# Patient Record
Sex: FEMALE | Race: OTHER | Employment: OTHER | URBAN - METROPOLITAN AREA
[De-identification: names, ages, dates, MRNs, and addresses within clinical notes are randomized per-mention and may not be internally consistent; named-entity substitution may affect disease eponyms.]

---

## 2017-01-19 ENCOUNTER — OFFICE VISIT (OUTPATIENT)
Dept: INTERNAL MEDICINE CLINIC | Age: 62
End: 2017-01-19

## 2017-01-19 VITALS
HEIGHT: 62 IN | WEIGHT: 142.8 LBS | TEMPERATURE: 97.9 F | SYSTOLIC BLOOD PRESSURE: 104 MMHG | BODY MASS INDEX: 26.28 KG/M2 | OXYGEN SATURATION: 98 % | HEART RATE: 71 BPM | DIASTOLIC BLOOD PRESSURE: 72 MMHG

## 2017-01-19 DIAGNOSIS — I10 ESSENTIAL HYPERTENSION: ICD-10-CM

## 2017-01-19 DIAGNOSIS — R05.9 COUGH: ICD-10-CM

## 2017-01-19 DIAGNOSIS — M79.7 FIBROMYALGIA SYNDROME: Chronic | ICD-10-CM

## 2017-01-19 DIAGNOSIS — J02.9 PHARYNGITIS, UNSPECIFIED ETIOLOGY: Primary | ICD-10-CM

## 2017-01-19 DIAGNOSIS — I48.91 ATRIAL FIBRILLATION, UNSPECIFIED TYPE (HCC): ICD-10-CM

## 2017-01-19 DIAGNOSIS — L30.9 DERMATITIS: ICD-10-CM

## 2017-01-19 LAB
INFLUENZA A ANTIBODY: NORMAL
INFLUENZA B ANTIBODY: NORMAL
S PYO AG THROAT QL: NORMAL

## 2017-01-19 PROCEDURE — 87880 STREP A ASSAY W/OPTIC: CPT | Performed by: NURSE PRACTITIONER

## 2017-01-19 PROCEDURE — 87804 INFLUENZA ASSAY W/OPTIC: CPT | Performed by: NURSE PRACTITIONER

## 2017-01-19 PROCEDURE — 99213 OFFICE O/P EST LOW 20 MIN: CPT | Performed by: NURSE PRACTITIONER

## 2017-01-19 RX ORDER — TORSEMIDE 20 MG/1
20 TABLET ORAL DAILY
Qty: 90 TABLET | Refills: 0 | Status: SHIPPED | OUTPATIENT
Start: 2017-01-19 | End: 2017-04-28 | Stop reason: SDUPTHER

## 2017-01-19 RX ORDER — CARVEDILOL 6.25 MG/1
6.25 TABLET ORAL 2 TIMES DAILY WITH MEALS
Qty: 180 TABLET | Refills: 0 | Status: SHIPPED | OUTPATIENT
Start: 2017-01-19 | End: 2017-04-28 | Stop reason: SDUPTHER

## 2017-01-19 RX ORDER — POTASSIUM CHLORIDE 20 MEQ/1
20 TABLET, EXTENDED RELEASE ORAL DAILY
Qty: 90 TABLET | Refills: 0 | Status: SHIPPED | OUTPATIENT
Start: 2017-01-19 | End: 2017-04-28 | Stop reason: SDUPTHER

## 2017-01-19 RX ORDER — SPIRONOLACTONE 25 MG/1
TABLET ORAL
Qty: 45 TABLET | Refills: 1 | Status: SHIPPED | OUTPATIENT
Start: 2017-01-19 | End: 2017-04-28 | Stop reason: SDUPTHER

## 2017-01-19 RX ORDER — VALSARTAN 40 MG/1
40 TABLET ORAL DAILY
Qty: 90 TABLET | Refills: 0 | Status: SHIPPED | OUTPATIENT
Start: 2017-01-19 | End: 2017-04-28 | Stop reason: SDUPTHER

## 2017-01-19 RX ORDER — DIGOXIN 125 MCG
125 TABLET ORAL DAILY
Qty: 90 TABLET | Refills: 0 | Status: SHIPPED | OUTPATIENT
Start: 2017-01-19 | End: 2017-04-28 | Stop reason: SDUPTHER

## 2017-01-19 RX ORDER — TRIAMCINOLONE ACETONIDE 1 MG/G
CREAM TOPICAL
Qty: 60 G | Refills: 0 | Status: SHIPPED | OUTPATIENT
Start: 2017-01-19 | End: 2017-10-05 | Stop reason: ALTCHOICE

## 2017-01-19 RX ORDER — BENZONATATE 100 MG/1
100 CAPSULE ORAL 3 TIMES DAILY PRN
Qty: 30 CAPSULE | Refills: 0 | Status: SHIPPED | OUTPATIENT
Start: 2017-01-19 | End: 2019-01-08 | Stop reason: SDUPTHER

## 2017-01-19 ASSESSMENT — ENCOUNTER SYMPTOMS
SINUS PRESSURE: 1
SORE THROAT: 1
COUGH: 1
CHEST TIGHTNESS: 1
TROUBLE SWALLOWING: 1

## 2017-02-16 ENCOUNTER — OFFICE VISIT (OUTPATIENT)
Dept: INTERNAL MEDICINE CLINIC | Age: 62
End: 2017-02-16

## 2017-02-16 VITALS
DIASTOLIC BLOOD PRESSURE: 82 MMHG | SYSTOLIC BLOOD PRESSURE: 130 MMHG | HEIGHT: 62 IN | WEIGHT: 145.8 LBS | BODY MASS INDEX: 26.83 KG/M2 | HEART RATE: 72 BPM

## 2017-02-16 DIAGNOSIS — R30.9 PAINFUL URINATION: Primary | ICD-10-CM

## 2017-02-16 LAB
BILIRUBIN, POC: ABNORMAL
BLOOD URINE, POC: ABNORMAL
CLARITY, POC: ABNORMAL
COLOR, POC: ABNORMAL
GLUCOSE URINE, POC: ABNORMAL
KETONES, POC: ABNORMAL
LEUKOCYTE EST, POC: ABNORMAL
NITRITE, POC: ABNORMAL
PH, POC: 7.5
PROTEIN, POC: ABNORMAL
SPECIFIC GRAVITY, POC: 1.01
UROBILINOGEN, POC: 0.2

## 2017-02-16 PROCEDURE — 81002 URINALYSIS NONAUTO W/O SCOPE: CPT | Performed by: INTERNAL MEDICINE

## 2017-02-16 PROCEDURE — 99213 OFFICE O/P EST LOW 20 MIN: CPT | Performed by: INTERNAL MEDICINE

## 2017-02-16 RX ORDER — CIPROFLOXACIN 250 MG/1
TABLET, FILM COATED ORAL
Qty: 10 TABLET | Refills: 1 | Status: SHIPPED | OUTPATIENT
Start: 2017-02-16 | End: 2017-04-28 | Stop reason: ALTCHOICE

## 2017-02-16 RX ORDER — CIPROFLOXACIN 250 MG/1
250 TABLET, FILM COATED ORAL 2 TIMES DAILY
Qty: 6 TABLET | Refills: 0 | Status: SHIPPED | OUTPATIENT
Start: 2017-02-16 | End: 2017-02-19

## 2017-02-20 ENCOUNTER — TELEPHONE (OUTPATIENT)
Dept: INTERNAL MEDICINE CLINIC | Age: 62
End: 2017-02-20

## 2017-04-28 ENCOUNTER — OFFICE VISIT (OUTPATIENT)
Dept: INTERNAL MEDICINE CLINIC | Age: 62
End: 2017-04-28

## 2017-04-28 VITALS
HEIGHT: 62 IN | DIASTOLIC BLOOD PRESSURE: 80 MMHG | HEART RATE: 68 BPM | SYSTOLIC BLOOD PRESSURE: 120 MMHG | WEIGHT: 141.8 LBS | BODY MASS INDEX: 26.09 KG/M2

## 2017-04-28 DIAGNOSIS — R21 RASH: ICD-10-CM

## 2017-04-28 DIAGNOSIS — I10 ESSENTIAL HYPERTENSION: ICD-10-CM

## 2017-04-28 DIAGNOSIS — I42.8 NON-ISCHEMIC CARDIOMYOPATHY (HCC): Primary | Chronic | ICD-10-CM

## 2017-04-28 LAB
CHOLESTEROL, TOTAL: 232 MG/DL (ref 0–199)
HDLC SERPL-MCNC: 39 MG/DL (ref 40–60)
LDL CHOLESTEROL CALCULATED: ABNORMAL MG/DL
LDL CHOLESTEROL DIRECT: 147 MG/DL
TRIGL SERPL-MCNC: 361 MG/DL (ref 0–150)
VLDLC SERPL CALC-MCNC: ABNORMAL MG/DL

## 2017-04-28 PROCEDURE — 99214 OFFICE O/P EST MOD 30 MIN: CPT | Performed by: INTERNAL MEDICINE

## 2017-04-28 RX ORDER — VALSARTAN 40 MG/1
40 TABLET ORAL DAILY
Qty: 90 TABLET | Refills: 3 | Status: SHIPPED | OUTPATIENT
Start: 2017-04-28 | End: 2017-06-22 | Stop reason: SDUPTHER

## 2017-04-28 RX ORDER — CARVEDILOL 6.25 MG/1
6.25 TABLET ORAL 2 TIMES DAILY WITH MEALS
Qty: 180 TABLET | Refills: 3 | Status: SHIPPED | OUTPATIENT
Start: 2017-04-28 | End: 2018-05-31 | Stop reason: SDUPTHER

## 2017-04-28 RX ORDER — TORSEMIDE 20 MG/1
20 TABLET ORAL DAILY
Qty: 90 TABLET | Refills: 3 | Status: SHIPPED | OUTPATIENT
Start: 2017-04-28 | End: 2020-05-14 | Stop reason: SDUPTHER

## 2017-04-28 RX ORDER — DIGOXIN 125 MCG
125 TABLET ORAL DAILY
Qty: 90 TABLET | Refills: 0 | Status: SHIPPED | OUTPATIENT
Start: 2017-04-28 | End: 2017-08-11 | Stop reason: SDUPTHER

## 2017-04-28 RX ORDER — POTASSIUM CHLORIDE 20 MEQ/1
20 TABLET, EXTENDED RELEASE ORAL DAILY
Qty: 90 TABLET | Refills: 3 | Status: SHIPPED | OUTPATIENT
Start: 2017-04-28 | End: 2018-07-13 | Stop reason: SDUPTHER

## 2017-04-28 RX ORDER — SPIRONOLACTONE 25 MG/1
TABLET ORAL
Qty: 45 TABLET | Refills: 3 | Status: SHIPPED | OUTPATIENT
Start: 2017-04-28 | End: 2018-02-05 | Stop reason: SDUPTHER

## 2017-05-04 ENCOUNTER — TELEPHONE (OUTPATIENT)
Dept: INTERNAL MEDICINE CLINIC | Age: 62
End: 2017-05-04

## 2017-05-04 RX ORDER — PRAVASTATIN SODIUM 40 MG
40 TABLET ORAL EVERY EVENING
Qty: 90 TABLET | Refills: 1 | Status: SHIPPED | OUTPATIENT
Start: 2017-05-04 | End: 2017-05-11

## 2017-05-10 ENCOUNTER — TELEPHONE (OUTPATIENT)
Dept: INTERNAL MEDICINE CLINIC | Age: 62
End: 2017-05-10

## 2017-06-21 DIAGNOSIS — I10 ESSENTIAL HYPERTENSION: ICD-10-CM

## 2017-06-22 RX ORDER — VALSARTAN 40 MG/1
TABLET ORAL
Qty: 225 TABLET | Refills: 3 | Status: SHIPPED | OUTPATIENT
Start: 2017-06-22 | End: 2018-07-28 | Stop reason: SDUPTHER

## 2017-08-11 RX ORDER — DIGOXIN 125 UG/1
TABLET ORAL
Qty: 90 TABLET | Refills: 1 | Status: SHIPPED | OUTPATIENT
Start: 2017-08-11 | End: 2018-02-04 | Stop reason: SDUPTHER

## 2017-08-15 ENCOUNTER — OFFICE VISIT (OUTPATIENT)
Dept: SLEEP MEDICINE | Age: 62
End: 2017-08-15

## 2017-08-15 VITALS
HEIGHT: 60 IN | OXYGEN SATURATION: 96 % | WEIGHT: 142.8 LBS | DIASTOLIC BLOOD PRESSURE: 70 MMHG | SYSTOLIC BLOOD PRESSURE: 118 MMHG | BODY MASS INDEX: 28.03 KG/M2 | HEART RATE: 90 BPM

## 2017-08-15 DIAGNOSIS — I10 HYPERTENSION, ESSENTIAL: Chronic | ICD-10-CM

## 2017-08-15 DIAGNOSIS — I42.8 NON-ISCHEMIC CARDIOMYOPATHY (HCC): Chronic | ICD-10-CM

## 2017-08-15 DIAGNOSIS — M79.7 FIBROMYALGIA SYNDROME: Chronic | ICD-10-CM

## 2017-08-15 DIAGNOSIS — G47.33 OBSTRUCTIVE SLEEP APNEA SYNDROME: Primary | Chronic | ICD-10-CM

## 2017-08-15 PROCEDURE — 99214 OFFICE O/P EST MOD 30 MIN: CPT | Performed by: INTERNAL MEDICINE

## 2017-08-15 ASSESSMENT — ENCOUNTER SYMPTOMS
RHINORRHEA: 0
APNEA: 0
SHORTNESS OF BREATH: 0
COUGH: 0
CHOKING: 0

## 2017-08-15 ASSESSMENT — SLEEP AND FATIGUE QUESTIONNAIRES
HOW LIKELY ARE YOU TO NOD OFF OR FALL ASLEEP WHILE SITTING AND READING: 1
HOW LIKELY ARE YOU TO NOD OFF OR FALL ASLEEP WHILE LYING DOWN TO REST IN THE AFTERNOON WHEN CIRCUMSTANCES PERMIT: 1
HOW LIKELY ARE YOU TO NOD OFF OR FALL ASLEEP WHILE SITTING AND TALKING TO SOMEONE: 0
HOW LIKELY ARE YOU TO NOD OFF OR FALL ASLEEP WHEN YOU ARE A PASSENGER IN A CAR FOR AN HOUR WITHOUT A BREAK: 1
HOW LIKELY ARE YOU TO NOD OFF OR FALL ASLEEP WHILE SITTING INACTIVE IN A PUBLIC PLACE: 1
HOW LIKELY ARE YOU TO NOD OFF OR FALL ASLEEP WHILE SITTING QUIETLY AFTER LUNCH WITHOUT ALCOHOL: 1
ESS TOTAL SCORE: 6
HOW LIKELY ARE YOU TO NOD OFF OR FALL ASLEEP IN A CAR, WHILE STOPPED FOR A FEW MINUTES IN TRAFFIC: 0
HOW LIKELY ARE YOU TO NOD OFF OR FALL ASLEEP WHILE WATCHING TV: 1

## 2017-09-20 ENCOUNTER — TELEPHONE (OUTPATIENT)
Dept: RHEUMATOLOGY | Age: 62
End: 2017-09-20

## 2017-10-02 ENCOUNTER — TELEPHONE (OUTPATIENT)
Dept: INTERNAL MEDICINE CLINIC | Age: 62
End: 2017-10-02

## 2017-10-02 DIAGNOSIS — E78.2 MIXED HYPERLIPIDEMIA: ICD-10-CM

## 2017-10-02 DIAGNOSIS — R73.09 ABNORMAL GLUCOSE: ICD-10-CM

## 2017-10-02 DIAGNOSIS — I10 BENIGN ESSENTIAL HTN: Primary | Chronic | ICD-10-CM

## 2017-10-05 ENCOUNTER — OFFICE VISIT (OUTPATIENT)
Dept: INTERNAL MEDICINE CLINIC | Age: 62
End: 2017-10-05

## 2017-10-05 VITALS
HEIGHT: 60 IN | WEIGHT: 143.2 LBS | BODY MASS INDEX: 28.11 KG/M2 | HEART RATE: 68 BPM | SYSTOLIC BLOOD PRESSURE: 100 MMHG | DIASTOLIC BLOOD PRESSURE: 60 MMHG

## 2017-10-05 DIAGNOSIS — I10 BENIGN ESSENTIAL HTN: Chronic | ICD-10-CM

## 2017-10-05 DIAGNOSIS — I42.8 NON-ISCHEMIC CARDIOMYOPATHY (HCC): Primary | Chronic | ICD-10-CM

## 2017-10-05 DIAGNOSIS — H01.002 BLEPHARITIS OF RIGHT LOWER EYELID, UNSPECIFIED TYPE: ICD-10-CM

## 2017-10-05 DIAGNOSIS — Z11.59 NEED FOR HEPATITIS C SCREENING TEST: ICD-10-CM

## 2017-10-05 PROCEDURE — 90471 IMMUNIZATION ADMIN: CPT | Performed by: INTERNAL MEDICINE

## 2017-10-05 PROCEDURE — 90686 IIV4 VACC NO PRSV 0.5 ML IM: CPT | Performed by: INTERNAL MEDICINE

## 2017-10-05 PROCEDURE — 99214 OFFICE O/P EST MOD 30 MIN: CPT | Performed by: INTERNAL MEDICINE

## 2017-10-05 ASSESSMENT — PATIENT HEALTH QUESTIONNAIRE - PHQ9
2. FEELING DOWN, DEPRESSED OR HOPELESS: 1
SUM OF ALL RESPONSES TO PHQ9 QUESTIONS 1 & 2: 2
1. LITTLE INTEREST OR PLEASURE IN DOING THINGS: 1
SUM OF ALL RESPONSES TO PHQ QUESTIONS 1-9: 2

## 2017-10-05 NOTE — PROGRESS NOTES
Subjective:      Patient ID: Abbey Loving is a 59 y.o. female. HPI  Presenting for management of cardiomyopathy, HTN, and with complaint of matted right eye. She has nonischemic cardiomyopathy. She is followed by cardiology at [facility]. She takes her medicine as directed. She reports that she walks 2 miles daily. She denies functional limitations. She has benign essential hypertension. She checks her blood pressure outside of the office and it runs under good control. She has no symptoms related to her hypertension. Today she also reports matting of the right eye. She also has a \"pimple\" in the right eye. Onset: yesterday  Symptoms are worse in the morning and improve throughout the day. Social History   Substance Use Topics    Smoking status: Former Smoker     Packs/day: 0.25     Years: 10.00     Quit date: 4/6/1998    Smokeless tobacco: Never Used      Comment: smoked 1 cig a day,    Alcohol use No      Review of Systems  Neg for chest pain  Neg for dyspnea  Neg for constipation  Denies urinary problems    Objective:   Physical Exam  /60 (Site: Left Arm, Position: Sitting, Cuff Size: Large Adult)  Pulse 68  Ht 5' (1.524 m)  Wt 143 lb 3.2 oz (65 kg)  BMI 27.97 kg/m2   Wt Readings from Last 3 Encounters:   10/05/17 143 lb 3.2 oz (65 kg)   08/15/17 142 lb 12.8 oz (64.8 kg)   04/28/17 141 lb 12.8 oz (64.3 kg)   GEN: WN/WD, NAD  Eyes: there is a stye to the lower conjunctiva of the right eye  There is redness of the lower conjunctiva of the right eye and a watery discharge. CV: regular rate and rhythm, no murmurs rubs or gallops  Resp: normal effort, clear auscultation bilaterally  No peripheral edema   Abd: soft, nontender to palpation. BW 4/6/17: normal LFT, normal GFR, normal glucose  Stress test 4/10/17: LVEF 42% at rest 47% with stress  Echo Feb 2017: LVEF 30-35%  Diffuse hypokinesis  Assessment/Plan:        1.  Non-ischemic cardiomyopathy [facility])  She continues to do well on medical management. Continue ARB, beta blocker, aldosterone antagonist    2. Benign essential HTN  Blood pressure remains well-controlled. Given her cardiomyopathy, she should remain at this level of control. Continue current medications. 3. Blepharitis of right lower eyelid, unspecified type  With acute discharge of the lower conjunctiva. She will be treated with warm compresses.     4. Need for hepatitis C screening test    - Hepatitis C Antibody    RTO 6 months or PRN

## 2017-10-18 ENCOUNTER — HOSPITAL ENCOUNTER (OUTPATIENT)
Dept: OTHER | Age: 62
Discharge: HOME | End: 2017-10-18
Attending: INTERNAL MEDICINE | Admitting: INTERNAL MEDICINE

## 2017-10-18 LAB
ALBUMIN SERPL-MCNC: 4.4 G/DL (ref 3.4–5)
ANION GAP SERPL CALCULATED.3IONS-SCNC: 14 MMOL/L (ref 3–16)
BUN BLDV-MCNC: 22 MG/DL (ref 7–20)
CALCIUM SERPL-MCNC: 10.4 MG/DL (ref 8.3–10.6)
CHLORIDE BLD-SCNC: 98 MMOL/L (ref 99–110)
CHOLESTEROL, TOTAL: 243 MG/DL (ref 0–199)
CO2: 28 MMOL/L (ref 21–32)
CREAT SERPL-MCNC: 0.9 MG/DL (ref 0.6–1.2)
ESTIMATED AVERAGE GLUCOSE: 116.9 MG/DL
GFR AFRICAN AMERICAN: >60
GFR NON-AFRICAN AMERICAN: >60
GLUCOSE BLD-MCNC: 113 MG/DL (ref 70–99)
HBA1C MFR BLD: 5.7 %
HDLC SERPL-MCNC: 41 MG/DL (ref 40–60)
HEPATITIS C ANTIBODY INTERPRETATION: NORMAL
LDL CHOLESTEROL CALCULATED: 166 MG/DL
PHOSPHORUS: 3.7 MG/DL (ref 2.5–4.9)
POTASSIUM SERPL-SCNC: 4.9 MMOL/L (ref 3.5–5.1)
SODIUM BLD-SCNC: 140 MMOL/L (ref 136–145)
TRIGL SERPL-MCNC: 178 MG/DL (ref 0–150)
VLDLC SERPL CALC-MCNC: 36 MG/DL

## 2017-10-20 ENCOUNTER — TELEPHONE (OUTPATIENT)
Dept: INTERNAL MEDICINE CLINIC | Age: 62
End: 2017-10-20

## 2017-10-20 RX ORDER — ATORVASTATIN CALCIUM 20 MG/1
20 TABLET, FILM COATED ORAL DAILY
Qty: 30 TABLET | Refills: 3 | Status: SHIPPED | OUTPATIENT
Start: 2017-10-20 | End: 2018-02-05 | Stop reason: SDUPTHER

## 2017-10-27 ENCOUNTER — OFFICE VISIT (OUTPATIENT)
Dept: SLEEP MEDICINE | Age: 62
End: 2017-10-27

## 2017-10-27 VITALS
SYSTOLIC BLOOD PRESSURE: 110 MMHG | BODY MASS INDEX: 25.58 KG/M2 | WEIGHT: 139 LBS | DIASTOLIC BLOOD PRESSURE: 70 MMHG | OXYGEN SATURATION: 98 % | HEIGHT: 62 IN | HEART RATE: 87 BPM

## 2017-10-27 DIAGNOSIS — I10 HYPERTENSION, ESSENTIAL: Chronic | ICD-10-CM

## 2017-10-27 DIAGNOSIS — I42.8 NON-ISCHEMIC CARDIOMYOPATHY (HCC): Chronic | ICD-10-CM

## 2017-10-27 DIAGNOSIS — M79.7 FIBROMYALGIA SYNDROME: Chronic | ICD-10-CM

## 2017-10-27 DIAGNOSIS — G47.33 OBSTRUCTIVE SLEEP APNEA SYNDROME: Primary | Chronic | ICD-10-CM

## 2017-10-27 PROCEDURE — 99214 OFFICE O/P EST MOD 30 MIN: CPT | Performed by: NURSE PRACTITIONER

## 2017-10-27 PROCEDURE — 3014F SCREEN MAMMO DOC REV: CPT | Performed by: NURSE PRACTITIONER

## 2017-10-27 PROCEDURE — G8484 FLU IMMUNIZE NO ADMIN: HCPCS | Performed by: NURSE PRACTITIONER

## 2017-10-27 PROCEDURE — 1036F TOBACCO NON-USER: CPT | Performed by: NURSE PRACTITIONER

## 2017-10-27 PROCEDURE — 3017F COLORECTAL CA SCREEN DOC REV: CPT | Performed by: NURSE PRACTITIONER

## 2017-10-27 PROCEDURE — G8417 CALC BMI ABV UP PARAM F/U: HCPCS | Performed by: NURSE PRACTITIONER

## 2017-10-27 PROCEDURE — G8427 DOCREV CUR MEDS BY ELIG CLIN: HCPCS | Performed by: NURSE PRACTITIONER

## 2017-10-27 ASSESSMENT — SLEEP AND FATIGUE QUESTIONNAIRES
HOW LIKELY ARE YOU TO NOD OFF OR FALL ASLEEP WHILE SITTING INACTIVE IN A PUBLIC PLACE: 0
HOW LIKELY ARE YOU TO NOD OFF OR FALL ASLEEP WHEN YOU ARE A PASSENGER IN A CAR FOR AN HOUR WITHOUT A BREAK: 2
HOW LIKELY ARE YOU TO NOD OFF OR FALL ASLEEP WHILE WATCHING TV: 1
HOW LIKELY ARE YOU TO NOD OFF OR FALL ASLEEP WHILE SITTING QUIETLY AFTER LUNCH WITHOUT ALCOHOL: 0
HOW LIKELY ARE YOU TO NOD OFF OR FALL ASLEEP IN A CAR, WHILE STOPPED FOR A FEW MINUTES IN TRAFFIC: 0
HOW LIKELY ARE YOU TO NOD OFF OR FALL ASLEEP WHILE SITTING AND TALKING TO SOMEONE: 0
ESS TOTAL SCORE: 4
HOW LIKELY ARE YOU TO NOD OFF OR FALL ASLEEP WHILE SITTING AND READING: 0
HOW LIKELY ARE YOU TO NOD OFF OR FALL ASLEEP WHILE LYING DOWN TO REST IN THE AFTERNOON WHEN CIRCUMSTANCES PERMIT: 1

## 2017-10-27 ASSESSMENT — ENCOUNTER SYMPTOMS
APNEA: 0
COUGH: 0
SHORTNESS OF BREATH: 0
RHINORRHEA: 0
ABDOMINAL PAIN: 0
SINUS PRESSURE: 0
ABDOMINAL DISTENTION: 0

## 2017-10-27 NOTE — LETTER
[facility]  [address] [place] [id number]  Phone: [phone]  Fax: [phone]    October 27, 2017       Patient: Sheri Peralta   MR Number: [id number]   YOB: 1955   Date of Visit: 10/27/2017       Sheri Peralta was seen for a follow up visit today. Here is my assessment and plan as well as an attached copy of her visit today:      ASSESSMENT:  Michael Pratt was seen today for sleep apnea. Diagnoses and all orders for this visit:    Obstructive sleep apnea syndrome    Non-ischemic cardiomyopathy ([facility]    Hypertension, essential    Fibromyalgia syndrome        Plan:       If you have questions or concerns, please do not hesitate to call me. I look forward to following Michael Pratt along with you.     Sincerely,      Aashish Sweeney CNP    CC providers:  Loy Farah MD  [address] [place] [address]  VIA In Basket

## 2017-10-27 NOTE — PROGRESS NOTES
change, chills and fever. HENT: Negative for congestion, nosebleeds, rhinorrhea and sinus pressure. Respiratory: Negative for apnea, cough and shortness of breath. Cardiovascular: Negative for chest pain and palpitations. Gastrointestinal: Negative for abdominal distention and abdominal pain. Neurological: Negative for dizziness and headaches. Social History     Social History    Marital status:      Spouse name: N/A    Number of children: N/A    Years of education: N/A     Occupational History    Not on file. Social History Main Topics    Smoking status: Former Smoker     Packs/day: 0.25     Years: 10.00     Quit date: 4/6/1998    Smokeless tobacco: Never Used      Comment: smoked 1 cig a day,    Alcohol use No    Drug use: No    Sexual activity: Yes     Partners: Male      Comment:      Other Topics Concern    Not on file     Social History Narrative    No narrative on file       Prior to Admission medications    Medication Sig Start Date End Date Taking?  Authorizing Provider   atorvastatin (LIPITOR) 20 MG tablet Take 1 tablet by mouth daily 10/20/17  Yes Barbara Borges MD   VOLTAREN 1 % GEL Apply 2-4 grams to painful joints 4 times daily as needed 10/5/17  Yes Barbara Borges MD   DIGOX 125 MCG tablet TAKE 1 TABLET BY MOUTH DAILY 8/11/17  Yes Barbara Borges MD   valsartan (DIOVAN) 40 MG tablet Take 1 1/2 tablets in the AM and 1 tablet in the PM 6/22/17  Yes Barbara Borges MD   spironolactone (ALDACTONE) 25 MG tablet Take 1/2 tablet daily 4/28/17  Yes Barbara Borges MD   carvedilol (COREG) 6.25 MG tablet Take 1 tablet by mouth 2 times daily (with meals) 4/28/17  Yes Barbara Borges MD   torsemide [facility]) 20 MG tablet Take 1 tablet by mouth daily 4/28/17  Yes Barbara Borges MD   diclofenac (FLECTOR) 1.3 % patch Place 1 patch onto the skin 2 times daily Apply over area of pain BID 1/7/16  Yes Barbara Borges MD   potassium chloride (KLOR-CON M) 20 MEQ extended release tablet Take 1 tablet by mouth daily 4/28/17 10/5/17  Yrn Madrigal MD       Allergies as of 10/27/2017    (No Known Allergies)       Patient Active Problem List   Diagnosis    Non-ischemic cardiomyopathy ([facility]    Pacemaker    History of pericarditis    Fibromyalgia syndrome    Sebaceous cyst    Benign essential HTN    Obstructive sleep apnea syndrome       Past Medical History:   Diagnosis Date    Asthma     Atrial fibrillation ([facility]     Cardiomyopathy, nonischemic ([facility]     GERD (gastroesophageal reflux disease)     HSV-1 (herpes simplex virus 1) infection     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    Sleep apnea     uses cpap    Uterine fibroids affecting pregnancy        Past Surgical History:   Procedure Laterality Date     SECTION      3 c-sections    COLONOSCOPY  2015    polypectomy    PACEMAKER INSERTION      PACEMAKER INSERTION      UPPER GASTROINTESTINAL ENDOSCOPY  2015       Family History   Problem Relation Age of Onset    Diabetes Mother     Kidney Disease Mother      reanl failure       Vitals:  Weight BMI   Wt Readings from Last 3 Encounters:   10/27/17 139 lb (63 kg)   10/05/17 143 lb 3.2 oz (65 kg)   08/15/17 142 lb 12.8 oz (64.8 kg)    Body mass index is 25.42 kg/m². BP HR SaO2   BP Readings from Last 3 Encounters:   10/27/17 110/70   10/05/17 100/60   08/15/17 118/70    Pulse Readings from Last 3 Encounters:   10/27/17 87   10/05/17 68   08/15/17 90    SpO2 Readings from Last 3 Encounters:   10/27/17 98%   08/15/17 96%   17 98%        Assessment:     1. Obstructive sleep apnea syndrome Stable    2. Non-ischemic cardiomyopathy (HCC) Stable    3. Hypertension, essential Stable    4. Fibromyalgia syndrome Stable        The chronic medical conditions listed are directly related to the primary diagnosis listed above.   The management of the primary diagnosis affects the secondary diagnosis and vice versa. Plan:   - Educated patient and reviewed compliance download with pt.    -Supplies and parts as needed for her machine, these are medically necessary.    - Patient using [employer] for supplies  -Continue medications per her PCP and other physicians.   -Limit caffeine use after 3pm.    -Encouraged her to work on weight loss through diet and exercise. -F/U: 12 month. No orders of the defined types were placed in this encounter. No orders of the defined types were placed in this encounter. No orders of the defined types were placed in this encounter.       Joselin Hamilton, MSN, RN, CNP

## 2017-11-07 ENCOUNTER — TELEPHONE (OUTPATIENT)
Dept: INTERNAL MEDICINE CLINIC | Age: 62
End: 2017-11-07

## 2017-11-07 NOTE — TELEPHONE ENCOUNTER
I have reviewed her ER records. She had a negative work up on 11/5. I can see her this week. If she needs to come in today, you can add her on this afternoon.

## 2017-11-07 NOTE — TELEPHONE ENCOUNTER
Pt calling was in the Er the other day having trouble breathing ---still having trouble breathing can you work her in today? Please call the pt. Thanks.

## 2017-11-07 NOTE — TELEPHONE ENCOUNTER
Patient would like to know if she can have a breathing test done. She said this happened last year and this year its happened again. She just wants to know what she should do. She said it is not urgent that she gets in to be seen. I scheduled her for Thursday. Do you want to wait to address this or order any testing for her?

## 2017-11-09 ENCOUNTER — OFFICE VISIT (OUTPATIENT)
Dept: INTERNAL MEDICINE CLINIC | Age: 62
End: 2017-11-09

## 2017-11-09 VITALS
BODY MASS INDEX: 25.76 KG/M2 | OXYGEN SATURATION: 98 % | WEIGHT: 140 LBS | DIASTOLIC BLOOD PRESSURE: 64 MMHG | HEART RATE: 64 BPM | HEIGHT: 62 IN | SYSTOLIC BLOOD PRESSURE: 98 MMHG

## 2017-11-09 DIAGNOSIS — R05.9 COUGH: Primary | ICD-10-CM

## 2017-11-09 PROCEDURE — 3017F COLORECTAL CA SCREEN DOC REV: CPT | Performed by: INTERNAL MEDICINE

## 2017-11-09 PROCEDURE — G8484 FLU IMMUNIZE NO ADMIN: HCPCS | Performed by: INTERNAL MEDICINE

## 2017-11-09 PROCEDURE — 99213 OFFICE O/P EST LOW 20 MIN: CPT | Performed by: INTERNAL MEDICINE

## 2017-11-09 PROCEDURE — G8417 CALC BMI ABV UP PARAM F/U: HCPCS | Performed by: INTERNAL MEDICINE

## 2017-11-09 PROCEDURE — 3014F SCREEN MAMMO DOC REV: CPT | Performed by: INTERNAL MEDICINE

## 2017-11-09 PROCEDURE — G8427 DOCREV CUR MEDS BY ELIG CLIN: HCPCS | Performed by: INTERNAL MEDICINE

## 2017-11-09 PROCEDURE — 1036F TOBACCO NON-USER: CPT | Performed by: INTERNAL MEDICINE

## 2017-11-09 NOTE — PROGRESS NOTES
Subjective:      Patient ID: Mich Foreman is a 59 y.o. female. CC: shortness of breath  HPI  Presenting for evaluation of cough, shortness of breath X 2 weeks. She was in the ER last Sunday. Records were reviewed. Today she reports she is improving. She continues to have a mild cough productive of sputum, but it is getting better. She has mild dyspnea which is improving. She has associated rhinnorhea and wheezing. Social History   Substance Use Topics    Smoking status: Former Smoker     Packs/day: 0.25     Years: 10.00     Quit date: 4/6/1998    Smokeless tobacco: Never Used      Comment: smoked 1 cig a day,    Alcohol use No    quit smoking about 35 years ago  She never smoked regularly, just about 1 cig per day  Review of Systems  +anxiety  +fatigued  She had a subjective fever about a week ago, now resolved  Negative for chest pain  Objective:   Physical Exam  BP 98/64 (Site: Left Arm, Position: Sitting, Cuff Size: Large Adult)   Pulse 64   Ht 5' 2\" (1.575 m)   Wt 140 lb (63.5 kg)   SpO2 98%   BMI 25.61 kg/m²    GEN: WN/WD  ENT: MMM, The oropharynx is pink without erythema or exudate  Neck: Supple, no palpable lymphadenopathy  Respiratory: Effort is normal, breath sounds are clear without crackles or wheezing  Cardiovascular: Regular rate and rhythm, no audible murmurs    Chest x-ray from November 5 within normal limits    D-dimer was negative on 11/5  Renal panel was normal other than glucose 143  CBC was normal other than platelet count 690,338  BNP 64  Troponin neg    EKG AV pacemaker  Assessment:      1. Cough  She is improving with supportive care. Her exam is unremarkable today. Emergency department testing reviewed and also unremarkable. Findings are most consistent with a viral upper respiratory infection. She reports that she has had recurrent episodes of coughing and would like to have lung function testing. I think this is reasonable and an order was placed.   Obstructive

## 2017-11-17 ENCOUNTER — TELEPHONE (OUTPATIENT)
Dept: INTERNAL MEDICINE CLINIC | Age: 62
End: 2017-11-17

## 2018-01-26 ENCOUNTER — TELEPHONE (OUTPATIENT)
Dept: INTERNAL MEDICINE CLINIC | Age: 63
End: 2018-01-26

## 2018-01-26 ENCOUNTER — NURSE ONLY (OUTPATIENT)
Dept: INTERNAL MEDICINE CLINIC | Age: 63
End: 2018-01-26

## 2018-01-26 DIAGNOSIS — R30.0 BURNING WITH URINATION: ICD-10-CM

## 2018-01-26 DIAGNOSIS — R35.0 FREQUENT URINATION: Primary | ICD-10-CM

## 2018-01-26 LAB
BILIRUBIN, POC: NORMAL
BLOOD URINE, POC: NORMAL
CLARITY, POC: NORMAL
COLOR, POC: NORMAL
GLUCOSE URINE, POC: NORMAL
KETONES, POC: NORMAL
LEUKOCYTE EST, POC: NORMAL
NITRITE, POC: NORMAL
PH, POC: 7
PROTEIN, POC: NORMAL
SPECIFIC GRAVITY, POC: 1.01
UROBILINOGEN, POC: 0.2

## 2018-01-26 PROCEDURE — 81002 URINALYSIS NONAUTO W/O SCOPE: CPT | Performed by: INTERNAL MEDICINE

## 2018-01-29 LAB — URINE CULTURE, ROUTINE: NORMAL

## 2018-02-05 DIAGNOSIS — I10 ESSENTIAL HYPERTENSION: ICD-10-CM

## 2018-02-05 RX ORDER — DIGOXIN 125 UG/1
TABLET ORAL
Qty: 90 TABLET | Refills: 1 | Status: SHIPPED | OUTPATIENT
Start: 2018-02-05 | End: 2018-08-12 | Stop reason: SDUPTHER

## 2018-02-05 RX ORDER — SPIRONOLACTONE 25 MG/1
TABLET ORAL
Qty: 45 TABLET | Refills: 1 | Status: SHIPPED | OUTPATIENT
Start: 2018-02-05 | End: 2018-09-11 | Stop reason: SDUPTHER

## 2018-02-05 RX ORDER — ATORVASTATIN CALCIUM 20 MG/1
20 TABLET, FILM COATED ORAL DAILY
Qty: 90 TABLET | Refills: 1 | Status: SHIPPED | OUTPATIENT
Start: 2018-02-05 | End: 2018-09-11 | Stop reason: CLARIF

## 2018-03-27 ENCOUNTER — OFFICE VISIT (OUTPATIENT)
Dept: INTERNAL MEDICINE CLINIC | Age: 63
End: 2018-03-27

## 2018-03-27 ENCOUNTER — TELEPHONE (OUTPATIENT)
Dept: RHEUMATOLOGY | Age: 63
End: 2018-03-27

## 2018-03-27 VITALS
DIASTOLIC BLOOD PRESSURE: 76 MMHG | WEIGHT: 143 LBS | BODY MASS INDEX: 26.31 KG/M2 | HEIGHT: 62 IN | HEART RATE: 76 BPM | SYSTOLIC BLOOD PRESSURE: 120 MMHG | TEMPERATURE: 98.1 F

## 2018-03-27 DIAGNOSIS — F33.1 MODERATE EPISODE OF RECURRENT MAJOR DEPRESSIVE DISORDER (HCC): ICD-10-CM

## 2018-03-27 DIAGNOSIS — J32.9 RHINOSINUSITIS: ICD-10-CM

## 2018-03-27 DIAGNOSIS — J31.0 RHINOSINUSITIS: ICD-10-CM

## 2018-03-27 DIAGNOSIS — M79.7 FIBROMYALGIA SYNDROME: Primary | Chronic | ICD-10-CM

## 2018-03-27 PROCEDURE — 99214 OFFICE O/P EST MOD 30 MIN: CPT | Performed by: INTERNAL MEDICINE

## 2018-03-27 PROCEDURE — 1036F TOBACCO NON-USER: CPT | Performed by: INTERNAL MEDICINE

## 2018-03-27 PROCEDURE — G8417 CALC BMI ABV UP PARAM F/U: HCPCS | Performed by: INTERNAL MEDICINE

## 2018-03-27 PROCEDURE — G8427 DOCREV CUR MEDS BY ELIG CLIN: HCPCS | Performed by: INTERNAL MEDICINE

## 2018-03-27 PROCEDURE — 3017F COLORECTAL CA SCREEN DOC REV: CPT | Performed by: INTERNAL MEDICINE

## 2018-03-27 PROCEDURE — 3014F SCREEN MAMMO DOC REV: CPT | Performed by: INTERNAL MEDICINE

## 2018-03-27 PROCEDURE — G8482 FLU IMMUNIZE ORDER/ADMIN: HCPCS | Performed by: INTERNAL MEDICINE

## 2018-03-27 RX ORDER — AMOXICILLIN AND CLAVULANATE POTASSIUM 875; 125 MG/1; MG/1
1 TABLET, FILM COATED ORAL 2 TIMES DAILY
Qty: 20 TABLET | Refills: 0 | Status: SHIPPED | OUTPATIENT
Start: 2018-03-27 | End: 2018-04-06

## 2018-03-27 RX ORDER — DULOXETIN HYDROCHLORIDE 20 MG/1
20 CAPSULE, DELAYED RELEASE ORAL DAILY
Qty: 30 CAPSULE | Refills: 1 | Status: SHIPPED | OUTPATIENT
Start: 2018-03-27 | End: 2018-06-15 | Stop reason: SDUPTHER

## 2018-03-27 NOTE — PROGRESS NOTES
percussion of maxillary and frontal sinuses  NECK: Supple, no palpable lymph nodes  MSK: There is tenderness upon palpation of the right cervical paraspinous muscles, right trapezius muscles, bilateral lumbar paraspinous muscles, over the greater trochanter of the right hip. Psych: mood is sad, affect is tearful    A/P  1. Fibromyalgia syndrome  The patient has chronic and diffuse pain consistent with fibromyalgia syndrome. She also has symptoms consistent with depression. In the past she did not give Cymbalta a full trial until it would have become effective because she was concerned that it was causing her to eat more. We discussed that Cymbalta would be able to potentially help with her pain and her depression, and I recommended that we try a low dose of Cymbalta for the next 6 weeks. She is agreeable. She will begin 20 mg daily and return in 6 weeks to monitor her response. 2. Moderate episode of recurrent major depressive disorder ([facility]  See discussion above. We discussed that to treat chronic widespread pain and depression Cymbalta is likely to be the most effective intervention. She is agreeable to a trial.    3. Rhinosinusitis  She has a sinus infection and worsening symptoms refractory to symptom-based treatment. A course of Augmentin will be prescribed. RTO 6 weeks   Scribe attestation: Taco Merida MA, am scribing for and in the presence of Daniel Abdi MD. Electronically signed by Grant Bennett MA on 3/27/2018 at 2:03 PM        Provider attestation: Meme Fuchs MD  personally performed the services described in this documentation, as scribed by the user listed above in my presence, and it is both accurate and complete. I agree with the Chief Complaint, ROS, and Past Histories independently gathered by the clinical support staff and the remaining scribed note accurately describes my personal service to the patient.     3/27/2018    2:10 PM

## 2018-03-27 NOTE — TELEPHONE ENCOUNTER
Pt called stating that she might have a sinus infection. Her face hurts and has pressure. She is blowing white from her nose and post nasal drip. She states that her facial pain is terrible. It hurts to pass saliva. She is having neck pain and her fibromyalgia is aggravated. She has had symptoms for a day and a half. No fever or chest congestion. Non productive cough. She is very fatigued. She only tried tylenol. There is nothing open with  until Friday. She declined appt with Sum[facility] Cuevas. Verified pharmacy and allergies.

## 2018-04-20 ENCOUNTER — HOSPITAL ENCOUNTER (OUTPATIENT)
Dept: OTHER | Age: 63
Discharge: HOME | End: 2018-04-20
Attending: INTERNAL MEDICINE | Admitting: INTERNAL MEDICINE

## 2018-04-20 LAB
CHOLESTEROL, TOTAL: 228 MG/DL (ref 0–199)
HDLC SERPL-MCNC: 39 MG/DL (ref 40–60)
LDL CHOLESTEROL CALCULATED: 142 MG/DL
TRIGL SERPL-MCNC: 235 MG/DL (ref 0–150)
VLDLC SERPL CALC-MCNC: 47 MG/DL

## 2018-05-08 ENCOUNTER — OFFICE VISIT (OUTPATIENT)
Dept: INTERNAL MEDICINE CLINIC | Age: 63
End: 2018-05-08

## 2018-05-08 VITALS
WEIGHT: 143.8 LBS | HEART RATE: 72 BPM | BODY MASS INDEX: 26.46 KG/M2 | DIASTOLIC BLOOD PRESSURE: 64 MMHG | HEIGHT: 62 IN | SYSTOLIC BLOOD PRESSURE: 88 MMHG

## 2018-05-08 DIAGNOSIS — F33.1 MODERATE EPISODE OF RECURRENT MAJOR DEPRESSIVE DISORDER (HCC): ICD-10-CM

## 2018-05-08 DIAGNOSIS — M79.7 FIBROMYALGIA SYNDROME: Primary | ICD-10-CM

## 2018-05-08 DIAGNOSIS — E78.00 HYPERCHOLESTEREMIA: ICD-10-CM

## 2018-05-08 PROCEDURE — 99214 OFFICE O/P EST MOD 30 MIN: CPT | Performed by: INTERNAL MEDICINE

## 2018-05-08 PROCEDURE — 3017F COLORECTAL CA SCREEN DOC REV: CPT | Performed by: INTERNAL MEDICINE

## 2018-05-08 PROCEDURE — G8417 CALC BMI ABV UP PARAM F/U: HCPCS | Performed by: INTERNAL MEDICINE

## 2018-05-08 PROCEDURE — G8427 DOCREV CUR MEDS BY ELIG CLIN: HCPCS | Performed by: INTERNAL MEDICINE

## 2018-05-08 PROCEDURE — 1036F TOBACCO NON-USER: CPT | Performed by: INTERNAL MEDICINE

## 2018-05-10 RX ORDER — CIPROFLOXACIN 250 MG/1
TABLET, FILM COATED ORAL
Qty: 10 TABLET | Refills: 1 | Status: SHIPPED | OUTPATIENT
Start: 2018-05-10 | End: 2019-03-07 | Stop reason: SDUPTHER

## 2018-05-31 DIAGNOSIS — I10 ESSENTIAL HYPERTENSION: ICD-10-CM

## 2018-05-31 RX ORDER — CARVEDILOL 6.25 MG/1
TABLET ORAL
Qty: 180 TABLET | Refills: 1 | Status: SHIPPED | OUTPATIENT
Start: 2018-05-31 | End: 2018-12-10 | Stop reason: SDUPTHER

## 2018-06-18 RX ORDER — DULOXETIN HYDROCHLORIDE 20 MG/1
20 CAPSULE, DELAYED RELEASE ORAL DAILY
Qty: 30 CAPSULE | Refills: 2 | Status: SHIPPED | OUTPATIENT
Start: 2018-06-18 | End: 2018-06-18 | Stop reason: SDUPTHER

## 2018-06-18 RX ORDER — DULOXETIN HYDROCHLORIDE 20 MG/1
20 CAPSULE, DELAYED RELEASE ORAL DAILY
Qty: 90 CAPSULE | Refills: 1 | Status: SHIPPED | OUTPATIENT
Start: 2018-06-18 | End: 2018-07-27 | Stop reason: SINTOL

## 2018-07-09 ENCOUNTER — HOSPITAL ENCOUNTER (OUTPATIENT)
Dept: OTHER | Age: 63
Discharge: HOME | End: 2018-07-09

## 2018-07-09 LAB
ALBUMIN SERPL-MCNC: 4.1 G/DL (ref 3.4–5)
ANION GAP SERPL CALCULATED.3IONS-SCNC: 11 MMOL/L (ref 3–16)
APTT: 31.6 SEC (ref 26–36)
BUN BLDV-MCNC: 23 MG/DL (ref 7–20)
CALCIUM SERPL-MCNC: 10.3 MG/DL (ref 8.3–10.6)
CHLORIDE BLD-SCNC: 100 MMOL/L (ref 99–110)
CO2: 29 MMOL/L (ref 21–32)
CREAT SERPL-MCNC: 0.8 MG/DL (ref 0.6–1.2)
GFR AFRICAN AMERICAN: >60
GFR NON-AFRICAN AMERICAN: >60
GLUCOSE BLD-MCNC: 126 MG/DL (ref 70–99)
HCT VFR BLD CALC: 39.6 % (ref 36–48)
HEMOGLOBIN: 13.8 G/DL (ref 12–16)
INR BLD: 1.06 (ref 0.86–1.14)
MCH RBC QN AUTO: 31.7 PG (ref 26–34)
MCHC RBC AUTO-ENTMCNC: 34.8 G/DL (ref 31–36)
MCV RBC AUTO: 91.3 FL (ref 80–100)
PDW BLD-RTO: 12.7 % (ref 12.4–15.4)
PHOSPHORUS: 2.9 MG/DL (ref 2.5–4.9)
PLATELET # BLD: 142 K/UL (ref 135–450)
PMV BLD AUTO: 10.7 FL (ref 5–10.5)
POTASSIUM SERPL-SCNC: 4.6 MMOL/L (ref 3.5–5.1)
PROTHROMBIN TIME: 12.1 SEC (ref 9.8–13)
RBC # BLD: 4.34 M/UL (ref 4–5.2)
SODIUM BLD-SCNC: 140 MMOL/L (ref 136–145)
WBC # BLD: 5.7 K/UL (ref 4–11)

## 2018-07-10 LAB
ALBUMIN SERPL-MCNC: 4.1 G/DL (ref 3.5–5.7)
ALP BLD-CCNC: 50 U/L (ref 36–125)
ALT SERPL-CCNC: 15 U/L (ref 7–52)
ANION GAP SERPL CALCULATED.3IONS-SCNC: 6 MMOL/L (ref 3–16)
AST SERPL-CCNC: 30 U/L (ref 13–39)
BILIRUB SERPL-MCNC: 0.5 MG/DL (ref 0–1.5)
BUN BLDV-MCNC: 22 MG/DL (ref 7–25)
CALCIUM SERPL-MCNC: 9.8 MG/DL (ref 8.6–10.3)
CHLORIDE BLD-SCNC: 101 MMOL/L (ref 98–110)
CO2: 30 MMOL/L (ref 21–33)
CREAT SERPL-MCNC: 1.02 MG/DL (ref 0.6–1.3)
GFR, ESTIMATED: 58 SEE NOTE.
GFR, ESTIMATED: 67 SEE NOTE.
GLUCOSE BLD-MCNC: 113 MG/DL (ref 70–100)
HCT VFR BLD CALC: 39.6 % (ref 35–45)
HEMOGLOBIN: 13.9 G/DL (ref 11.7–15.5)
MCH RBC QN AUTO: 32 PG (ref 27–33)
MCHC RBC AUTO-ENTMCNC: 35.2 G/DL (ref 32–36)
MCV RBC AUTO: 90.9 FL (ref 80–100)
OSMOLALITY CALCULATION: 288 MOSM/KG (ref 278–305)
PDW BLD-RTO: 13.1 % (ref 11–15)
PLATELET # BLD: 146 10E3/UL (ref 140–400)
PMV BLD AUTO: 10.1 FL (ref 7.5–11.5)
POTASSIUM SERPL-SCNC: 4.8 MMOL/L (ref 3.5–5.3)
RBC # BLD: 4.35 10E6/UL (ref 3.8–5.1)
SODIUM BLD-SCNC: 137 MMOL/L (ref 133–146)
TOTAL PROTEIN: 7.2 G/DL (ref 6.4–8.9)
WBC # BLD: 4.9 10E3/UL (ref 3.8–10.8)

## 2018-07-13 DIAGNOSIS — I10 ESSENTIAL HYPERTENSION: ICD-10-CM

## 2018-07-13 RX ORDER — POTASSIUM CHLORIDE 20 MEQ/1
20 TABLET, EXTENDED RELEASE ORAL DAILY
Qty: 90 TABLET | Refills: 0 | Status: SHIPPED | OUTPATIENT
Start: 2018-07-13 | End: 2019-03-07

## 2018-07-27 ENCOUNTER — TELEPHONE (OUTPATIENT)
Dept: INTERNAL MEDICINE CLINIC | Age: 63
End: 2018-07-27

## 2018-07-27 NOTE — TELEPHONE ENCOUNTER
This would be an uncommon side effect of this medication, but if she wants to stop taking it and see if the symptoms resolve that will be okay.

## 2018-07-28 DIAGNOSIS — I10 ESSENTIAL HYPERTENSION: ICD-10-CM

## 2018-07-30 DIAGNOSIS — I10 ESSENTIAL HYPERTENSION: ICD-10-CM

## 2018-07-30 RX ORDER — VALSARTAN 40 MG/1
TABLET ORAL
Qty: 225 TABLET | Refills: 3 | Status: SHIPPED | OUTPATIENT
Start: 2018-07-30 | End: 2018-08-13 | Stop reason: ALTCHOICE

## 2018-07-31 RX ORDER — VALSARTAN 40 MG/1
TABLET ORAL
Qty: 226 TABLET | Refills: 3 | OUTPATIENT
Start: 2018-07-31

## 2018-07-31 RX ORDER — LOSARTAN POTASSIUM 25 MG/1
25 TABLET ORAL DAILY
Qty: 90 TABLET | Refills: 3 | Status: SHIPPED | OUTPATIENT
Start: 2018-07-31 | End: 2019-10-01 | Stop reason: ALTCHOICE

## 2018-08-12 DIAGNOSIS — I10 ESSENTIAL HYPERTENSION: ICD-10-CM

## 2018-08-13 RX ORDER — DIGOXIN 125 UG/1
TABLET ORAL
Qty: 90 TABLET | Refills: 1 | Status: SHIPPED | OUTPATIENT
Start: 2018-08-13 | End: 2019-01-16 | Stop reason: SDUPTHER

## 2018-08-13 RX ORDER — SPIRONOLACTONE 25 MG/1
TABLET ORAL
Qty: 45 TABLET | Refills: 1 | Status: SHIPPED | OUTPATIENT
Start: 2018-08-13 | End: 2018-11-12 | Stop reason: SDUPTHER

## 2018-08-29 ENCOUNTER — TELEPHONE (OUTPATIENT)
Dept: FAMILY MEDICINE CLINIC | Age: 63
End: 2018-08-29

## 2018-08-29 RX ORDER — CHLORAL HYDRATE 500 MG
3000 CAPSULE ORAL 2 TIMES DAILY
COMMUNITY

## 2018-08-29 RX ORDER — VALSARTAN 40 MG/1
40 TABLET ORAL
COMMUNITY
End: 2019-03-21

## 2018-09-11 ENCOUNTER — OFFICE VISIT (OUTPATIENT)
Dept: INTERNAL MEDICINE CLINIC | Age: 63
End: 2018-09-11

## 2018-09-11 VITALS
WEIGHT: 143 LBS | TEMPERATURE: 98.1 F | HEIGHT: 62 IN | DIASTOLIC BLOOD PRESSURE: 60 MMHG | BODY MASS INDEX: 26.31 KG/M2 | SYSTOLIC BLOOD PRESSURE: 96 MMHG | OXYGEN SATURATION: 98 % | HEART RATE: 64 BPM

## 2018-09-11 DIAGNOSIS — E78.2 MIXED HYPERLIPIDEMIA: ICD-10-CM

## 2018-09-11 DIAGNOSIS — M79.7 FIBROMYALGIA: ICD-10-CM

## 2018-09-11 DIAGNOSIS — R05.9 COUGH: Primary | ICD-10-CM

## 2018-09-11 PROCEDURE — 1036F TOBACCO NON-USER: CPT | Performed by: INTERNAL MEDICINE

## 2018-09-11 PROCEDURE — 99214 OFFICE O/P EST MOD 30 MIN: CPT | Performed by: INTERNAL MEDICINE

## 2018-09-11 PROCEDURE — 3017F COLORECTAL CA SCREEN DOC REV: CPT | Performed by: INTERNAL MEDICINE

## 2018-09-11 PROCEDURE — G8417 CALC BMI ABV UP PARAM F/U: HCPCS | Performed by: INTERNAL MEDICINE

## 2018-09-11 PROCEDURE — G8427 DOCREV CUR MEDS BY ELIG CLIN: HCPCS | Performed by: INTERNAL MEDICINE

## 2018-09-11 RX ORDER — ATORVASTATIN CALCIUM 20 MG/1
20 TABLET, FILM COATED ORAL DAILY
Qty: 90 TABLET | Refills: 1 | Status: CANCELLED | OUTPATIENT
Start: 2018-09-11

## 2018-09-11 NOTE — PROGRESS NOTES
Chief Complaint   Patient presents with    Chronic Pain    Depression    Other     Was in ER on 9/7 for Headache, congestion, sob, fever. Was placed on atb and cough suppresent. Starting to feel a little better. HPI:  The patient was treated for URI on 9/7/18. She went to the ER. Records were reviewed. She was placed on doxycycline and cough suppressant. Location: chest  Quality: productive cough   Onset: last Wednesday  Severity: at time of onset, her symptoms were severe, now she is improved, but not 100% better. Associated symptoms: dyspnea      Fibromyalgia: She stopped Cymbalta because of side effects (dyspnea, bloating, weight gain). She has ongoing chronic pain. She gets relief with massage, exercise, diclofenac gel. She feels like her symptoms are tolerable. HLD: she is not taking atorvastatin. ASCVD risk done today- 4.8%. Social History   Substance Use Topics    Smoking status: Former Smoker     Packs/day: 0.25     Years: 10.00     Quit date: 4/6/1998    Smokeless tobacco: Never Used      Comment: smoked 1 cig a day,    Alcohol use No        ROS:  +chills, no known fevers. +chronic dyspnea  Chest pain has resolved. Reg BM's  No urinary problems. EXAM:  BP 96/60 (Site: Left Upper Arm, Position: Sitting, Cuff Size: Large Adult)   Pulse 64   Temp 98.1 °F (36.7 °C) (Oral)   Ht 5' 2\" (1.575 m)   Wt 143 lb (64.9 kg)   SpO2 98%   BMI 26.16 kg/m²    GEN: WN/WD, NAD  ENT: MMM, normal appearing OP  CV: regular rate and rhythm, no murmurs rubs or gallops  Resp: normal effort, clear auscultation bilaterally  No peripheral edema   Abd: soft, nontender to palpation.        Lab Results   Component Value Date    CHOL 228 (H) 04/20/2018    CHOL 243 (H) 10/18/2017    CHOL 232 (H) 04/28/2017     Lab Results   Component Value Date    TRIG 235 (H) 04/20/2018    TRIG 178 (H) 10/18/2017    TRIG 361 (H) 04/28/2017     Lab Results   Component Value Date    HDL 39 (L) 04/20/2018    HDL 41 10/18/2017    HDL 39 (L) 04/28/2017     Lab Results   Component Value Date    LDLCALC 142 (H) 04/20/2018    LDLCALC 166 (H) 10/18/2017    LDLCALC see below 04/28/2017     Lab Results   Component Value Date    LABVLDL 47 04/20/2018    LABVLDL 36 10/18/2017    LABVLDL see below 04/28/2017      A/P  1. Cough  Recent acute upper respiratory infection which is resolving spontaneously. However she is concerned about chronic dyspnea and cough. PFTs ordered. Additional care will be provided as appropriate pending results. - FULL PFT STUDY WITH PRE AND POST    2. Fibromyalgia  She is doing well managing pain with conservative measures, and she has gotten off of narcotic pain medicine. She'll continue current treatment. 3. Mixed hyperlipidemia  We discussed that based on her risk of cardiovascular disease (4.8%) a statin is not indicated at this time. Continue healthy lifestyle.     RTO 6 months or PRN

## 2018-09-21 ENCOUNTER — HOSPITAL ENCOUNTER (OUTPATIENT)
Dept: PULMONOLOGY | Age: 63
Discharge: HOME | End: 2018-09-22
Attending: INTERNAL MEDICINE | Admitting: INTERNAL MEDICINE

## 2018-09-21 VITALS — HEART RATE: 70 BPM | OXYGEN SATURATION: 98 % | RESPIRATION RATE: 18 BRPM

## 2018-09-21 RX ORDER — ALBUTEROL SULFATE 90 UG/1
4 AEROSOL, METERED RESPIRATORY (INHALATION) ONCE
Status: COMPLETED | OUTPATIENT
Start: 2018-09-21 | End: 2018-09-21

## 2018-09-21 RX ADMIN — ALBUTEROL SULFATE 4 PUFF: 90 AEROSOL, METERED RESPIRATORY (INHALATION) at 09:25

## 2018-11-12 ENCOUNTER — TELEPHONE (OUTPATIENT)
Dept: INTERNAL MEDICINE CLINIC | Age: 63
End: 2018-11-12

## 2018-11-12 DIAGNOSIS — I10 ESSENTIAL HYPERTENSION: ICD-10-CM

## 2018-11-12 RX ORDER — SPIRONOLACTONE 25 MG/1
TABLET ORAL
Qty: 45 TABLET | Refills: 5 | Status: SHIPPED | OUTPATIENT
Start: 2018-11-12 | End: 2019-11-27 | Stop reason: SDUPTHER

## 2018-12-10 DIAGNOSIS — I10 ESSENTIAL HYPERTENSION: ICD-10-CM

## 2018-12-10 RX ORDER — CARVEDILOL 6.25 MG/1
TABLET ORAL
Qty: 180 TABLET | Refills: 3 | Status: SHIPPED | OUTPATIENT
Start: 2018-12-10 | End: 2019-12-30

## 2019-01-07 ASSESSMENT — ENCOUNTER SYMPTOMS
EYE ITCHING: 0
SHORTNESS OF BREATH: 0
CONSTIPATION: 0
EYE REDNESS: 0
VOMITING: 0
ABDOMINAL PAIN: 0
EYE DISCHARGE: 0
PHOTOPHOBIA: 0
DIARRHEA: 0
EYE PAIN: 0
SINUS PAIN: 0
NAUSEA: 0
TROUBLE SWALLOWING: 0

## 2019-01-08 ENCOUNTER — OFFICE VISIT (OUTPATIENT)
Dept: INTERNAL MEDICINE CLINIC | Age: 64
End: 2019-01-08
Payer: COMMERCIAL

## 2019-01-08 VITALS
WEIGHT: 144 LBS | SYSTOLIC BLOOD PRESSURE: 108 MMHG | BODY MASS INDEX: 26.5 KG/M2 | DIASTOLIC BLOOD PRESSURE: 66 MMHG | OXYGEN SATURATION: 99 % | HEART RATE: 68 BPM | HEIGHT: 62 IN | TEMPERATURE: 97.4 F

## 2019-01-08 DIAGNOSIS — R05.9 COUGH: ICD-10-CM

## 2019-01-08 DIAGNOSIS — J01.00 ACUTE NON-RECURRENT MAXILLARY SINUSITIS: Primary | ICD-10-CM

## 2019-01-08 PROCEDURE — G8417 CALC BMI ABV UP PARAM F/U: HCPCS | Performed by: NURSE PRACTITIONER

## 2019-01-08 PROCEDURE — G8427 DOCREV CUR MEDS BY ELIG CLIN: HCPCS | Performed by: NURSE PRACTITIONER

## 2019-01-08 PROCEDURE — G8400 PT W/DXA NO RESULTS DOC: HCPCS | Performed by: NURSE PRACTITIONER

## 2019-01-08 PROCEDURE — 99213 OFFICE O/P EST LOW 20 MIN: CPT | Performed by: NURSE PRACTITIONER

## 2019-01-08 PROCEDURE — 3017F COLORECTAL CA SCREEN DOC REV: CPT | Performed by: NURSE PRACTITIONER

## 2019-01-08 PROCEDURE — 1101F PT FALLS ASSESS-DOCD LE1/YR: CPT | Performed by: NURSE PRACTITIONER

## 2019-01-08 PROCEDURE — 1123F ACP DISCUSS/DSCN MKR DOCD: CPT | Performed by: NURSE PRACTITIONER

## 2019-01-08 PROCEDURE — G8484 FLU IMMUNIZE NO ADMIN: HCPCS | Performed by: NURSE PRACTITIONER

## 2019-01-08 PROCEDURE — 1036F TOBACCO NON-USER: CPT | Performed by: NURSE PRACTITIONER

## 2019-01-08 PROCEDURE — 4040F PNEUMOC VAC/ADMIN/RCVD: CPT | Performed by: NURSE PRACTITIONER

## 2019-01-08 PROCEDURE — 1090F PRES/ABSN URINE INCON ASSESS: CPT | Performed by: NURSE PRACTITIONER

## 2019-01-08 RX ORDER — ALBUTEROL SULFATE 90 UG/1
2 AEROSOL, METERED RESPIRATORY (INHALATION) EVERY 6 HOURS PRN
Qty: 1 INHALER | Refills: 0 | Status: SHIPPED | OUTPATIENT
Start: 2019-01-08 | End: 2019-09-24

## 2019-01-08 RX ORDER — AMOXICILLIN AND CLAVULANATE POTASSIUM 875; 125 MG/1; MG/1
1 TABLET, FILM COATED ORAL 2 TIMES DAILY
Qty: 14 TABLET | Refills: 0 | Status: SHIPPED | OUTPATIENT
Start: 2019-01-08 | End: 2019-01-15

## 2019-01-08 RX ORDER — BENZONATATE 100 MG/1
100 CAPSULE ORAL 3 TIMES DAILY PRN
Qty: 30 CAPSULE | Refills: 0 | Status: SHIPPED | OUTPATIENT
Start: 2019-01-08 | End: 2019-11-15 | Stop reason: SDUPTHER

## 2019-01-08 ASSESSMENT — ENCOUNTER SYMPTOMS
RHINORRHEA: 1
SORE THROAT: 1
SINUS PRESSURE: 1
COUGH: 1
WHEEZING: 1

## 2019-01-16 RX ORDER — DIGOXIN 125 UG/1
TABLET ORAL
Qty: 90 TABLET | Refills: 1 | Status: SHIPPED | OUTPATIENT
Start: 2019-01-16 | End: 2019-08-11 | Stop reason: SDUPTHER

## 2019-03-07 ENCOUNTER — OFFICE VISIT (OUTPATIENT)
Dept: INTERNAL MEDICINE CLINIC | Age: 64
End: 2019-03-07
Payer: COMMERCIAL

## 2019-03-07 ENCOUNTER — HOSPITAL ENCOUNTER (OUTPATIENT)
Dept: CT IMAGING | Age: 64
Discharge: HOME | End: 2019-03-07
Payer: COMMERCIAL

## 2019-03-07 VITALS
SYSTOLIC BLOOD PRESSURE: 126 MMHG | BODY MASS INDEX: 27.38 KG/M2 | DIASTOLIC BLOOD PRESSURE: 80 MMHG | HEIGHT: 62 IN | WEIGHT: 148.8 LBS | HEART RATE: 64 BPM | TEMPERATURE: 97.5 F

## 2019-03-07 DIAGNOSIS — N39.0 FREQUENT UTI: ICD-10-CM

## 2019-03-07 DIAGNOSIS — R10.9 FLANK PAIN: ICD-10-CM

## 2019-03-07 DIAGNOSIS — R10.9 FLANK PAIN: Primary | ICD-10-CM

## 2019-03-07 DIAGNOSIS — Z13.31 POSITIVE DEPRESSION SCREENING: ICD-10-CM

## 2019-03-07 DIAGNOSIS — N20.0 RENAL CALCULI: Primary | ICD-10-CM

## 2019-03-07 LAB
A/G RATIO: 1.6 (ref 1.1–2.2)
ALBUMIN SERPL-MCNC: 4.4 G/DL (ref 3.4–5)
ALP BLD-CCNC: 67 U/L (ref 40–129)
ALT SERPL-CCNC: 15 U/L (ref 10–40)
ANION GAP SERPL CALCULATED.3IONS-SCNC: 18 MMOL/L (ref 3–16)
AST SERPL-CCNC: 18 U/L (ref 15–37)
BILIRUB SERPL-MCNC: 0.5 MG/DL (ref 0–1)
BILIRUBIN, POC: ABNORMAL
BLOOD URINE, POC: ABNORMAL
BUN BLDV-MCNC: 23 MG/DL (ref 7–20)
CALCIUM SERPL-MCNC: 10.4 MG/DL (ref 8.3–10.6)
CHLORIDE BLD-SCNC: 100 MMOL/L (ref 99–110)
CLARITY, POC: CLEAR
CO2: 23 MMOL/L (ref 21–32)
COLOR, POC: YELLOW
CREAT SERPL-MCNC: 0.7 MG/DL (ref 0.6–1.2)
GFR AFRICAN AMERICAN: >60
GFR NON-AFRICAN AMERICAN: >60
GLOBULIN: 2.8 G/DL
GLUCOSE BLD-MCNC: 91 MG/DL (ref 70–99)
GLUCOSE URINE, POC: ABNORMAL
HCT VFR BLD CALC: 43.3 % (ref 36–48)
HEMOGLOBIN: 14.4 G/DL (ref 12–16)
KETONES, POC: ABNORMAL
LEUKOCYTE EST, POC: ABNORMAL
MCH RBC QN AUTO: 31.1 PG (ref 26–34)
MCHC RBC AUTO-ENTMCNC: 33.2 G/DL (ref 31–36)
MCV RBC AUTO: 93.7 FL (ref 80–100)
NITRITE, POC: ABNORMAL
PDW BLD-RTO: 13.5 % (ref 12.4–15.4)
PH, POC: 7
PLATELET # BLD: 162 K/UL (ref 135–450)
PMV BLD AUTO: 10.9 FL (ref 5–10.5)
POTASSIUM SERPL-SCNC: 4.2 MMOL/L (ref 3.5–5.1)
PROTEIN, POC: ABNORMAL
RBC # BLD: 4.62 M/UL (ref 4–5.2)
SODIUM BLD-SCNC: 141 MMOL/L (ref 136–145)
SPECIFIC GRAVITY, POC: 1.01
TOTAL PROTEIN: 7.2 G/DL (ref 6.4–8.2)
UROBILINOGEN, POC: ABNORMAL
WBC # BLD: 7.6 K/UL (ref 4–11)

## 2019-03-07 PROCEDURE — G0444 DEPRESSION SCREEN ANNUAL: HCPCS | Performed by: NURSE PRACTITIONER

## 2019-03-07 PROCEDURE — 1101F PT FALLS ASSESS-DOCD LE1/YR: CPT | Performed by: NURSE PRACTITIONER

## 2019-03-07 PROCEDURE — G8400 PT W/DXA NO RESULTS DOC: HCPCS | Performed by: NURSE PRACTITIONER

## 2019-03-07 PROCEDURE — 3017F COLORECTAL CA SCREEN DOC REV: CPT | Performed by: NURSE PRACTITIONER

## 2019-03-07 PROCEDURE — G8484 FLU IMMUNIZE NO ADMIN: HCPCS | Performed by: NURSE PRACTITIONER

## 2019-03-07 PROCEDURE — 99213 OFFICE O/P EST LOW 20 MIN: CPT | Performed by: NURSE PRACTITIONER

## 2019-03-07 PROCEDURE — 1123F ACP DISCUSS/DSCN MKR DOCD: CPT | Performed by: NURSE PRACTITIONER

## 2019-03-07 PROCEDURE — G8427 DOCREV CUR MEDS BY ELIG CLIN: HCPCS | Performed by: NURSE PRACTITIONER

## 2019-03-07 PROCEDURE — 1090F PRES/ABSN URINE INCON ASSESS: CPT | Performed by: NURSE PRACTITIONER

## 2019-03-07 PROCEDURE — G8417 CALC BMI ABV UP PARAM F/U: HCPCS | Performed by: NURSE PRACTITIONER

## 2019-03-07 PROCEDURE — G8431 POS CLIN DEPRES SCRN F/U DOC: HCPCS | Performed by: NURSE PRACTITIONER

## 2019-03-07 PROCEDURE — 4040F PNEUMOC VAC/ADMIN/RCVD: CPT | Performed by: NURSE PRACTITIONER

## 2019-03-07 PROCEDURE — 1036F TOBACCO NON-USER: CPT | Performed by: NURSE PRACTITIONER

## 2019-03-07 PROCEDURE — 74176 CT ABD & PELVIS W/O CONTRAST: CPT

## 2019-03-07 PROCEDURE — 81002 URINALYSIS NONAUTO W/O SCOPE: CPT | Performed by: NURSE PRACTITIONER

## 2019-03-07 RX ORDER — TAMSULOSIN HYDROCHLORIDE 0.4 MG/1
0.4 CAPSULE ORAL DAILY
Qty: 30 CAPSULE | Refills: 0 | Status: SHIPPED | OUTPATIENT
Start: 2019-03-07 | End: 2019-09-24

## 2019-03-07 RX ORDER — CIPROFLOXACIN 250 MG/1
TABLET, FILM COATED ORAL
Qty: 10 TABLET | Refills: 1 | Status: SHIPPED | OUTPATIENT
Start: 2019-03-07 | End: 2019-03-21 | Stop reason: ALTCHOICE

## 2019-03-07 ASSESSMENT — PATIENT HEALTH QUESTIONNAIRE - PHQ9
7. TROUBLE CONCENTRATING ON THINGS, SUCH AS READING THE NEWSPAPER OR WATCHING TELEVISION: 1
8. MOVING OR SPEAKING SO SLOWLY THAT OTHER PEOPLE COULD HAVE NOTICED. OR THE OPPOSITE, BEING SO FIGETY OR RESTLESS THAT YOU HAVE BEEN MOVING AROUND A LOT MORE THAN USUAL: 0
9. THOUGHTS THAT YOU WOULD BE BETTER OFF DEAD, OR OF HURTING YOURSELF: 0
10. IF YOU CHECKED OFF ANY PROBLEMS, HOW DIFFICULT HAVE THESE PROBLEMS MADE IT FOR YOU TO DO YOUR WORK, TAKE CARE OF THINGS AT HOME, OR GET ALONG WITH OTHER PEOPLE: 1
SUM OF ALL RESPONSES TO PHQ9 QUESTIONS 1 & 2: 6
5. POOR APPETITE OR OVEREATING: 1
1. LITTLE INTEREST OR PLEASURE IN DOING THINGS: 3
4. FEELING TIRED OR HAVING LITTLE ENERGY: 3
2. FEELING DOWN, DEPRESSED OR HOPELESS: 3
SUM OF ALL RESPONSES TO PHQ QUESTIONS 1-9: 11
3. TROUBLE FALLING OR STAYING ASLEEP: 0
SUM OF ALL RESPONSES TO PHQ QUESTIONS 1-9: 11
6. FEELING BAD ABOUT YOURSELF - OR THAT YOU ARE A FAILURE OR HAVE LET YOURSELF OR YOUR FAMILY DOWN: 0

## 2019-03-07 ASSESSMENT — ENCOUNTER SYMPTOMS: BOWEL INCONTINENCE: 0

## 2019-03-09 LAB — URINE CULTURE, ROUTINE: NORMAL

## 2019-03-21 ENCOUNTER — OFFICE VISIT (OUTPATIENT)
Dept: INTERNAL MEDICINE CLINIC | Age: 64
End: 2019-03-21
Payer: COMMERCIAL

## 2019-03-21 VITALS
BODY MASS INDEX: 26.5 KG/M2 | HEIGHT: 62 IN | SYSTOLIC BLOOD PRESSURE: 100 MMHG | DIASTOLIC BLOOD PRESSURE: 64 MMHG | HEART RATE: 68 BPM | WEIGHT: 144 LBS

## 2019-03-21 DIAGNOSIS — M79.7 FIBROMYALGIA SYNDROME: Primary | Chronic | ICD-10-CM

## 2019-03-21 DIAGNOSIS — Z23 NEED FOR VACCINATION WITH 13-POLYVALENT PNEUMOCOCCAL CONJUGATE VACCINE: ICD-10-CM

## 2019-03-21 PROCEDURE — G8427 DOCREV CUR MEDS BY ELIG CLIN: HCPCS | Performed by: INTERNAL MEDICINE

## 2019-03-21 PROCEDURE — 1123F ACP DISCUSS/DSCN MKR DOCD: CPT | Performed by: INTERNAL MEDICINE

## 2019-03-21 PROCEDURE — G8484 FLU IMMUNIZE NO ADMIN: HCPCS | Performed by: INTERNAL MEDICINE

## 2019-03-21 PROCEDURE — G8400 PT W/DXA NO RESULTS DOC: HCPCS | Performed by: INTERNAL MEDICINE

## 2019-03-21 PROCEDURE — 1101F PT FALLS ASSESS-DOCD LE1/YR: CPT | Performed by: INTERNAL MEDICINE

## 2019-03-21 PROCEDURE — 3017F COLORECTAL CA SCREEN DOC REV: CPT | Performed by: INTERNAL MEDICINE

## 2019-03-21 PROCEDURE — 4040F PNEUMOC VAC/ADMIN/RCVD: CPT | Performed by: INTERNAL MEDICINE

## 2019-03-21 PROCEDURE — 90471 IMMUNIZATION ADMIN: CPT | Performed by: INTERNAL MEDICINE

## 2019-03-21 PROCEDURE — G8417 CALC BMI ABV UP PARAM F/U: HCPCS | Performed by: INTERNAL MEDICINE

## 2019-03-21 PROCEDURE — 1036F TOBACCO NON-USER: CPT | Performed by: INTERNAL MEDICINE

## 2019-03-21 PROCEDURE — 99213 OFFICE O/P EST LOW 20 MIN: CPT | Performed by: INTERNAL MEDICINE

## 2019-03-21 PROCEDURE — 90670 PCV13 VACCINE IM: CPT | Performed by: INTERNAL MEDICINE

## 2019-03-21 PROCEDURE — 1090F PRES/ABSN URINE INCON ASSESS: CPT | Performed by: INTERNAL MEDICINE

## 2019-03-26 ENCOUNTER — TELEPHONE (OUTPATIENT)
Dept: INTERNAL MEDICINE CLINIC | Age: 64
End: 2019-03-26

## 2019-05-02 ENCOUNTER — OFFICE VISIT (OUTPATIENT)
Dept: PSYCHOLOGY | Age: 64
End: 2019-05-02
Payer: COMMERCIAL

## 2019-05-02 DIAGNOSIS — F33.1 MODERATE EPISODE OF RECURRENT MAJOR DEPRESSIVE DISORDER (HCC): Primary | ICD-10-CM

## 2019-05-02 PROCEDURE — 90791 PSYCH DIAGNOSTIC EVALUATION: CPT | Performed by: PSYCHOLOGIST

## 2019-05-02 NOTE — PROGRESS NOTES
smoking about 21 years ago. She has a 2.50 pack-year smoking history. She has never used smokeless tobacco.  ETOH:   reports that she does not drink alcohol. Diagnosis:    Major depressive disorder; recurrent and moderate, in partial remission    Plan:  Pt interventions:  Established rapport, Conducted functional assessment, [place]-setting to identify pt's primary goals for [facility] visit / overall health, Supportive techniques and Provided Psychoeducation re: gate theory    Documentation was done using voice recognition dragon software. Every effort was made to ensure accuracy; however, inadvertent, unintentional computerized transcription errors may be present.

## 2019-06-02 ENCOUNTER — HOSPITAL ENCOUNTER (EMERGENCY)
Age: 64
Discharge: HOME | End: 2019-06-02
Attending: EMERGENCY MEDICINE
Payer: COMMERCIAL

## 2019-06-02 VITALS
TEMPERATURE: 97.2 F | RESPIRATION RATE: 17 BRPM | SYSTOLIC BLOOD PRESSURE: 107 MMHG | DIASTOLIC BLOOD PRESSURE: 58 MMHG | HEART RATE: 82 BPM | HEIGHT: 62 IN | BODY MASS INDEX: 26.68 KG/M2 | WEIGHT: 145 LBS | OXYGEN SATURATION: 98 %

## 2019-06-02 DIAGNOSIS — T78.40XA ALLERGIC REACTION, INITIAL ENCOUNTER: Primary | ICD-10-CM

## 2019-06-02 PROCEDURE — 99284 EMERGENCY DEPT VISIT MOD MDM: CPT

## 2019-06-02 PROCEDURE — 6370000000 HC RX 637 (ALT 250 FOR IP): Performed by: PHYSICIAN ASSISTANT

## 2019-06-02 RX ORDER — DIPHENHYDRAMINE HCL 25 MG
50 TABLET ORAL ONCE
Status: COMPLETED | OUTPATIENT
Start: 2019-06-02 | End: 2019-06-02

## 2019-06-02 RX ORDER — FAMOTIDINE 20 MG/1
40 TABLET, FILM COATED ORAL ONCE
Status: COMPLETED | OUTPATIENT
Start: 2019-06-02 | End: 2019-06-02

## 2019-06-02 RX ORDER — PREDNISONE 20 MG/1
TABLET ORAL
Qty: 18 TABLET | Refills: 0 | Status: SHIPPED | OUTPATIENT
Start: 2019-06-02 | End: 2019-06-12

## 2019-06-02 RX ORDER — FAMOTIDINE 20 MG/1
20 TABLET, FILM COATED ORAL 2 TIMES DAILY
Qty: 20 TABLET | Refills: 0 | Status: SHIPPED | OUTPATIENT
Start: 2019-06-02 | End: 2019-09-24

## 2019-06-02 RX ORDER — PREDNISONE 20 MG/1
60 TABLET ORAL ONCE
Status: COMPLETED | OUTPATIENT
Start: 2019-06-02 | End: 2019-06-02

## 2019-06-02 RX ORDER — DIPHENHYDRAMINE HCL 25 MG
25 CAPSULE ORAL EVERY 6 HOURS PRN
Qty: 40 CAPSULE | Refills: 0 | Status: SHIPPED | OUTPATIENT
Start: 2019-06-02 | End: 2019-06-12

## 2019-06-02 RX ADMIN — FAMOTIDINE 40 MG: 20 TABLET, FILM COATED ORAL at 21:25

## 2019-06-02 RX ADMIN — DIPHENHYDRAMINE HCL 50 MG: 25 TABLET ORAL at 21:25

## 2019-06-02 RX ADMIN — PREDNISONE 60 MG: 20 TABLET ORAL at 21:25

## 2019-06-02 ASSESSMENT — ENCOUNTER SYMPTOMS
CHEST TIGHTNESS: 0
SORE THROAT: 0
NAUSEA: 0
STRIDOR: 0
VOMITING: 0
COLOR CHANGE: 0
CONSTIPATION: 0
COUGH: 0
DIARRHEA: 0
BACK PAIN: 0
FACIAL SWELLING: 1
ABDOMINAL PAIN: 0
SHORTNESS OF BREATH: 0
TROUBLE SWALLOWING: 0

## 2019-06-03 NOTE — ED PROVIDER NOTES
[address] [facility]  eMERGENCY dEPARTMENT eNCOUnter    Pt Name: Franko Barnes  MRN: [id number]  [place] 1955  Date of evaluation: 2019  Provider: ARIES Blanca    Chief Complaint:    Chief Complaint   Patient presents with    Allergic Reaction     hives to body, lip swelling that has been going on since this morning. unknown to what. denies sob or trouble swallowing. took benadryl today. Nursing Notes, Past Medical Hx, Past Surgical Hx, Social Hx, Allergies, and Family Hx were all reviewed and agreedwith or any disagreements were addressed in the HPI.    HPI:  (Location, Duration, Timing, Severity, Quality, Assoc Sx, Context, Modifying factors)  This is a  70 y.o. female who presents to the emergency department with complaints of hives that started this morning along with right lower lip swelling. States that she has been out of town all week and at a resort and was eating fancy foods and is sleeping on hotel sheets. She states that she did take 1 dose of 25 mg Benadryl earlier today but no change in her symptoms. Denies any pain associated with this. No nausea vomiting diarrhea constipation fevers or chills. Denies any difficulty swallowing or shortness of breath. Denies any worsening of symptoms since this morning. No aggravating or alleviating factors. No prior history of allergies. All other systems were reviewed and are negative.      Past Medical/Surgical History:      Diagnosis Date    Asthma     Atrial fibrillation ([address]     Cardiomyopathy, nonischemic ([address]     GERD (gastroesophageal reflux disease)     HSV-1 (herpes simplex virus 1) infection     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    Hyperlipidemia     Sleep apnea     uses cpap    Uterine fibroids affecting pregnancy          Procedure Laterality Date     SECTION      3 c-sections    COLONOSCOPY  2015    polypectomy    PACEMAKER INSERTION      PACEMAKER INSERTION      UPPER GASTROINTESTINAL ENDOSCOPY  4/21/2015       Medications:  Previous Medications    ALBUTEROL SULFATE HFA (PROVENTIL HFA) 108 (90 BASE) MCG/ACT INHALER    Inhale 2 puffs into the lungs every 6 hours as needed for Wheezing    CARVEDILOL (COREG) 6.25 MG TABLET    TAKE 1 TABLET BY MOUTH TWICE DAILY WITH MEALS    DICLOFENAC (FLECTOR) 1.3 % PATCH    Place 1 patch onto the skin 2 times daily Apply over area of pain BID    DIGOX 125 MCG TABLET    TAKE 1 TABLET BY MOUTH DAILY    LOSARTAN (COZAAR) 25 MG TABLET    Take 1 tablet by mouth daily    OMEGA-3 FATTY ACIDS (FISH OIL) 1000 MG CAPS    Take 3,000 mg by mouth daily    SACUBITRIL-VALSARTAN (ENTRESTO) 24-26 MG PER TABLET    Take 1 tablet by mouth 2 times daily    SPIRONOLACTONE (ALDACTONE) 25 MG TABLET    TAKE 1/2 TABLET BY MOUTH DAILY    TAMSULOSIN (FLOMAX) 0.4 MG CAPSULE    Take 1 capsule by mouth daily    TORSEMIDE (DEMADEX) 20 MG TABLET    Take 1 tablet by mouth daily    VOLTAREN 1 % GEL    Apply 2-4 grams to painful joints 4 times daily as needed     Review of Systems:  Review of Systems   Constitutional: Negative for chills, fatigue and fever. HENT: Positive for facial swelling (right lower lip). Negative for sore throat and trouble swallowing. Respiratory: Negative for cough, chest tightness, shortness of breath and stridor. Cardiovascular: Negative for chest pain. Gastrointestinal: Negative for abdominal pain, constipation, diarrhea, nausea and vomiting. Musculoskeletal: Negative for back pain and neck pain. Skin: Positive for rash (hives). Negative for color change. All other systems reviewed and are negative. Positives and Pertinent negatives as per HPI. Except as noted above in the ROS, all other systems were reviewed/completed and are negative. Physical Exam:  Physical Exam   Constitutional: She is oriented to person, place, and time. She appears well-developed and well-nourished. She is active and cooperative. Non-toxic appearance. HENT:   Head: Normocephalic. Right Ear: External ear normal.   Left Ear: External ear normal.   Nose: Nose normal.   Eyes: Conjunctivae are normal. Right eye exhibits no discharge. Left eye exhibits no discharge. Neck: Normal range of motion. Neck supple. Cardiovascular: Normal rate, regular rhythm and normal heart sounds. Exam reveals no gallop and no friction rub. No murmur heard. Pulmonary/Chest: Effort normal and breath sounds normal. No stridor. No respiratory distress. She has no wheezes. She has no rales. Abdominal: Soft. Bowel sounds are normal. She exhibits no distension and no mass. There is no tenderness. There is no guarding. Musculoskeletal: Normal range of motion. Neurological: She is alert and oriented to person, place, and time. Skin: Skin is warm and dry. She is not diaphoretic. No pallor. Psychiatric: She has a normal mood and affect. Her behavior is normal.   Nursing note and vitals reviewed. MEDICAL DECISION MAKING    Vitals:    Vitals:    06/02/19 2108 06/02/19 2109   BP:  (!) 107/58   Pulse: 82    Resp: 17    Temp:  97.2 °F (36.2 °C)   TempSrc: Oral    SpO2: 99%    Weight: 145 lb (65.8 kg)    Height: 5' 2\" (1.575 m)        LABS: Labs Reviewed - No data to display     Remainder of labs reviewed and were negative at this time or not returned at the time of this note.     RADIOLOGY:   Non-plain film images suchas CT, Ultrasound and MRI are read by the radiologist. ILoyda PA have directly visualized the radiologic plain film image(s) with the below findings:  Interpretation per the Radiologist below, if available at the time of this note:    No orders to display       [facility] / ED COURSE:      PROCEDURES:   Procedures    Patient was given:  Medications   predniSONE (DELTASONE) tablet 60 mg (60 mg Oral Given 6/2/19 2125)   diphenhydrAMINE (BENADRYL) tablet 50 mg (50 mg Oral Given 6/2/19 2125)   famotidine (PEPCID) tablet 40 mg (40 mg Oral Given 6/2/19 2125)   This is a  70 y.o. female who presents to the emergency department with complaints of hives that started this morning along with right lower lip swelling. States that she has been out of town all week and at a resort and was eating fancy foods and is sleeping on hotel sheets. She states that she did take 1 dose of 25 mg Benadryl earlier today but no change in her symptoms. Denies any pain associated with this. No nausea vomiting diarrhea constipation fevers or chills. Denies any difficulty swallowing or shortness of breath. Denies any worsening of symptoms since this morning. No aggravating or alleviating factors. No prior history of allergies. On exam patient has mild hives to upper extremities. Oropharynx is clear and patent. Symptoms have been going on since and did not progressively worsened at all and therefore patient will be given a dose of prednisone, Benadryl and Pepcid here in the ER and started on the same medications for home. I do not believe the patient needs evaluation here in the ER for a prolonged amount of time again since symptoms have been going on since this morning and she has not had any worsening of symptoms. There is no evidence of airway compromise. At this time a known cause of allergic reaction. Referred to PCP for outpatient follow-up. Patient and family are agreeable with this plan of care. Allergic reaction instructions for home. The patient tolerated their visit well. I have evaluated the patient with physician available for consultation as needed. I have discussed the findings of today's workup with the patient and addressed the patient's questions and concerns. Important warning signs as well as new or worsening symptoms which wouldnecessitate immediate return to the ED were discussed. The plan is to discharge from the ED at this time, and the patient is in stable condition.  The patient acknowledged understanding is agreeable with this plan.    CLINICAL IMPRESSION:  1.  Allergic reaction, initial encounter        DISPOSITION Decision To Discharge 06/02/2019 09:25:25 PM      PATIENT REFERRED TO:  Brandon Honeycutt MD  [address] [facility]  [phone]    Schedule an appointment as soon as possible for a visit       [facility] Emergency Department  [address] [place] [address]  [phone]  Go to   If symptoms worsen      DISCHARGE MEDICATIONS:  New Prescriptions    DIPHENHYDRAMINE (BENADRYL) 25 MG CAPSULE    Take 1 capsule by mouth every 6 hours as needed for Itching or Allergies    FAMOTIDINE (PEPCID) 20 MG TABLET    Take 1 tablet by mouth 2 times daily for 10 days    PREDNISONE (DELTASONE) 20 MG TABLET    3 tabs po qam for 3 days then 2 tabs qam for 3 days the 1 tab qam for 3 days              (Please note the MDM and HPI sections of this note were completed with avoice recognition program.  Efforts were made to edit the dictations but occasionally words are mis-transcribed.)    Electronically signed, ARIES Martinez,             ARIES Odonnell  06/02/19 7811

## 2019-06-03 NOTE — ED PROVIDER NOTES
[address] [facility] Emergency Department      Pt Name: Clark Howard  MRN: [id number]  [place] 1955  Date of evaluation: 2019  Provider: Malu Bobo MD  I independently performed a history and physical on Clark Howard. All diagnostic, treatment, and disposition decisions were made by myself in conjunction with the advanced practice provider. HPI: Clark Howard presented with   Chief Complaint   Patient presents with    Allergic Reaction     hives to body, lip swelling that has been going on since this morning. unknown to what. denies sob or trouble swallowing. took benadryl today. Clark Howard has a past medical history of Asthma, Atrial [address] [address] Cardiomyopathy, nonischemic ([address] (), GERD (gastroesophageal reflux disease), HSV-1 (herpes simplex virus 1) infection, Hyperlipidemia, Sleep apnea, and Uterine fibroids affecting pregnancy. She has a past surgical history that includes  section; Pacemaker insertion (); Pacemaker insertion; Upper gastrointestinal endoscopy (2015); and Colonoscopy (2015). No current facility-administered medications on file prior to encounter.       Current Outpatient Medications on File Prior to Encounter   Medication Sig Dispense Refill    sacubitril-valsartan (ENTRESTO) 24-26 MG per tablet Take 1 tablet by mouth 2 times daily      tamsulosin (FLOMAX) 0.4 MG capsule Take 1 capsule by mouth daily 30 capsule 0    DIGOX 125 MCG tablet TAKE 1 TABLET BY MOUTH DAILY 90 tablet 1    albuterol sulfate HFA (PROVENTIL HFA) 108 (90 Base) MCG/ACT inhaler Inhale 2 puffs into the lungs every 6 hours as needed for Wheezing 1 Inhaler 0    carvedilol (COREG) 6.25 MG tablet TAKE 1 TABLET BY MOUTH TWICE DAILY WITH MEALS 180 tablet 3    spironolactone (ALDACTONE) 25 MG tablet TAKE 1/2 TABLET BY MOUTH DAILY 45 tablet 5    Omega-3 Fatty Acids (FISH OIL) 1000 MG CAPS Take 3,000 mg by mouth daily      losartan (COZAAR) 25 MG tablet Take 1 tablet by mouth daily 90 tablet 3    VOLTAREN 1 % GEL Apply 2-4 grams to painful joints 4 times daily as needed 5 Tube 0    torsemide (DEMADEX) 20 MG tablet Take 1 tablet by mouth daily (Patient taking differently: Take 20 mg by mouth as needed ) 90 tablet 3    diclofenac (FLECTOR) 1.3 % patch Place 1 patch onto the skin 2 times daily Apply over area of pain  patch 1     PHYSICAL EXAM  Vitals: BP (!) 107/58   Pulse 82   Temp 97.2 °F (36.2 °C)   Resp 17   Ht 5' 2\" (1.575 m)   Wt 145 lb (65.8 kg)   SpO2 99%   BMI 26.52 kg/m²   Constitutional:  70 y.o. female alert  HENT:  Atraumatic, oral mucosa moist, mild soft tissue swelling of the lower lip, tongue and the throat looks normal  Neck:  No visible JVD, supple  Chest/Lungs:  Respiratory effort normal, clear, regular  Abdomen:  Non-distended, soft, NT  Back:  No gross deformity, a few hives on her flank  Extremities:  Normal tone and perfusion, scattered hives    Medical Decision Making and Plan: Briefly, this is an 70 y. o.female who presented with allergic reaction. The patient has reasonable vital signs, no airway component to her complaint, and is symptomatically stable. We recommend medications to mitigate the symptoms. Robbie Siddiqi was given appropriate discharge instructions. Referral to follow up provider. For further details of [facility] Emergency Department encounter, please see documentation by advanced practice provider ARIES Mesa.     Medications administered:  Medications   predniSONE (DELTASONE) tablet 60 mg (60 mg Oral Given 6/2/19 2125)   diphenhydrAMINE (BENADRYL) tablet 50 mg (50 mg Oral Given 6/2/19 2125)   famotidine (PEPCID) tablet 40 mg (40 mg Oral Given 6/2/19 2125)     New Prescriptions    DIPHENHYDRAMINE (BENADRYL) 25 MG CAPSULE    Take 1 capsule by mouth every 6 hours as needed for Itching or Allergies    FAMOTIDINE (PEPCID) 20 MG TABLET    Take 1 tablet by mouth 2 times daily for 10 days    PREDNISONE (DELTASONE) 20 MG TABLET    3 tabs po qam for 3 days then 2 tabs qam for 3 days the 1 tab qam for 3 days     FOLLOW UP:    Ara Dunne MD  [address] [place] [address]  [phone]    Schedule an appointment as soon as possible for a visit       [facility] Emergency Department  [address]  Go to   If symptoms worsen    FINAL IMPRESSION:    1.  Allergic reaction, initial encounter         Saadia Vale MD  06/02/19 3319

## 2019-06-04 ENCOUNTER — TELEPHONE (OUTPATIENT)
Dept: INTERNAL MEDICINE CLINIC | Age: 64
End: 2019-06-04

## 2019-06-04 NOTE — TELEPHONE ENCOUNTER
To whom it may concern:  My patient Robbie Siddiqi suffers from fibromyalia.     Kaiser Carbajal MD   06/04/19

## 2019-06-07 ENCOUNTER — OFFICE VISIT (OUTPATIENT)
Dept: INTERNAL MEDICINE CLINIC | Age: 64
End: 2019-06-07
Payer: COMMERCIAL

## 2019-06-07 VITALS
SYSTOLIC BLOOD PRESSURE: 120 MMHG | BODY MASS INDEX: 26.24 KG/M2 | HEIGHT: 62 IN | HEART RATE: 64 BPM | DIASTOLIC BLOOD PRESSURE: 86 MMHG | WEIGHT: 142.6 LBS

## 2019-06-07 DIAGNOSIS — J06.9 ACUTE URI OF MULTIPLE SITES: Primary | ICD-10-CM

## 2019-06-07 DIAGNOSIS — R42 VERTIGO: ICD-10-CM

## 2019-06-07 PROCEDURE — 4040F PNEUMOC VAC/ADMIN/RCVD: CPT | Performed by: NURSE PRACTITIONER

## 2019-06-07 PROCEDURE — G8400 PT W/DXA NO RESULTS DOC: HCPCS | Performed by: NURSE PRACTITIONER

## 2019-06-07 PROCEDURE — 1036F TOBACCO NON-USER: CPT | Performed by: NURSE PRACTITIONER

## 2019-06-07 PROCEDURE — G8427 DOCREV CUR MEDS BY ELIG CLIN: HCPCS | Performed by: NURSE PRACTITIONER

## 2019-06-07 PROCEDURE — 99213 OFFICE O/P EST LOW 20 MIN: CPT | Performed by: NURSE PRACTITIONER

## 2019-06-07 PROCEDURE — G8417 CALC BMI ABV UP PARAM F/U: HCPCS | Performed by: NURSE PRACTITIONER

## 2019-06-07 PROCEDURE — 3017F COLORECTAL CA SCREEN DOC REV: CPT | Performed by: NURSE PRACTITIONER

## 2019-06-07 PROCEDURE — 1090F PRES/ABSN URINE INCON ASSESS: CPT | Performed by: NURSE PRACTITIONER

## 2019-06-07 PROCEDURE — 1123F ACP DISCUSS/DSCN MKR DOCD: CPT | Performed by: NURSE PRACTITIONER

## 2019-06-07 RX ORDER — MECLIZINE HYDROCHLORIDE 25 MG/1
25 TABLET ORAL 3 TIMES DAILY PRN
Qty: 30 TABLET | Refills: 0 | Status: SHIPPED | OUTPATIENT
Start: 2019-06-07 | End: 2019-06-17

## 2019-06-07 RX ORDER — CEFUROXIME AXETIL 250 MG/1
250 TABLET ORAL 2 TIMES DAILY
Qty: 14 TABLET | Refills: 0 | Status: SHIPPED | OUTPATIENT
Start: 2019-06-07 | End: 2019-06-14

## 2019-06-07 ASSESSMENT — ENCOUNTER SYMPTOMS
SWOLLEN GLANDS: 0
COUGH: 0
CHANGE IN BOWEL HABIT: 0
VOMITING: 0
SORE THROAT: 0
VISUAL CHANGE: 0

## 2019-06-07 NOTE — PROGRESS NOTES
HPI:  6/7/2019    This is a 70 y. o.female   Chief Complaint   Patient presents with    Dizziness     spinning sensation, SOB, fatigue- started 3 days ago, started Famotidine, Prednisone and Benadryl 4 days ago     Dizziness   This is a new problem. Episode onset: 2-3. The problem occurs constantly. The problem has been waxing and waning. Associated symptoms include congestion, fatigue, headaches (unchanged from previous HA) and vertigo. Pertinent negatives include no arthralgias, change in bowel habit, chest pain, chills, coughing, fever, myalgias, neck pain, numbness, rash, sore throat, swollen glands, visual change, vomiting or weakness. Associated symptoms comments: Hard to take a deep breath. Lots of sinus drainage, head congestion, HA  Ear pain and pressure.      Better when laying down   Worse with fast movements     Was in [facility] for 10 days   Was feeling ill  Then thought to have an allergic reaction to sheets- started on prednisone, benadryl, famotidine   Stopped benadryl and famotidine   Still taking prednisone     /86   Pulse 64   Ht 5' 2\" (1.575 m)   Wt 142 lb 9.6 oz (64.7 kg)   BMI 26.08 kg/m²     No Known Allergies    Current Outpatient Medications   Medication Sig Dispense Refill    cefUROXime (CEFTIN) 250 MG tablet Take 1 tablet by mouth 2 times daily for 7 days 14 tablet 0    meclizine (ANTIVERT) 25 MG tablet Take 1 tablet by mouth 3 times daily as needed for Dizziness 30 tablet 0    diphenhydrAMINE (BENADRYL) 25 MG capsule Take 1 capsule by mouth every 6 hours as needed for Itching or Allergies 40 capsule 0    predniSONE (DELTASONE) 20 MG tablet 3 tabs po qam for 3 days then 2 tabs qam for 3 days the 1 tab qam for 3 days 18 tablet 0    famotidine (PEPCID) 20 MG tablet Take 1 tablet by mouth 2 times daily for 10 days 20 tablet 0    sacubitril-valsartan (ENTRESTO) 24-26 MG per tablet Take 1 tablet by mouth 2 times daily      tamsulosin (FLOMAX) 0.4 MG capsule Take 1 capsule by mouth

## 2019-06-28 ENCOUNTER — OFFICE VISIT (OUTPATIENT)
Dept: INTERNAL MEDICINE CLINIC | Age: 64
End: 2019-06-28
Payer: COMMERCIAL

## 2019-06-28 VITALS
DIASTOLIC BLOOD PRESSURE: 76 MMHG | HEIGHT: 62 IN | WEIGHT: 143.4 LBS | SYSTOLIC BLOOD PRESSURE: 114 MMHG | HEART RATE: 68 BPM | BODY MASS INDEX: 26.39 KG/M2

## 2019-06-28 DIAGNOSIS — R19.5 LOOSE STOOLS: Primary | ICD-10-CM

## 2019-06-28 DIAGNOSIS — K21.9 GASTROESOPHAGEAL REFLUX DISEASE WITHOUT ESOPHAGITIS: ICD-10-CM

## 2019-06-28 PROCEDURE — G8417 CALC BMI ABV UP PARAM F/U: HCPCS | Performed by: NURSE PRACTITIONER

## 2019-06-28 PROCEDURE — 1090F PRES/ABSN URINE INCON ASSESS: CPT | Performed by: NURSE PRACTITIONER

## 2019-06-28 PROCEDURE — 3017F COLORECTAL CA SCREEN DOC REV: CPT | Performed by: NURSE PRACTITIONER

## 2019-06-28 PROCEDURE — G8400 PT W/DXA NO RESULTS DOC: HCPCS | Performed by: NURSE PRACTITIONER

## 2019-06-28 PROCEDURE — 1123F ACP DISCUSS/DSCN MKR DOCD: CPT | Performed by: NURSE PRACTITIONER

## 2019-06-28 PROCEDURE — 4040F PNEUMOC VAC/ADMIN/RCVD: CPT | Performed by: NURSE PRACTITIONER

## 2019-06-28 PROCEDURE — 1036F TOBACCO NON-USER: CPT | Performed by: NURSE PRACTITIONER

## 2019-06-28 PROCEDURE — 99213 OFFICE O/P EST LOW 20 MIN: CPT | Performed by: NURSE PRACTITIONER

## 2019-06-28 PROCEDURE — G8427 DOCREV CUR MEDS BY ELIG CLIN: HCPCS | Performed by: NURSE PRACTITIONER

## 2019-06-28 RX ORDER — OMEPRAZOLE 20 MG/1
20 CAPSULE, DELAYED RELEASE ORAL
Qty: 15 CAPSULE | Refills: 0 | Status: SHIPPED | OUTPATIENT
Start: 2019-06-28 | End: 2019-09-24

## 2019-06-28 RX ORDER — DICYCLOMINE HYDROCHLORIDE 10 MG/1
10 CAPSULE ORAL 4 TIMES DAILY PRN
Qty: 40 CAPSULE | Refills: 0 | Status: SHIPPED | OUTPATIENT
Start: 2019-06-28 | End: 2019-09-24

## 2019-06-28 NOTE — PROGRESS NOTES
times daily Apply over area of pain  patch 1    famotidine (PEPCID) 20 MG tablet Take 1 tablet by mouth 2 times daily for 10 days 20 tablet 0     No current facility-administered medications for this visit. Review of Systems  See HPI    Physical Exam   Constitutional: She is oriented to person, place, and time. She appears well-developed and well-nourished. No distress. HENT:   Head: Normocephalic and atraumatic. Cardiovascular: Normal rate, regular rhythm and normal heart sounds. Pulmonary/Chest: Effort normal and breath sounds normal. No respiratory distress. She has no wheezes. Abdominal: Soft. Bowel sounds are normal. She exhibits no distension and no mass. There is generalized tenderness (mild). There is no rebound, no guarding, no tenderness at McBurney's point and negative Case's sign. No hernia. Musculoskeletal: She exhibits no edema. Neurological: She is alert and oriented to person, place, and time. Coordination normal.   Skin: Skin is warm and dry. She is not diaphoretic. No erythema. Psychiatric: She has a normal mood and affect. Thought content normal.     Assessment/Plan:  1. Loose stools  Stable, checking for cdiff due to recent abx use. Take probiotic daily. - Clostridium Difficile Toxin/Antigen; Future  Trial of Bentyl prn for intermittent abd cramps/ discomfort with BMs      2. Gastroesophageal reflux disease without esophagitis  Stable, uncontrolled. 2 weeks of PPI if c diff negative. If cdiff + restart pepcid. - omeprazole (PRILOSEC) 20 MG delayed release capsule; Take 1 capsule by mouth every morning (before breakfast)  Dispense: 15 capsule;  Refill: 0    Follow up for new / worsening symptoms or failing to improve as anticipated     Electronically signed by SHAMEKA Liao CNP on 6/28/2019 at 1:45 PM

## 2019-07-01 ENCOUNTER — HOSPITAL ENCOUNTER (OUTPATIENT)
Age: 64
Discharge: HOME | End: 2019-07-01
Payer: COMMERCIAL

## 2019-07-01 DIAGNOSIS — R19.5 LOOSE STOOLS: ICD-10-CM

## 2019-07-01 LAB — C DIFF TOXIN/ANTIGEN: NORMAL

## 2019-07-01 PROCEDURE — 87449 NOS EACH ORGANISM AG IA: CPT

## 2019-07-01 PROCEDURE — 87324 CLOSTRIDIUM AG IA: CPT

## 2019-07-24 ENCOUNTER — TELEPHONE (OUTPATIENT)
Dept: INTERNAL MEDICINE CLINIC | Age: 64
End: 2019-07-24

## 2019-08-12 RX ORDER — DIGOXIN 125 UG/1
TABLET ORAL
Qty: 90 TABLET | Refills: 0 | Status: SHIPPED | OUTPATIENT
Start: 2019-08-12 | End: 2019-11-12 | Stop reason: SDUPTHER

## 2019-09-24 ENCOUNTER — OFFICE VISIT (OUTPATIENT)
Dept: INTERNAL MEDICINE CLINIC | Age: 64
End: 2019-09-24
Payer: COMMERCIAL

## 2019-09-24 VITALS
DIASTOLIC BLOOD PRESSURE: 68 MMHG | HEIGHT: 62 IN | BODY MASS INDEX: 26.68 KG/M2 | WEIGHT: 145 LBS | HEART RATE: 72 BPM | SYSTOLIC BLOOD PRESSURE: 100 MMHG

## 2019-09-24 DIAGNOSIS — I42.8 NON-ISCHEMIC CARDIOMYOPATHY (HCC): Primary | Chronic | ICD-10-CM

## 2019-09-24 DIAGNOSIS — R10.13 EPIGASTRIC PAIN: ICD-10-CM

## 2019-09-24 DIAGNOSIS — Z23 NEED FOR INFLUENZA VACCINATION: ICD-10-CM

## 2019-09-24 DIAGNOSIS — E78.00 HYPERCHOLESTEREMIA: ICD-10-CM

## 2019-09-24 LAB
CHOLESTEROL, TOTAL: 220 MG/DL (ref 0–199)
HCT VFR BLD CALC: 42 % (ref 36–48)
HDLC SERPL-MCNC: 37 MG/DL (ref 40–60)
HEMOGLOBIN: 14.3 G/DL (ref 12–16)
LDL CHOLESTEROL CALCULATED: 125 MG/DL
MCH RBC QN AUTO: 31.4 PG (ref 26–34)
MCHC RBC AUTO-ENTMCNC: 33.9 G/DL (ref 31–36)
MCV RBC AUTO: 92.5 FL (ref 80–100)
PDW BLD-RTO: 13.1 % (ref 12.4–15.4)
PLATELET # BLD: 154 K/UL (ref 135–450)
PMV BLD AUTO: 10.4 FL (ref 5–10.5)
RBC # BLD: 4.54 M/UL (ref 4–5.2)
TRIGL SERPL-MCNC: 288 MG/DL (ref 0–150)
VLDLC SERPL CALC-MCNC: 58 MG/DL
WBC # BLD: 6.4 K/UL (ref 4–11)

## 2019-09-24 PROCEDURE — G8417 CALC BMI ABV UP PARAM F/U: HCPCS | Performed by: INTERNAL MEDICINE

## 2019-09-24 PROCEDURE — 99214 OFFICE O/P EST MOD 30 MIN: CPT | Performed by: INTERNAL MEDICINE

## 2019-09-24 PROCEDURE — 3017F COLORECTAL CA SCREEN DOC REV: CPT | Performed by: INTERNAL MEDICINE

## 2019-09-24 PROCEDURE — G8400 PT W/DXA NO RESULTS DOC: HCPCS | Performed by: INTERNAL MEDICINE

## 2019-09-24 PROCEDURE — 90471 IMMUNIZATION ADMIN: CPT | Performed by: INTERNAL MEDICINE

## 2019-09-24 PROCEDURE — 1090F PRES/ABSN URINE INCON ASSESS: CPT | Performed by: INTERNAL MEDICINE

## 2019-09-24 PROCEDURE — 90653 IIV ADJUVANT VACCINE IM: CPT | Performed by: INTERNAL MEDICINE

## 2019-09-24 PROCEDURE — 1123F ACP DISCUSS/DSCN MKR DOCD: CPT | Performed by: INTERNAL MEDICINE

## 2019-09-24 PROCEDURE — 1036F TOBACCO NON-USER: CPT | Performed by: INTERNAL MEDICINE

## 2019-09-24 PROCEDURE — G8427 DOCREV CUR MEDS BY ELIG CLIN: HCPCS | Performed by: INTERNAL MEDICINE

## 2019-09-24 PROCEDURE — 4040F PNEUMOC VAC/ADMIN/RCVD: CPT | Performed by: INTERNAL MEDICINE

## 2019-09-24 RX ORDER — LOSARTAN POTASSIUM 25 MG/1
25 TABLET ORAL DAILY
Qty: 90 TABLET | Refills: 3 | Status: CANCELLED | OUTPATIENT
Start: 2019-09-24

## 2019-09-24 RX ORDER — TORSEMIDE 20 MG/1
20 TABLET ORAL PRN
Qty: 90 TABLET | Refills: 3 | Status: CANCELLED | OUTPATIENT
Start: 2019-09-24

## 2019-09-24 RX ORDER — DIGOXIN 125 MCG
125 TABLET ORAL DAILY
Qty: 90 TABLET | Refills: 3 | Status: CANCELLED | OUTPATIENT
Start: 2019-09-24

## 2019-09-24 RX ORDER — SPIRONOLACTONE 25 MG/1
TABLET ORAL
Qty: 45 TABLET | Refills: 3 | Status: CANCELLED | OUTPATIENT
Start: 2019-09-24

## 2019-09-24 RX ORDER — CARVEDILOL 6.25 MG/1
6.25 TABLET ORAL 2 TIMES DAILY WITH MEALS
Qty: 180 TABLET | Refills: 3 | Status: CANCELLED | OUTPATIENT
Start: 2019-09-24

## 2019-09-24 RX ORDER — OMEPRAZOLE 20 MG/1
20 CAPSULE, DELAYED RELEASE ORAL
Qty: 14 CAPSULE | Refills: 0 | Status: ON HOLD | OUTPATIENT
Start: 2019-09-24 | End: 2020-06-28

## 2019-09-24 NOTE — PROGRESS NOTES
Chief Complaint   Patient presents with    Cardiomyopathy    Hyperlipidemia    Abdominal Pain     Pt c/o abdominal pain & bloating after eating since having a reaction to prednisone     Insect Bite     Pt reports itchy and red insect bites that started last night       HPI:  Cardiomyopathy: She is followed by [facility] cardiology- CNP Marian Reddy. She was started on Entresto. She denies dyspnea or leg swelling. She is able to walk up to 3 miles and do cardiovascular exercise. HLD: She is no longer on a statin. She is concerned about taking too many pills. She is taking fish oil. Epigastric pain and bloating X 2.5 months. Aggravated by eating. Severity is 3-4/10. The pain is only present after eating. It lasts a few seconds, then resolved. Past Surgical History:   Procedure Laterality Date     SECTION      3 c-sections    COLONOSCOPY  2015    polypectomy    PACEMAKER INSERTION      PACEMAKER INSERTION      UPPER GASTROINTESTINAL ENDOSCOPY  2015      ROS:  Neg for blood in stool  Neg for chest pain  +itching insect bites (denies any insects at home), first occurred overnight. EXAM:  /68 (Site: Left Upper Arm, Position: Sitting, Cuff Size: Small Adult)   Pulse 72   Ht 5' 2\" (1.575 m)   Wt 145 lb (65.8 kg)   BMI 26.52 kg/m²    GEN: WN/WD, NAD  CV: regular rate and rhythm, no murmurs rubs or gallops  Resp: normal effort, clear auscultation bilaterally  No peripheral edema   GI: The abdomen is soft and nondistended. There is diffuse tenderness without peritoneal signs. Skin: Insect bites are present on the arms and legs.     Lab Results   Component Value Date    CREATININE 0.7 2019    BUN 23 (H) 2019     2019    K 4.2 2019     2019    CO2 23 2019     Lab Results   Component Value Date    CHOL 228 (H) 2018    CHOL 243 (H) 10/18/2017    CHOL 232 (H) 2017     Lab Results   Component Value Date    TRIG 235

## 2019-10-01 ENCOUNTER — TELEPHONE (OUTPATIENT)
Dept: INTERNAL MEDICINE CLINIC | Age: 64
End: 2019-10-01

## 2019-11-12 ENCOUNTER — TELEPHONE (OUTPATIENT)
Dept: ADMINISTRATIVE | Age: 64
End: 2019-11-12

## 2019-11-12 RX ORDER — DIGOXIN 125 MCG
TABLET ORAL
Qty: 90 TABLET | Refills: 1 | Status: SHIPPED | OUTPATIENT
Start: 2019-11-12 | End: 2020-05-12

## 2019-11-15 ENCOUNTER — OFFICE VISIT (OUTPATIENT)
Dept: INTERNAL MEDICINE CLINIC | Age: 64
End: 2019-11-15
Payer: COMMERCIAL

## 2019-11-15 VITALS
SYSTOLIC BLOOD PRESSURE: 118 MMHG | HEART RATE: 66 BPM | BODY MASS INDEX: 26.68 KG/M2 | WEIGHT: 145 LBS | TEMPERATURE: 98 F | DIASTOLIC BLOOD PRESSURE: 82 MMHG | HEIGHT: 62 IN

## 2019-11-15 DIAGNOSIS — J06.9 VIRAL URI: Primary | ICD-10-CM

## 2019-11-15 PROCEDURE — G8417 CALC BMI ABV UP PARAM F/U: HCPCS | Performed by: NURSE PRACTITIONER

## 2019-11-15 PROCEDURE — 1123F ACP DISCUSS/DSCN MKR DOCD: CPT | Performed by: NURSE PRACTITIONER

## 2019-11-15 PROCEDURE — 1036F TOBACCO NON-USER: CPT | Performed by: NURSE PRACTITIONER

## 2019-11-15 PROCEDURE — G8427 DOCREV CUR MEDS BY ELIG CLIN: HCPCS | Performed by: NURSE PRACTITIONER

## 2019-11-15 PROCEDURE — G8400 PT W/DXA NO RESULTS DOC: HCPCS | Performed by: NURSE PRACTITIONER

## 2019-11-15 PROCEDURE — 99213 OFFICE O/P EST LOW 20 MIN: CPT | Performed by: NURSE PRACTITIONER

## 2019-11-15 PROCEDURE — 4040F PNEUMOC VAC/ADMIN/RCVD: CPT | Performed by: NURSE PRACTITIONER

## 2019-11-15 PROCEDURE — G8482 FLU IMMUNIZE ORDER/ADMIN: HCPCS | Performed by: NURSE PRACTITIONER

## 2019-11-15 PROCEDURE — 1090F PRES/ABSN URINE INCON ASSESS: CPT | Performed by: NURSE PRACTITIONER

## 2019-11-15 PROCEDURE — 3017F COLORECTAL CA SCREEN DOC REV: CPT | Performed by: NURSE PRACTITIONER

## 2019-11-15 RX ORDER — BENZONATATE 100 MG/1
100 CAPSULE ORAL 3 TIMES DAILY PRN
Qty: 30 CAPSULE | Refills: 0 | Status: SHIPPED | OUTPATIENT
Start: 2019-11-15 | End: 2019-11-22

## 2019-11-15 ASSESSMENT — ENCOUNTER SYMPTOMS
SINUS COMPLAINT: 1
COUGH: 0
SORE THROAT: 1
HOARSE VOICE: 0
SHORTNESS OF BREATH: 0
SWOLLEN GLANDS: 0
SINUS PRESSURE: 1

## 2019-11-27 ENCOUNTER — OFFICE VISIT (OUTPATIENT)
Dept: INTERNAL MEDICINE CLINIC | Age: 64
End: 2019-11-27
Payer: COMMERCIAL

## 2019-11-27 VITALS
TEMPERATURE: 98 F | BODY MASS INDEX: 27.23 KG/M2 | HEART RATE: 70 BPM | WEIGHT: 148 LBS | SYSTOLIC BLOOD PRESSURE: 120 MMHG | HEIGHT: 62 IN | DIASTOLIC BLOOD PRESSURE: 76 MMHG

## 2019-11-27 DIAGNOSIS — J06.9 ACUTE URI OF MULTIPLE SITES: Primary | ICD-10-CM

## 2019-11-27 PROCEDURE — 4040F PNEUMOC VAC/ADMIN/RCVD: CPT | Performed by: NURSE PRACTITIONER

## 2019-11-27 PROCEDURE — G8417 CALC BMI ABV UP PARAM F/U: HCPCS | Performed by: NURSE PRACTITIONER

## 2019-11-27 PROCEDURE — G8427 DOCREV CUR MEDS BY ELIG CLIN: HCPCS | Performed by: NURSE PRACTITIONER

## 2019-11-27 PROCEDURE — 99213 OFFICE O/P EST LOW 20 MIN: CPT | Performed by: NURSE PRACTITIONER

## 2019-11-27 PROCEDURE — G8400 PT W/DXA NO RESULTS DOC: HCPCS | Performed by: NURSE PRACTITIONER

## 2019-11-27 PROCEDURE — 3017F COLORECTAL CA SCREEN DOC REV: CPT | Performed by: NURSE PRACTITIONER

## 2019-11-27 PROCEDURE — 1036F TOBACCO NON-USER: CPT | Performed by: NURSE PRACTITIONER

## 2019-11-27 PROCEDURE — 1123F ACP DISCUSS/DSCN MKR DOCD: CPT | Performed by: NURSE PRACTITIONER

## 2019-11-27 PROCEDURE — 1090F PRES/ABSN URINE INCON ASSESS: CPT | Performed by: NURSE PRACTITIONER

## 2019-11-27 PROCEDURE — G8482 FLU IMMUNIZE ORDER/ADMIN: HCPCS | Performed by: NURSE PRACTITIONER

## 2019-11-27 RX ORDER — SPIRONOLACTONE 25 MG/1
TABLET ORAL
Qty: 45 TABLET | Refills: 5 | Status: SHIPPED | OUTPATIENT
Start: 2019-11-27 | End: 2020-05-14 | Stop reason: SDUPTHER

## 2019-11-27 RX ORDER — AMOXICILLIN AND CLAVULANATE POTASSIUM 875; 125 MG/1; MG/1
1 TABLET, FILM COATED ORAL 2 TIMES DAILY
Qty: 20 TABLET | Refills: 0 | Status: SHIPPED | OUTPATIENT
Start: 2019-11-27 | End: 2019-12-07

## 2019-12-03 ENCOUNTER — HOSPITAL ENCOUNTER (OUTPATIENT)
Age: 64
Discharge: HOME | End: 2019-12-03
Payer: COMMERCIAL

## 2019-12-03 LAB
IGA: 220 MG/DL (ref 70–400)
TSH SERPL DL<=0.05 MIU/L-ACNC: 2.01 UIU/ML (ref 0.27–4.2)

## 2019-12-03 PROCEDURE — 83516 IMMUNOASSAY NONANTIBODY: CPT

## 2019-12-03 PROCEDURE — 82784 ASSAY IGA/IGD/IGG/IGM EACH: CPT

## 2019-12-03 PROCEDURE — 84443 ASSAY THYROID STIM HORMONE: CPT

## 2019-12-03 PROCEDURE — 36415 COLL VENOUS BLD VENIPUNCTURE: CPT

## 2019-12-04 ENCOUNTER — HOSPITAL ENCOUNTER (OUTPATIENT)
Age: 64
Discharge: HOME | End: 2019-12-04
Payer: COMMERCIAL

## 2019-12-04 PROCEDURE — 87338 HPYLORI STOOL AG IA: CPT

## 2019-12-05 LAB — TISSUE TRANSGLUTAMINASE IGA: 1 U/ML (ref 0–3)

## 2019-12-06 LAB — H PYLORI ANTIGEN STOOL: NEGATIVE

## 2019-12-09 ENCOUNTER — ANESTHESIA EVENT (OUTPATIENT)
Dept: ENDOSCOPY | Age: 64
End: 2019-12-09
Payer: COMMERCIAL

## 2019-12-19 ENCOUNTER — HOSPITAL ENCOUNTER (OUTPATIENT)
Age: 64
Setting detail: OUTPATIENT SURGERY
Discharge: HOME | End: 2019-12-19
Attending: INTERNAL MEDICINE | Admitting: INTERNAL MEDICINE
Payer: COMMERCIAL

## 2019-12-19 ENCOUNTER — ANESTHESIA (OUTPATIENT)
Dept: ENDOSCOPY | Age: 64
End: 2019-12-19
Payer: COMMERCIAL

## 2019-12-19 VITALS
RESPIRATION RATE: 18 BRPM | SYSTOLIC BLOOD PRESSURE: 102 MMHG | OXYGEN SATURATION: 98 % | DIASTOLIC BLOOD PRESSURE: 56 MMHG

## 2019-12-19 VITALS
TEMPERATURE: 97.4 F | BODY MASS INDEX: 27.05 KG/M2 | HEART RATE: 60 BPM | SYSTOLIC BLOOD PRESSURE: 103 MMHG | WEIGHT: 147 LBS | RESPIRATION RATE: 10 BRPM | DIASTOLIC BLOOD PRESSURE: 64 MMHG | OXYGEN SATURATION: 100 % | HEIGHT: 62 IN

## 2019-12-19 PROCEDURE — 7100000011 HC PHASE II RECOVERY - ADDTL 15 MIN: Performed by: INTERNAL MEDICINE

## 2019-12-19 PROCEDURE — 3609010600 HC COLONOSCOPY POLYPECTOMY SNARE/COLD BIOPSY: Performed by: INTERNAL MEDICINE

## 2019-12-19 PROCEDURE — 7100000000 HC PACU RECOVERY - FIRST 15 MIN: Performed by: INTERNAL MEDICINE

## 2019-12-19 PROCEDURE — 2500000003 HC RX 250 WO HCPCS: Performed by: NURSE ANESTHETIST, CERTIFIED REGISTERED

## 2019-12-19 PROCEDURE — 6360000002 HC RX W HCPCS: Performed by: NURSE ANESTHETIST, CERTIFIED REGISTERED

## 2019-12-19 PROCEDURE — 2580000003 HC RX 258: Performed by: ANESTHESIOLOGY

## 2019-12-19 PROCEDURE — 7100000010 HC PHASE II RECOVERY - FIRST 15 MIN: Performed by: INTERNAL MEDICINE

## 2019-12-19 PROCEDURE — 3609012400 HC EGD TRANSORAL BIOPSY SINGLE/MULTIPLE: Performed by: INTERNAL MEDICINE

## 2019-12-19 PROCEDURE — 3700000000 HC ANESTHESIA ATTENDED CARE: Performed by: INTERNAL MEDICINE

## 2019-12-19 PROCEDURE — 88305 TISSUE EXAM BY PATHOLOGIST: CPT

## 2019-12-19 PROCEDURE — 2580000003 HC RX 258: Performed by: NURSE ANESTHETIST, CERTIFIED REGISTERED

## 2019-12-19 PROCEDURE — 7100000001 HC PACU RECOVERY - ADDTL 15 MIN: Performed by: INTERNAL MEDICINE

## 2019-12-19 PROCEDURE — 2709999900 HC NON-CHARGEABLE SUPPLY: Performed by: INTERNAL MEDICINE

## 2019-12-19 PROCEDURE — 3700000001 HC ADD 15 MINUTES (ANESTHESIA): Performed by: INTERNAL MEDICINE

## 2019-12-19 RX ORDER — SODIUM CHLORIDE 0.9 % (FLUSH) 0.9 %
10 SYRINGE (ML) INJECTION PRN
Status: DISCONTINUED | OUTPATIENT
Start: 2019-12-19 | End: 2019-12-19 | Stop reason: HOSPADM

## 2019-12-19 RX ORDER — SODIUM CHLORIDE 9 MG/ML
INJECTION, SOLUTION INTRAVENOUS CONTINUOUS PRN
Status: DISCONTINUED | OUTPATIENT
Start: 2019-12-19 | End: 2019-12-19 | Stop reason: SDUPTHER

## 2019-12-19 RX ORDER — LIDOCAINE HYDROCHLORIDE 20 MG/ML
INJECTION, SOLUTION INFILTRATION; PERINEURAL PRN
Status: DISCONTINUED | OUTPATIENT
Start: 2019-12-19 | End: 2019-12-19 | Stop reason: SDUPTHER

## 2019-12-19 RX ORDER — SODIUM CHLORIDE 0.9 % (FLUSH) 0.9 %
10 SYRINGE (ML) INJECTION EVERY 12 HOURS SCHEDULED
Status: DISCONTINUED | OUTPATIENT
Start: 2019-12-19 | End: 2019-12-19 | Stop reason: HOSPADM

## 2019-12-19 RX ORDER — PROPOFOL 10 MG/ML
INJECTION, EMULSION INTRAVENOUS CONTINUOUS PRN
Status: DISCONTINUED | OUTPATIENT
Start: 2019-12-19 | End: 2019-12-19 | Stop reason: SDUPTHER

## 2019-12-19 RX ORDER — KETAMINE HCL IN NACL, ISO-OSM 100MG/10ML
SYRINGE (ML) INJECTION PRN
Status: DISCONTINUED | OUTPATIENT
Start: 2019-12-19 | End: 2019-12-19 | Stop reason: SDUPTHER

## 2019-12-19 RX ORDER — SODIUM CHLORIDE 9 MG/ML
INJECTION, SOLUTION INTRAVENOUS CONTINUOUS
Status: DISCONTINUED | OUTPATIENT
Start: 2019-12-19 | End: 2019-12-19 | Stop reason: HOSPADM

## 2019-12-19 RX ORDER — PROPOFOL 10 MG/ML
INJECTION, EMULSION INTRAVENOUS PRN
Status: DISCONTINUED | OUTPATIENT
Start: 2019-12-19 | End: 2019-12-19 | Stop reason: SDUPTHER

## 2019-12-19 RX ADMIN — LIDOCAINE HYDROCHLORIDE 60 MG: 20 INJECTION, SOLUTION INFILTRATION; PERINEURAL at 12:07

## 2019-12-19 RX ADMIN — SODIUM CHLORIDE: 9 INJECTION, SOLUTION INTRAVENOUS at 11:40

## 2019-12-19 RX ADMIN — SODIUM CHLORIDE: 9 INJECTION, SOLUTION INTRAVENOUS at 11:54

## 2019-12-19 RX ADMIN — PHENYLEPHRINE HYDROCHLORIDE 50 MCG: 10 INJECTION INTRAVENOUS at 12:13

## 2019-12-19 RX ADMIN — PHENYLEPHRINE HYDROCHLORIDE 50 MCG: 10 INJECTION INTRAVENOUS at 12:26

## 2019-12-19 RX ADMIN — PROPOFOL 50 MG: 10 INJECTION, EMULSION INTRAVENOUS at 12:07

## 2019-12-19 RX ADMIN — Medication 5 MG: at 12:08

## 2019-12-19 RX ADMIN — PROPOFOL 120 MCG/KG/MIN: 10 INJECTION, EMULSION INTRAVENOUS at 12:09

## 2019-12-19 ASSESSMENT — ENCOUNTER SYMPTOMS: SHORTNESS OF BREATH: 1

## 2019-12-19 ASSESSMENT — PAIN - FUNCTIONAL ASSESSMENT: PAIN_FUNCTIONAL_ASSESSMENT: 0-10

## 2019-12-28 DIAGNOSIS — I10 ESSENTIAL HYPERTENSION: ICD-10-CM

## 2019-12-30 RX ORDER — CARVEDILOL 6.25 MG/1
TABLET ORAL
Qty: 180 TABLET | Refills: 3 | Status: ON HOLD | OUTPATIENT
Start: 2019-12-30 | End: 2020-07-01 | Stop reason: SDUPTHER

## 2020-01-16 ENCOUNTER — OFFICE VISIT (OUTPATIENT)
Dept: INTERNAL MEDICINE CLINIC | Age: 65
End: 2020-01-16
Payer: COMMERCIAL

## 2020-01-16 VITALS
BODY MASS INDEX: 26.68 KG/M2 | WEIGHT: 145 LBS | DIASTOLIC BLOOD PRESSURE: 80 MMHG | HEIGHT: 62 IN | SYSTOLIC BLOOD PRESSURE: 112 MMHG | HEART RATE: 72 BPM

## 2020-01-16 PROCEDURE — G8417 CALC BMI ABV UP PARAM F/U: HCPCS | Performed by: NURSE PRACTITIONER

## 2020-01-16 PROCEDURE — 99213 OFFICE O/P EST LOW 20 MIN: CPT | Performed by: NURSE PRACTITIONER

## 2020-01-16 PROCEDURE — G8400 PT W/DXA NO RESULTS DOC: HCPCS | Performed by: NURSE PRACTITIONER

## 2020-01-16 PROCEDURE — 1036F TOBACCO NON-USER: CPT | Performed by: NURSE PRACTITIONER

## 2020-01-16 PROCEDURE — 3017F COLORECTAL CA SCREEN DOC REV: CPT | Performed by: NURSE PRACTITIONER

## 2020-01-16 PROCEDURE — G8427 DOCREV CUR MEDS BY ELIG CLIN: HCPCS | Performed by: NURSE PRACTITIONER

## 2020-01-16 PROCEDURE — 1090F PRES/ABSN URINE INCON ASSESS: CPT | Performed by: NURSE PRACTITIONER

## 2020-01-16 PROCEDURE — G8482 FLU IMMUNIZE ORDER/ADMIN: HCPCS | Performed by: NURSE PRACTITIONER

## 2020-01-16 PROCEDURE — 4040F PNEUMOC VAC/ADMIN/RCVD: CPT | Performed by: NURSE PRACTITIONER

## 2020-01-16 PROCEDURE — 1123F ACP DISCUSS/DSCN MKR DOCD: CPT | Performed by: NURSE PRACTITIONER

## 2020-01-16 RX ORDER — HYDROXYZINE HYDROCHLORIDE 25 MG/1
25 TABLET, FILM COATED ORAL EVERY 8 HOURS PRN
Qty: 30 TABLET | Refills: 0 | Status: SHIPPED | OUTPATIENT
Start: 2020-01-16 | End: 2020-01-26

## 2020-01-16 RX ORDER — ESCITALOPRAM OXALATE 10 MG/1
10 TABLET ORAL DAILY
Qty: 30 TABLET | Refills: 0 | Status: SHIPPED | OUTPATIENT
Start: 2020-01-16 | End: 2020-02-10

## 2020-01-16 RX ORDER — HYDROXYZINE HYDROCHLORIDE 25 MG/1
25 TABLET, FILM COATED ORAL EVERY 8 HOURS PRN
Qty: 30 TABLET | Refills: 0 | Status: SHIPPED | OUTPATIENT
Start: 2020-01-16 | End: 2020-01-16

## 2020-01-16 NOTE — PROGRESS NOTES
1/16/20     Chief Complaint   Patient presents with    Anxiety     Pt c/o having anxiety, stomach feeling upset, diarrhea, very emotional, not able to sleep thru the night x 3 wks. HPI     Here for uncontrolled anxiety  Found out 3 weeks ago that her  has been having an affair  She has been very tearful, gets sick to her stomach and has diarrhea when she thinks about it  Having trouble sleeping for the last 3 weeks  She states all the symptoms get worse whenever she thinks about the affair    Allergies   Allergen Reactions    Prednisone      Caused an allergic reaction. Swelling all over her body. Current Outpatient Medications   Medication Sig Dispense Refill    escitalopram (LEXAPRO) 10 MG tablet Take 1 tablet by mouth daily 30 tablet 0    hydrOXYzine (ATARAX) 25 MG tablet Take 1 tablet by mouth every 8 hours as needed for Anxiety 30 tablet 0    carvedilol (COREG) 6.25 MG tablet TAKE 1 TABLET BY MOUTH TWICE DAILY WITH MEALS 180 tablet 3    spironolactone (ALDACTONE) 25 MG tablet TAKE 1/2 TABLET BY MOUTH DAILY 45 tablet 5    digoxin (LANOXIN) 125 MCG tablet TAKE 1 TABLET BY MOUTH DAILY 90 tablet 1    omeprazole (PRILOSEC) 20 MG delayed release capsule Take 1 capsule by mouth every morning (before breakfast) 14 capsule 0    sacubitril-valsartan (ENTRESTO) 24-26 MG per tablet Take 1 tablet by mouth 2 times daily      Omega-3 Fatty Acids (FISH OIL) 1000 MG CAPS Take 3,000 mg by mouth daily      torsemide (DEMADEX) 20 MG tablet Take 1 tablet by mouth daily (Patient taking differently: Take 20 mg by mouth as needed ) 90 tablet 3     No current facility-administered medications for this visit. Review of Systems  Negative other than HPI    Vitals:    01/16/20 1547   BP: 112/80   Site: Left Upper Arm   Position: Sitting   Cuff Size: Small Adult   Pulse: 72   Weight: 145 lb (65.8 kg)   Height: 5' 2\" (1.575 m)      Physical Exam  Constitutional:       General: She is not in acute distress. Appearance: Normal appearance. HENT:      Head: Normocephalic and atraumatic. Cardiovascular:      Rate and Rhythm: Normal rate and regular rhythm. Heart sounds: Normal heart sounds. No murmur. Pulmonary:      Effort: No respiratory distress. Breath sounds: Normal breath sounds. No wheezing. Skin:     General: Skin is warm. Neurological:      General: No focal deficit present. Mental Status: She is alert and oriented to person, place, and time. Psychiatric:         Attention and Perception: Attention normal.         Mood and Affect: Mood is anxious. Affect is tearful. Speech: Speech normal.         Thought Content: Thought content normal.         Cognition and Memory: Cognition normal.         Judgment: Judgment normal.       Assessment and Plan:  1. Anxiety  Stable, uncontrolled  Situational with marriage concerns  Discussed options in detail with patient  Will use hydroxyzine as needed for anxiety, sleep for the first few weeks  Start Lexapro daily, follow-up in 4 weeks for mood check  Referral for psychotherapy, okay to see Dr. Adrienne Garcia or Zain Yee. Patient may call and see if she can find a Samoan speaking counselor/psychologist  - escitalopram (LEXAPRO) 10 MG tablet; Take 1 tablet by mouth daily  Dispense: 30 tablet; Refill: 0  - Ambulatory referral to Psychology  - hydrOXYzine (ATARAX) 25 MG tablet; Take 1 tablet by mouth every 8 hours as needed for Anxiety  Dispense: 30 tablet;  Refill: 0     Follow-up in 4 weeks or sooner if needed    Electronically signed by SHAMEKA Bautista CNP on 1/16/2020 at 4:20 PM

## 2020-02-10 ENCOUNTER — OFFICE VISIT (OUTPATIENT)
Dept: INTERNAL MEDICINE CLINIC | Age: 65
End: 2020-02-10
Payer: COMMERCIAL

## 2020-02-10 ENCOUNTER — OFFICE VISIT (OUTPATIENT)
Dept: PSYCHOLOGY | Age: 65
End: 2020-02-10
Payer: COMMERCIAL

## 2020-02-10 VITALS
SYSTOLIC BLOOD PRESSURE: 96 MMHG | BODY MASS INDEX: 27.05 KG/M2 | DIASTOLIC BLOOD PRESSURE: 64 MMHG | HEIGHT: 62 IN | WEIGHT: 147 LBS | HEART RATE: 64 BPM

## 2020-02-10 PROCEDURE — 90791 PSYCH DIAGNOSTIC EVALUATION: CPT | Performed by: PSYCHOLOGIST

## 2020-02-10 PROCEDURE — 4040F PNEUMOC VAC/ADMIN/RCVD: CPT | Performed by: INTERNAL MEDICINE

## 2020-02-10 PROCEDURE — 1090F PRES/ABSN URINE INCON ASSESS: CPT | Performed by: INTERNAL MEDICINE

## 2020-02-10 PROCEDURE — 1036F TOBACCO NON-USER: CPT | Performed by: PSYCHOLOGIST

## 2020-02-10 PROCEDURE — 1036F TOBACCO NON-USER: CPT | Performed by: INTERNAL MEDICINE

## 2020-02-10 PROCEDURE — G8400 PT W/DXA NO RESULTS DOC: HCPCS | Performed by: INTERNAL MEDICINE

## 2020-02-10 PROCEDURE — G8482 FLU IMMUNIZE ORDER/ADMIN: HCPCS | Performed by: INTERNAL MEDICINE

## 2020-02-10 PROCEDURE — 1123F ACP DISCUSS/DSCN MKR DOCD: CPT | Performed by: INTERNAL MEDICINE

## 2020-02-10 PROCEDURE — 3017F COLORECTAL CA SCREEN DOC REV: CPT | Performed by: INTERNAL MEDICINE

## 2020-02-10 PROCEDURE — 99213 OFFICE O/P EST LOW 20 MIN: CPT | Performed by: INTERNAL MEDICINE

## 2020-02-10 PROCEDURE — G8417 CALC BMI ABV UP PARAM F/U: HCPCS | Performed by: INTERNAL MEDICINE

## 2020-02-10 PROCEDURE — G8427 DOCREV CUR MEDS BY ELIG CLIN: HCPCS | Performed by: INTERNAL MEDICINE

## 2020-02-10 RX ORDER — TRAZODONE HYDROCHLORIDE 50 MG/1
50 TABLET ORAL NIGHTLY PRN
Qty: 30 TABLET | Refills: 1 | Status: ON HOLD | OUTPATIENT
Start: 2020-02-10 | End: 2020-06-28

## 2020-02-10 ASSESSMENT — PATIENT HEALTH QUESTIONNAIRE - PHQ9
1. LITTLE INTEREST OR PLEASURE IN DOING THINGS: 0
SUM OF ALL RESPONSES TO PHQ9 QUESTIONS 1 & 2: 3
SUM OF ALL RESPONSES TO PHQ QUESTIONS 1-9: 11
SUM OF ALL RESPONSES TO PHQ QUESTIONS 1-9: 11
5. POOR APPETITE OR OVEREATING: 2
7. TROUBLE CONCENTRATING ON THINGS, SUCH AS READING THE NEWSPAPER OR WATCHING TELEVISION: 1
4. FEELING TIRED OR HAVING LITTLE ENERGY: 1
8. MOVING OR SPEAKING SO SLOWLY THAT OTHER PEOPLE COULD HAVE NOTICED. OR THE OPPOSITE, BEING SO FIGETY OR RESTLESS THAT YOU HAVE BEEN MOVING AROUND A LOT MORE THAN USUAL: 1
2. FEELING DOWN, DEPRESSED OR HOPELESS: 3
6. FEELING BAD ABOUT YOURSELF - OR THAT YOU ARE A FAILURE OR HAVE LET YOURSELF OR YOUR FAMILY DOWN: 1
9. THOUGHTS THAT YOU WOULD BE BETTER OFF DEAD, OR OF HURTING YOURSELF: 1
3. TROUBLE FALLING OR STAYING ASLEEP: 1

## 2020-02-10 NOTE — PROGRESS NOTES
oriented to person, place, time, and general circumstances  Memory    recent and remote memory intact  Attention/Concentration    intact  Morbid ideation Yes  Suicide Assessment    suicidal ideation with unclear plan no intent    History:    Social History:   Social History     Socioeconomic History    Marital status:      Spouse name: Not on file    Number of children: Not on file    Years of education: Not on file    Highest education level: Not on file   Occupational History    Not on file   Social Needs    Financial resource strain: Not on file    Food insecurity:     Worry: Not on file     Inability: Not on file    Transportation needs:     Medical: Not on file     Non-medical: Not on file   Tobacco Use    Smoking status: Former Smoker     Packs/day: 0.25     Years: 10.00     Pack years: 2.50     Last attempt to quit: 1998     Years since quittin.8    Smokeless tobacco: Never Used    Tobacco comment: smoked 1 cig a day,   Substance and Sexual Activity    Alcohol use: No    Drug use: No    Sexual activity: Yes     Partners: Male     Comment:    Lifestyle    Physical activity:     Days per week: Not on file     Minutes per session: Not on file    Stress: Not on file   Relationships    Social connections:     Talks on phone: Not on file     Gets together: Not on file     Attends Muslim service: Not on file     Active member of club or organization: Not on file     Attends meetings of clubs or organizations: Not on file     Relationship status: Not on file    Intimate partner violence:     Fear of current or ex partner: Not on file     Emotionally abused: Not on file     Physically abused: Not on file     Forced sexual activity: Not on file   Other Topics Concern    Not on file   Social History Narrative    Not on file     TOBACCO:   reports that she quit smoking about 21 years ago. She has a 2.50 pack-year smoking history.  She has never used smokeless tobacco.  ETOH: reports no history of alcohol use. A:  Administered PHQ-9 (see below). Patient endorses moderate symptoms of depression. Endorses SI, has thought of a plan but has no intent. PHQ Scores 2/10/2020 3/7/2019 10/5/2017   PHQ2 Score 3 6 2   PHQ9 Score 11 11 2     Interpretation of Total Score Depression Severity: 1-4 = Minimal depression, 5-9 = Mild depression, 10-14 = Moderate depression, 15-19 = Moderately severe depression, 20-27 = Severe depression    Diagnosis:    Major depressive disorder; recurrent and moderate    Plan:  Pt interventions:  Discussed self-care (sleep, nutrition, rewarding activities, social support, exercise), Established rapport, Supportive techniques, Emphasized self-care as important for managing overall health and Safety planning re: who to contact/what to do when having suicidal thoughts    Documentation was done using voice recognition dragon software. Every effort was made to ensure accuracy; however, inadvertent, unintentional computerized transcription errors may be present.

## 2020-02-10 NOTE — PROGRESS NOTES
CC: insomnia, anxiety, possible STD exposure    HPI:  The patient is presenting to the office to address insomnia. This is due to anxiety because she recently found out that her  of 25 years has been having an affair. She reports that he has had multiple sexual partners including prostitutes over a period of several years. She has not had any partners besides him. She denies any symptoms related to sexually transmitted infection at this time. She is concerned about difficulty sleeping. She has trouble falling asleep and staying asleep. She is requesting medication to help with this. [address]  1-3 drinks about once a week      ROS:  Denies vaginal pain, discharge, bleeding    EXAM:  BP 96/64 (Site: Left Upper Arm, Position: Sitting, Cuff Size: Small Adult)   Pulse 64   Ht 5' 2\" (1.575 m)   Wt 147 lb (66.7 kg)   BMI 26.89 kg/m²   GEN: not in physical distress  PSYCH: +sad, tearful, judgement and insight are intact      A/P  1. Adjustment insomnia  Secondary to recent discovery that her  has been having numerous other sexual partners. She has been referred for counseling. She is going to see Josefa Russell this afternoon. A trial of trazodone 50 mg nightly as needed will be prescribed. 2. Possible exposure to STD  She is asymptomatic. Testing for chlamydia, gonorrhea, HIV, and syphilis will be performed today.

## 2020-02-11 LAB
HIV AG/AB: NORMAL
HIV ANTIGEN: NORMAL
HIV-1 ANTIBODY: NORMAL
HIV-2 AB: NORMAL
TOTAL SYPHILLIS IGG/IGM: NORMAL

## 2020-02-12 LAB
C. TRACHOMATIS DNA ,URINE: NEGATIVE
N. GONORRHOEAE DNA, URINE: NEGATIVE

## 2020-04-07 ENCOUNTER — TELEPHONE (OUTPATIENT)
Dept: INTERNAL MEDICINE CLINIC | Age: 65
End: 2020-04-07

## 2020-04-15 NOTE — PROGRESS NOTES
[address]   Electrophysiology Consultation   Date: 2020    CC: Establish care  HPI: Marisela Hercules is a 75 y.o. female with a PMH of dilated cardiomyopathy s/p ICD Cardiac defibrillator placement (); Laser lead extraction and removal and replacement of Biv ICD () Gen change 2018, Atrial fibrillation, CHF (congestive heart failure) (10/2/2008), Dilated cardiomyopathy, Fibromyalgia, GERD, Hyperlipemia, hyperlipidemia (2010) and TB (). She was previously followed by me at [facility]. Aguila Sherwood was a previous patient of mine from [facility]. She presents to the office to reestablish care with me today. She is feeling well from a cardiac standpoint but does have occasional dizziness. She will establish care also with the heart failure team.    Past Medical History:   Diagnosis Date    Asthma     Atrial fibrillation ([address]     Cardiomyopathy, nonischemic ([address]     GERD (gastroesophageal reflux disease)     HSV-1 (herpes simplex virus 1) infection     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    Hyperlipidemia     Sleep apnea     uses cpap    Uterine fibroids affecting pregnancy         Past Surgical History:   Procedure Laterality Date     SECTION      3 c-sections    COLONOSCOPY  2015    polypectomy    COLONOSCOPY N/A 2019    COLONOSCOPY POLYPECTOMY SNARE/COLD ASCENDING X1 , TRANSVERSE X1, SIGMOID X1 performed by Dev Yo MD at [place] [address]      PACEMAKER INSERTION      UPPER GASTROINTESTINAL ENDOSCOPY  2015    UPPER GASTROINTESTINAL ENDOSCOPY N/A 2019    EGD GASTRIC BIOPSY performed by Dev Yo MD at [address] [place] [address]       Allergies: Allergies   Allergen Reactions    Prednisone      Caused an allergic reaction. Swelling all over her body. Social History:   reports that she quit smoking about 22 years ago. She has a 2.50 pack-year smoking history.  She has never used smokeless tobacco. She reports that she does not drink alcohol or use drugs. Family History:  family history includes Diabetes in her mother; Kidney Disease in her mother. Reviewed. Denies family history of sudden cardiac death, arrhythmia, premature CAD    Review of System:  All other systems reviewed and are negative except for that noted above. Pertinent negatives are:   General: negative for fever, chills   Ophthalmic ROS: negative for - eye pain or loss of vision  ENT ROS: negative for - headaches, sore throat   Respiratory: negative for - cough, sputum  Cardiovascular: Reviewed in HPI  Gastrointestinal: negative for - abdominal pain, diarrhea, N/V  Hematology: negative for - bleeding, blood clots, bruising or jaundice  Genito-Urinary:  negative for - Dysuria or incontinence  Musculoskeletal: negative for - Joint swelling, muscle pain  Neurological: negative for - confusion, dizziness, headaches   Psychiatric: No anxiety, no depression. Dermatological: negative for - rash    Physical Examination:  Vitals:    04/16/20 0943   BP: 102/67   Pulse: 84   Resp: 18      Wt Readings from Last 3 Encounters:   04/16/20 145 lb (65.8 kg)   02/10/20 147 lb (66.7 kg)   01/16/20 145 lb (65.8 kg)       · Constitutional: Oriented. No distress. · Head: Normocephalic and atraumatic. · Mouth/Throat: Oropharynx is clear and moist.   · Eyes: Conjunctivae normal. EOM are normal.   · Neck: Neck supple. No rigidity. No JVD present. · Cardiovascular: Normal rate, regular rhythm, S1&S2. · Pulmonary/Chest: Bilateral respiratory sounds. No wheezes, No rhonchi. · Abdominal: Soft. Bowel sounds present. No distension, No tenderness. · Musculoskeletal: No tenderness. No edema    · Lymphadenopathy: Has no cervical adenopathy. · Neurological: Alert and oriented. Cranial nerve appears intact, No Gross deficit   · Skin: Skin is warm and dry. No rash noted. · Psychiatric: Has a normal behavior       Labs, diagnostic and imaging results reviewed. Reviewed.    Lab

## 2020-04-16 ENCOUNTER — OFFICE VISIT (OUTPATIENT)
Dept: CARDIOLOGY CLINIC | Age: 65
End: 2020-04-16
Payer: COMMERCIAL

## 2020-04-16 ENCOUNTER — NURSE ONLY (OUTPATIENT)
Dept: CARDIOLOGY CLINIC | Age: 65
End: 2020-04-16

## 2020-04-16 VITALS
SYSTOLIC BLOOD PRESSURE: 102 MMHG | RESPIRATION RATE: 18 BRPM | WEIGHT: 145 LBS | HEIGHT: 62 IN | DIASTOLIC BLOOD PRESSURE: 67 MMHG | BODY MASS INDEX: 26.68 KG/M2 | HEART RATE: 84 BPM

## 2020-04-16 PROBLEM — Z95.810 BIVENTRICULAR ICD (IMPLANTABLE CARDIOVERTER-DEFIBRILLATOR) IN PLACE: Status: ACTIVE | Noted: 2020-04-16

## 2020-04-16 PROCEDURE — 99204 OFFICE O/P NEW MOD 45 MIN: CPT | Performed by: INTERNAL MEDICINE

## 2020-04-16 PROCEDURE — 4040F PNEUMOC VAC/ADMIN/RCVD: CPT | Performed by: INTERNAL MEDICINE

## 2020-04-16 PROCEDURE — G8417 CALC BMI ABV UP PARAM F/U: HCPCS | Performed by: INTERNAL MEDICINE

## 2020-04-16 PROCEDURE — 93000 ELECTROCARDIOGRAM COMPLETE: CPT | Performed by: INTERNAL MEDICINE

## 2020-04-16 PROCEDURE — 3017F COLORECTAL CA SCREEN DOC REV: CPT | Performed by: INTERNAL MEDICINE

## 2020-04-16 PROCEDURE — 93284 PRGRMG EVAL IMPLANTABLE DFB: CPT | Performed by: INTERNAL MEDICINE

## 2020-04-16 PROCEDURE — G8427 DOCREV CUR MEDS BY ELIG CLIN: HCPCS | Performed by: INTERNAL MEDICINE

## 2020-04-16 PROCEDURE — G8400 PT W/DXA NO RESULTS DOC: HCPCS | Performed by: INTERNAL MEDICINE

## 2020-04-16 PROCEDURE — 1036F TOBACCO NON-USER: CPT | Performed by: INTERNAL MEDICINE

## 2020-04-16 PROCEDURE — 1123F ACP DISCUSS/DSCN MKR DOCD: CPT | Performed by: INTERNAL MEDICINE

## 2020-04-16 PROCEDURE — 1090F PRES/ABSN URINE INCON ASSESS: CPT | Performed by: INTERNAL MEDICINE

## 2020-04-17 ENCOUNTER — TELEPHONE (OUTPATIENT)
Dept: CARDIOLOGY CLINIC | Age: 65
End: 2020-04-17

## 2020-04-17 NOTE — TELEPHONE ENCOUNTER
I spoke with Mily Meyer and her . They expressed concerns that this morning she started feeling an uncomfortable pulsating from her device. Settings were changed yesterday. She experienced this same feeling when changes were made at [facility]. They would like an explanation and should the settings be returned to the previous settings?

## 2020-04-23 RX ORDER — CIPROFLOXACIN 250 MG/1
TABLET, FILM COATED ORAL
Qty: 10 TABLET | Refills: 1 | Status: SHIPPED | OUTPATIENT
Start: 2020-04-23 | End: 2020-12-02 | Stop reason: SDUPTHER

## 2020-05-12 RX ORDER — DIGOXIN 125 MCG
TABLET ORAL
Qty: 90 TABLET | Refills: 1 | Status: SHIPPED | OUTPATIENT
Start: 2020-05-12 | End: 2020-08-31 | Stop reason: SDUPTHER

## 2020-05-13 NOTE — PROGRESS NOTES
[address]  Advanced CHF/Pulmonary Hypertension   Cardiac Evaluation      George Anaya  YOB: 1955    Date of Visit:  5/14/20      Chief Complaint   Patient presents with    Hypertension    Cardiomyopathy        History of Present Illness:  George Anaya is a 75 y.o. female who presents from referral from Dr. Cary Zamora for consultation and management of cardiomyopathy. She has been a long standing patient with Dr. Cary Zamora at [facility]. She has a past medical history of NICM, dilated CM, s/p BiV ICD in 2009 and generator change in 7/2018 per Dr. Cary Zamora, atrial fibrillation (not on anti coagulation), and hyperlipidemia. Today she states she has SOB with stairs and a funny chest feeling sometimes while talking. The episode was brief and only occurred one time. She states she has SOB like she is choking when she is emotional and crying. She walks 1-2 miles every other day. She is here by herself. She states she was seeing Dr. Orlinda Collet at [facility]. He put her on Entresto about a year ago. She states she is careful with her salt. She takes Torsemide PRN and for her its about every 2 weeks. Impedance monitoring via [employer] Cardiac Compass was downloaded from patient's ICD and reviewed. The impedance shows stable fluid level. NYHA Class 2-3      Allergies   Allergen Reactions    Prednisone      Caused an allergic reaction. Swelling all over her body.      Current Outpatient Medications   Medication Sig Dispense Refill    digoxin (LANOXIN) 125 MCG tablet TAKE 1 TABLET BY MOUTH DAILY 90 tablet 1    traZODone (DESYREL) 50 MG tablet Take 1 tablet by mouth nightly as needed for Sleep 30 tablet 1    carvedilol (COREG) 6.25 MG tablet TAKE 1 TABLET BY MOUTH TWICE DAILY WITH MEALS 180 tablet 3    spironolactone (ALDACTONE) 25 MG tablet TAKE 1/2 TABLET BY MOUTH DAILY 45 tablet 5    sacubitril-valsartan (ENTRESTO) 24-26 MG per tablet Take 1 tablet by mouth 2 times daily  Omega-3 Fatty Acids (FISH OIL) 1000 MG CAPS Take 3,000 mg by mouth daily      torsemide (DEMADEX) 20 MG tablet Take 1 tablet by mouth daily (Patient taking differently: Take 20 mg by mouth as needed ) 90 tablet 3    ciprofloxacin (CIPRO) 250 MG tablet TAKE 1 TABLET BY MOUTH AFTER SEXUAL INTERCOURSE 10 tablet 1    omeprazole (PRILOSEC) 20 MG delayed release capsule Take 1 capsule by mouth every morning (before breakfast) (Patient taking differently: Take 20 mg by mouth as needed ) 14 capsule 0     No current facility-administered medications for this visit.         Past Medical History:   Diagnosis Date    Asthma     Atrial fibrillation (HCC)     [facility]mic [facility]     GERD (gastroesophageal reflux disease)     HSV-1 (herpes simplex virus 1) infection     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    Hyperlipidemia     Sleep apnea     uses cpap    Uterine fibroids affecting pregnancy      Past Surgical History:   Procedure Laterality Date     SECTION      3 c-sections    COLONOSCOPY  2015    polypectomy    COLONOSCOPY N/A 2019    COLONOSCOPY POLYPECTOMY SNARE/COLD ASCENDING X1 , TRANSVERSE X1, SIGMOID X1 performed by Tomasa Howard MD at [place] [address]      PACEMAKER INSERTION      UPPER GASTROINTESTINAL ENDOSCOPY  2015    UPPER GASTROINTESTINAL ENDOSCOPY N/A 2019    EGD GASTRIC BIOPSY performed by Tomasa Howard MD at [address] [place] [address]     Family History   Problem Relation Age of Onset    Diabetes Mother     Kidney Disease Mother         reanl failure     Social History     Socioeconomic History    Marital status:      Spouse name: Not on file    Number of children: Not on file    Years of education: Not on file    Highest education level: Not on file   Occupational History    Not on file   Social Needs    Financial resource strain: Not on file    Food insecurity     Worry: Not on file     Inability: Not on file  Transportation needs     Medical: Not on file     Non-medical: Not on file   Tobacco Use    Smoking status: Former Smoker     Packs/day: 0.25     Years: 10.00     Pack years: 2.50     Last attempt to quit: 1998     Years since quittin.1    Smokeless tobacco: Never Used    Tobacco comment: smoked 1 cig a day,   Substance and Sexual Activity    Alcohol use: No    Drug use: No    Sexual activity: Yes     Partners: Male     Comment:    Lifestyle    Physical activity     Days per week: Not on file     Minutes per session: Not on file    Stress: Not on file   Relationships    Social connections     Talks on phone: Not on file     Gets together: Not on file     Attends Pent[employer]al service: Not on file     Active member of club or organization: Not on file     Attends meetings of clubs or organizations: Not on file     Relationship status: Not on file    Intimate partner violence     Fear of current or ex partner: Not on file     Emotionally abused: Not on file     Physically abused: Not on file     Forced sexual activity: Not on file   Other Topics Concern    Not on file   Social History Narrative    Not on file       Review of Systems:   · Constitutional: there has been no unanticipated weight loss. There's been no change in energy level, sleep pattern, or activity level. · Eyes: No visual changes or diplopia. No scleral icterus. · ENT: No Headaches, hearing loss or vertigo. No mouth sores or sore throat. · Cardiovascular: Reviewed in HPI  · Respiratory: No cough or wheezing, no sputum production. No hematemesis. · Gastrointestinal: No abdominal pain, appetite loss, blood in stools. No change in bowel or bladder habits. · Genitourinary: No dysuria, trouble voiding, or hematuria. · Musculoskeletal:  No gait disturbance, weakness or joint complaints. · Integumentary: No rash or pruritis. · Neurological: No headache, diplopia, change in muscle strength, numbness or tingling.  No change in gait, balance, coordination, mood, affect, memory, mentation, behavior. · Psychiatric: No anxiety, no depression. · Endocrine: No malaise, fatigue or temperature intolerance. No excessive thirst, fluid intake, or urination. No tremor. · Hematologic/Lymphatic: No abnormal bruising or bleeding, blood clots or swollen lymph nodes. · Allergic/Immunologic: No nasal congestion or hives. Physical Examination:    Vitals:    05/14/20 0742   BP: 96/60   Site: Left Upper Arm   Position: Sitting   Cuff Size: Medium Adult   Pulse: 67   SpO2: 99%   Weight: 144 lb (65.3 kg)   Height: 5' 2\" (1.575 m)     Body mass index is 26.34 kg/m². Wt Readings from Last 3 Encounters:   05/14/20 144 lb (65.3 kg)   04/16/20 145 lb (65.8 kg)   02/10/20 147 lb (66.7 kg)     BP Readings from Last 3 Encounters:   05/14/20 96/60   04/16/20 102/67   02/10/20 96/64     Constitutional and General Appearance:   WD/WN in NAD  HEENT:  NC/AT  SHAI  No problems with hearing  Skin:  Warm, dry  Respiratory:  · Normal excursion and expansion without use of accessory muscles  · Resp Auscultation: Normal breath sounds without dullness  Cardiovascular:  · The apical impulses not displaced  · Heart tones are crisp and normal  · Cervical veins are not engorged  · The carotid upstroke is normal in amplitude and contour without delay or bruit  · JVP less than 8 cm H2O  RRR with nl S1 and S2 without m,r,g  · Peripheral pulses are symmetrical and full  · There is no clubbing, cyanosis of the extremities.   · No edema  · Femoral Arteries: 2+ and equal  · Pedal Pulses: 2+ and equal   Neck:  · No thyromegaly  Abdomen:  · No masses or tenderness  · Liver/Spleen: No Abnormalities Noted  Neurological/Psychiatric:  · Alert and oriented in all spheres  · Moves all extremities well  · Exhibits normal gait balance and coordination  · No abnormalities of mood, affect, memory, mentation, or behavior are noted      Diagnostic Testing:  Echo 10-19-18  - Left ventricle: The cavity size was moderately dilated. Wall    thickness was normal. Systolic function was severely reduced. The    estimated ejection fraction was in the range of 25% to 30%. There    is significant dyssynergy between the septal and lateral walls. Mild hypokinesis of the inferoseptal myocardium. Severe    hypokinesis of the basal-midanterolateral myocardium. Moderate    hypokinesis of the apicallateral myocardium. Akinesis of the    apicalinferior myocardium. Doppler parameters are consistent with    abnormal left ventricular relaxation (grade 1 diastolic    dysfunction). - Ventricular septum: Septal motion showed abnormal function and    dyssynergy.  - Aortic valve: Trivial regurgitation.  - Right ventricle: The cavity size was mildly dilated. Wall    thickness was normal. Systolic function was normal by objective    interpretation. TAPSE: 2.6cm. Tricuspid annular systolic velocity:    83DS/Q. A device lead is seen extending into the RV cavity.  - Right atrium: The atrium was mildly to moderately dilated. Pacer    wire or catheter noted in right atrium.  - Tricuspid valve: Mild-moderate regurgitation.  - Pericardium, extracardiac: A trivial pericardial effusion was    identified.     Impressions:  Compared to the study from 2/2/44, LV systolic is  slightly more reduced. Stress Test 4-10-17  PERFUSION FINDINGS  There is normal left ventricular perfusion. There is no evidence of visual transient dilation with a normal stress/rest left ventricular volume ratio of 0.98. The sum stress score is 2  (normal, less than     The right ventricle is normal.      Assessment:   1. Chronic systolic heart failure ([address]    2. Non-ischemic cardiomyopathy ([address]    3. Biventricular ICD (implantable cardioverter-defibrillator) in place    4. Benign essential HTN    5. PAF (paroxysmal atrial fibrillation) ([address]    6. SOB (shortness of breath)    7. Hypercholesteremia    8. Essential hypertension         1.  Chronic

## 2020-05-14 ENCOUNTER — NURSE ONLY (OUTPATIENT)
Dept: CARDIOLOGY CLINIC | Age: 65
End: 2020-05-14
Payer: COMMERCIAL

## 2020-05-14 ENCOUNTER — OFFICE VISIT (OUTPATIENT)
Dept: CARDIOLOGY CLINIC | Age: 65
End: 2020-05-14
Payer: COMMERCIAL

## 2020-05-14 VITALS
OXYGEN SATURATION: 99 % | DIASTOLIC BLOOD PRESSURE: 60 MMHG | SYSTOLIC BLOOD PRESSURE: 96 MMHG | HEIGHT: 62 IN | BODY MASS INDEX: 26.5 KG/M2 | WEIGHT: 144 LBS | HEART RATE: 67 BPM

## 2020-05-14 PROCEDURE — 99214 OFFICE O/P EST MOD 30 MIN: CPT | Performed by: INTERNAL MEDICINE

## 2020-05-14 PROCEDURE — 1090F PRES/ABSN URINE INCON ASSESS: CPT | Performed by: INTERNAL MEDICINE

## 2020-05-14 PROCEDURE — G8417 CALC BMI ABV UP PARAM F/U: HCPCS | Performed by: INTERNAL MEDICINE

## 2020-05-14 PROCEDURE — 93284 PRGRMG EVAL IMPLANTABLE DFB: CPT | Performed by: INTERNAL MEDICINE

## 2020-05-14 PROCEDURE — G8427 DOCREV CUR MEDS BY ELIG CLIN: HCPCS | Performed by: INTERNAL MEDICINE

## 2020-05-14 RX ORDER — SACUBITRIL AND VALSARTAN 24; 26 MG/1; MG/1
1 TABLET, FILM COATED ORAL 2 TIMES DAILY
Qty: 180 TABLET | Refills: 3 | Status: ON HOLD | OUTPATIENT
Start: 2020-05-14 | End: 2020-07-01 | Stop reason: SDUPTHER

## 2020-05-14 RX ORDER — SPIRONOLACTONE 25 MG/1
TABLET ORAL
Qty: 55 TABLET | Refills: 3 | Status: ON HOLD | OUTPATIENT
Start: 2020-05-14 | End: 2020-07-01 | Stop reason: HOSPADM

## 2020-05-14 RX ORDER — TORSEMIDE 20 MG/1
20 TABLET ORAL PRN
Qty: 30 TABLET | Refills: 3 | Status: SHIPPED | OUTPATIENT
Start: 2020-05-14 | End: 2020-10-28

## 2020-06-01 RX ORDER — HYDROXYZINE HYDROCHLORIDE 25 MG/1
25 TABLET, FILM COATED ORAL EVERY 8 HOURS PRN
Qty: 30 TABLET | Refills: 0 | Status: SHIPPED | OUTPATIENT
Start: 2020-06-01 | End: 2020-06-11

## 2020-06-28 ENCOUNTER — APPOINTMENT (OUTPATIENT)
Dept: GENERAL RADIOLOGY | Age: 65
DRG: 445 | End: 2020-06-28
Payer: COMMERCIAL

## 2020-06-28 ENCOUNTER — HOSPITAL ENCOUNTER (INPATIENT)
Age: 65
LOS: 3 days | Discharge: HOME | DRG: 445 | End: 2020-07-01
Attending: STUDENT IN AN ORGANIZED HEALTH CARE EDUCATION/TRAINING PROGRAM | Admitting: INTERNAL MEDICINE
Payer: COMMERCIAL

## 2020-06-28 PROBLEM — R45.851 SUICIDAL IDEATION: Status: ACTIVE | Noted: 2020-06-28

## 2020-06-28 PROBLEM — K21.00 GERD WITH ESOPHAGITIS: Status: ACTIVE | Noted: 2020-06-28

## 2020-06-28 PROBLEM — R13.10 DYSPHAGIA: Status: ACTIVE | Noted: 2020-06-28

## 2020-06-28 PROBLEM — R07.9 CHEST PAIN: Status: ACTIVE | Noted: 2020-06-28

## 2020-06-28 PROBLEM — E87.1 HYPONATREMIA: Status: ACTIVE | Noted: 2020-06-28

## 2020-06-28 PROBLEM — E87.5 HYPERKALEMIA: Status: ACTIVE | Noted: 2020-06-28

## 2020-06-28 PROBLEM — F41.9 ANXIETY: Status: ACTIVE | Noted: 2020-06-28

## 2020-06-28 PROBLEM — R06.09 DYSPNEA ON EXERTION: Status: ACTIVE | Noted: 2020-06-28

## 2020-06-28 LAB
ANION GAP SERPL CALCULATED.3IONS-SCNC: 14 MMOL/L (ref 3–16)
BASOPHILS ABSOLUTE: 0.1 K/UL (ref 0–0.2)
BASOPHILS RELATIVE PERCENT: 0.7 %
BUN BLDV-MCNC: 22 MG/DL (ref 7–20)
CALCIUM SERPL-MCNC: 9.9 MG/DL (ref 8.3–10.6)
CHLORIDE BLD-SCNC: 98 MMOL/L (ref 99–110)
CO2: 20 MMOL/L (ref 21–32)
CREAT SERPL-MCNC: 0.9 MG/DL (ref 0.6–1.2)
DIGOXIN LEVEL: 0.6 NG/ML (ref 0.8–2)
EKG ATRIAL RATE: 75 BPM
EKG DIAGNOSIS: NORMAL
EKG P AXIS: 67 DEGREES
EKG P-R INTERVAL: 106 MS
EKG Q-T INTERVAL: 478 MS
EKG QRS DURATION: 162 MS
EKG QTC CALCULATION (BAZETT): 533 MS
EKG R AXIS: -51 DEGREES
EKG T AXIS: 78 DEGREES
EKG VENTRICULAR RATE: 75 BPM
EOSINOPHILS ABSOLUTE: 0.1 K/UL (ref 0–0.6)
EOSINOPHILS RELATIVE PERCENT: 1.7 %
GFR AFRICAN AMERICAN: >60
GFR NON-AFRICAN AMERICAN: >60
GLUCOSE BLD-MCNC: 198 MG/DL (ref 70–99)
HCT VFR BLD CALC: 44.9 % (ref 36–48)
HEMOGLOBIN: 15 G/DL (ref 12–16)
LYMPHOCYTES ABSOLUTE: 2.2 K/UL (ref 1–5.1)
LYMPHOCYTES RELATIVE PERCENT: 29.3 %
MCH RBC QN AUTO: 30.5 PG (ref 26–34)
MCHC RBC AUTO-ENTMCNC: 33.4 G/DL (ref 31–36)
MCV RBC AUTO: 91.3 FL (ref 80–100)
MONOCYTES ABSOLUTE: 0.4 K/UL (ref 0–1.3)
MONOCYTES RELATIVE PERCENT: 4.9 %
NEUTROPHILS ABSOLUTE: 4.7 K/UL (ref 1.7–7.7)
NEUTROPHILS RELATIVE PERCENT: 63.4 %
PDW BLD-RTO: 13.2 % (ref 12.4–15.4)
PLATELET # BLD: 150 K/UL (ref 135–450)
PMV BLD AUTO: 10.3 FL (ref 5–10.5)
POTASSIUM REFLEX MAGNESIUM: 6.1 MMOL/L (ref 3.5–5.1)
POTASSIUM SERPL-SCNC: 3.6 MMOL/L (ref 3.5–5.1)
PRO-BNP: 263 PG/ML (ref 0–124)
RBC # BLD: 4.92 M/UL (ref 4–5.2)
SODIUM BLD-SCNC: 132 MMOL/L (ref 136–145)
TROPONIN: <0.01 NG/ML
WBC # BLD: 7.5 K/UL (ref 4–11)

## 2020-06-28 PROCEDURE — 6370000000 HC RX 637 (ALT 250 FOR IP): Performed by: NURSE PRACTITIONER

## 2020-06-28 PROCEDURE — 1200000000 HC SEMI PRIVATE

## 2020-06-28 PROCEDURE — 93005 ELECTROCARDIOGRAM TRACING: CPT | Performed by: STUDENT IN AN ORGANIZED HEALTH CARE EDUCATION/TRAINING PROGRAM

## 2020-06-28 PROCEDURE — 84484 ASSAY OF TROPONIN QUANT: CPT

## 2020-06-28 PROCEDURE — 99285 EMERGENCY DEPT VISIT HI MDM: CPT

## 2020-06-28 PROCEDURE — 71045 X-RAY EXAM CHEST 1 VIEW: CPT

## 2020-06-28 PROCEDURE — 6360000002 HC RX W HCPCS: Performed by: STUDENT IN AN ORGANIZED HEALTH CARE EDUCATION/TRAINING PROGRAM

## 2020-06-28 PROCEDURE — 6370000000 HC RX 637 (ALT 250 FOR IP): Performed by: INTERNAL MEDICINE

## 2020-06-28 PROCEDURE — 80048 BASIC METABOLIC PNL TOTAL CA: CPT

## 2020-06-28 PROCEDURE — 93010 ELECTROCARDIOGRAM REPORT: CPT | Performed by: INTERNAL MEDICINE

## 2020-06-28 PROCEDURE — 84132 ASSAY OF SERUM POTASSIUM: CPT

## 2020-06-28 PROCEDURE — 83880 ASSAY OF NATRIURETIC PEPTIDE: CPT

## 2020-06-28 PROCEDURE — 96374 THER/PROPH/DIAG INJ IV PUSH: CPT

## 2020-06-28 PROCEDURE — 36415 COLL VENOUS BLD VENIPUNCTURE: CPT

## 2020-06-28 PROCEDURE — 85025 COMPLETE CBC W/AUTO DIFF WBC: CPT

## 2020-06-28 PROCEDURE — 80162 ASSAY OF DIGOXIN TOTAL: CPT

## 2020-06-28 PROCEDURE — 94640 AIRWAY INHALATION TREATMENT: CPT

## 2020-06-28 PROCEDURE — 6360000002 HC RX W HCPCS: Performed by: INTERNAL MEDICINE

## 2020-06-28 PROCEDURE — 2580000003 HC RX 258: Performed by: INTERNAL MEDICINE

## 2020-06-28 RX ORDER — KETOROLAC TROMETHAMINE 15 MG/ML
15 INJECTION, SOLUTION INTRAMUSCULAR; INTRAVENOUS EVERY 6 HOURS
Status: DISPENSED | OUTPATIENT
Start: 2020-06-28 | End: 2020-06-30

## 2020-06-28 RX ORDER — DEXTROSE MONOHYDRATE 25 G/50ML
25 INJECTION, SOLUTION INTRAVENOUS ONCE
Status: DISCONTINUED | OUTPATIENT
Start: 2020-06-28 | End: 2020-06-28

## 2020-06-28 RX ORDER — DIGOXIN 125 MCG
125 TABLET ORAL DAILY
Status: DISCONTINUED | OUTPATIENT
Start: 2020-06-28 | End: 2020-07-01 | Stop reason: HOSPADM

## 2020-06-28 RX ORDER — CARVEDILOL 6.25 MG/1
6.25 TABLET ORAL 2 TIMES DAILY WITH MEALS
Status: DISCONTINUED | OUTPATIENT
Start: 2020-06-28 | End: 2020-07-01 | Stop reason: HOSPADM

## 2020-06-28 RX ORDER — LORAZEPAM 2 MG/ML
0.5 INJECTION INTRAMUSCULAR EVERY 4 HOURS PRN
Status: DISCONTINUED | OUTPATIENT
Start: 2020-06-28 | End: 2020-06-29

## 2020-06-28 RX ORDER — ACETAMINOPHEN 650 MG/1
650 SUPPOSITORY RECTAL EVERY 6 HOURS PRN
Status: DISCONTINUED | OUTPATIENT
Start: 2020-06-28 | End: 2020-06-30 | Stop reason: DRUGHIGH

## 2020-06-28 RX ORDER — ACETAMINOPHEN 325 MG/1
650 TABLET ORAL EVERY 6 HOURS PRN
Status: DISCONTINUED | OUTPATIENT
Start: 2020-06-28 | End: 2020-06-30 | Stop reason: DRUGHIGH

## 2020-06-28 RX ORDER — ONDANSETRON 2 MG/ML
4 INJECTION INTRAMUSCULAR; INTRAVENOUS EVERY 6 HOURS PRN
Status: DISCONTINUED | OUTPATIENT
Start: 2020-06-28 | End: 2020-07-01 | Stop reason: HOSPADM

## 2020-06-28 RX ORDER — SODIUM CHLORIDE 9 MG/ML
INJECTION, SOLUTION INTRAVENOUS CONTINUOUS
Status: DISCONTINUED | OUTPATIENT
Start: 2020-06-28 | End: 2020-06-29

## 2020-06-28 RX ORDER — PANTOPRAZOLE SODIUM 40 MG/1
40 TABLET, DELAYED RELEASE ORAL
Status: DISCONTINUED | OUTPATIENT
Start: 2020-06-28 | End: 2020-07-01

## 2020-06-28 RX ORDER — HYDROXYZINE PAMOATE 25 MG/1
50 CAPSULE ORAL ONCE
Status: COMPLETED | OUTPATIENT
Start: 2020-06-28 | End: 2020-06-28

## 2020-06-28 RX ORDER — CALCIUM GLUCONATE 94 MG/ML
1 INJECTION, SOLUTION INTRAVENOUS ONCE
Status: COMPLETED | OUTPATIENT
Start: 2020-06-28 | End: 2020-06-28

## 2020-06-28 RX ORDER — PROMETHAZINE HYDROCHLORIDE 25 MG/1
12.5 TABLET ORAL EVERY 6 HOURS PRN
Status: DISCONTINUED | OUTPATIENT
Start: 2020-06-28 | End: 2020-07-01 | Stop reason: HOSPADM

## 2020-06-28 RX ORDER — ASPIRIN 81 MG/1
81 TABLET, CHEWABLE ORAL DAILY
Status: DISCONTINUED | OUTPATIENT
Start: 2020-06-28 | End: 2020-06-30

## 2020-06-28 RX ORDER — DEXTROSE MONOHYDRATE 25 G/50ML
12.5 INJECTION, SOLUTION INTRAVENOUS PRN
Status: DISCONTINUED | OUTPATIENT
Start: 2020-06-28 | End: 2020-06-28

## 2020-06-28 RX ORDER — NITROGLYCERIN 0.4 MG/1
0.4 TABLET SUBLINGUAL EVERY 5 MIN PRN
Status: DISCONTINUED | OUTPATIENT
Start: 2020-06-28 | End: 2020-07-01 | Stop reason: HOSPADM

## 2020-06-28 RX ORDER — DEXTROSE MONOHYDRATE 50 MG/ML
100 INJECTION, SOLUTION INTRAVENOUS PRN
Status: DISCONTINUED | OUTPATIENT
Start: 2020-06-28 | End: 2020-06-28

## 2020-06-28 RX ORDER — SODIUM CHLORIDE 0.9 % (FLUSH) 0.9 %
10 SYRINGE (ML) INJECTION PRN
Status: DISCONTINUED | OUTPATIENT
Start: 2020-06-28 | End: 2020-07-01 | Stop reason: HOSPADM

## 2020-06-28 RX ORDER — SODIUM CHLORIDE 0.9 % (FLUSH) 0.9 %
10 SYRINGE (ML) INJECTION EVERY 12 HOURS SCHEDULED
Status: DISCONTINUED | OUTPATIENT
Start: 2020-06-28 | End: 2020-07-01 | Stop reason: HOSPADM

## 2020-06-28 RX ORDER — NICOTINE POLACRILEX 4 MG
15 LOZENGE BUCCAL PRN
Status: DISCONTINUED | OUTPATIENT
Start: 2020-06-28 | End: 2020-06-28

## 2020-06-28 RX ORDER — POLYETHYLENE GLYCOL 3350 17 G/17G
17 POWDER, FOR SOLUTION ORAL DAILY PRN
Status: DISCONTINUED | OUTPATIENT
Start: 2020-06-28 | End: 2020-07-01 | Stop reason: HOSPADM

## 2020-06-28 RX ORDER — MORPHINE SULFATE 2 MG/ML
2 INJECTION, SOLUTION INTRAMUSCULAR; INTRAVENOUS EVERY 4 HOURS PRN
Status: DISCONTINUED | OUTPATIENT
Start: 2020-06-28 | End: 2020-07-01 | Stop reason: HOSPADM

## 2020-06-28 RX ORDER — CHLORAL HYDRATE 500 MG
3000 CAPSULE ORAL DAILY
Status: DISCONTINUED | OUTPATIENT
Start: 2020-06-28 | End: 2020-06-28 | Stop reason: CLARIF

## 2020-06-28 RX ADMIN — PANTOPRAZOLE SODIUM 40 MG: 40 TABLET, DELAYED RELEASE ORAL at 16:33

## 2020-06-28 RX ADMIN — ASPIRIN 81 MG 81 MG: 81 TABLET ORAL at 16:36

## 2020-06-28 RX ADMIN — HYDROXYZINE PAMOATE 50 MG: 25 CAPSULE ORAL at 11:19

## 2020-06-28 RX ADMIN — ENOXAPARIN SODIUM 40 MG: 40 INJECTION SUBCUTANEOUS at 16:33

## 2020-06-28 RX ADMIN — LORAZEPAM 0.5 MG: 2 INJECTION INTRAMUSCULAR; INTRAVENOUS at 18:13

## 2020-06-28 RX ADMIN — CARVEDILOL 6.25 MG: 6.25 TABLET, FILM COATED ORAL at 16:32

## 2020-06-28 RX ADMIN — CALCIUM GLUCONATE 1 G: 98 INJECTION, SOLUTION INTRAVENOUS at 11:14

## 2020-06-28 RX ADMIN — KETOROLAC TROMETHAMINE 15 MG: 15 INJECTION, SOLUTION INTRAMUSCULAR; INTRAVENOUS at 15:11

## 2020-06-28 RX ADMIN — ALBUTEROL SULFATE 10 MG: 2.5 SOLUTION RESPIRATORY (INHALATION) at 11:24

## 2020-06-28 RX ADMIN — SODIUM CHLORIDE: 9 INJECTION, SOLUTION INTRAVENOUS at 15:10

## 2020-06-28 RX ADMIN — NITROGLYCERIN 1 INCH: 20 OINTMENT TOPICAL at 10:06

## 2020-06-28 ASSESSMENT — PAIN SCALES - GENERAL
PAINLEVEL_OUTOF10: 1
PAINLEVEL_OUTOF10: 0
PAINLEVEL_OUTOF10: 10
PAINLEVEL_OUTOF10: 0
PAINLEVEL_OUTOF10: 0
PAINLEVEL_OUTOF10: 2

## 2020-06-28 ASSESSMENT — PAIN DESCRIPTION - LOCATION
LOCATION: CHEST

## 2020-06-28 ASSESSMENT — ENCOUNTER SYMPTOMS
DIARRHEA: 0
SORE THROAT: 0
VOMITING: 0
BLOOD IN STOOL: 0
SHORTNESS OF BREATH: 1
CONSTIPATION: 0
NAUSEA: 0
RHINORRHEA: 0
ABDOMINAL PAIN: 0

## 2020-06-28 ASSESSMENT — HEART SCORE: ECG: 1

## 2020-06-28 ASSESSMENT — PAIN DESCRIPTION - PAIN TYPE
TYPE: ACUTE PAIN

## 2020-06-28 ASSESSMENT — PAIN DESCRIPTION - DESCRIPTORS
DESCRIPTORS: HEAVINESS
DESCRIPTORS: HEAVINESS

## 2020-06-28 NOTE — PROGRESS NOTES
Pt admitted to room 3319. VSS. Oriented to room and call light system. She c/o chest pressure radiating to her left arm with left arm numbness that has subsided since admission. When pts ex- left the room pts daughter came to visit. She began talking about how much stress the pt has been under while going through a divorce recently. After discussing the pts condition, pt opened up saying she has been very depressed and anxious at home. I proceeded to ask pt if she had thoughts of harming herself and she said yes. When asked if pt had a plan she said \"I want to just take my heart pills and go to sleep. \" pt also stated she was on her porch today and thought about jumping off into the water to drown herself. Pt very tearful. MD and charge RN made aware- see orders. Suicide precautions in place.

## 2020-06-28 NOTE — ED PROVIDER NOTES
[facility]        Pt Name: Giovanny Alonso  MRN: [id number]  [place] 1955  Date of evaluation: 6/28/2020  Provider: SHAMEKA Pedroza - ABHISHEK  PCP: Deniz Ortiz MD    This patient was seen and evaluated by the attending physician Sarah Gray MD.    [address] [place] [address]       Chief Complaint   Patient presents with    Chest Pain     Pt. comes in today with complaints of chest pain and difficulty breathing. Pt. reports last night she was feeling tightness in her chest. Pt. reports pain radiating down her left arm. HISTORY OF PRESENT ILLNESS   (Location/Symptom, Timing/Onset,Context/Setting, Quality, Duration, Modifying Factors, Severity)  Note limiting factors. Giovanny Alonso is a 75 y.o. female who presents emergency department with concern for chest pain. Patient reports it started about 2 days ago. It is increasing in intensity. The pain is bringing her to tears and making her left arm numb. She reports associated shortness of breath. She has a history of cardiomyopathy. She has tried no over-the-counter prescribed remedies for symptoms. She denies fever, rash, headaches, dizziness, cough, congestion, abdominal pain, nausea, vomiting, diarrhea, constipation, blood in the stool, or painful urination. One friend/family member at bedside. Nursing Notes triage note reviewed and agreed with or any disagreements were addressed  in the HPI. REVIEW OF SYSTEMS    (2-9 systems for level 4, 10 or more for level 5)     Review of Systems   Constitutional: Negative for chills and fever. HENT: Negative for postnasal drip, rhinorrhea and sore throat. Eyes: Negative for visual disturbance. Respiratory: Positive for shortness of breath. Cardiovascular: Positive for chest pain. Gastrointestinal: Negative for abdominal pain, blood in stool, constipation, diarrhea, nausea and vomiting. TORSEMIDE (DEMADEX) 20 MG TABLET    Take 1 tablet by mouth as needed (for weight gain or swelling)    TRAZODONE (DESYREL) 50 MG TABLET    Take 1 tablet by mouth nightly as needed for Sleep         ALLERGIES     Prednisone    FAMILY HISTORY       Family History   Problem Relation Age of Onset    Diabetes Mother     Kidney Disease Mother         reanl failure          SOCIAL HISTORY       Social History     Socioeconomic History    Marital status:      Spouse name: None    Number of children: None    Years of education: None    Highest education level: None   Occupational History    None   Social Needs    Financial resource strain: None    Food insecurity     Worry: None     Inability: None    Transportation needs     Medical: None     Non-medical: None   Tobacco Use    Smoking status: Former Smoker     Packs/day: 0.25     Years: 10.00     Pack years: 2.50     Last attempt to quit: 1998     Years since quittin.2    Smokeless tobacco: Never Used    Tobacco comment: smoked 1 cig a day,   Substance and Sexual Activity    Alcohol use: No    Drug use: No    Sexual activity: Yes     Partners: Male     Comment:    Lifestyle    Physical activity     Days per week: None     Minutes per session: None    Stress: None   Relationships    Social connections     Talks on phone: None     Gets together: None     Attends Congregational service: None     Active member of club or organization: None     Attends meetings of clubs or organizations: None     Relationship status: None    Intimate partner violence     Fear of current or ex partner: None     Emotionally abused: None     Physically abused: None     Forced sexual activity: None   Other Topics Concern    None   Social History Narrative    None       SCREENINGS      Heart Score for chest pain patients  History:  Moderately Suspicious  ECG: Non-Specifc repolarization disturbance/LBTB/PM  Patient Age: > 65 years  *Risk factors for Atherosclerotic disease: Hypertension, Hypercholesterolemia  Risk Factors: 1 or 2 risk factors  Troponin: < 1X normal limit  Heart Score Total: 5      PHYSICAL EXAM  (up to 7 for level 4, 8 or more for level 5)     ED Triage Vitals [06/28/20 0912]   BP Temp Temp Source Pulse Resp SpO2 Height Weight   111/62 97.7 °F (36.5 °C) Oral 73 20 99 % 5' 2\" (1.575 m) 144 lb (65.3 kg)       Physical Exam  Vitals signs and nursing note reviewed. Constitutional:       General: She is not in acute distress. Appearance: Normal appearance. She is well-developed. She is not diaphoretic. HENT:      Head: Normocephalic and atraumatic. Eyes:      General: No scleral icterus. Right eye: No discharge. Left eye: No discharge. Neck:      Musculoskeletal: Normal range of motion and neck supple. Cardiovascular:      Rate and Rhythm: Normal rate and regular rhythm. Heart sounds: Normal heart sounds. No murmur. No friction rub. No gallop. Pulmonary:      Effort: Pulmonary effort is normal. No respiratory distress. Breath sounds: Normal breath sounds. No stridor. No wheezing or rales. Chest:      Chest wall: No tenderness. Abdominal:      General: Bowel sounds are normal. There is no distension. Palpations: Abdomen is soft. There is no mass. Tenderness: There is no abdominal tenderness. There is no guarding or rebound. Musculoskeletal: Normal range of motion. General: No tenderness. Skin:     General: Skin is warm and dry. Coloration: Skin is not pale. Neurological:      Mental Status: She is alert and oriented to person, place, and time. Coordination: Coordination normal.   Psychiatric:         Mood and Affect: Affect is tearful.          Behavior: Behavior normal.         DIAGNOSTIC RESULTS   LABS:    Labs Reviewed   BASIC METABOLIC PANEL W/ REFLEX TO MG FOR LOW K - Abnormal; Notable for the following components:       Result Value    Sodium 132 (*)     Potassium reflex Magnesium 6.1 (*)     Chloride 98 (*)     CO2 20 (*)     Glucose 198 (*)     BUN 22 (*)     All other components within normal limits    Narrative:     Tess LEWIS tel. [phone],  Chemistry results called to and read back by juanjose acosta/rn/er, 06/28/2020  10:19, by Waldo Tejada  Performed at:  [facility] [address], [place], [facility] [email or web address]   Phone [phone]   BRAIN NATRIURETIC PEPTIDE - Abnormal; Notable for the following components:    Pro- (*)     All other components within normal limits    Narrative:     Tess DAVIESER tel. [phone],  Chemistry results called to and read back by juanjose acosta/rn/er, 06/28/2020  10:19, by Waldo Tejada  Performed at:  [facility] [address] [facility], [facility] [email or web address]   Phone [phone]   CBC WITH AUTO DIFFERENTIAL    Narrative:     Performed at:  [facility] [address], [place], [facility] [email or web address]   Phone [phone]   TROPONIN    Narrative:     Tess LEWIS tel. [phone],  Chemistry results called to and read back by juanjose acosta/rn/er, 06/28/2020  10:19, by Waldo Tejada  Performed at:  [facility] [address], [place], [facility] [email or web address]   Phone [phone]   DIGOXIN LEVEL       All other labs werewithin normal range or not returned as of this dictation. EKG: All EKG's are interpreted by the Emergency Department Physician who either signs or Co-signs this chart in the absence of acardiologist.  Please see their note for interpretation of EKG. RADIOLOGY:   Interpretation per the Radiologist below, if available at the time of this note:    XR CHEST PORTABLE   Final Result   No acute cardiopulmonary process. Xr Chest Portable    Result Date: 6/28/2020  EXAMINATION: ONE XRAY VIEW OF THE CHEST 6/28/2020 9:18 am COMPARISON: Chest radiograph performed 09/07/2018.  HISTORY: ORDERING SYSTEM PROVIDED HISTORY: nirmal concern for chest pain. Treated with nitro in the ER with improvement in symptoms. Heart score is 5. She is hyperkalemic on labs. Treated with calcium and albuterol in the ER. Troponin is not elevated. Chest x-ray negative for acute abnormality. EKG is nonischemic. The hospitalist was consulted and is in agreement for admission. This plan was discussed with the patient who is in agreement with the plan. FINAL IMPRESSION      1. Acute chest pain    2.  Hyperkalemia          DISPOSITION/PLAN   DISPOSITION Decision To Admit 06/28/2020 10:51:07 AM      (Please note that portions of this note were completed with a voice recognition program.  Efforts were made to edit the dictations but occasionally words are mis-transcribed.)    SHAMEKA Moon CNP (electronically signed)        SHAMEKA Moon CNP  06/28/20 4147

## 2020-06-28 NOTE — H&P
HOSPITALISTS HISTORY AND PHYSICAL    2020 2:28 PM    Patient Information:  Tali Gonzalez is a 75 y.o. female [id number]  PCP:  Earl Ocampo MD (Tel: [phone] )    Chief complaint:    Chief Complaint   Patient presents with    Chest Pain     Pt. comes in today with complaints of chest pain and difficulty breathing. Pt. reports last night she was feeling tightness in her chest. Pt. reports pain radiating down her left arm. History of Present Illness:  Wander Vinson is a 75 y.o. female with history of NICMP s/p BiVICD, fibromyalgia, h/o pericarditis, HTN who came to ER with complaints of CP. States it has been going on for weeks to months. It has acutely progressed in past few days. Is substernal and radiating to L arm. Associated with SOB and ZARATE. Also notes dysphagia, epigastric pain and GERD. Uses PPI PRN. Denies melena or hematochezia. Has nausea. VERY anxious. No fevers, chills, NS, cough or diarrhea. In ED, started on Nitropaste with improvement in CP. Denies smoking or EtOH. Does not use oxygen or inhalers. No BL LE edema. Has orthopnea. Found to have hyperkalemia in ED. Denies ICD discharges. Otherwise complete ROS is negative unless listed above. REVIEW OF SYSTEMS:   Pertinent positives as noted in HPI. All other systems were reviewed and are negative. Past Medical History:   has a past medical history of Atrial fibrillation ([address] Cardiomyopathy, nonischemic ([address] CHF (congestive heart failure) ([address] Fibromyalgia, GERD (gastroesophageal reflux disease), HSV-1 (herpes simplex virus 1) infection, Hyperlipidemia, Sleep apnea, and Uterine fibroids affecting pregnancy. Past Surgical History:   has a past surgical history that includes  section; Pacemaker insertion (); Pacemaker insertion;  Upper gastrointestinal endoscopy (4/21/2015); Colonoscopy (4/21/2015); Colonoscopy (N/A, 12/19/2019); Upper gastrointestinal endoscopy (N/A, 12/19/2019); and Cardiac defibrillator placement. Medications:  No current facility-administered medications on file prior to encounter. Current Outpatient Medications on File Prior to Encounter   Medication Sig Dispense Refill    sacubitril-valsartan (ENTRESTO) 24-26 MG per tablet Take 1 tablet by mouth 2 times daily 180 tablet 3    spironolactone (ALDACTONE) 25 MG tablet TAKE 1/2 TABLET BY MOUTH DAILY 55 tablet 3    torsemide (DEMADEX) 20 MG tablet Take 1 tablet by mouth as needed (for weight gain or swelling) 30 tablet 3    digoxin (LANOXIN) 125 MCG tablet TAKE 1 TABLET BY MOUTH DAILY 90 tablet 1    ciprofloxacin (CIPRO) 250 MG tablet TAKE 1 TABLET BY MOUTH AFTER SEXUAL INTERCOURSE 10 tablet 1    carvedilol (COREG) 6.25 MG tablet TAKE 1 TABLET BY MOUTH TWICE DAILY WITH MEALS 180 tablet 3    Omega-3 Fatty Acids (FISH OIL) 1000 MG CAPS Take 3,000 mg by mouth 2 times daily          Allergies: Allergies   Allergen Reactions    Prednisone      Caused an allergic reaction. Swelling all over her body. Social History:  Patient Lives with    reports that she quit smoking about 22 years ago. She has a 2.50 pack-year smoking history. She has never used smokeless tobacco. She reports that she does not drink alcohol or use drugs. Family History:  family history includes Diabetes in her mother; Kidney Disease in her mother. Physical Exam:  BP (!) 93/56   Pulse 63   Temp 97.7 °F (36.5 °C) (Oral)   Resp 16   Ht 5' 2\" (1.575 m)   Wt 142 lb 6 oz (64.6 kg)   SpO2 97%   BMI 26.04 kg/m²     General appearance:  Appears comfortable, very anxious. Well nourished  Eyes: Sclera clear, pupils equal  ENT: Moist mucus membranes, no thrush. Trachea midline. Cardiovascular: Regular rhythm, normal S1, S2. No murmur, gallop, rub. No edema in lower extremities.   L ICD  Respiratory: Clear to auscultation bilaterally, no wheeze, good inspiratory effort  Gastrointestinal: Abdomen soft, non-tender, not distended, normal bowel sounds  Musculoskeletal: Tender to palpation in lower sternal areas, more on left side  Neurology: Grossly intact. Alert and oriented in time, place and person. No speech or motor deficits  Psychiatry: Anxious affect. Not agitated  Skin: Warm, dry, normal turgor, no rash  Brisk capillary refill, peripheral pulses palpable   Labs:  CBC:   Lab Results   Component Value Date    WBC 7.5 06/28/2020    RBC 4.92 06/28/2020    HGB 15.0 06/28/2020    HCT 44.9 06/28/2020    MCV 91.3 06/28/2020    MCH 30.5 06/28/2020    MCHC 33.4 06/28/2020    RDW 13.2 06/28/2020     06/28/2020    MPV 10.3 06/28/2020     BMP:    Lab Results   Component Value Date     06/28/2020    K 6.1 06/28/2020    CL 98 06/28/2020    CO2 20 06/28/2020    BUN 22 06/28/2020    CREATININE 0.9 06/28/2020    CALCIUM 9.9 06/28/2020    GFRAA >60 06/28/2020    GFRAA >60 05/30/2013    LABGLOM >60 06/28/2020    LABGLOM 77 06/07/2012    LABGLOM 63 06/07/2012    GLUCOSE 198 06/28/2020    GLUCOSE 80 06/07/2012     XR CHEST PORTABLE   Final Result   No acute cardiopulmonary process. NM MYOCARDIAL SPECT REST EXERCISE OR RX    (Results Pending)   US ABDOMEN LIMITED Specify organ? GALLBLADDER    (Results Pending)         Problem List  Principal Problem:    Chest pain  Active Problems:    Non-ischemic cardiomyopathy (HCC)    History of pericarditis    Fibromyalgia syndrome    Obstructive sleep apnea syndrome    Hypercholesteremia    Biventricular ICD (implantable cardioverter-defibrillator) in place    Hyperkalemia    Dyspnea on exertion    Anxiety    GERD with esophagitis    Hyponatremia    Dysphagia    Suicidal ideation  Resolved Problems:    * No resolved hospital problems. *        Assessment/Plan:   1. Serial troponin  2. NTG SL PRN, DC Nitropaste from ED  3. Add ASA  4.  Stop Aldactone given hyponatremia/hyperkalemia  5. Cont Coreg and Entresto  6. Cardio consult for CP'  7. SPECT stress in AM  8. Check Echo for CP  9. GI consult for dysphagia/epigastric pain  10. Protonix PO bid  11. NPO p MN  12. Check RUQ US to r/o GB disease causing epigastric pain/CP  13. Toradol 15 mg IV q6h X 8 doses for costochondritis  14. Sitter  15. Psych consult for suicidal ideation      DVT prophylaxis  Lovenox  Code status Full code  Diet Cardiac, NPO p MN  IV access Peripheral  Millan Catheter No    Admit as inpatient. I anticipate hospitalization spanning more than two midnights for investigation and treatment of the above medically necessary diagnoses. Discussed with patient and  at bedside. Will see what work up shows. Hopefully back home in next 48-72 hrs.     Melvin Funes MD    6/28/2020 2:28 PM

## 2020-06-28 NOTE — ED NOTES
Pt lying in bed. Denies any needs at the present time. VSS. Bed in lowest position and locked, alarm engaged. Call light in reach.        [facility], [facility]  06/28/20 5807

## 2020-06-28 NOTE — ACP (ADVANCE CARE PLANNING)
Advanced Care Planning Note. Purpose of Encounter: Advanced care planning in light of chest pain  Parties In Attendance: Patient,   Decisional Capacity: Yes  Subjective: Patient with CP, ZARATE, epigastric pain and dysphagia  Objective: Cr 0.9  Goals of Care Determination: Patient wants full support (CPR, vent, surgery, HD, trach, PEG)  Plan:  SPECT, Echo, RUQ US, Cardio and GI consults  Code Status: Full code   Time spent on Advanced care Plannin minutes  Advanced Care Planning Documents: Completed advanced directives on chart,  is the POA.     Arvie Apgar, MD  2020 12:51 PM

## 2020-06-29 ENCOUNTER — APPOINTMENT (OUTPATIENT)
Dept: ULTRASOUND IMAGING | Age: 65
DRG: 445 | End: 2020-06-29
Payer: COMMERCIAL

## 2020-06-29 LAB
ALBUMIN SERPL-MCNC: 3.6 G/DL (ref 3.4–5)
ALP BLD-CCNC: 46 U/L (ref 40–129)
ALT SERPL-CCNC: 14 U/L (ref 10–40)
AMYLASE: 52 U/L (ref 25–115)
ANION GAP SERPL CALCULATED.3IONS-SCNC: 11 MMOL/L (ref 3–16)
AST SERPL-CCNC: 17 U/L (ref 15–37)
BASOPHILS ABSOLUTE: 0 K/UL (ref 0–0.2)
BASOPHILS RELATIVE PERCENT: 0.7 %
BILIRUB SERPL-MCNC: 0.5 MG/DL (ref 0–1)
BILIRUBIN DIRECT: <0.2 MG/DL (ref 0–0.3)
BILIRUBIN, INDIRECT: ABNORMAL MG/DL (ref 0–1)
BUN BLDV-MCNC: 29 MG/DL (ref 7–20)
CALCIUM SERPL-MCNC: 9.2 MG/DL (ref 8.3–10.6)
CHLORIDE BLD-SCNC: 102 MMOL/L (ref 99–110)
CHOLESTEROL, TOTAL: 189 MG/DL (ref 0–199)
CO2: 21 MMOL/L (ref 21–32)
CREAT SERPL-MCNC: 1 MG/DL (ref 0.6–1.2)
EOSINOPHILS ABSOLUTE: 0.2 K/UL (ref 0–0.6)
EOSINOPHILS RELATIVE PERCENT: 3 %
GFR AFRICAN AMERICAN: >60
GFR NON-AFRICAN AMERICAN: 55
GLUCOSE BLD-MCNC: 106 MG/DL (ref 70–99)
GLUCOSE BLD-MCNC: 110 MG/DL (ref 70–99)
GLUCOSE BLD-MCNC: 118 MG/DL (ref 70–99)
GLUCOSE BLD-MCNC: 125 MG/DL (ref 70–99)
HCT VFR BLD CALC: 39.9 % (ref 36–48)
HDLC SERPL-MCNC: 31 MG/DL (ref 40–60)
HEMOGLOBIN: 13.4 G/DL (ref 12–16)
LDL CHOLESTEROL CALCULATED: 116 MG/DL
LIPASE: 27 U/L (ref 13–60)
LV EF: 30 %
LV EF: 38 %
LVEF MODALITY: NORMAL
LVEF MODALITY: NORMAL
LYMPHOCYTES ABSOLUTE: 2.4 K/UL (ref 1–5.1)
LYMPHOCYTES RELATIVE PERCENT: 39 %
MCH RBC QN AUTO: 30.7 PG (ref 26–34)
MCHC RBC AUTO-ENTMCNC: 33.6 G/DL (ref 31–36)
MCV RBC AUTO: 91.3 FL (ref 80–100)
MONOCYTES ABSOLUTE: 0.5 K/UL (ref 0–1.3)
MONOCYTES RELATIVE PERCENT: 7.4 %
NEUTROPHILS ABSOLUTE: 3.1 K/UL (ref 1.7–7.7)
NEUTROPHILS RELATIVE PERCENT: 49.9 %
PDW BLD-RTO: 13.2 % (ref 12.4–15.4)
PERFORMED ON: ABNORMAL
PLATELET # BLD: 124 K/UL (ref 135–450)
PMV BLD AUTO: 10.1 FL (ref 5–10.5)
POTASSIUM REFLEX MAGNESIUM: 3.8 MMOL/L (ref 3.5–5.1)
RBC # BLD: 4.37 M/UL (ref 4–5.2)
SODIUM BLD-SCNC: 134 MMOL/L (ref 136–145)
TOTAL PROTEIN: 6 G/DL (ref 6.4–8.2)
TRIGL SERPL-MCNC: 210 MG/DL (ref 0–150)
TROPONIN: <0.01 NG/ML
VLDLC SERPL CALC-MCNC: 42 MG/DL
WBC # BLD: 6.2 K/UL (ref 4–11)

## 2020-06-29 PROCEDURE — 6360000002 HC RX W HCPCS: Performed by: INTERNAL MEDICINE

## 2020-06-29 PROCEDURE — 36415 COLL VENOUS BLD VENIPUNCTURE: CPT

## 2020-06-29 PROCEDURE — 84484 ASSAY OF TROPONIN QUANT: CPT

## 2020-06-29 PROCEDURE — 80048 BASIC METABOLIC PNL TOTAL CA: CPT

## 2020-06-29 PROCEDURE — 6370000000 HC RX 637 (ALT 250 FOR IP): Performed by: INTERNAL MEDICINE

## 2020-06-29 PROCEDURE — 97530 THERAPEUTIC ACTIVITIES: CPT

## 2020-06-29 PROCEDURE — A9502 TC99M TETROFOSMIN: HCPCS | Performed by: INTERNAL MEDICINE

## 2020-06-29 PROCEDURE — 3430000000 HC RX DIAGNOSTIC RADIOPHARMACEUTICAL: Performed by: INTERNAL MEDICINE

## 2020-06-29 PROCEDURE — 78452 HT MUSCLE IMAGE SPECT MULT: CPT | Performed by: INTERNAL MEDICINE

## 2020-06-29 PROCEDURE — 97535 SELF CARE MNGMENT TRAINING: CPT

## 2020-06-29 PROCEDURE — 80076 HEPATIC FUNCTION PANEL: CPT

## 2020-06-29 PROCEDURE — 80061 LIPID PANEL: CPT

## 2020-06-29 PROCEDURE — 6370000000 HC RX 637 (ALT 250 FOR IP): Performed by: PSYCHIATRY & NEUROLOGY

## 2020-06-29 PROCEDURE — 93306 TTE W/DOPPLER COMPLETE: CPT

## 2020-06-29 PROCEDURE — 2580000003 HC RX 258: Performed by: INTERNAL MEDICINE

## 2020-06-29 PROCEDURE — 83690 ASSAY OF LIPASE: CPT

## 2020-06-29 PROCEDURE — 1200000000 HC SEMI PRIVATE

## 2020-06-29 PROCEDURE — 76705 ECHO EXAM OF ABDOMEN: CPT

## 2020-06-29 PROCEDURE — 85025 COMPLETE CBC W/AUTO DIFF WBC: CPT

## 2020-06-29 PROCEDURE — 97165 OT EVAL LOW COMPLEX 30 MIN: CPT

## 2020-06-29 PROCEDURE — 99221 1ST HOSP IP/OBS SF/LOW 40: CPT | Performed by: PSYCHIATRY & NEUROLOGY

## 2020-06-29 PROCEDURE — 97161 PT EVAL LOW COMPLEX 20 MIN: CPT

## 2020-06-29 PROCEDURE — 99222 1ST HOSP IP/OBS MODERATE 55: CPT | Performed by: INTERNAL MEDICINE

## 2020-06-29 PROCEDURE — 82150 ASSAY OF AMYLASE: CPT

## 2020-06-29 PROCEDURE — 93017 CV STRESS TEST TRACING ONLY: CPT | Performed by: INTERNAL MEDICINE

## 2020-06-29 RX ORDER — LANOLIN ALCOHOL/MO/W.PET/CERES
3 CREAM (GRAM) TOPICAL NIGHTLY PRN
Status: DISCONTINUED | OUTPATIENT
Start: 2020-06-29 | End: 2020-07-01 | Stop reason: HOSPADM

## 2020-06-29 RX ORDER — CLONAZEPAM 0.5 MG/1
0.25 TABLET ORAL 2 TIMES DAILY
Status: DISCONTINUED | OUTPATIENT
Start: 2020-06-29 | End: 2020-07-01 | Stop reason: HOSPADM

## 2020-06-29 RX ORDER — AMINOPHYLLINE DIHYDRATE 25 MG/ML
100 INJECTION, SOLUTION INTRAVENOUS ONCE
Status: COMPLETED | OUTPATIENT
Start: 2020-06-29 | End: 2020-06-29

## 2020-06-29 RX ADMIN — LORAZEPAM 0.5 MG: 2 INJECTION INTRAMUSCULAR; INTRAVENOUS at 04:56

## 2020-06-29 RX ADMIN — TETROFOSMIN 30 MILLICURIE: 1.38 INJECTION, POWDER, LYOPHILIZED, FOR SOLUTION INTRAVENOUS at 14:14

## 2020-06-29 RX ADMIN — TETROFOSMIN 10 MILLICURIE: 1.38 INJECTION, POWDER, LYOPHILIZED, FOR SOLUTION INTRAVENOUS at 12:46

## 2020-06-29 RX ADMIN — KETOROLAC TROMETHAMINE 15 MG: 15 INJECTION, SOLUTION INTRAMUSCULAR; INTRAVENOUS at 04:56

## 2020-06-29 RX ADMIN — PANTOPRAZOLE SODIUM 40 MG: 40 TABLET, DELAYED RELEASE ORAL at 09:03

## 2020-06-29 RX ADMIN — REGADENOSON 0.4 MG: 0.08 INJECTION, SOLUTION INTRAVENOUS at 14:08

## 2020-06-29 RX ADMIN — ASPIRIN 81 MG 81 MG: 81 TABLET ORAL at 09:03

## 2020-06-29 RX ADMIN — AMINOPHYLLINE DIHYDRATE 100 MG: 25 INJECTION, SOLUTION INTRAVENOUS at 14:16

## 2020-06-29 RX ADMIN — SERTRALINE 50 MG: 50 TABLET, FILM COATED ORAL at 17:45

## 2020-06-29 RX ADMIN — PANTOPRAZOLE SODIUM 40 MG: 40 TABLET, DELAYED RELEASE ORAL at 17:44

## 2020-06-29 RX ADMIN — Medication 10 ML: at 04:56

## 2020-06-29 RX ADMIN — KETOROLAC TROMETHAMINE 15 MG: 15 INJECTION, SOLUTION INTRAMUSCULAR; INTRAVENOUS at 09:02

## 2020-06-29 RX ADMIN — CARVEDILOL 6.25 MG: 6.25 TABLET, FILM COATED ORAL at 17:44

## 2020-06-29 RX ADMIN — CLONAZEPAM 0.25 MG: 0.5 TABLET ORAL at 17:45

## 2020-06-29 ASSESSMENT — PAIN DESCRIPTION - PAIN TYPE
TYPE: CHRONIC PAIN
TYPE: ACUTE PAIN
TYPE: CHRONIC PAIN
TYPE: ACUTE PAIN

## 2020-06-29 ASSESSMENT — PAIN DESCRIPTION - ORIENTATION: ORIENTATION: MID

## 2020-06-29 ASSESSMENT — PAIN SCALES - GENERAL
PAINLEVEL_OUTOF10: 1
PAINLEVEL_OUTOF10: 0
PAINLEVEL_OUTOF10: 1
PAINLEVEL_OUTOF10: 6
PAINLEVEL_OUTOF10: 0
PAINLEVEL_OUTOF10: 2
PAINLEVEL_OUTOF10: 0
PAINLEVEL_OUTOF10: 1
PAINLEVEL_OUTOF10: 0
PAINLEVEL_OUTOF10: 1

## 2020-06-29 ASSESSMENT — PAIN DESCRIPTION - LOCATION
LOCATION: CHEST
LOCATION: CHEST;GENERALIZED
LOCATION: CHEST

## 2020-06-29 ASSESSMENT — PAIN DESCRIPTION - DESCRIPTORS: DESCRIPTORS: PRESSURE

## 2020-06-29 NOTE — PROGRESS NOTES
Physical Therapy    Facility/Department: [facility] 3A NURSING  Initial Assessment    NAME: Teodoro Inman  : 1955  MRN: [id number]    Date of Service: 2020    Discharge Recommendations:  Teodoro Inman scored a 21/24 on the AM-PAC short mobility form. At this time, no further PT is recommended upon discharge due to functioning close to baseline. Recommend patient returns to prior setting with prior services. PT Equipment Recommendations  Equipment Needed: No    Assessment   Body structures, Functions, Activity limitations: Decreased balance;Decreased functional mobility   Assessment: Pt presenting close to her baseline with only mild dizziness when ambulation resulting in mild balance concerns. Continued skilled PT while in hospital to ensure safe discharge home. Treatment Diagnosis: mild balance deficits  Prognosis: Excellent  Decision Making: Low Complexity  PT Education: PT Role;Plan of Care;Goals  Patient Education: verbalized understanding   Barriers to Learning: none  REQUIRES PT FOLLOW UP: Yes  Activity Tolerance  Activity Tolerance: Patient Tolerated treatment well  Activity Tolerance: mild dizziness with mobility        Patient Diagnosis(es): The primary encounter diagnosis was Acute chest pain. A diagnosis of Hyperkalemia was also pertinent to this visit. has a past medical history of Atrial fibrillation ([address] Cardiomyopathy, nonischemic ([address] CHF (congestive heart failure) ([address] Fibromyalgia, GERD (gastroesophageal reflux disease), HSV-1 (herpes simplex virus 1) infection, Hyperlipidemia, Sleep apnea, and Uterine fibroids affecting pregnancy. has a past surgical history that includes  section; Pacemaker insertion (); Pacemaker insertion; Upper gastrointestinal endoscopy (2015); Colonoscopy (2015); Colonoscopy (N/A, 2019);  Upper gastrointestinal endoscopy (N/A, 2019); and Cardiac defibrillator placement. Restrictions  Restrictions/Precautions  Restrictions/Precautions: Fall Risk(medium fall risk, sitter for suicide watch)  Required Braces or Orthoses?: No  Position Activity Restriction  Other position/activity restrictions: Wander Vinson is a 75 y.o. female with history of NICMP s/p BiVICD, fibromyalgia, h/o pericarditis, HTN who came to ER with complaints of CP. States it has been going on for weeks to months. It has acutely progressed in past few days. Is substernal and radiating to L arm. Associated with SOB and ZARATE. Also notes dysphagia, epigastric pain and GERD. Uses PPI PRN. Denies melena or hematochezia. Has nausea. VERY anxious. No fevers, chills, NS, cough or diarrhea. In ED, started on Nitropaste with improvement in CP. Denies smoking or EtOH. Does not use oxygen or inhalers. No BL LE edema. Has orthopnea. Found to have hyperkalemia in ED. Denies ICD discharges. Otherwise complete ROS is negative unless listed above.    Vision/Hearing  Vision: Impaired  Vision Exceptions: (permanent contacts after cataract surgery)  Hearing: Within functional limits     Subjective  General  Chart Reviewed: Yes  Patient assessed for rehabilitation services?: Yes  Family / Caregiver Present: (sitter present)  Diagnosis: chest pain   General Comment  Comments: supine in bed at arrival   Subjective  Subjective: agreed to evaluation   Pain Screening  Patient Currently in Pain: Yes  Pain Assessment  Pain Assessment: 0-10  Pain Type: Chronic pain  Pain Location: Chest;Generalized(fibromyalgia)  Vital Signs  Patient Currently in Pain: Yes       Orientation  Orientation  Overall Orientation Status: Within Functional Limits  Social/Functional History  Social/Functional History  Lives With: Spouse  Type of Home: House  Home Layout: Two level  Home Access: Level entry  Bathroom Shower/Tub: Tub/Shower unit  Bathroom Toilet: Standard  Receives Help From: Other (comment)()  ADL Assistance: Independent  Homemaking Assistance: Independent  Homemaking Responsibilities: Yes  Ambulation Assistance: Independent  Transfer Assistance: Independent  Active : Yes  Occupation: Unemployed  Leisure & Hobbies: She likes cooking. Additional Comments: Pt reports 1 minor mechanical fall from tripping on stairs; she was able to get up by herself. Cognition        Objective     Observation/Palpation  Posture: Good    AROM RLE (degrees)  RLE AROM: WFL  AROM LLE (degrees)  LLE AROM : WFL  Strength RLE  Strength RLE: WFL  Comment: as observed with mobility   Strength LLE  Strength LLE: WFL  Comment: as observed with mobility      Sensation  Overall Sensation Status: WFL(chronic fibromyalgia pain)  Bed mobility  Supine to Sit: Modified independent  Sit to Supine: Modified independent  Scooting: Modified independent  Comment: HOB elevated, use of bed railing  Transfers  Sit to Stand: Supervision(due to dizziness with transitional movements)  Stand to sit: Supervision  Ambulation  Ambulation?: Yes  Ambulation 1  Surface: level tile  Device: No Device(IV pole)  Assistance: Contact guard assistance;Stand by assistance(due to dizziness)  Quality of Gait: mild veering  Distance: 200 ft   Comments: 1 standing rest at wall due to mild dizziness, BP after ambulation 102/60 (trends low)  Stairs/Curb  Stairs?: No     Balance  Posture: Good  Sitting - Static: Good  Sitting - Dynamic: Good  Standing - Static: Good  Standing - Dynamic: Good;-        Plan   Plan  Times per week: 1-2x/wk   Current Treatment Recommendations: Transfer Training, Functional Mobility Training, Balance Training, Gait Training, Stair training  Safety Devices  Type of devices:  All fall risk precautions in place, Call light within reach, Left in bed, Sitter present, Nurse notified(medium fall risk)    G-Code       OutComes Score                                                  AM-PAC Score  AM-PAC Inpatient Mobility Raw Score : 21 (06/29/20 1000)  AM-PAC Inpatient T-Scale Score : 50.25 (06/29/20 1000)  Mobility Inpatient CMS 0-100% Score: 28.97 (06/29/20 1000)  Mobility Inpatient CMS G-Code Modifier : CJ (06/29/20 1000)          Goals  Short term goals  Time Frame for Short term goals: to be met by discharge  Short term goal 1: pt able to ambulate 150 ft without AD mod I   Short term goal 2: pt able to navigate 1 flight of stairs with railing and mod I   Patient Goals   Patient goals : \"to return home\"       Therapy Time   Individual Concurrent Group Co-treatment   Time In 0901         Time Out 0939         Minutes 38         Timed Code Treatment [facility]: [facility] [id number]

## 2020-06-29 NOTE — PROGRESS NOTES
Instructed on Lexiscan Stress Test Procedure including possible side effects/ adverse reactions. Patient verbalizes  understanding and denies having any questions . See [facility] Cardiology             Aminophylline given per lexiscan protocol in stress lab for c/o persistant nausea.

## 2020-06-29 NOTE — CONSULTS
Gastroenterology Consult Note      Patient: Teodoro Inman  : 1955  Acct#:      Date:  2020    Subjective:       History of Present Illness  Patient is a 75 y.o.   female admitted with Chest pain [R07.9]  Chest pain [R07.9] who is seen in consult for epigastric pain and dysphagia. H/o CHF, afib, GERD, fibromyalgia, s/p defibrillator. H/o chronic nausea. A few times over the weekend she had substernal chest pain and epigastric pain. The pain would come on at rest and with activity. Pain not brought on the eating. Epigastric pain was non radiating. Has had associated nausea as well which is chronic. No vomiting. Denies dysphagia. Past Medical History:   Diagnosis Date    Atrial fibrillation ([address]     Cardiomyopathy, nonischemic ([address] 0    CHF (congestive heart failure) (HCC)     Fibromyalgia     GERD (gastroesophageal reflux disease)     HSV-1 (herpes simplex virus 1) infection     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    Hyperlipidemia     Sleep apnea     uses cpap    Uterine fibroids affecting pregnancy       Past Surgical History:   Procedure Laterality Date    CARDIAC DEFIBRILLATOR PLACEMENT       SECTION      3 c-sections    COLONOSCOPY  2015    polypectomy    COLONOSCOPY N/A 2019    COLONOSCOPY POLYPECTOMY SNARE/COLD ASCENDING X1 , TRANSVERSE X1, SIGMOID X1 performed by Prasanth Gonzales MD at [facility]      PACEMAKER INSERTION      UPPER GASTROINTESTINAL ENDOSCOPY  2015    UPPER GASTROINTESTINAL ENDOSCOPY N/A 2019    EGD GASTRIC BIOPSY performed by Prasanth Gonzales MD at [address]      Past Endoscopic History:  EGD for N/V and screening colonoscopy with Dr Ashutosh Welsh 2019   Impression:  -Normal upper endoscopy, with no endoscopic evidence of neoplasia or mucosal abnormality. Gastric biopsies obtained. Impression: Three polyps removed as noted above. FINAL DIAGNOSIS:       A.  Stomach, Ears, nose, mouth, throat, and face: negative for nasal congestion and sore throat   Respiratory: negative for cough and shortness of breath   Cardiovascular: negative for palpitations and dyspnea   Gastrointestinal: see hpi   Genitourinary:negative for dysuria and frequency   Integument/breast: negative for pruritus and rash   Hematologic/lymphatic: negative for bleeding and easy bruising   Musculoskeletal:negative for arthralgias and myalgias   Neurological: negative for dizziness and weakness         Physical Exam  Blood pressure (!) 94/59, pulse 69, temperature 97.2 °F (36.2 °C), temperature source Temporal, resp. rate 18, height 5' 2\" (1.575 m), weight 142 lb 6 oz (64.6 kg), SpO2 99 %. General appearance: alert, cooperative, no distress, appears stated age  Eyes: Anicteric  Head: Normocephalic, without obvious abnormality  Lungs: clear to auscultation bilaterally, Normal Effort  Heart: regular rate and rhythm, normal S1 and S2, no murmurs or rubs  Abdomen: soft, epigastric tenderness. Bowel sounds normal. No masses,  no organomegaly. Extremities: atraumatic, no cyanosis or edema  Skin: warm and dry, no jaundice  Neuro: Grossly intact, A&OX3  Musculoskeletal: 5/5  strength BUE      Data Review:    Recent Labs     06/28/20  0936 06/29/20  0543   WBC 7.5 6.2   HGB 15.0 13.4   HCT 44.9 39.9   MCV 91.3 91.3    124*     Recent Labs     06/28/20  0936 06/28/20  1447 06/29/20  0543   *  --  134*   K 6.1* 3.6 3.8   CL 98*  --  102   CO2 20*  --  21   BUN 22*  --  29*   CREATININE 0.9  --  1.0     No results for input(s): AST, ALT, ALB, BILIDIR, BILITOT, ALKPHOS in the last 72 hours. No results for input(s): LIPASE, AMYLASE in the last 72 hours. No results for input(s): PROTIME, INR in the last 72 hours. No results for input(s): PTT in the last 72 hours. No results for input(s): OCCULTBLD in the last 72 hours.          Assessment:     Principal Problem:    Chest pain  Active Problems: Non-ischemic cardiomyopathy ([facility]    History of pericarditis    Fibromyalgia syndrome    Obstructive sleep apnea syndrome    Hypercholesteremia    Biventricular ICD (implantable cardioverter-defibrillator) in place    Hyperkalemia    Dyspnea on exertion    Anxiety    GERD with esophagitis    Hyponatremia    Dysphagia    Suicidal ideation  Resolved Problems:    * No resolved hospital problems. *    Chest and epigastric pain - to have stress test today. Cardiology consulted. If pain is not felt to be cardiac in etiology, consider GB disease, PUD, GERD. Chronic nausea - prior neg EGD 12/2019 for this. Recommendations:   - f/u cardiac w/u  - GB US  - hepatic profile, amylase, lipase  - If the above w/u all negative, consider EGD    Discussed with Dr. Pinky Garcia, [facility]      I have personally performed a face to face diagnostic evaluation on this patient. I have interviewed and examined the patient and I agree with the findings and recommended plan of care. In summary, my findings and plan are the following:  Chest and epigastric pain and h/o chronic nausea. Prior EGD neg in Dec.  Mild epigastric ttp. Labs neg so far. Plan: await cardiac w/u. If neg will get US and EGD.           Brad Curran MD  [address]

## 2020-06-29 NOTE — PROGRESS NOTES
Occupational Therapy   Occupational Therapy Initial Assessment/Discharge Summary  Date: 2020   Patient Name: Darron Malone  MRN: [id number]     : 1955    Date of Service: 2020    Discharge Recommendations: Darron Malone scored a 24/24 on the AM-PAC ADL Inpatient form. At this time, no further OT is recommended upon discharge due to return to baseline level of function with resolved medical concerns. Recommend patient returns to prior setting with prior services. OT Equipment Recommendations  Equipment Needed: No    Assessment   Assessment: Pt is currently at baseline level of function in ADLs and near baseline LOF in functional mobility. She currently requires supervision due to self-harm concerns and occassional dizziness that are being addressed by psychiatry and her physician. Pt does not currently require skilled inpatient OT services at this time. Prognosis: Good  Decision Making: Low Complexity  OT Education: OT Role;Plan of Care;Transfer Training  Patient Education: d/c planning- pt verbalized understanding  Barriers to Learning: Pt speaks English well, but has increased understanding of Nauruan language. Language could be a potential barrier. REQUIRES OT FOLLOW UP: No  Activity Tolerance  Activity Tolerance: Patient Tolerated treatment well  Activity Tolerance: Pt initially dizzy upon standing; dizziness resolved with short break in stance; see vitals for BP info; BP trends low. Safety Devices  Safety Devices in place: Yes  Type of devices: Gait belt;Left in bed;Call light within reach;Nurse notified(sitter present with pt upon exit)  Restraints  Initially in place: No           Patient Diagnosis(es): The primary encounter diagnosis was Acute chest pain. A diagnosis of Hyperkalemia was also pertinent to this visit.      has a past medical history of Atrial fibrillation ([address] Cardiomyopathy, nonischemic ([address] CHF (congestive heart failure) ([address] Fibromyalgia, GERD (gastroesophageal reflux disease), HSV-1 (herpes simplex virus 1) infection, Hyperlipidemia, Sleep apnea, and Uterine fibroids affecting pregnancy. has a past surgical history that includes  section; Pacemaker insertion (); Pacemaker insertion; Upper gastrointestinal endoscopy (2015); Colonoscopy (2015); Colonoscopy (N/A, 2019); Upper gastrointestinal endoscopy (N/A, 2019); and Cardiac defibrillator placement. Restrictions  Restrictions/Precautions  Restrictions/Precautions: Fall Risk(medium fall risk, sitter for suicide watch)  Required Braces or Orthoses?: No  Position Activity Restriction  Other position/activity restrictions: Manual Falling is a 75 y.o. female with history of NICMP s/p BiVICD, fibromyalgia, h/o pericarditis, HTN who came to ER with complaints of CP. States it has been going on for weeks to months. It has acutely progressed in past few days. Is substernal and radiating to L arm. Associated with SOB and ZARATE. Also notes dysphagia, epigastric pain and GERD. Uses PPI PRN. Denies melena or hematochezia. Has nausea. VERY anxious. No fevers, chills, NS, cough or diarrhea. In ED, started on Nitropaste with improvement in CP. Denies smoking or EtOH. Does not use oxygen or inhalers. No BL LE edema. Has orthopnea. Found to have hyperkalemia in ED. Denies ICD discharges. Otherwise complete ROS is negative unless listed above. Subjective   General  Chart Reviewed: Yes  Patient assessed for rehabilitation services?: Yes  Family / Caregiver Present: No( was in room prior to entry)  Diagnosis: chest pain  Subjective  Subjective: Pt supine in bed upon entry; agreeable to eval. Eval completed in both [place] and Ivorian with Ivorian-speaking OT.    Patient Currently in Pain: Yes  Pain Assessment  Pain Assessment: 0-10  Pain Level: 2(fibromyalgia pain)  Pain Type: Chronic pain  Pain Location: Chest;Generalized(fibromyalgia)  Pain Orientation: Mid  Pain Descriptors: Pressure  Vital Signs  Temp: 97.2 °F (36.2 °C)  Temp Source: Temporal  Pulse: 69  Heart Rate Source: Monitor  Resp: 18  BP: 102/60  BP Location: Right upper arm  BP Upper/Lower: Upper  Patient Position: Semi fowlers  Level of Consciousness: Alert  MEWS Score: 2  Patient Currently in Pain: Yes  Oxygen Therapy  SpO2: 99 %  Pulse Oximeter Device Mode: Intermittent  Pulse Oximeter Device Location: Finger  O2 Device: None (Room air)  Social/Functional History  Social/Functional History  Lives With: Spouse  Type of Home: House  Home Layout: Two level  Home Access: Level entry  Bathroom Shower/Tub: Tub/Shower unit  Bathroom Toilet: Standard  Receives Help From: Other (comment)()  ADL Assistance: Independent  Homemaking Assistance: Independent  Homemaking Responsibilities: Yes  Ambulation Assistance: Independent  Transfer Assistance: Independent  Active : Yes  Occupation: Unemployed  Leisure & Hobbies: She likes cooking. Additional Comments: Pt reports 1 minor mechanical fall from tripping on stairs; she was able to get up by herself. Objective   Vision: Impaired  Vision Exceptions: (permanent contacts after cataract surgery )  Hearing: Within functional limits    Orientation  Overall Orientation Status: Within Functional Limits  Observation/Palpation  Posture: Good  Balance  Sitting Balance: Modified independent   Standing Balance: Contact guard assistance(ranged from SBA to CGA for safety)  Standing Balance  Time: ~12 min  Activity: functional mobility, ADLs, transfers  Comment: no device, held IV pole  Functional Mobility  Functional - Mobility Device: No device  Activity: To/from bathroom; Other(~200 ft in hallway)  Assist Level: Contact guard assistance(ranged from SBA to CGA for safety)  Functional Mobility Comments: held IV pole; 1 minor LOB due to reported dizziness upon standing  Toilet Transfers  Toilet - Technique: Stand step  Equipment Used: Standard toilet  Toilet Transfer: Stand by assistance  ADL  Grooming: Supervision(Pt washed hands and completed oral care in stance at sink)  UE Bathing: Supervision(Pt washed UEs with wipes seated on toilet)  LE Bathing: Supervision(Pt washed LEs with wipes seated on toilet.)  UE Dressing: Supervision(doffed and donned gown seated EOB)  LE Dressing: Supervision(underwear management during toileting)  Toileting: Supervision(completed pericare and underwear management at toilet)  Additional Comments: Supervision for all ADLs due to current suicide watch and dizziness. Pt is otherwise at baseline LOF in ADL completion.   Tone RUE  RUE Tone: Normotonic  Tone LUE  LUE Tone: Normotonic  Coordination  Movements Are Fluid And Coordinated: Yes     Bed mobility  Supine to Sit: Modified independent  Sit to Supine: Modified independent  Scooting: Modified independent  Comment: HOB elevated, use of bed rail  Transfers  Sit to stand: Supervision  Stand to sit: Supervision  Vision - Basic Assessment  Prior Vision: Wears contacts  Visual History: Cataracts  Cognition  Overall Cognitive Status: WFL  Perception  Overall Perceptual Status: WFL     Sensation  Overall Sensation Status: WFL(chronic fibromyalgia pain)        LUE AROM (degrees)  LUE AROM : WFL  Left Hand AROM (degrees)  Left Hand AROM: WFL  RUE AROM (degrees)  RUE AROM : WFL  Right Hand AROM (degrees)  Right Hand AROM: WFL  LUE Strength  Gross LUE Strength: WFL  RUE Strength  Gross RUE Strength: WFL                   Plan   Plan  Times per week: No acute OT needs indicated at this time    G-Code     OutComes Score                                                  AM-PAC Score        AM-[facility] Inpatient Daily Activity Raw Score: 24 (06/29/20 0959)  AM-PAC Inpatient ADL T-Scale Score : 57.54 (06/29/20 0959)  ADL Inpatient CMS 0-100% Score: 0 (06/29/20 0959)  ADL Inpatient CMS G-Code Modifier : [address] (06/29/20 3429)    Goals  Patient Goals   Patient goals : No acute OT goals indicated at this time       Therapy Time   Individual Concurrent Group Co-treatment   Time In 0900         Time Out 0939         Minutes 39         Timed Code Treatment Minutes: [id number] [place], [place] [id number]

## 2020-06-29 NOTE — PROGRESS NOTES
& Plan:   1. Chest pain. Troponin negative x 3. Denies chest pain, describes epigastric pressure and RUQ pain. NPO for echo and stress test today. Also to have RUQ ultrasound. GI and cardiology consults pending. Continue Protonix. 2. Nonischemic cardiomyopathy. EF 25-30% (10/2018). Continue Entresto and carvedilol, also on digoxin. Spironolactone discontinued secondary to hyperkalemia (potassium 6.1). 3. Suicidal ideation. Admits to thoughts of taking all her pills. Psych consult pending. Sitter in room. Diet: Diet NPO, After Midnight  Code:Full Code  DVT PPX: On enoxaparin.        Jennifer Cotto, SHAMEKA - CNP   6/29/2020 8:31 AM
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
## 2020-06-29 NOTE — PLAN OF CARE
Problem: Suicide risk  Goal: Provide patient with safe environment  Description: Provide patient with safe environment  Outcome: Ongoing  Note: Constant observer at bedside.

## 2020-06-29 NOTE — CONSULTS
[address] [facility]  [phone]      Chief Complaint   Patient presents with    Chest Pain     Pt. comes in today with complaints of chest pain and difficulty breathing. Pt. reports last night she was feeling tightness in her chest. Pt. reports pain radiating down her left arm. History of Present Illness:  Marianne Hernandez is a 75 y.o. patient who presented to the hospital with complaints of chest pain. CP has been intermittent for several months but was severe 2 days ago so she came to the ED. Substernal, radiating to L arm, associated with SOB. Also has epigastric pain. I have been asked to provide consultation regarding further management and testing. She has a past medical history of NICM, dilated CM, s/p BiV ICD in  and generator change in 2018 per Dr. Newton Thakur, atrial fibrillation (not on anti coagulation), and hyperlipidemia. Also has CP  Past Medical History:   has a past medical history of Atrial fibrillation ([address] Cardiomyopathy, nonischemic ([address] CHF (congestive heart failure) ([address] Fibromyalgia, GERD (gastroesophageal reflux disease), HSV-1 (herpes simplex virus 1) infection, Hyperlipidemia, Sleep apnea, and Uterine fibroids affecting pregnancy. Surgical History:   has a past surgical history that includes  section; Pacemaker insertion (); Pacemaker insertion; Upper gastrointestinal endoscopy (2015); Colonoscopy (2015); Colonoscopy (N/A, 2019); Upper gastrointestinal endoscopy (N/A, 2019); and Cardiac defibrillator placement. Social History:   reports that she quit smoking about 22 years ago. She has a 2.50 pack-year smoking history. She has never used smokeless tobacco. She reports current alcohol use. She reports current drug use. Frequency: 2.00 times per week. Drug: Marijuana. Family History:  family history includes Diabetes in her mother; Kidney Disease in her mother.      Home Medications:  Were reviewed and are listed in nursing record. and/or listed below  Prior to Admission medications    Medication Sig Start Date End Date Taking?  Authorizing Provider   sacubitril-valsartan (ENTRESTO) 24-26 MG per tablet Take 1 tablet by mouth 2 times daily 5/14/20  Yes Regina Kelly MD   spironolactone (ALDACTONE) 25 MG tablet TAKE 1/2 TABLET BY MOUTH DAILY 5/14/20  Yes Regina Kelly MD   torsemide (DEMADEX) 20 MG tablet Take 1 tablet by mouth as needed (for weight gain or swelling) 5/14/20  Yes Regina Kelly MD   digoxin (LANOXIN) 125 MCG tablet TAKE 1 TABLET BY MOUTH DAILY 5/12/20  Yes Joellen John MD   ciprofloxacin (CIPRO) 250 MG tablet TAKE 1 TABLET BY MOUTH AFTER SEXUAL INTERCOURSE 4/23/20  Yes SHAMEKA Lanier CNP   carvedilol (COREG) 6.25 MG tablet TAKE 1 TABLET BY MOUTH TWICE DAILY WITH MEALS 12/30/19  Yes Joellen John MD   Omega-3 Fatty Acids (FISH OIL) 1000 MG CAPS Take 3,000 mg by mouth 2 times daily    Yes Historical Provider, MD        Current Medications:  Current Facility-Administered Medications   Medication Dose Route Frequency Provider Last Rate Last Dose    carvedilol (COREG) tablet 6.25 mg  6.25 mg Oral BID  David Yoo MD   6.25 mg at 06/28/20 1632    digoxin (LANOXIN) tablet 125 mcg  125 mcg Oral Daily David Yoo MD        sacubitril-valsartan (ENTRESTO) 24-26 MG per tablet 1 tablet  1 tablet Oral BID David Yoo MD        sodium chloride flush 0.9 % injection 10 mL  10 mL Intravenous 2 times per day David Yoo MD        sodium chloride flush 0.9 % injection 10 mL  10 mL Intravenous PRN David Yoo MD   10 mL at 06/29/20 0456    acetaminophen (TYLENOL) tablet 650 mg  650 mg Oral Q6H PRN David Yoo MD        Or   [facility] acetaminophen (TYLENOL) suppository 650 mg  650 mg Rectal Q6H PRN David Yoo MD        polyethylene glycol (GLYCOLAX) packet 17 g  17 g Oral Daily PRN David Yoo MD        promethazine (PHENERGAN) tablet 12.5 mg  12.5 mg Oral Q6H PRN David Yoo MD Or    ondansetron (ZOFRAN) injection 4 mg  4 mg Intravenous Q6H PRN Rudy Mcgee MD        enoxaparin (LOVENOX) injection 40 mg  40 mg Subcutaneous Daily Rudy Mcgee MD   40 mg at 06/28/20 1633    aspirin chewable tablet 81 mg  81 mg Oral Daily Rudy Mcgee MD   81 mg at 06/28/20 1636    perflutren lipid microspheres (DEFINITY) injection 1.65 mg  1.5 mL Intravenous ONCE PRN Rudy Mcgee MD        pantoprazole (PROTONIX) tablet 40 mg  40 mg Oral BID AC Rudy Mcgee MD   40 mg at 06/28/20 1633    nitroGLYCERIN (NITROSTAT) SL tablet 0.4 mg  0.4 mg Sublingual Q5 Min PRN Rudy Mcgee MD        0.9 % sodium chloride infusion   Intravenous Continuous Rudy Mcgee MD 50 mL/hr at 06/28/20 1510      ketorolac (TORADOL) injection 15 mg  15 mg Intravenous Q6H Rudy Mcgee MD   15 mg at 06/29/20 0456    morphine (PF) injection 2 mg  2 mg Intravenous Q4H PRN Rudy Mcgee MD        LORazepam (ATIVAN) injection 0.5 mg  0.5 mg Intravenous Q4H PRN Rudy Mcgee MD   0.5 mg at 06/29/20 0456        Allergies:  Prednisone     Review of Systems:     · Constitutional: there has been no unanticipated weight loss. There's been no change in energy level, sleep pattern, or activity level. · Eyes: No visual changes or diplopia. No scleral icterus. · ENT: No Headaches, hearing loss or vertigo. No mouth sores or sore throat. · Cardiovascular: Reviewed in HPI  · Respiratory: No cough or wheezing, no sputum production. No hematemesis. · Gastrointestinal: No abdominal pain, appetite loss, blood in stools. No change in bowel or bladder habits. · Genitourinary: No dysuria, trouble voiding, or hematuria. · Musculoskeletal:  No gait disturbance, weakness or joint complaints. · Integumentary: No rash or pruritis. · Neurological: No headache, diplopia, change in muscle strength, numbness or tingling. No change in gait, balance, coordination, mood, affect, memory, mentation, behavior.   · Psychiatric: No anxiety, no depression. · Endocrine: No malaise, fatigue or temperature intolerance. No excessive thirst, fluid intake, or urination. No tremor. · Hematologic/Lymphatic: No abnormal bruising or bleeding, blood clots or swollen lymph nodes. · Allergic/Immunologic: No nasal congestion or hives.   ·     Physical Examination:    Vitals:    06/29/20 0445   BP: 104/68   Pulse: 80   Resp: 16   Temp: 97.3 °F (36.3 °C)   SpO2: 97%    Weight: 142 lb 6 oz (64.6 kg)         General Appearance:  Alert, cooperative, no distress, appears stated age   Head:  Normocephalic, without obvious abnormality, atraumatic   Eyes:  PERRL, conjunctiva/corneas clear       Nose: Nares normal, no drainage or sinus tenderness   Throat: Lips, mucosa, and tongue normal   Neck: Supple, symmetrical, trachea midline, no adenopathy, thyroid: not enlarged, symmetric, no tenderness/mass/nodules, no carotid bruit or JVD       Lungs:   Clear to auscultation bilaterally, respirations unlabored   Chest Wall:  No tenderness or deformity   Heart:  Regular rate and rhythm, S1, S2 normal, no murmur, rub or gallop   Abdomen:   Soft, non-tender, bowel sounds active all four quadrants,  no masses, no organomegaly           Extremities: Extremities normal, atraumatic, no cyanosis or edema   Pulses: 2+ and symmetric   Skin: Skin color, texture, turgor normal, no rashes or lesions   Pysch: Normal mood and affect   Neurologic: Normal gross motor and sensory exam.         Labs  CBC:   Lab Results   Component Value Date    WBC 6.2 06/29/2020    RBC 4.37 06/29/2020    HGB 13.4 06/29/2020    HCT 39.9 06/29/2020    MCV 91.3 06/29/2020    RDW 13.2 06/29/2020     06/29/2020     CMP:    Lab Results   Component Value Date     06/29/2020    K 3.8 06/29/2020     06/29/2020    CO2 21 06/29/2020    BUN 29 06/29/2020    CREATININE 1.0 06/29/2020    GFRAA >60 06/29/2020    GFRAA >60 05/30/2013    AGRATIO 1.6 03/07/2019    LABGLOM 55 06/29/2020    LABGLOM 77 06/07/2012 [id number] annular systolic velocity:    46AP/F. A device lead is seen extending into the RV cavity.  - Right atrium: The atrium was mildly to moderately dilated. Krys Stager or catheter noted in right atrium.  - Tricuspid valve: Mild-moderate regurgitation.  - Pericardium, extracardiac: A trivial pericardial effusion was    identified. LISETH (date) (results)  EP procedures/studies/ablation:  (specific data). Device information:  Event monitor: (date/results)    All testing and labs listed below were personally reviewed. Assessment  Patient Active Problem List   Diagnosis    Non-ischemic cardiomyopathy ([facility] [address]    Pacemaker    History of pericarditis    Fibromyalgia syndrome    Sebaceous cyst    Benign essential HTN    Obstructive sleep apnea syndrome    Hypercholesteremia    Biventricular ICD (implantable cardioverter-defibrillator) in place    Chest pain    Hyperkalemia    Dyspnea on exertion    Anxiety    GERD with esophagitis    Hyponatremia    Dysphagia    Suicidal ideation         Plan:    I had the opportunity to review the clinical symptoms and presentation of Concepcion Britton. Assessment/Plan:  Principal Problem:    Chest pain  Plan: Atypical angina. Normal troponin. Paced ECG H/o NICMP. H/o fibromyalgia. Recommend nuc stress test. If abnormal will perform [address] cath. Active Problems:    Non-ischemic cardiomyopathy (HCC)  Plan: NYHA class 1 symptoms. Euvolemic. LVEF 25%. Cont coreg, entresto, digoxin. Check echo. Stable for EGD if necessary    History of pericarditis  Plan: CP could be pericarditis. Consider colchicine. Avoid NSAIDs for renal concerns and GI concerns. I will address the patient's cardiac risk factors and adjusted pharmacologic treatment as needed. In addition, I have reinforced the need for patient directed risk factor modification. Tobacco use was discussed with the patient and educated on the negative effects.  I have asked the patient to not utilize these agents. Thank you for allowing to us to participate in the care or [facility]. Further evaluation will be based upon the patient's clinical course and testing results. All questions and concerns were addressed to the patient/family. Alternatives to my treatment were discussed. The note was completed using EMR. Every effort was made to ensure accuracy; however, inadvertent computerized transcription errors may be present.     Lars Coleman MD 6/29/2020 8:36 AM

## 2020-06-29 NOTE — CARE COORDINATION
CM Met briefly with patient, she declines any discharge needs at this time. CM notes a pending psych consult, will follow for possible needs.     ALENA JonesN, [facility], RN  [address] [facility]  [phone]

## 2020-06-29 NOTE — PROGRESS NOTES
Speech Language Pathology  Attempt  Veena Reasons   1955     SLP evaluation orders received due to reported dysphagia. Per chart, pt currently NPO for stress test. Will hold and re-attempt to follow-up as therapy schedule allows and as pt is able to participate.     Thanks,  Pratibha Atkinson, [facility]  Speech Language Pathologist

## 2020-06-30 ENCOUNTER — ANESTHESIA EVENT (OUTPATIENT)
Dept: ENDOSCOPY | Age: 65
DRG: 445 | End: 2020-06-30
Payer: COMMERCIAL

## 2020-06-30 ENCOUNTER — ANESTHESIA (OUTPATIENT)
Dept: ENDOSCOPY | Age: 65
DRG: 445 | End: 2020-06-30
Payer: COMMERCIAL

## 2020-06-30 VITALS
OXYGEN SATURATION: 98 % | RESPIRATION RATE: 14 BRPM | SYSTOLIC BLOOD PRESSURE: 110 MMHG | DIASTOLIC BLOOD PRESSURE: 60 MMHG

## 2020-06-30 LAB
ANION GAP SERPL CALCULATED.3IONS-SCNC: 10 MMOL/L (ref 3–16)
BUN BLDV-MCNC: 27 MG/DL (ref 7–20)
CALCIUM SERPL-MCNC: 9 MG/DL (ref 8.3–10.6)
CHLORIDE BLD-SCNC: 103 MMOL/L (ref 99–110)
CO2: 22 MMOL/L (ref 21–32)
CREAT SERPL-MCNC: 0.9 MG/DL (ref 0.6–1.2)
GFR AFRICAN AMERICAN: >60
GFR NON-AFRICAN AMERICAN: >60
GLUCOSE BLD-MCNC: 114 MG/DL (ref 70–99)
GLUCOSE BLD-MCNC: 123 MG/DL (ref 70–99)
HCT VFR BLD CALC: 39.1 % (ref 36–48)
HEMOGLOBIN: 13.1 G/DL (ref 12–16)
LEFT VENTRICULAR EJECTION FRACTION MODE: NORMAL
LV EF: 25 %
MCH RBC QN AUTO: 30.6 PG (ref 26–34)
MCHC RBC AUTO-ENTMCNC: 33.6 G/DL (ref 31–36)
MCV RBC AUTO: 91.2 FL (ref 80–100)
PDW BLD-RTO: 12.9 % (ref 12.4–15.4)
PERFORMED ON: ABNORMAL
PLATELET # BLD: 127 K/UL (ref 135–450)
PMV BLD AUTO: 10.6 FL (ref 5–10.5)
POTASSIUM SERPL-SCNC: 4.2 MMOL/L (ref 3.5–5.1)
RBC # BLD: 4.29 M/UL (ref 4–5.2)
SODIUM BLD-SCNC: 135 MMOL/L (ref 136–145)
WBC # BLD: 5.8 K/UL (ref 4–11)

## 2020-06-30 PROCEDURE — 7100000000 HC PACU RECOVERY - FIRST 15 MIN: Performed by: INTERNAL MEDICINE

## 2020-06-30 PROCEDURE — C1894 INTRO/SHEATH, NON-LASER: HCPCS

## 2020-06-30 PROCEDURE — 2500000003 HC RX 250 WO HCPCS

## 2020-06-30 PROCEDURE — 99152 MOD SED SAME PHYS/QHP 5/>YRS: CPT

## 2020-06-30 PROCEDURE — 6370000000 HC RX 637 (ALT 250 FOR IP): Performed by: PSYCHIATRY & NEUROLOGY

## 2020-06-30 PROCEDURE — 99231 SBSQ HOSP IP/OBS SF/LOW 25: CPT | Performed by: PSYCHIATRY & NEUROLOGY

## 2020-06-30 PROCEDURE — 6370000000 HC RX 637 (ALT 250 FOR IP): Performed by: INTERNAL MEDICINE

## 2020-06-30 PROCEDURE — 2709999900 HC NON-CHARGEABLE SUPPLY

## 2020-06-30 PROCEDURE — B2151ZZ FLUOROSCOPY OF LEFT HEART USING LOW OSMOLAR CONTRAST: ICD-10-PCS | Performed by: INTERNAL MEDICINE

## 2020-06-30 PROCEDURE — 6360000002 HC RX W HCPCS: Performed by: INTERNAL MEDICINE

## 2020-06-30 PROCEDURE — 3700000001 HC ADD 15 MINUTES (ANESTHESIA): Performed by: INTERNAL MEDICINE

## 2020-06-30 PROCEDURE — 80048 BASIC METABOLIC PNL TOTAL CA: CPT

## 2020-06-30 PROCEDURE — 85027 COMPLETE CBC AUTOMATED: CPT

## 2020-06-30 PROCEDURE — 2500000003 HC RX 250 WO HCPCS: Performed by: NURSE ANESTHETIST, CERTIFIED REGISTERED

## 2020-06-30 PROCEDURE — 2580000003 HC RX 258

## 2020-06-30 PROCEDURE — 2709999900 HC NON-CHARGEABLE SUPPLY: Performed by: INTERNAL MEDICINE

## 2020-06-30 PROCEDURE — 2700000000 HC OXYGEN THERAPY PER DAY

## 2020-06-30 PROCEDURE — 93284 PRGRMG EVAL IMPLANTABLE DFB: CPT | Performed by: INTERNAL MEDICINE

## 2020-06-30 PROCEDURE — 6360000002 HC RX W HCPCS: Performed by: NURSE ANESTHETIST, CERTIFIED REGISTERED

## 2020-06-30 PROCEDURE — 2580000003 HC RX 258: Performed by: INTERNAL MEDICINE

## 2020-06-30 PROCEDURE — 88342 IMHCHEM/IMCYTCHM 1ST ANTB: CPT

## 2020-06-30 PROCEDURE — C1769 GUIDE WIRE: HCPCS

## 2020-06-30 PROCEDURE — 2580000003 HC RX 258: Performed by: ANESTHESIOLOGY

## 2020-06-30 PROCEDURE — 6360000004 HC RX CONTRAST MEDICATION: Performed by: INTERNAL MEDICINE

## 2020-06-30 PROCEDURE — 1200000000 HC SEMI PRIVATE

## 2020-06-30 PROCEDURE — 7100000001 HC PACU RECOVERY - ADDTL 15 MIN: Performed by: INTERNAL MEDICINE

## 2020-06-30 PROCEDURE — 3609012400 HC EGD TRANSORAL BIOPSY SINGLE/MULTIPLE: Performed by: INTERNAL MEDICINE

## 2020-06-30 PROCEDURE — 6370000000 HC RX 637 (ALT 250 FOR IP): Performed by: PHYSICIAN ASSISTANT

## 2020-06-30 PROCEDURE — 3700000000 HC ANESTHESIA ATTENDED CARE: Performed by: INTERNAL MEDICINE

## 2020-06-30 PROCEDURE — 94760 N-INVAS EAR/PLS OXIMETRY 1: CPT

## 2020-06-30 PROCEDURE — 93458 L HRT ARTERY/VENTRICLE ANGIO: CPT | Performed by: INTERNAL MEDICINE

## 2020-06-30 PROCEDURE — 88305 TISSUE EXAM BY PATHOLOGIST: CPT

## 2020-06-30 PROCEDURE — 36415 COLL VENOUS BLD VENIPUNCTURE: CPT

## 2020-06-30 PROCEDURE — 6360000002 HC RX W HCPCS

## 2020-06-30 PROCEDURE — 0DB68ZX EXCISION OF STOMACH, VIA NATURAL OR ARTIFICIAL OPENING ENDOSCOPIC, DIAGNOSTIC: ICD-10-PCS | Performed by: INTERNAL MEDICINE

## 2020-06-30 PROCEDURE — 2580000003 HC RX 258: Performed by: NURSE ANESTHETIST, CERTIFIED REGISTERED

## 2020-06-30 PROCEDURE — 4A023N7 MEASUREMENT OF CARDIAC SAMPLING AND PRESSURE, LEFT HEART, PERCUTANEOUS APPROACH: ICD-10-PCS | Performed by: INTERNAL MEDICINE

## 2020-06-30 PROCEDURE — 99233 SBSQ HOSP IP/OBS HIGH 50: CPT | Performed by: INTERNAL MEDICINE

## 2020-06-30 PROCEDURE — B2111ZZ FLUOROSCOPY OF MULTIPLE CORONARY ARTERIES USING LOW OSMOLAR CONTRAST: ICD-10-PCS | Performed by: INTERNAL MEDICINE

## 2020-06-30 PROCEDURE — 93458 L HRT ARTERY/VENTRICLE ANGIO: CPT

## 2020-06-30 RX ORDER — PROPOFOL 10 MG/ML
INJECTION, EMULSION INTRAVENOUS PRN
Status: DISCONTINUED | OUTPATIENT
Start: 2020-06-30 | End: 2020-06-30 | Stop reason: SDUPTHER

## 2020-06-30 RX ORDER — PROPOFOL 10 MG/ML
INJECTION, EMULSION INTRAVENOUS CONTINUOUS PRN
Status: DISCONTINUED | OUTPATIENT
Start: 2020-06-30 | End: 2020-06-30 | Stop reason: SDUPTHER

## 2020-06-30 RX ORDER — ONDANSETRON 2 MG/ML
4 INJECTION INTRAMUSCULAR; INTRAVENOUS EVERY 6 HOURS PRN
Status: DISCONTINUED | OUTPATIENT
Start: 2020-06-30 | End: 2020-06-30 | Stop reason: SDUPTHER

## 2020-06-30 RX ORDER — SODIUM CHLORIDE 9 MG/ML
INJECTION, SOLUTION INTRAVENOUS CONTINUOUS
Status: DISCONTINUED | OUTPATIENT
Start: 2020-06-30 | End: 2020-07-01 | Stop reason: HOSPADM

## 2020-06-30 RX ORDER — LIDOCAINE HYDROCHLORIDE 20 MG/ML
INJECTION, SOLUTION INFILTRATION; PERINEURAL PRN
Status: DISCONTINUED | OUTPATIENT
Start: 2020-06-30 | End: 2020-06-30 | Stop reason: SDUPTHER

## 2020-06-30 RX ORDER — SODIUM CHLORIDE 9 MG/ML
INJECTION, SOLUTION INTRAVENOUS CONTINUOUS PRN
Status: DISCONTINUED | OUTPATIENT
Start: 2020-06-30 | End: 2020-06-30 | Stop reason: SDUPTHER

## 2020-06-30 RX ORDER — SODIUM CHLORIDE 9 MG/ML
INJECTION, SOLUTION INTRAVENOUS CONTINUOUS
Status: ACTIVE | OUTPATIENT
Start: 2020-06-30 | End: 2020-06-30

## 2020-06-30 RX ORDER — SODIUM CHLORIDE 0.9 % (FLUSH) 0.9 %
10 SYRINGE (ML) INJECTION EVERY 12 HOURS SCHEDULED
Status: DISCONTINUED | OUTPATIENT
Start: 2020-06-30 | End: 2020-07-01 | Stop reason: HOSPADM

## 2020-06-30 RX ORDER — SODIUM CHLORIDE 0.9 % (FLUSH) 0.9 %
10 SYRINGE (ML) INJECTION PRN
Status: DISCONTINUED | OUTPATIENT
Start: 2020-06-30 | End: 2020-07-01 | Stop reason: HOSPADM

## 2020-06-30 RX ORDER — ACETAMINOPHEN 325 MG/1
650 TABLET ORAL EVERY 4 HOURS PRN
Status: DISCONTINUED | OUTPATIENT
Start: 2020-06-30 | End: 2020-07-01 | Stop reason: HOSPADM

## 2020-06-30 RX ORDER — OXYCODONE HYDROCHLORIDE AND ACETAMINOPHEN 5; 325 MG/1; MG/1
2 TABLET ORAL EVERY 4 HOURS PRN
Status: DISCONTINUED | OUTPATIENT
Start: 2020-06-30 | End: 2020-07-01 | Stop reason: HOSPADM

## 2020-06-30 RX ORDER — OXYCODONE HYDROCHLORIDE AND ACETAMINOPHEN 5; 325 MG/1; MG/1
1 TABLET ORAL EVERY 4 HOURS PRN
Status: DISCONTINUED | OUTPATIENT
Start: 2020-06-30 | End: 2020-07-01 | Stop reason: HOSPADM

## 2020-06-30 RX ADMIN — DIGOXIN 125 MCG: 125 TABLET ORAL at 16:10

## 2020-06-30 RX ADMIN — CLONAZEPAM 0.25 MG: 0.5 TABLET ORAL at 16:36

## 2020-06-30 RX ADMIN — KETOROLAC TROMETHAMINE 15 MG: 15 INJECTION, SOLUTION INTRAMUSCULAR; INTRAVENOUS at 08:23

## 2020-06-30 RX ADMIN — KETOROLAC TROMETHAMINE 15 MG: 15 INJECTION, SOLUTION INTRAMUSCULAR; INTRAVENOUS at 00:18

## 2020-06-30 RX ADMIN — SACUBITRIL AND VALSARTAN 1 TABLET: 24; 26 TABLET, FILM COATED ORAL at 20:17

## 2020-06-30 RX ADMIN — MELATONIN TAB 3 MG 3 MG: 3 TAB at 00:19

## 2020-06-30 RX ADMIN — Medication 10 ML: at 08:23

## 2020-06-30 RX ADMIN — CLONAZEPAM 0.25 MG: 0.5 TABLET ORAL at 08:23

## 2020-06-30 RX ADMIN — Medication 10 ML: at 00:23

## 2020-06-30 RX ADMIN — SODIUM CHLORIDE: 9 INJECTION, SOLUTION INTRAVENOUS at 14:10

## 2020-06-30 RX ADMIN — SODIUM CHLORIDE: 9 INJECTION, SOLUTION INTRAVENOUS at 14:54

## 2020-06-30 RX ADMIN — PANTOPRAZOLE SODIUM 40 MG: 40 TABLET, DELAYED RELEASE ORAL at 16:09

## 2020-06-30 RX ADMIN — SERTRALINE 50 MG: 50 TABLET, FILM COATED ORAL at 08:23

## 2020-06-30 RX ADMIN — LIDOCAINE HYDROCHLORIDE 40 MG: 20 INJECTION, SOLUTION INFILTRATION; PERINEURAL at 14:46

## 2020-06-30 RX ADMIN — MELATONIN TAB 3 MG 3 MG: 3 TAB at 20:17

## 2020-06-30 RX ADMIN — IOPAMIDOL 64 ML: 755 INJECTION, SOLUTION INTRAVENOUS at 11:59

## 2020-06-30 RX ADMIN — PROPOFOL 110 MCG/KG/MIN: 10 INJECTION, EMULSION INTRAVENOUS at 14:47

## 2020-06-30 RX ADMIN — SODIUM CHLORIDE: 9 INJECTION, SOLUTION INTRAVENOUS at 14:42

## 2020-06-30 RX ADMIN — PROPOFOL 40 MG: 10 INJECTION, EMULSION INTRAVENOUS at 14:46

## 2020-06-30 RX ADMIN — SACUBITRIL AND VALSARTAN 1 TABLET: 24; 26 TABLET, FILM COATED ORAL at 00:20

## 2020-06-30 RX ADMIN — CARVEDILOL 6.25 MG: 6.25 TABLET, FILM COATED ORAL at 18:27

## 2020-06-30 ASSESSMENT — PAIN DESCRIPTION - DESCRIPTORS
DESCRIPTORS: DISCOMFORT

## 2020-06-30 ASSESSMENT — PAIN DESCRIPTION - PAIN TYPE
TYPE: ACUTE PAIN

## 2020-06-30 ASSESSMENT — PAIN SCALES - GENERAL
PAINLEVEL_OUTOF10: 0
PAINLEVEL_OUTOF10: 0
PAINLEVEL_OUTOF10: 5
PAINLEVEL_OUTOF10: 0
PAINLEVEL_OUTOF10: 1
PAINLEVEL_OUTOF10: 1

## 2020-06-30 ASSESSMENT — PAIN DESCRIPTION - ORIENTATION
ORIENTATION: LEFT
ORIENTATION: LEFT

## 2020-06-30 ASSESSMENT — PAIN DESCRIPTION - ONSET: ONSET: ON-GOING

## 2020-06-30 ASSESSMENT — ENCOUNTER SYMPTOMS: SHORTNESS OF BREATH: 1

## 2020-06-30 ASSESSMENT — PAIN DESCRIPTION - LOCATION
LOCATION: CHEST

## 2020-06-30 ASSESSMENT — PAIN - FUNCTIONAL ASSESSMENT: PAIN_FUNCTIONAL_ASSESSMENT: 0-10

## 2020-06-30 ASSESSMENT — PAIN DESCRIPTION - FREQUENCY: FREQUENCY: CONTINUOUS

## 2020-06-30 NOTE — PROGRESS NOTES
PSYCHIATRY CONSULT PROGRESS NOTE    6/30/2020  Wander Vinson  [id number]    CC/Reason for Consult: depression, anxiety, suicidal ideation    S: doing better today, feels less anxiety. Mood is better. Hopeful. Denies suicidal ideation. Communicating more with . She recognizes that she can switch to loving him to hating him easily. Tolerating medication well. Feels they are helping. She believes her chest pain was d/t the psychological distress. Behavior issues in last 24hours: none  Reviewed staff/RN notes. RN notes that  hovers a lot and is trying to be very involved in her care.      ROS: no cp, no palpitations, no abd pain, no n/v/d, no headaches or vision problems    PMH/SH/FH reviewed and no changes     sodium chloride flush  10 mL Intravenous 2 times per day    sertraline  50 mg Oral Daily    clonazePAM  0.25 mg Oral BID    carvedilol  6.25 mg Oral BID WC    digoxin  125 mcg Oral Daily    sacubitril-valsartan  1 tablet Oral BID    sodium chloride flush  10 mL Intravenous 2 times per day    enoxaparin  40 mg Subcutaneous Daily    pantoprazole  40 mg Oral BID AC     sodium chloride flush, acetaminophen, oxyCODONE-acetaminophen **OR** oxyCODONE-acetaminophen, melatonin, sodium chloride flush, polyethylene glycol, promethazine **OR** ondansetron, perflutren lipid microspheres, nitroGLYCERIN, morphine    O:  .  Vitals:    06/30/20 1510 06/30/20 1515 06/30/20 1525 06/30/20 1540   BP: 117/74 121/75 119/76 111/72   Pulse: 60 60 60 71   Resp:  15 17 16   Temp:  97 °F (36.1 °C) 97.1 °F (36.2 °C) 96.8 °F (36 °C)   TempSrc:  Temporal Temporal Temporal   SpO2: 100% 100% 97% 94%   Weight:       Height:           Mental Status Exam:   Appearance    alert, cooperative  Speech    spontaneous, normal rate and normal volume  Mood/Affect    \"betteR\" / fair motility and range  Thought Process    linear, goal directed and coherent  Thought Content    intact , no suicidal ideation  Associations

## 2020-06-30 NOTE — OP NOTE
Patient:  Kurtis Turcios   :   1955    Procedural Summary  ~Consent:   Obtained written and verbal consent      Risks/benefits explained in detail  ~Procedure:    Left Heart Catheterization  ~Medications:    Procedural sedation with minimal conscious sedation  ~Complications:   None  ~Blood Loss:    <10cc  ~Specimens:    None obtained  ~Pre-sedation re-evaluation: Performed immediately prior to procedure. Medication and Procedural Reconciliation:  An independent trained observer pushed medications at my direction. We monitored the patient's level of consciousness and vital signs/physiologic status throughout the procedure duration (see start and stop times below). Sedation: 3 mg Versed, 100 mcg Fentanyl  Sedation start: 1130  Sedation stop: 1146    Cardiac Cath LVG:  Anatomy:   LM-Normal   LAD-Normal   Cx-Normal   OM- Normal   RCA-Dominant, Normal   RPDA- Normal   LVEF- 25  LVG- global hypokinesis  LVEDP- 5      Contrast: 64  Flouro Time: 2.1  Access: R radial    Impression  ~Coronary Angiography w/ normal coronaries  ~LVG with LVEF of 25 and global regional wall motion abnormalities    Recommendation  ~Aggressive medical treatment and risk factor modification  ~1. Medications reviewed, no changes at this time. 2. Post cath IVF. Bedrest.  3. Cont OMT for CHF  4. Patient has been advised on the importance of regular exercise of at least 20-30 minutes daily alternating with aerobic and isometric activities. 5. Patient counseled about and offered assistance for smoking cessation   6. No indication for cardiac rehab  7. Follow up in 1-2 weeks with cardiology CHF clinic.             Kylah Major MD 2020 11:52 AM

## 2020-06-30 NOTE — PROGRESS NOTES
Physical Therapy      Spoke with patient who states she was been up walking halls with family member and reports for dizziness or unsteadiness like observed during evaluation. Pt then observed walking in halls with family without any difficulty. No further acute PT needs at this time. Pt discharged from caseload.   Thanks, Ailin Blunt, DPT [id number]

## 2020-06-30 NOTE — BRIEF OP NOTE
EGD:  Normal.       Rec:  F/u path for h pylori            surg c/s for gallstones. ebl none.          Zeb Asencio MD  [address]

## 2020-06-30 NOTE — ANESTHESIA PRE PROCEDURE
tablet Oral Q4H PRN Marcos Myers MD        Or    oxyCODONE-acetaminophen (PERCOCET) 5-325 MG per tablet 2 tablet  2 tablet Oral Q4H PRN Marcos Myers MD        sertraline (ZOLOFT) tablet 50 mg  50 mg Oral Daily Negro Ferreira MD   50 mg at 06/30/20 0823    clonazePAM (KLONOPIN) tablet 0.25 mg  0.25 mg Oral BID Negro Ferreira MD   0.25 mg at 06/30/20 5582    melatonin tablet 3 mg  3 mg Oral Nightly PRN Christiane Libman, PA-C   3 mg at 06/30/20 0019    carvedilol (COREG) tablet 6.25 mg  6.25 mg Oral BID  Hesham Bell MD   6.25 mg at 06/29/20 1744    digoxin (LANOXIN) tablet 125 mcg  125 mcg Oral Daily Hesham Bell MD        sacubitril-valsartan (ENTRESTO) 24-26 MG per tablet 1 tablet  1 tablet Oral BID Hesham Bell MD   1 tablet at 06/30/20 0020    sodium chloride flush 0.9 % injection 10 mL  10 mL Intravenous 2 times per day Hesham Bell MD   10 mL at 06/30/20 0823    sodium chloride flush 0.9 % injection 10 mL  10 mL Intravenous PRN Hesham Bell MD   10 mL at 06/29/20 0456    polyethylene glycol (GLYCOLAX) packet 17 g  17 g Oral Daily PRN Hesham Bell MD        promethazine (PHENERGAN) tablet 12.5 mg  12.5 mg Oral Q6H PRN Hesham Bell MD        Or    ondansetron (ZOFRAN) injection 4 mg  4 mg Intravenous Q6H PRN Hesham Bell MD        enoxaparin (LOVENOX) injection 40 mg  40 mg Subcutaneous Daily Hesham Bell MD   40 mg at 06/28/20 1633    perflutren lipid microspheres (DEFINITY) injection 1.65 mg  1.5 mL Intravenous ONCE PRN Hesham Bell MD        pantoprazole (PROTONIX) tablet 40 mg  40 mg Oral BID AC Hesham Bell MD   40 mg at 06/29/20 1744    nitroGLYCERIN (NITROSTAT) SL tablet 0.4 mg  0.4 mg Sublingual Q5 Min PRN Hesham Bell MD        ketorolac (TORADOL) injection 15 mg  15 mg Intravenous Q6H Hesham Bell MD   15 mg at 06/30/20 0823    morphine (PF) injection 2 mg  2 mg Intravenous Q4H PRN Hesham Bell MD           Allergies:     Allergies   Allergen Reactions    Prednisone

## 2020-06-30 NOTE — ANESTHESIA POSTPROCEDURE EVALUATION
Department of Anesthesiology  Postprocedure Note    Patient: Alida Vidal  MRN: [id number]  YOB: 1955  Date of evaluation: 6/30/2020  Time:  3:15 PM     Procedure Summary     Date:  06/30/20 Room / Location:  [address] [place] [address]    Anesthesia Start:  5041 Anesthesia Stop:  9842    Procedure:  EGD BIOPSY (N/A Abdomen) Diagnosis:  (epigastric pain)    Surgeon:  Bradford Nguyen MD Responsible Provider:  Rodriguez Palumbo MD    Anesthesia Type:  MAC ASA Status:  4          Anesthesia Type: MAC    Morgan Phase I: Morgan Score: 8    Morgan Phase II:      Last vitals: Reviewed and per EMR flowsheets.        Anesthesia Post Evaluation    Patient location during evaluation: PACU  Patient participation: complete - patient participated  Level of consciousness: awake and alert  Pain score: 2  Airway patency: patent  Nausea & Vomiting: no vomiting  Complications: no  Cardiovascular status: blood pressure returned to baseline  Respiratory status: acceptable  Hydration status: euvolemic

## 2020-06-30 NOTE — PROGRESS NOTES
[facility] Daily Progress Note      Admit Date:  6/28/2020    Chief Complaint   Patient presents with    Chest Pain     Pt. comes in today with complaints of chest pain and difficulty breathing. Pt. reports last night she was feeling tightness in her chest. Pt. reports pain radiating down her left arm. Subjective:  Ms. Sheba Varner still has CP/epigastric pain at rest. Mild intensity.  Relieved with     Objective:   BP (!) 103/55   Pulse 61   Temp 96.8 °F (36 °C) (Temporal)   Resp 16   Ht 5' 2\" (1.575 m)   Wt 142 lb 6 oz (64.6 kg)   SpO2 97%   BMI 26.04 kg/m²   No intake or output data in the 24 hours ending 06/30/20 1119    TELEMETRY: Sinus     Physical Exam:  General:  Awake, alert, oriented x 3, NAD  Skin:  Warm and dry  Neck:  JVD flat  Chest:  normal air entry  Cardiovascular:  RRR S1S2, no S3, no mrmr  Abdomen:  Soft, ND, NT, No HSM  Extremities:  No edema    Medications:    sertraline  50 mg Oral Daily    clonazePAM  0.25 mg Oral BID    carvedilol  6.25 mg Oral BID WC    digoxin  125 mcg Oral Daily    sacubitril-valsartan  1 tablet Oral BID    sodium chloride flush  10 mL Intravenous 2 times per day    enoxaparin  40 mg Subcutaneous Daily    aspirin  81 mg Oral Daily    pantoprazole  40 mg Oral BID AC    ketorolac  15 mg Intravenous Q6H       melatonin, sodium chloride flush, acetaminophen **OR** acetaminophen, polyethylene glycol, promethazine **OR** ondansetron, perflutren lipid microspheres, nitroGLYCERIN, morphine    Lab Data:  CBC:   Recent Labs     06/28/20  0936 06/29/20  0543 06/30/20  0719   WBC 7.5 6.2 5.8   HGB 15.0 13.4 13.1   HCT 44.9 39.9 39.1   MCV 91.3 91.3 91.2    124* 127*     BMP:   Recent Labs     06/28/20  0936 06/28/20  1447 06/29/20  0543 06/30/20  0719   *  --  134* 135*   K 6.1* 3.6 3.8 4.2   CL 98*  --  102 103   CO2 20*  --  21 22   BUN 22*  --  29* 27*   CREATININE 0.9  --  1.0 0.9     LIVER PROFILE:   Recent Labs     06/29/20  1558 AST 17   ALT 14   LIPASE 27.0   BILIDIR <0.2   BILITOT 0.5   ALKPHOS 46     PT/INR: No results for input(s): PROTIME, INR in the last 72 hours. APTT: No results for input(s): APTT in the last 72 hours. BNP:  No results for input(s): BNP in the last 72 hours. IMAGING: -Normal left ventricular wall thickness.   -Left ventricular size is severely increased. - Grade II diastolic dysfunction with elevated LV filling pressures.   -The right atrium is mildly dilated. -Pacemaker / ICD lead was visualized in the right atrium and ventricle.   -Aortic valve appears sclerotic but opens adequately. -Mild aortic and tricuspid regurgitation is present. -Mild to moderate mitral regurgitation.   -Systolic pulmonary artery pressure (SPAP) is normal and estimated at 24   mmHg (right atrial pressure 3 mmHg). -IVC is normal in size (< 2.1 cm) and collapses > 50% with respiration   consistent with normal RA pressure (3 mmHg).        *Nondiagnostic EKG due to ventricular pacing.    *Reduced LV function with EF of 38%    *SPECT images demonstrate small area of decreased perfusion of moderate    intensity in the apical wall with stress and rest consistent with scar. No    reversible ischemia.        Impression    Decreased LV function with EF of 38%    Abnormal myocardial perfusion imaging with apical scar.           Assessment/Plan:    Chest pain  Plan: Atypical angina. Normal troponin. Paced ECG H/o NICMP. H/o fibromyalgia. Abnormal nuc stress. Based on these findings I recommend left heart cath for definitive evaluation of coronary arteries. Risks, benefits, expectations, and alternative treatments were discussed. Questions appropriately answered. Cathlyn Settles agrees to proceed and verbalized understanding. Active Problems:    Non-ischemic cardiomyopathy (HCC)  Plan: NYHA class 1 symptoms. Euvolemic. LVEF 25%. Cont coreg, entresto, digoxin. Check echo.  Stable for EGD if necessary    History of pericarditis  Plan: CP could be pericarditis. Consider colchicine. Avoid NSAIDs for renal concerns and GI concerns.             Elysia Narvaez MD 6/30/2020 11:19 AM

## 2020-06-30 NOTE — PROGRESS NOTES
All air removed from TR band. VSS. Site CDI-no swelling, drainage, or hematoma. TR band kept on R wrist d/t pt going down to endo. Will monitor.

## 2020-06-30 NOTE — FLOWSHEET NOTE
Patient transferred to room 31 17 09.  AOx4 TR band in place from cath lab.     06/30/20 1540   Vital Signs   Temp 96.8 °F (36 °C)   Temp Source Temporal   Pulse 71   Heart Rate Source Monitor   Resp 16   /72   BP Location Left Arm   BP Upper/Lower Lower   Patient Position Semi fowlers   Level of Consciousness 0   MEWS Score 1   Oxygen Therapy   SpO2 94 %   O2 Device None (Room air)

## 2020-06-30 NOTE — PROGRESS NOTES
Pt back to room 3319 from cath lab. VSS. R radial site CDI, no signs of swelling or hematoma formation. R radial pulse intact. Can start removing air at 1230 per cath lab RN. Will monitor.

## 2020-06-30 NOTE — PROGRESS NOTES
Speech Language Pathology      Tanner Cliffordmon  1955    Attempted to see patient for bedside swallow evaluation, Pt is currently NPO for procedure. Will attempt to follow-up as schedule allows. 1358: Second attempt to see patient for evaluation, Pt is off of floor for EGD. Will attempt on 7/1/20.     Polo Benites M.A., [address] [place] [address]  Speech-Language Pathologist

## 2020-06-30 NOTE — PROGRESS NOTES
[facility]    6/30/2020 12:40 PM        Name: Rk Nicolas Admitted: 6/28/2020  Primary Care Provider: Annette Go MD (Tel: [phone])      Subjective:  Patient is a 74 yo female with hx atrial fib, chronic sCHF, nonischemic cardiomyopathy (EF 25-20%), s/p BiVICD and sleep apnea. She presented to hospital with chest pain and shortness of breath which has been ongoing for weeks to months. Noted to be hyperkalemic in ER (6.1) and was treated. Presently resting in bed,  at bedside. Patient is drowsy, she recently returned from [facility]. Continues to note epigastric pressure and RUQ discomfort. EGD is planned for this afternoon. Denies shortness of breath, orthopnea, cough.       Reviewed interval ancillary notes    Current Medications  0.9 % sodium chloride infusion, Continuous  sodium chloride flush 0.9 % injection 10 mL, 2 times per day  sodium chloride flush 0.9 % injection 10 mL, PRN  acetaminophen (TYLENOL) tablet 650 mg, Q4H PRN  oxyCODONE-acetaminophen (PERCOCET) 5-325 MG per tablet 1 tablet, Q4H PRN    Or  oxyCODONE-acetaminophen (PERCOCET) 5-325 MG per tablet 2 tablet, Q4H PRN  sertraline (ZOLOFT) tablet 50 mg, Daily  clonazePAM (KLONOPIN) tablet 0.25 mg, BID  melatonin tablet 3 mg, Nightly PRN  carvedilol (COREG) tablet 6.25 mg, BID WC  digoxin (LANOXIN) tablet 125 mcg, Daily  sacubitril-valsartan (ENTRESTO) 24-26 MG per tablet 1 tablet, BID  sodium chloride flush 0.9 % injection 10 mL, 2 times per day  sodium chloride flush 0.9 % injection 10 mL, PRN  polyethylene glycol (GLYCOLAX) packet 17 g, Daily PRN  promethazine (PHENERGAN) tablet 12.5 mg, Q6H PRN    Or  ondansetron (ZOFRAN) injection 4 mg, Q6H PRN  enoxaparin (LOVENOX) injection 40 mg, Daily  perflutren lipid microspheres (DEFINITY) injection 1.65 mg, ONCE PRN  pantoprazole (PROTONIX) tablet 40 mg, BID alternating with aerobic and isometric activities. 5. Patient counseled about and offered assistance for smoking cessation   6. No indication for cardiac rehab  7. Follow up in 1-2 weeks with cardiology CHF clinic. MPI 6/29/2020:  Summary    *Nondiagnostic EKG due to ventricular pacing.    *Reduced LV function with EF of 38%    *SPECT images demonstrate small area of decreased perfusion of moderate    intensity in the apical wall with stress and rest consistent with scar. No    reversible ischemia.        Impression    Decreased LV function with EF of 38%    Abnormal myocardial perfusion imaging with apical scar. US Abdomen 6/29/2020:   Cholelithiasis without sonographic evidence of acute cholecystitis.       Diffuse hepatic steatosis. CXR 6/28/2020:  FINDINGS:   There is no acute consolidation or effusion.  There is no pneumothorax.  The   mediastinal structures are unremarkable.  The upper abdomen is unremarkable. The extrathoracic soft tissues are unremarkable. [place] is no acute osseous   abnormality.           Impression   No acute cardiopulmonary process. Problem List  Principal Problem:    Acute chest pain  Active Problems:    Non-ischemic cardiomyopathy (HCC)    History of pericarditis    Fibromyalgia syndrome    Obstructive sleep apnea syndrome    Hypercholesteremia    Biventricular ICD (implantable cardioverter-defibrillator) in place    Hyperkalemia    Dyspnea on exertion    Anxiety    GERD with esophagitis    Hyponatremia    Dysphagia    Suicidal ideation    Major depressive disorder, recurrent episode, severe with anxious distress ([address]    Trauma and stressor-related disorder  Resolved Problems:    * No resolved hospital problems. *       Assessment & Plan:   1. Chest pain. [facility] today with normal cors. Patient is to have EGD this afternoon. Continue Protonix. 2. Nonischemic cardiomyopathy. EF 25-30% (10/2018). Continue Entresto and carvedilol, also on digoxin.  Spironolactone discontinued secondary to hyperkalemia (potassium 6.1). 3. Suicidal ideation. Has been evaluated by psych. Started on clonazepam and sertraline. Suicide precautions discontinued, does not require inpatient psych admit. Diet: DIET CARDIAC;  Code:Full Code  DVT PPX: On enoxaparin.        SHAMEKA Zelaya CNP   6/30/2020 12:40 PM

## 2020-06-30 NOTE — PRE SEDATION
Brief Pre-Op Note/Sedation Assessment      Christiano Childers  1955  [id number]      [id number]  11:17 AM    Planned Procedure: Cardiac Catheterization Procedure    Post Procedure Plan: Return to same level of care    Consent: I have discussed with the patient and/or the patient representative the indication, alternatives, and the possible risks and/or complications of the planned procedure and the anesthesia methods. The patient and/or patient representative appear to understand and agree to proceed. Chief Complaint: Chest Pain/Pressure  Anginal Equivalent      Indications for Cath Procedure:  New Onset Angina <= 2 months, Worsening Angina, Suspected CAD, Cardiomyopathy and LV Dysfunction  Anginal Classification within 2 weeks:  CCS III - Symptoms with everyday living activities, i.e., moderate limitation  NYHA Heart Failure Class within 2 weeks: No symptoms  Is Cath Lab Visit Valve-related?: No  Surgical Risk: Low  Functional Type: >= 4 METS with symptoms    Anti- Anginal Meds within 2 weeks:   Yes: Beta Blockers and Aspirin    Stress or Imaging Studies Performed:  Stress Test with SPECT Result: Positive:  apical Risk/Extent of Ischemia:  Intermediate     Vital Signs:  BP (!) 103/55   Pulse 61   Temp 96.8 °F (36 °C) (Temporal)   Resp 16   Ht 5' 2\" (1.575 m)   Wt 142 lb 6 oz (64.6 kg)   SpO2 97%   BMI 26.04 kg/m²     Allergies: Allergies   Allergen Reactions    Prednisone      Caused an allergic reaction. Swelling all over her body.        Past Medical History:  Past Medical History:   Diagnosis Date    Atrial fibrillation ([address]     Cardiomyopathy, nonischemic ([address] 0    CHF (congestive heart failure) (HCC)     Fibromyalgia     GERD (gastroesophageal reflux disease)     HSV-1 (herpes simplex virus 1) infection     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    Hyperlipidemia     Sleep apnea     uses cpap    Uterine fibroids affecting pregnancy          Surgical History:  Past Surgical History: Procedure Laterality Date    CARDIAC DEFIBRILLATOR PLACEMENT       SECTION      3 c-sections    COLONOSCOPY  2015    polypectomy    COLONOSCOPY N/A 2019    COLONOSCOPY POLYPECTOMY SNARE/COLD ASCENDING X1 , TRANSVERSE X1, SIGMOID X1 performed by Serena Levy [facility] at [facility] [place] [facility]      PACEMAKER INSERTION      UPPER GASTROINTESTINAL ENDOSCOPY  2015    UPPER GASTROINTESTINAL ENDOSCOPY N/A 2019    [facility]         Medications:  Current Facility-Administered Medications   Medication Dose Route Frequency Provider Last Rate Last Dose    sertraline (ZOLOFT) tablet 50 mg  50 mg Oral Daily Negro Ortega MD   50 mg at 20 0823    clonazePAM (KLONOPIN) tablet 0.25 mg  0.25 mg Oral BID Negro Ortega MD   0.25 mg at 20 0349    melatonin tablet 3 mg  3 mg Oral Nightly PRN Stella Dunne PA-C   3 mg at 20 0019    carvedilol (COREG) tablet 6.25 mg  6.25 mg Oral BID  Jose Stevens MD   6.25 mg at 20 1744    digoxin (LANOXIN) tablet 125 mcg  125 mcg Oral Daily Jose Stevens MD        sacubitril-valsartan (ENTRESTO) 24-26 MG per tablet 1 tablet  1 tablet Oral BID Jose Stevens MD   1 tablet at 20 0020    sodium chloride flush 0.9 % injection 10 mL  10 mL Intravenous 2 times per day Jose Stevens MD   10 mL at 20 0823    sodium chloride flush 0.9 % injection 10 mL  10 mL Intravenous PRN Jose Stevens MD   10 mL at 20 0456    acetaminophen (TYLENOL) tablet 650 mg  650 mg Oral Q6H PRN Jose Stevens MD        Or   Snehal Arredondo acetaminophen (TYLENOL) suppository 650 mg  650 mg Rectal Q6H PRN Jose Stevens MD        polyethylene glycol (GLYCOLAX) packet 17 g  17 g Oral Daily PRN Jose Stevens MD        promethazine (PHENERGAN) tablet 12.5 mg  12.5 mg Oral Q6H PRN Jose Stevens MD        Or    ondansetron (ZOFRAN) injection 4 mg  4 mg Intravenous Q6H PRN Jose Navarro Shaw MD        enoxaparin (LOVENOX) injection 40 mg  40 mg Subcutaneous Daily Tae Valenzuela MD   40 mg at 06/28/20 1633    aspirin chewable tablet 81 mg  81 mg Oral Daily Tae Valenzuela MD   81 mg at 06/29/20 4411    perflutren lipid microspheres (DEFINITY) injection 1.65 mg  1.5 mL Intravenous ONCE PRN Tae Valenzuela MD        pantoprazole (PROTONIX) tablet 40 mg  40 mg Oral BID AC Tae Valenzuela MD   40 mg at 06/29/20 1744    nitroGLYCERIN (NITROSTAT) SL tablet 0.4 mg  0.4 mg Sublingual Q5 Min PRN Tae Valenzuela MD        ketorolac (TORADOL) injection 15 mg  15 mg Intravenous Q6H Tae Valenzuela MD   15 mg at 06/30/20 3272    morphine (PF) injection 2 mg  2 mg Intravenous Q4H PRN Tae Valenzuela MD               Pre-Sedation:    Pre-Sedation Documentation and Exam:  I have assessed the patient and agree with the H&P present on the chart. Prior History of Anesthesia Complications:   none    Modified Mallampati:  II (soft palate, uvula, fauces visible)    ASA Classification:  Class 2 - A normal healthy patient with mild systemic disease      Morgan Scale: Activity:  2 - Able to move 4 extremities voluntarily on command  Respiration:  2 - Able to breathe deeply and cough freely  Circulation:  2 - BP+/- 20mmHg of normal  Consciousness:  2 - Fully awake  Oxygen Saturation (color):  2 - Able to maintain oxygen saturation >92% on room air    Sedation/Anesthesia Plan:  Guard the patient's safety and welfare. Minimize physical discomfort and pain. Minimize negative psychological responses to treatment by providing sedation and analgesia and maximize the potential amnesia. Patient to meet pre-procedure discharge plan.     Medication Planned:  midazolam intravenously and fentanyl intravenously    Patient is an appropriate candidate for plan of sedation: yes      Electronically signed by Shahrzad Martin MD on 6/30/2020 at 11:17 AM

## 2020-07-01 VITALS
HEART RATE: 60 BPM | DIASTOLIC BLOOD PRESSURE: 57 MMHG | WEIGHT: 148.2 LBS | HEIGHT: 62 IN | TEMPERATURE: 96.6 F | BODY MASS INDEX: 27.27 KG/M2 | RESPIRATION RATE: 16 BRPM | SYSTOLIC BLOOD PRESSURE: 96 MMHG | OXYGEN SATURATION: 95 %

## 2020-07-01 LAB
ANION GAP SERPL CALCULATED.3IONS-SCNC: 9 MMOL/L (ref 3–16)
BUN BLDV-MCNC: 21 MG/DL (ref 7–20)
CALCIUM SERPL-MCNC: 8.8 MG/DL (ref 8.3–10.6)
CHLORIDE BLD-SCNC: 104 MMOL/L (ref 99–110)
CO2: 23 MMOL/L (ref 21–32)
CREAT SERPL-MCNC: 0.9 MG/DL (ref 0.6–1.2)
GFR AFRICAN AMERICAN: >60
GFR NON-AFRICAN AMERICAN: >60
GLUCOSE BLD-MCNC: 112 MG/DL (ref 70–99)
POTASSIUM SERPL-SCNC: 4.3 MMOL/L (ref 3.5–5.1)
SODIUM BLD-SCNC: 136 MMOL/L (ref 136–145)

## 2020-07-01 PROCEDURE — 92526 ORAL FUNCTION THERAPY: CPT

## 2020-07-01 PROCEDURE — 92610 EVALUATE SWALLOWING FUNCTION: CPT

## 2020-07-01 PROCEDURE — 99222 1ST HOSP IP/OBS MODERATE 55: CPT | Performed by: SURGERY

## 2020-07-01 PROCEDURE — 6370000000 HC RX 637 (ALT 250 FOR IP): Performed by: INTERNAL MEDICINE

## 2020-07-01 PROCEDURE — 2580000003 HC RX 258: Performed by: INTERNAL MEDICINE

## 2020-07-01 PROCEDURE — 36415 COLL VENOUS BLD VENIPUNCTURE: CPT

## 2020-07-01 PROCEDURE — 80048 BASIC METABOLIC PNL TOTAL CA: CPT

## 2020-07-01 PROCEDURE — 99223 1ST HOSP IP/OBS HIGH 75: CPT | Performed by: INTERNAL MEDICINE

## 2020-07-01 PROCEDURE — 6370000000 HC RX 637 (ALT 250 FOR IP): Performed by: PSYCHIATRY & NEUROLOGY

## 2020-07-01 PROCEDURE — APPNB30 APP NON BILLABLE TIME 0-30 MINS: Performed by: NURSE PRACTITIONER

## 2020-07-01 RX ORDER — SACUBITRIL AND VALSARTAN 24; 26 MG/1; MG/1
TABLET, FILM COATED ORAL
Qty: 180 TABLET | Refills: 3
Start: 2020-07-01 | End: 2021-02-02 | Stop reason: SDUPTHER

## 2020-07-01 RX ORDER — CARVEDILOL 6.25 MG/1
3.12 TABLET ORAL 2 TIMES DAILY
Qty: 180 TABLET | Refills: 0 | Status: SHIPPED
Start: 2020-07-01 | End: 2020-10-28 | Stop reason: SDUPTHER

## 2020-07-01 RX ORDER — SPIRONOLACTONE 25 MG/1
TABLET ORAL
Qty: 30 TABLET | Refills: 3 | Status: SHIPPED | OUTPATIENT
Start: 2020-07-01 | End: 2020-10-28 | Stop reason: SDUPTHER

## 2020-07-01 RX ORDER — CLONAZEPAM 0.5 MG/1
0.25 TABLET ORAL 2 TIMES DAILY
Qty: 7 TABLET | Refills: 0 | Status: SHIPPED | OUTPATIENT
Start: 2020-07-01 | End: 2020-07-06 | Stop reason: SDUPTHER

## 2020-07-01 RX ADMIN — SERTRALINE 50 MG: 50 TABLET, FILM COATED ORAL at 08:35

## 2020-07-01 RX ADMIN — CARVEDILOL 6.25 MG: 6.25 TABLET, FILM COATED ORAL at 08:35

## 2020-07-01 RX ADMIN — DIGOXIN 125 MCG: 125 TABLET ORAL at 08:35

## 2020-07-01 RX ADMIN — CLONAZEPAM 0.25 MG: 0.5 TABLET ORAL at 08:35

## 2020-07-01 RX ADMIN — CLONAZEPAM 0.25 MG: 0.5 TABLET ORAL at 17:22

## 2020-07-01 RX ADMIN — PANTOPRAZOLE SODIUM 40 MG: 40 TABLET, DELAYED RELEASE ORAL at 05:58

## 2020-07-01 RX ADMIN — Medication 10 ML: at 08:35

## 2020-07-01 ASSESSMENT — PAIN SCALES - GENERAL
PAINLEVEL_OUTOF10: 0

## 2020-07-01 NOTE — PROGRESS NOTES
Cardiac resynchronization therapy-defibrillator(CRT-D)    The CIED was interrogated and programmed and I supervised and reviewed all the data. All findings and changes are in device interrogation sheat and reflect my personal interpretation and changes and is scanned to [facility].

## 2020-07-01 NOTE — PROGRESS NOTES
The [place] Sleepiness Scale       The [place] Sleepiness Scale is widely used in the field of sleep medicine as a subjective measure of a patient's sleepiness. The test is a list of eight situations in which you rate your tendency to become sleepy on a scale of 0, no chance to 3, high chance of dozing. Your score is based on a scale of 0 to 24. The scale estimates whether you are experiencing excessive sleepiness that possibly requires medical attention. How Sleepy Are You? How sleepy are you to doze off or fall asleep in the following situations? You should rate your chances of dozing off, not just feeling tired. Even if you have not done some of these things recently try to determine how they would have affected you.  For each situation, decide whether or not you would have:     0 = No chance of dozing 1 = Slight chance of dozing   2 = Moderate chance of  dozing 3 = High change of dozing       Situation                                                                                     Chance of Dozing    Sitting and reading  0 =  [x]  1 =    [] 2 =    [] 3 =    []    Watching TV  0 =  [x]  1 =    [] 2 =    [] 3 =    []      Sitting inactive in public place (e.g., a theater or a meeting)  0 =  [x]  1 =    [] 2 =    [] 3 =    []    As a passenger in a car for an hour without a break          0  =  [x]  1 =    [] 2 =    [] 3 =    []    Lying down to rest in the afternoon when circumstances permit    0 =  [x]  1 =    [] 2 =    [] 3 =    []    Sitting and talking to someone  0 =  [x]  1 =    [] 2 =    [] 3 =    []      Sitting quietly after a lunch without alcohol  0 =  [x]  1 =    [] 2 =    [] 3 =    []    In a car, while stopped for a few minutes in traffic                                                                      0 =  [x]  1 =    [] 2 =    [] 3 =    []    Total Score = 0     If your total score is 10 or greater, you are experiencing excessive sleepiness and should consider seeking a medical follow-up. Take a copy of this screening test to your primary care physician on your next office visit. Interpretation:      0 -   7: It is unlikely that you are abnormally sleepy. 8 -   9: You have an average amount of daytime sleepiness. 10 - 15: You may be excessively sleepy depending on the situation. You may want to consider seeking medical attention. 16 - 24: You are excessively sleepy and should consider seeking medical attention.       Electronically signed by Hannah Doan RCP on 7/1/2020 at 4:43 PM

## 2020-07-01 NOTE — CONSULTS
[facility]        PATIENT NAME: Deidre Vieira      TODAY'S DATE: 2020    Reason for Consult:  Jacqueline alegria    Requesting Physician:  TA Glynn NP    HISTORY OF PRESENT ILLNESS:              The patient is a 75 y.o. female who presents with abd pain, upper abd, epigastric, sharp, focal, worse with pressure and anxiety. Relieved at rest. Has some nausea, no emesis. No GIB or diarrhea. Pain on and off for a few months. The pt has had symptoms bother her resulting in ER visit and admit. Cardiac pierre negative. She has had associated symptoms of stress and anxiety, and feels this pain is worse under stress. Testing has included CT and US. This showed findings of gallstones. EGD negative.       Past Medical History:        Diagnosis Date    Atrial fibrillation ([address]     Cardiomyopathy, nonischemic ([address] 0    CHF (congestive heart failure) (HCC)     Fibromyalgia     GERD (gastroesophageal reflux disease)     HSV-1 (herpes simplex virus 1) infection     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    Hyperlipidemia     Sleep apnea     uses cpap    Uterine fibroids affecting pregnancy        Past Surgical History:        Procedure Laterality Date    CARDIAC DEFIBRILLATOR PLACEMENT       SECTION      3 c-sections    COLONOSCOPY  2015    polypectomy    COLONOSCOPY N/A 2019    COLONOSCOPY POLYPECTOMY SNARE/COLD ASCENDING X1 , TRANSVERSE X1, SIGMOID X1 performed by Rafal Yuen MD at [place] [address]      PACEMAKER INSERTION      UPPER GASTROINTESTINAL ENDOSCOPY  2015    UPPER GASTROINTESTINAL ENDOSCOPY N/A 2019    EGD GASTRIC BIOPSY performed by Rafal Yuen MD at [facility] N/A 2020    EGD BIOPSY performed by Rafal Yuen MD at [address] [place] [address]       Current Medications:   Current Facility-Administered Medications: sodium chloride flush 0.9 % injection 10 mL, 10 mL, Intravenous, 2 times per day  sodium chloride flush 0.9 % injection 10 mL, 10 mL, Intravenous, PRN  acetaminophen (TYLENOL) tablet 650 mg, 650 mg, Oral, Q4H PRN  oxyCODONE-acetaminophen (PERCOCET) 5-325 MG per tablet 1 tablet, 1 tablet, Oral, Q4H PRN **OR** oxyCODONE-acetaminophen (PERCOCET) 5-325 MG per tablet 2 tablet, 2 tablet, Oral, Q4H PRN  0.9 % sodium chloride infusion, , Intravenous, Continuous  sertraline (ZOLOFT) tablet 50 mg, 50 mg, Oral, Daily  clonazePAM (KLONOPIN) tablet 0.25 mg, 0.25 mg, Oral, BID  melatonin tablet 3 mg, 3 mg, Oral, Nightly PRN  carvedilol (COREG) tablet 6.25 mg, 6.25 mg, Oral, BID WC  digoxin (LANOXIN) tablet 125 mcg, 125 mcg, Oral, Daily  sacubitril-valsartan (ENTRESTO) 24-26 MG per tablet 1 tablet, 1 tablet, Oral, BID  sodium chloride flush 0.9 % injection 10 mL, 10 mL, Intravenous, 2 times per day  sodium chloride flush 0.9 % injection 10 mL, 10 mL, Intravenous, PRN  polyethylene glycol (GLYCOLAX) packet 17 g, 17 g, Oral, Daily PRN  promethazine (PHENERGAN) tablet 12.5 mg, 12.5 mg, Oral, Q6H PRN **OR** ondansetron (ZOFRAN) injection 4 mg, 4 mg, Intravenous, Q6H PRN  enoxaparin (LOVENOX) injection 40 mg, 40 mg, Subcutaneous, Daily  perflutren lipid microspheres (DEFINITY) injection 1.65 mg, 1.5 mL, Intravenous, ONCE PRN  nitroGLYCERIN (NITROSTAT) SL tablet 0.4 mg, 0.4 mg, Sublingual, Q5 Min PRN  morphine (PF) injection 2 mg, 2 mg, Intravenous, Q4H PRN  Prior to Admission medications    Medication Sig Start Date End Date Taking?  Authorizing Provider   sacubitril-valsartan (ENTRESTO) 24-26 MG per tablet Take 1 tablet by mouth 2 times daily 5/14/20  Yes Tori Arcos MD   spironolactone (ALDACTONE) 25 MG tablet TAKE 1/2 TABLET BY MOUTH DAILY 5/14/20  Yes Tori Arcos MD   torsemide (DEMADEX) 20 MG tablet Take 1 tablet by mouth as needed (for weight gain or swelling) 5/14/20  Yes Tori Arcos MD   digoxin (LANOXIN) 125 MCG tablet TAKE 1 TABLET BY MOUTH DAILY 5/12/20  Yes Michael Corado Debbi Arambula MD   ciprofloxacin (CIPRO) 250 MG tablet TAKE 1 TABLET BY MOUTH AFTER SEXUAL INTERCOURSE 4/23/20  Yes SHAMEKA Martin - CNP   carvedilol (COREG) 6.25 MG tablet TAKE 1 TABLET BY MOUTH TWICE DAILY WITH MEALS 12/30/19  Yes Justice Martinez MD   Omega-3 Fatty Acids (FISH OIL) 1000 MG CAPS Take 3,000 mg by mouth 2 times daily    Yes Historical Provider, MD        Allergies:  Prednisone    Social History:    reports that she quit smoking about 22 years ago. She has a 2.50 pack-year smoking history. She has never used smokeless tobacco. She reports current alcohol use. She reports current drug use. Frequency: 2.00 times per week. Drug: Marijuana.     Family History:        Problem Relation Age of Onset    Diabetes Mother     Kidney Disease Mother         reanl failure       REVIEW OF SYSTEMS:  CONSTITUTIONAL:  positive for  fatigue  HEENT:  negative  RESPIRATORY:  negative  CARDIOVASCULAR:  negative  GASTROINTESTINAL:  negative except for nausea and abdominal pain  GENITOURINARY:  negative  HEMATOLOGIC/LYMPHATIC:  negative  NEUROLOGICAL:  negative  SKIN: negative    PHYSICAL EXAM:  VITALS:  BP (!) 92/53   Pulse 63   Temp 96.6 °F (35.9 °C) (Temporal)   Resp 16   Ht 5' 2\" (1.575 m)   Wt 148 lb 3.2 oz (67.2 kg)   SpO2 95%   BMI 27.11 kg/m²     CONSTITUTIONAL:  alert, no apparent distress and normal weight  EYES:  sclera clear  ENT:  normocepalic, without obvious abnormality  NECK:  supple, symmetrical, trachea midline  LUNGS:  clear to auscultation  CARDIOVASCULAR:  regular rate and rhythm  ABDOMEN:  no scars, normal bowel sounds, soft, non-distended, tenderness noted in the epigastric region and in the right upper quadrant Caes's sign is absent, voluntary guarding absent, no masses palpated, no hepatosplenomegally and hernia absent  MUSCULOSKELETAL:  0+ pitting edema lower extremities  NEUROLOGIC:  Mental Status Exam:  Level of Alertness:   awake  Orientation:   person, place, time  SKIN:  no bruising or bleeding, normal skin color, texture, turgor and no redness, warmth, or swelling    DATA:    CBC:   Recent Labs     06/29/20  0543 06/30/20  0719   WBC 6.2 5.8   HGB 13.4 13.1   HCT 39.9 39.1   * 127*     BMP:    Recent Labs     06/29/20  0543 06/30/20  0719 07/01/20  0619   * 135* 136   K 3.8 4.2 4.3    103 104   CO2 21 22 23   BUN 29* 27* 21*   CREATININE 1.0 0.9 0.9   GLUCOSE 125* 114* 112*     Hepatic:   Recent Labs     06/29/20  0542   AST 17   ALT 14   BILITOT 0.5   ALKPHOS 46     Mag:    No results for input(s): MG in the last 72 hours. Phos:   No results for input(s): PHOS in the last 72 hours. INR: No results for input(s): INR in the last 72 hours. LIPASE:   Recent Labs     06/29/20  0542   LIPASE 27.0      AMYLASE:   Recent Labs     06/29/20  0542   AMYLASE 52        EXAMINATION:   RIGHT UPPER QUADRANT ULTRASOUND       6/29/2020 9:54 am       COMPARISON:   CT abdomen and pelvis 03/07/2019       HISTORY:   ORDERING SYSTEM PROVIDED HISTORY: Epigastric pain, r/o gallstones/dilated   CBD/cholecystitis   TECHNOLOGIST PROVIDED HISTORY:   Reason for exam:->Epigastric pain, r/o gallstones/dilated CBD/cholecystitis   Specify organ?->GALLBLADDER   Reason for Exam: chest pain   Acuity: Unknown   Type of Exam: Unknown       FINDINGS:   LIVER:  Liver is heterogeneous in appearance with slight increased   echogenicity. BILIARY SYSTEM:  Dependent stones are noted within the gallbladder. No wall   thickening or pericholecystic fluid noted. Patient reports no sonographic   Case's sign. Common bile duct is within normal limits measuring 3.1 mm.       RIGHT KIDNEY: The right kidney is grossly unremarkable without evidence of   hydronephrosis. PANCREAS:  Visualized portions of the pancreas are unremarkable. OTHER: No evidence of right upper quadrant ascites. Impression   Cholelithiasis without sonographic evidence of acute cholecystitis. Diffuse hepatic steatosis. EXAMINATION:   CT OF THE ABDOMEN AND PELVIS WITHOUT CONTRAST 3/7/2019 2:51 pm       TECHNIQUE:   CT of the abdomen and pelvis was performed without the administration of   intravenous contrast. Multiplanar reformatted images are provided for review. Dose modulation, iterative reconstruction, and/or weight based adjustment of   the mA/kV was utilized to reduce the radiation dose to as low as reasonably   achievable. COMPARISON:   None. HISTORY:   ORDERING SYSTEM PROVIDED HISTORY: Flank pain   TECHNOLOGIST PROVIDED HISTORY:   Additional Contrast?->Radiologist Recommendation   Reason for exam:->left flank pain, UA + blood       FINDINGS:   Lower Chest: There is bibasilar scarring. A 3 lead ICD is in situ. Organs: The unenhanced liver, spleen, pancreas, adrenal glands and kidneys   are unremarkable. A too small to characterize low-attenuation lesion is   present in the right hepatic lobe. Cholelithiases are present in lumen of   the gallbladder. A punctate nonobstructing nephrolithiasis present in the   lower pole of the left kidney without hydronephrosis. GI/Bowel: There is no bowel obstruction. The appendix is within normal   limits. Pelvis: The unenhanced pelvic viscera are within normal limits. Status post   tubal ligation. Peritoneum/Retroperitoneum: There is no evidence of free fluid or adenopathy. Bones/Soft Tissues: Degenerative changes involve the thoracolumbar spine. Impression   1. No acute abnormality. IMPRESSION/RECOMMENDATIONS:    Epigastric and RUQ pain  Episodic, pt with negative acute gastric and cardiac work up  Has gallstones, and very likely symptomatic GB disease  Would recommend Lap madhu - DW pt  Pt thinks issues are due to stress and anxiety.  That may be contributing, but GB is still a problem I feel  She is OK for DC plans, and she will call office and see me to schedule surgery if the pain episodes continue and then we will do a lap madhu at that time  Contact info in DC EPIC instructions    Thank you,      Makayla Winkler

## 2020-07-01 NOTE — PROGRESS NOTES
[facility] GI  Gastroenterology Progress Note  Mina Cooper is a 75 y.o. female patient. 1. Acute chest pain    2. Hyperkalemia        SUBJECTIVE:   Still has some chest pain and epig pain but states it is anxiety related. Physical    VITALS:  BP 98/63   Pulse 61   Temp 97 °F (36.1 °C) (Temporal)   Resp 16   Ht 5' 2\" (1.575 m)   Wt 148 lb 3.2 oz (67.2 kg)   SpO2 97%   BMI 27.11 kg/m²   TEMPERATURE:  Current - Temp: 97 °F (36.1 °C); Max - Temp  Av.1 °F (36.2 °C)  Min: 96.5 °F (35.8 °C)  Max: 98.8 °F (37.1 °C)    Abdomen soft, ND, NT, no HSM, Bowel sounds normal   aaox 3 anicteric no jaundice  Rrr. Nl s1s2    Data      Recent Labs     20  0543 20  0719   WBC 6.2 5.8   HGB 13.4 13.1   HCT 39.9 39.1   MCV 91.3 91.2   * 127*     Recent Labs     20  0543 20  0719 20  0619   * 135* 136   K 3.8 4.2 4.3    103 104   CO2 21 22 23   BUN 29* 27* 21*   CREATININE 1.0 0.9 0.9     Recent Labs     20  0542   AST 17   ALT 14   BILIDIR <0.2   BILITOT 0.5   ALKPHOS 46     Recent Labs     20  0542   LIPASE 27.0   AMYLASE 52             ASSESSMENT   1. Chest pain- atypical.  Neg egd. No help with ppi. Pt admits to anxiety causing her cp and epig pain. 2. Cholelithiasis-         PLAN    1. Can f/u with gen surg if her pain changes. 2. Anxiolytics per psych. 3. Will sign off.      Zeb Asencio MD  [address]

## 2020-07-01 NOTE — PROGRESS NOTES
[address] Donovan 1955    History:  Past Medical History:   Diagnosis Date    Atrial fibrillation ([facility]     Cardiomyopathy, nonischemic ([facility] 1997    CHF (congestive heart failure) (HCC)     Fibromyalgia     GERD (gastroesophageal reflux disease)     HSV-1 (herpes simplex virus 1) infection     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    Hyperlipidemia     Sleep apnea     uses cpap    Uterine fibroids affecting pregnancy        ECHO:   Conclusions      Summary   -Left ventricular systolic function is reduced with ejection fraction   estimated at 30 %. -Abnormal septal motion.   -Normal left ventricular wall thickness.   -Left ventricular size is severely increased. - Grade II diastolic dysfunction with elevated LV filling pressures.   -The right atrium is mildly dilated. -Pacemaker / ICD lead was visualized in the right atrium and ventricle.   -Aortic valve appears sclerotic but opens adequately. -Mild aortic and tricuspid regurgitation is present. -Mild to moderate mitral regurgitation.   -Systolic pulmonary artery pressure (SPAP) is normal and estimated at 24   mmHg (right atrial pressure 3 mmHg). -IVC is normal in size (< 2.1 cm) and collapses > 50% with respiration   consistent with normal RA pressure (3 mmHg). Signature      ------------------------------------------------------------------   Electronically signed by Veena Oliveros MD, [facility] (Interpreting   physician) on 06/29/2020 at 03:52 PM   -----------------------------------------------------------------    ACE/ARB: . Sacubitril-Valsartan 24-26 mg bid  BB: Carvedilol 6.25 mg bid  Aldosterone Antagonist: No aldosterone antagonist due to hyperkalemia     History of sleep apnea: Yes  Equipment used at home: No  [place] Screen ordered: Yes    Code Status: Full   Discharge plans: Patient to return home. Lives independently with her     Family Present:     Ms. Atkinson was seen 64 oz during inpatient stay so he can understand how to measure intake at home. 3. Continue to educate on S/S.   4. Emphasize daily weights, diet, and knowing when and who to call  5. Provided patient with CHF Resource Line for questions and concerns.        [address] 7/1/2020 2:43 PM

## 2020-07-01 NOTE — PLAN OF CARE
Problem: Cardiovascular  Goal: Hemodynamic stability  Outcome: Ongoing  Note: Continue to monitor labs vitals heart rate and rhythm a-v paced medications as directed.  S/p right radial cath + pulses warm no visible bleeding or bruising

## 2020-07-01 NOTE — PROGRESS NOTES
CLINICAL BEDSIDE SWALLOWING EVALUATION  Speech Therapy Department    Patient Name:  Ric Johnson  :  1955  Pain level: Pt did not report pain  Medical Diagnosis:   Chest pain [R07.9]  Chest pain [R07.9]     HPI: Per chart: Ric Johnson is a 75 y.o. female with history of NICMP s/p BiVICD, fibromyalgia, h/o pericarditis, HTN who came to ER with complaints of CP. States it has been going on for weeks to months. It has acutely progressed in past few days. Is substernal and radiating to L arm. Associated with SOB and ZARATE. Also notes dysphagia, epigastric pain and GERD. Uses PPI PRN. Denies melena or hematochezia. Has nausea. VERY anxious. No fevers, chills, NS, cough or diarrhea. In ED, started on Nitropaste with improvement in CP. Denies smoking or EtOH. Does not use oxygen or inhalers. No BL LE edema. Has orthopnea. Found to have hyperkalemia in ED. Denies ICD discharges. Otherwise complete ROS is negative unless listed above. Chest x-ray:  No acute cardiopulmonary process. Treatment Diagnosis:  No Oropharyngeal Dysphagia Identified     Impressions: Pt was seen sitting upright in bed, she reported sensation of food sticking and difficulty with swallowing when she first came to the hospital.  Pt reported this has all resolved and she is no longer having difficulty. Various textures were provided to assess swallow function. Pt presents with normal swallow function with timely swallow initiation and no overt clinical s/s of aspiration/penetration assessed. Adequate mastication and oral clearance was achieved with regular solids. At this time continuation of current diet is recommended with no further dysphagia therapy indicated at this time. Dietary Recommendations: Regular texture diet with thin liquids, meds as tolerated     Treatment/Goals: N/A, No further dysphagia therapy is indicated at this time.       Oral motor Exam:  Dentition: adequate   Labial/Facial: within functional limits   Lingual: within functional limits   Voice: within functional limits     Oral Phase:   Grossly within functional limits     Pharyngeal Phase:  Grossly within functional limits     Patient/Family Education:Education, results and recommendations given to the Pt who verbalized understanding    Timed Code Treatment: 0 minutes    Total Treatment Time: 23 minutes    Discharge Recommendations: No further dysphagia therapy is indicated at this time.       Mckinley Santillan M.A., [address] [place] [address]  Speech-Language Pathologist

## 2020-07-01 NOTE — PROGRESS NOTES
Data- discharge order received, pt verbalized agreement to discharge, disposition to previous residence, no needs for HHC/DME. Action- discharge instructions prepared and given to pt, pt verbalized understanding. Medication information packet given r/t NEW and/or CHANGED prescriptions emphasizing name/purpose/side effects, pt verbalized understanding. Discharge instruction summary: Diet- Cardiac, Activity- As Tolerated, Primary Care Physician as follows: Lavinia Mendoza -[phone] f/u appointment 1 week,  prescription medications filled sent to pts pharmacy. Inpatient surgical procedure precautions reviewed: post radial angiogram CHF Education reviewed. Pt/ Family has had a total of 60 minutes CHF education this admission encounter. Response- Pt belongings gathered, IV removed. Disposition is home (no HHC/DME needs), transported with belongings, taken to lobby via w/c w/ this RN, no complications.

## 2020-07-01 NOTE — DISCHARGE SUMMARY
[facility]    Patient Demographics    Patient. Manoj Sin  Date of Birth. 1955  MRN. [id number]     Primary care provider. Hammad Webb MD  (Tel: [phone])    Admit date: 6/28/2020    Discharge date (blank if same as Note Date): Note Date: 7/1/2020     Reason for Hospitalization. Chief Complaint   Patient presents with    Chest Pain     Pt. comes in today with complaints of chest pain and difficulty breathing. Pt. reports last night she was feeling tightness in her chest. Pt. reports pain radiating down her left arm. Significant Findings. Principal Problem:    Acute chest pain  Active Problems:    Non-ischemic cardiomyopathy (HCC)    History of pericarditis    Fibromyalgia syndrome    Obstructive sleep apnea syndrome    Hypercholesteremia    AICD (automatic cardioverter/defibrillator) present    Hyperkalemia    Dyspnea on exertion    Anxiety    GERD with esophagitis    Hyponatremia    Dysphagia    Suicidal ideation    Major depressive disorder, recurrent episode, severe with anxious distress ([address]    Trauma and stressor-related disorder  Resolved Problems:    * No resolved hospital problems. *       Problems and results from this hospitalization that need follow up. 1. EGD pathology results for H pylori  2. Anxiety/depression. Has follow up scheduled for Monday 7/6. Significant test results and incidental findings. MPI 6/29/2020:  Summary    *Nondiagnostic EKG due to ventricular pacing.    *Reduced LV function with EF of 38%    *SPECT images demonstrate small area of decreased perfusion of moderate    intensity in the apical wall with stress and rest consistent with scar.  No    reversible ischemia.        Impression    Decreased LV function with EF of 38%    Abnormal myocardial perfusion imaging with apical scar.      US Abdomen 6/29/2020:   Cholelithiasis without sonographic evidence of acute cholecystitis.       Diffuse hepatic steatosis.      CXR 6/28/2020:  FINDINGS:   There is no acute consolidation or effusion.  There is no pneumothorax.  The   mediastinal structures are unremarkable.  The upper abdomen is unremarkable. The extrathoracic soft tissues are unremarkable. [place] is no acute osseous   abnormality.           Impression   No acute cardiopulmonary process.      Echo 6/29/2020:  Summary   -Left ventricular systolic function is reduced with ejection fraction   estimated at 30 %. -Abnormal septal motion.   -Normal left ventricular wall thickness.   -Left ventricular size is severely increased. - Grade II diastolic dysfunction with elevated LV filling pressures.   -The right atrium is mildly dilated. -Pacemaker / ICD lead was visualized in the right atrium and ventricle.   -Aortic valve appears sclerotic but opens adequately. -Mild aortic and tricuspid regurgitation is present. -Mild to moderate mitral regurgitation.   -Systolic pulmonary artery pressure (SPAP) is normal and estimated at 24   mmHg (right atrial pressure 3 mmHg). -IVC is normal in size (< 2.1 cm) and collapses > 50% with respiration   consistent with normal RA pressure (3 mmHg). Invasive procedures and treatments. [facility] 6/30/2020: Anatomy:   LM-Normal   LAD-Normal   Cx-Normal   OM- Normal   RCA-Dominant, Normal   RPDA- Normal   LVEF- 25  LVG- global hypokinesis  LVEDP- 5      Impression  ~Coronary Angiography w/ normal coronaries  ~LVG with LVEF of 25 and global regional wall motion abnormalities     Recommendation  ~Aggressive medical treatment and risk factor modification  ~1. Medications reviewed, no changes at this time. 2. Post cath IVF. Bedrest.  3. Cont OMT for CHF  4. Patient has been advised on the importance of regular exercise of at least 20-30 minutes daily alternating with aerobic and isometric activities.    5. Patient counseled about and offered assistance for smoking cessation   6. No indication for cardiac rehab  7. Follow up in 1-2 weeks with cardiology CHF clinic. EGD 6/30/2020:  EGD:  Normal.       Rec:  F/u path for h pylori            surg c/s for gallstones. [facility] Course. Patient is a 76 yo female with hx atrial fib, chronic sCHF, nonischemic cardiomyopathy (EF 25-20%), s/p BiVICD and sleep apnea. She presented to hospital with chest pain and shortness of breath which has been ongoing for weeks to months. Noted to be hyperkalemic in ER (6.1) and was treated. Evaluated by cardiology, GI and General Surgery. 1. Chest pain. LHC with normal cors. EGD with normal findings. CT and US showed gallstones, no evidence of cholecystitis. Suspected pain likely symptomatic gall bladder disease. She will follow with surgery as out-patient for consideration for lap madhu. 2. Nonischemic cardiomyopathy. EF 30%. Appeared compensated. Continued ARNI, evidence based beta blocker and digoxin. Spironolactone discontinued secondary to hyperkalemia (potassium 6.1). Carvedilol dose decreased to 3.125 mg BID on DC secondary hypotension with SBP 80s. 3. Suicidal ideation/anxiety/depression. Evaluated by psych. Started on clonazepam and sertraline. Does not require inpatient psych admit. Patient provide prescription for clonazepam #7 days. She was started on this per psychiatry and has follow up appointment scheduled for 7/6. Information reviewed on PDMP and no red flags. Consults. IP CONSULT TO HOSPITALIST  IP CONSULT TO GI  IP CONSULT TO CARDIOLOGY  IP CONSULT TO PSYCHIATRY  IP CONSULT TO GENERAL SURGERY    Physical examination on discharge day. BP 98/63   Pulse 61   Temp 97 °F (36.1 °C) (Temporal)   Resp 16   Ht 5' 2\" (1.575 m)   Wt 148 lb 3.2 oz (67.2 kg)   SpO2 97%   BMI 27.11 kg/m²   General appearance. Alert. Looks comfortable. HEENT. Sclera clear. Moist mucus membranes. Cardiovascular.  Regular rate and rhythm, normal S1, S2. No murmur. Respiratory. Not using accessory muscles. Clear to auscultation bilaterally, no wheeze. Gastrointestinal. Abdomen soft, non-tender, not distended, normal bowel sounds  Neurology. Facial symmetry. No speech deficits. Moving all extremities equally. Extremities. No edema in lower extremities. Skin. Warm, dry, normal turgor    Condition at time of discharge stable    Medication instructions provided to patient at discharge. Medication List      START taking these medications    clonazePAM 0.5 MG tablet  Commonly known as:  KLONOPIN  Take 0.5 tablets by mouth 2 times daily for 7 days. Notes to patient:  Use: Treats anxiety  Side Effects: Drowsiness, dizziness     sertraline 50 MG tablet  Commonly known as:  ZOLOFT  Take 1 tablet by mouth daily  Start taking on:  July 2, 2020  Notes to patient:  Use: Antidepressant  Side Effects: Drowsiness, nausea        CHANGE how you take these medications    carvedilol 6.25 MG tablet  Commonly known as:  COREG  Take 0.5 tablets by mouth 2 times daily  What changed:  See the new instructions.   Notes to patient:  Use: Lowers blood pressure, lowers heart rate, takes workload off of heart  Side Effects: Dizziness, low heart rate     Entresto 24-26 MG per tablet  Generic drug:  sacubitril-valsartan  Take 1/2 tablet am and 1 tablet pm  What changed:    · how much to take  · how to take this  · when to take this  · additional instructions  Notes to patient:  Use: treats heart failure  Side Effects: Dizziness, lower blood pressure     spironolactone 25 MG tablet  Commonly known as:  ALDACTONE  Take 1/2 tablet M,W,F only  What changed:  additional instructions  Notes to patient:  Use: Takes fluid off  Side Effects: Frequent urination, dizziness        CONTINUE taking these medications    ciprofloxacin 250 MG tablet  Commonly known as:  CIPRO  TAKE 1 TABLET BY MOUTH AFTER SEXUAL INTERCOURSE  Notes to patient:  Use: to treat infections  Side

## 2020-07-02 ENCOUNTER — TELEPHONE (OUTPATIENT)
Dept: OTHER | Age: 65
End: 2020-07-02

## 2020-07-06 ENCOUNTER — VIRTUAL VISIT (OUTPATIENT)
Dept: PSYCHIATRY | Age: 65
End: 2020-07-06
Payer: COMMERCIAL

## 2020-07-06 PROCEDURE — 99214 OFFICE O/P EST MOD 30 MIN: CPT | Performed by: PSYCHIATRY & NEUROLOGY

## 2020-07-06 RX ORDER — CLONAZEPAM 0.5 MG/1
0.25 TABLET ORAL 2 TIMES DAILY
Qty: 30 TABLET | Refills: 0 | Status: SHIPPED | OUTPATIENT
Start: 2020-07-06 | End: 2020-08-19 | Stop reason: SDUPTHER

## 2020-07-06 NOTE — PROGRESS NOTES
PSYCHIATRY PROGRESS NOTE  TELEPHONE VISIT    Mark Davies  1955 07/06/20  Total time: 25 min  PRovider location: Office  Patient location: Home  PCP: Letty Eaton MD    CC:   Chief Complaint   Patient presents with    Depression    Anxiety         S:   Pursuant to the emergency declaration under the [facility], [address] [place] [address] authority and the [employer] Act, this Virtual  Visit was conducted, with patient's consent, to reduce the patient's risk of exposure to COVID-19 and provide continuity of care for an established patient. Services were provided through telephone because patient was not able to connect through video. Pt continues to feel she is doing better with her anxiety and her mood. It continues to be stressful being at home with her  as she has mixed feelings towards him. They do fight sometimes about money which is part of divorce settlement. She tries to avoid him at times but he has 6 months per the court before he has to move out. Pt denies having any feelings of panic attacks since leaving the hospital. Her mood is still depressed d/t what has happened with her  but she feels more hopeful and denies and suicidal ideation. Pt has continued to smoke MJ a couple times since she left the hospital but notices it makes her feeling worse so she is a little more motivated to stop. Taking medications as prescribed. Denies side effects. ROS: no headaches, vision problems, dysuria, abd pain, chest pain or SOB        Current Outpatient Medications   Medication Sig Dispense Refill    clonazePAM (KLONOPIN) 0.5 MG tablet Take 0.5 tablets by mouth 2 times daily for 30 days.  Do not fill before 7/8/2020 30 tablet 0    sertraline (ZOLOFT) 50 MG tablet Take 1 tablet by mouth daily 30 tablet 1    carvedilol (COREG) 6.25 MG tablet Take 0.5 tablets by mouth 2 times daily 180 tablet 06/28/2020 78  degrees Final    Diagnosis 06/28/2020  Poor data quality, interpretation may be adversely affectedElectronic ventricular pacemakerConfirmed by [facility] MD, Corinne Keller ([id number]) on 6/28/2020 2:26:37 PM   Final    Digoxin Lvl 06/28/2020 0.6* 0.8 - 2.0 ng/mL Final    WBC 06/28/2020 7.5  4.0 - 11.0 K/uL Final    RBC 06/28/2020 4.92  4.00 - 5.20 M/uL Final    Hemoglobin 06/28/2020 15.0  12.0 - 16.0 g/dL Final    Hematocrit 06/28/2020 44.9  36.0 - 48.0 % Final    MCV 06/28/2020 91.3  80.0 - 100.0 fL Final    MCH 06/28/2020 30.5  26.0 - 34.0 pg Final    MCHC 06/28/2020 33.4  31.0 - 36.0 g/dL Final    RDW 06/28/2020 13.2  12.4 - 15.4 % Final    Platelets 52/30/5826 150  135 - 450 K/uL Final    MPV 06/28/2020 10.3  5.0 - 10.5 fL Final    Neutrophils % 06/28/2020 63.4  % Final    Lymphocytes % 06/28/2020 29.3  % Final    Monocytes % 06/28/2020 4.9  % Final    Eosinophils % 06/28/2020 1.7  % Final    Basophils % 06/28/2020 0.7  % Final    Neutrophils Absolute 06/28/2020 4.7  1.7 - 7.7 K/uL Final    Lymphocytes Absolute 06/28/2020 2.2  1.0 - 5.1 K/uL Final    Monocytes Absolute 06/28/2020 0.4  0.0 - 1.3 K/uL Final    Eosinophils Absolute 06/28/2020 0.1  0.0 - 0.6 K/uL Final    Basophils Absolute 06/28/2020 0.1  0.0 - 0.2 K/uL Final    Sodium 06/28/2020 132* 136 - 145 mmol/L Final    Potassium reflex Magnesium 06/28/2020 6.1* 3.5 - 5.1 mmol/L Final    Comment: Specimen hemolysis has exceeded the interference as defined by Roche. Value may be falsely increased. Suggest recollection if clinically  indicated.       Chloride 06/28/2020 98* 99 - 110 mmol/L Final    CO2 06/28/2020 20* 21 - 32 mmol/L Final    Anion Gap 06/28/2020 14  3 - 16 Final    Glucose 06/28/2020 198* 70 - 99 mg/dL Final    BUN 06/28/2020 22* 7 - 20 mg/dL Final    CREATININE 06/28/2020 0.9  0.6 - 1.2 mg/dL Final    GFR Non- 06/28/2020 >60  >60 Final    Comment: >60 mL/min/1.73m2 EGFR, calc. for ages 25 and older using the  MDRD formula (not corrected for weight), is valid for stable  renal function.  GFR  06/28/2020 >60  >60 Final    Comment: Chronic Kidney Disease: less than 60 ml/min/1.73 sq.m. Kidney Failure: less than 15 ml/min/1.73 sq.m. Results valid for patients 18 years and older.  Calcium 06/28/2020 9.9  8.3 - 10.6 mg/dL Final    Troponin 06/28/2020 <0.01  <0.01 ng/mL Final    Comment: Specimen hemolysis has exceeded the interference as defined by Roche. Value may be falsely decreased. Suggest recollection if clinically  indicated. Methodology by Troponin T      Pro-BNP 06/28/2020 263* 0 - 124 pg/mL Final    Comment: Methodology by NT-proBNP    An age-independent cutoff point of 300 pg/ml has a 98%  negative predictive value excluding acute heart failure. Values exceeding the age-related cutoff values (450 pg/mL if  age<50, 900 if 50-75 and 1800 if >75) has 90% sensitivity and  84% specificity for diagnosing acute HF. In patients with  renal compromise (eGFR<60) values greater than 1200pg/ml have  a diagnostic sensitivity and specificity of 89% and 72% for  acute HF.       Troponin 06/28/2020 <0.01  <0.01 ng/mL Final    Methodology by Troponin T    Troponin 06/28/2020 <0.01  <0.01 ng/mL Final    Methodology by Troponin T    Troponin 06/29/2020 <0.01  <0.01 ng/mL Final    Methodology by Troponin T    Potassium 06/28/2020 3.6  3.5 - 5.1 mmol/L Final    Cholesterol, Total 06/29/2020 189  0 - 199 mg/dL Final    Triglycerides 06/29/2020 210* 0 - 150 mg/dL Final    HDL 06/29/2020 31* 40 - 60 mg/dL Final    LDL Calculated 06/29/2020 116* <100 mg/dL Final    VLDL Cholesterol Calculated 06/29/2020 42  Not Established mg/dL Final    Sodium 06/29/2020 134* 136 - 145 mmol/L Final    Potassium reflex Magnesium 06/29/2020 3.8  3.5 - 5.1 mmol/L Final    Chloride 06/29/2020 102  99 - 110 mmol/L Final    CO2 06/29/2020 21  21 - 32 mmol/L Final    Anion Gap 06/29/2020 11  3 - 16 17  15 - 37 U/L Final    Total Bilirubin 06/29/2020 0.5  0.0 - 1.0 mg/dL Final    Bilirubin, Direct 06/29/2020 <0.2  0.0 - 0.3 mg/dL Final    Bilirubin, Indirect 06/29/2020 see below  0.0 - 1.0 mg/dL Final    Comment: Indirect Bilirubin cannot be calculated since Total Bilirubin  and/or Direct Bilirubin is below measurable range.  Amylase 06/29/2020 52  25 - 115 U/L Final    Lipase 06/29/2020 27.0  13.0 - 60.0 U/L Final    POC Glucose 06/29/2020 106* 70 - 99 mg/dl Final    Performed on 06/29/2020 ACCU-CHEK   Final    POC Glucose 06/29/2020 110* 70 - 99 mg/dl Final    Performed on 06/29/2020 ACCU-CHEK   Final    Sodium 06/30/2020 135* 136 - 145 mmol/L Final    Potassium 06/30/2020 4.2  3.5 - 5.1 mmol/L Final    Chloride 06/30/2020 103  99 - 110 mmol/L Final    CO2 06/30/2020 22  21 - 32 mmol/L Final    Anion Gap 06/30/2020 10  3 - 16 Final    Glucose 06/30/2020 114* 70 - 99 mg/dL Final    BUN 06/30/2020 27* 7 - 20 mg/dL Final    CREATININE 06/30/2020 0.9  0.6 - 1.2 mg/dL Final    GFR Non- 06/30/2020 >60  >60 Final    Comment: >60 mL/min/1.73m2 EGFR, calc. for ages 25 and older using the  MDRD formula (not corrected for weight), is valid for stable  renal function.  GFR  06/30/2020 >60  >60 Final    Comment: Chronic Kidney Disease: less than 60 ml/min/1.73 sq.m. Kidney Failure: less than 15 ml/min/1.73 sq.m. Results valid for patients 18 years and older.       Calcium 06/30/2020 9.0  8.3 - 10.6 mg/dL Final    WBC 06/30/2020 5.8  4.0 - 11.0 K/uL Final    RBC 06/30/2020 4.29  4.00 - 5.20 M/uL Final    Hemoglobin 06/30/2020 13.1  12.0 - 16.0 g/dL Final    Hematocrit 06/30/2020 39.1  36.0 - 48.0 % Final    MCV 06/30/2020 91.2  80.0 - 100.0 fL Final    MCH 06/30/2020 30.6  26.0 - 34.0 pg Final    MCHC 06/30/2020 33.6  31.0 - 36.0 g/dL Final    RDW 06/30/2020 12.9  12.4 - 15.4 % Final    Platelets 72/46/5796 127* 135 - 450 K/uL Final    MPV 06/30/2020 10.6* 5.0 - 10.5 fL Final    POC Glucose 06/30/2020 123* 70 - 99 mg/dl Final    Performed on 06/30/2020 ACCU-CHEK   Final    Left Ventricular Ejection Fraction 06/30/2020 25  % Final    LEFT VENTRICULAR EJECTION FRACTION* 06/30/2020 Cardiac Cath   Final    Sodium 07/01/2020 136  136 - 145 mmol/L Final    Potassium 07/01/2020 4.3  3.5 - 5.1 mmol/L Final    Chloride 07/01/2020 104  99 - 110 mmol/L Final    CO2 07/01/2020 23  21 - 32 mmol/L Final    Anion Gap 07/01/2020 9  3 - 16 Final    Glucose 07/01/2020 112* 70 - 99 mg/dL Final    BUN 07/01/2020 21* 7 - 20 mg/dL Final    CREATININE 07/01/2020 0.9  0.6 - 1.2 mg/dL Final    GFR Non- 07/01/2020 >60  >60 Final    Comment: >60 mL/min/1.73m2 EGFR, calc. for ages 25 and older using the  MDRD formula (not corrected for weight), is valid for stable  renal function.  GFR  07/01/2020 >60  >60 Final    Comment: Chronic Kidney Disease: less than 60 ml/min/1.73 sq.m. Kidney Failure: less than 15 ml/min/1.73 sq.m. Results valid for patients 18 years and older.  Calcium 07/01/2020 8.8  8.3 - 10.6 mg/dL Final         A:  Continues to be doing better since leaving the hospital. Needs to work on [address] complete cessation. Likely things will improve as there is more separation from ex- (when he moves out of the home). 1. MDD recurrent, moderate with anxious distress  2. Trauma and stressor related disorder      P:   1. Continue clonazepam 0.25mg BID  2. Continue sertraline 50mg daily  3. Will work on tapering off the clonazepam in 1 month if patient is continuing to improve and be stable with panic attacks. Follow-up: RTC in Aug 5th at 9:30am  Safety: RF include age, chronic medical issues, mood disorder, anxiety, interpersonal relationship stressors / .  Pt is moderate risk of future dangerousness to self or others, however at this time pt denies SI/HI, is future oriented, has reasonable follow up plan, and is not an imminent safety risk to self or others at this time.         Mona Lugo MD  Psychiatrist

## 2020-07-08 ENCOUNTER — HOSPITAL ENCOUNTER (OUTPATIENT)
Age: 65
Discharge: HOME | End: 2020-07-08
Payer: COMMERCIAL

## 2020-07-08 ENCOUNTER — OFFICE VISIT (OUTPATIENT)
Dept: CARDIOLOGY CLINIC | Age: 65
End: 2020-07-08
Payer: COMMERCIAL

## 2020-07-08 VITALS
WEIGHT: 143.5 LBS | DIASTOLIC BLOOD PRESSURE: 60 MMHG | HEIGHT: 62 IN | SYSTOLIC BLOOD PRESSURE: 92 MMHG | BODY MASS INDEX: 26.41 KG/M2 | HEART RATE: 60 BPM

## 2020-07-08 LAB
ANION GAP SERPL CALCULATED.3IONS-SCNC: 9 MMOL/L (ref 3–16)
BUN BLDV-MCNC: 21 MG/DL (ref 7–20)
CALCIUM SERPL-MCNC: 9.9 MG/DL (ref 8.3–10.6)
CHLORIDE BLD-SCNC: 98 MMOL/L (ref 99–110)
CO2: 29 MMOL/L (ref 21–32)
CREAT SERPL-MCNC: 0.7 MG/DL (ref 0.6–1.2)
GFR AFRICAN AMERICAN: >60
GFR NON-AFRICAN AMERICAN: >60
GLUCOSE BLD-MCNC: 90 MG/DL (ref 70–99)
POTASSIUM SERPL-SCNC: 4.5 MMOL/L (ref 3.5–5.1)
PRO-BNP: 116 PG/ML (ref 0–124)
SODIUM BLD-SCNC: 136 MMOL/L (ref 136–145)
VITAMIN D 25-HYDROXY: 30.8 NG/ML

## 2020-07-08 PROCEDURE — 82306 VITAMIN D 25 HYDROXY: CPT

## 2020-07-08 PROCEDURE — 36415 COLL VENOUS BLD VENIPUNCTURE: CPT

## 2020-07-08 PROCEDURE — 80048 BASIC METABOLIC PNL TOTAL CA: CPT

## 2020-07-08 PROCEDURE — 83880 ASSAY OF NATRIURETIC PEPTIDE: CPT

## 2020-07-08 PROCEDURE — 99496 TRANSJ CARE MGMT HIGH F2F 7D: CPT | Performed by: CLINICAL NURSE SPECIALIST

## 2020-07-08 RX ORDER — MELATONIN
1000 DAILY
Qty: 90 TABLET | Refills: 1
Start: 2020-07-08 | End: 2020-10-28 | Stop reason: SDUPTHER

## 2020-07-08 NOTE — PROGRESS NOTES
[address]  Progress Note    Primary Care Doctor:  Annette Go MD    Chief Complaint   Patient presents with    Follow-Up from Hospital     No cardiac complaints        History of Present Illness:  75 y.o. female with history of NICM, BiV ICD  and , AF (not on anticoagulation, follows with Dr Vee Hester), ESCOBAR on cpap, fibromyalgia, sHF on entresto since , followed at [facility]    I had the pleasure of seeing Rk Andre in follow up for hospitalization - for chest pain, LHC done, EGD with H pylori neg and anxiety/depression (family issues) seen by psychiatry and started on klonipin/zoloft. She is ambulatory and by her self. She had a visit with psychiatry yesterday and feels much better. She denies any chest pain, palpitations, edema or lightheadedness. Labs in hospital stable. Medications reviewed and she is on vitamin D and unsure of strength  Phone call 2020    Past Medical History:   has a past medical history of Atrial fibrillation ([address] Cardiomyopathy, nonischemic ([address] CHF (congestive heart failure) ([address] Fibromyalgia, GERD (gastroesophageal reflux disease), HSV-1 (herpes simplex virus 1) infection, Hyperlipidemia, Sleep apnea, and Uterine fibroids affecting pregnancy. Surgical History:   has a past surgical history that includes  section; Pacemaker insertion (); Pacemaker insertion; Upper gastrointestinal endoscopy (2015); Colonoscopy (2015); Colonoscopy (N/A, 2019); Upper gastrointestinal endoscopy (N/A, 2019); Cardiac defibrillator placement; and Upper gastrointestinal endoscopy (N/A, 2020). Social History:   reports that she quit smoking about 22 years ago. She has a 2.50 pack-year smoking history. She has never used smokeless tobacco. She reports current alcohol use. She reports current drug use. Frequency: 2.00 times per week. Drug: Marijuana.    Family History:   Family History   Problem Relation Age of Onset    Diabetes Mother     Kidney Disease Mother         reanl failure       Home Medications:  Prior to Admission medications    Medication Sig Start Date End Date Taking? Authorizing Provider   vitamin D3 (CHOLECALCIFEROL) 25 MCG (1000 UT) TABS tablet Take 1 tablet by mouth daily 7/8/20  Yes Carolina Allen APRN - CNS   clonazePAM (KLONOPIN) 0.5 MG tablet Take 0.5 tablets by mouth 2 times daily for 30 days. Do not fill before 7/8/2020 7/6/20 8/5/20 Yes Harish Briones MD   sertraline (ZOLOFT) 50 MG tablet Take 1 tablet by mouth daily 7/2/20  Yes SHAMEKA Mccullough - CNP   carvedilol (COREG) 6.25 MG tablet Take 0.5 tablets by mouth 2 times daily 7/1/20  Yes SHAMEKA Mccullough CNP   sacubitril-valsartan (ENTRESTO) 24-26 MG per tablet Take 1/2 tablet am and 1 tablet pm 7/1/20  Yes Servando Mckinnon MD   spironolactone (ALDACTONE) 25 MG tablet Take 1/2 tablet M,W,F only 7/1/20  Yes Servando Mckinnon MD   torsemide (DEMADEX) 20 MG tablet Take 1 tablet by mouth as needed (for weight gain or swelling) 5/14/20  Yes Servando Mckinnon MD   digoxin (LANOXIN) 125 MCG tablet TAKE 1 TABLET BY MOUTH DAILY 5/12/20  Yes Hammad Webb MD   ciprofloxacin (CIPRO) 250 MG tablet TAKE 1 TABLET BY MOUTH AFTER SEXUAL INTERCOURSE 4/23/20  Yes SHAMEKA Lindsey - CNP   Omega-3 Fatty Acids (FISH OIL) 1000 MG CAPS Take 3,000 mg by mouth 2 times daily    Yes Historical Provider, MD        Allergies:  Prednisone     Review of Systems:   · Constitutional: there has been no unanticipated weight loss. There's been no change in energy level, sleep pattern, or activity level. · Eyes: No visual changes or diplopia. No scleral icterus. · ENT: No Headaches, hearing loss or vertigo. No mouth sores or sore throat. · Cardiovascular: Reviewed in HPI  · Respiratory: No cough or wheezing, no sputum production. No hematemesis. · Gastrointestinal: No abdominal pain, appetite loss, blood in stools.  No change in bowel or bladder habits. · Genitourinary: No dysuria, trouble voiding, or hematuria. · Musculoskeletal:  No gait disturbance, weakness or joint complaints. · Integumentary: No rash or pruritis. · Neurological: No headache, diplopia, change in muscle strength, numbness or tingling. No change in gait, balance, coordination, mood, affect, memory, mentation, behavior. · Psychiatric: No anxiety, no depression. · Endocrine: No malaise, fatigue or temperature intolerance. No excessive thirst, fluid intake, or urination. No tremor. · Hematologic/Lymphatic: No abnormal bruising or bleeding, blood clots or swollen lymph nodes. · Allergic/Immunologic: No nasal congestion or hives. Physical Examination:    Vitals:    07/08/20 0924   BP: 92/60   Pulse: 60   Weight: 143 lb 8 oz (65.1 kg)   Height: 5' 2\" (1.575 m)        Constitutional and General Appearance: Warm and dry, no apparent distress, normal coloration  HEENT:  Normocephalic, atraumatic  Respiratory:  · Normal excursion and expansion without use of accessory muscles  · Resp Auscultation: Normal breath sounds without dullness  Cardiovascular:  · The apical impulses not displaced  · Heart tones are crisp and normal  · JVP normal cm H2O  · Regular rate and rhythm  · Peripheral pulses are symmetrical and full  · There is no clubbing, cyanosis of the extremities.   · no edema  · Pedal Pulses: 2+ and equal   Abdomen:  · No masses or tenderness  · Liver/Spleen: No Abnormalities Noted  Neurological/Psychiatric:  · Alert and oriented in all spheres  · Moves all extremities well  · Exhibits normal gait balance and coordination  · No abnormalities of mood, affect, memory, mentation, or behavior are noted    Lab Data:    CBC:   Lab Results   Component Value Date    WBC 5.8 06/30/2020    WBC 6.2 06/29/2020    WBC 7.5 06/28/2020    RBC 4.29 06/30/2020    RBC 4.37 06/29/2020    RBC 4.92 06/28/2020    HGB 13.1 06/30/2020    HGB 13.4 06/29/2020    HGB 15.0 06/28/2020    HCT 39.1 06/30/2020 HCT 39.9 06/29/2020    HCT 44.9 06/28/2020    MCV 91.2 06/30/2020    MCV 91.3 06/29/2020    MCV 91.3 06/28/2020    RDW 12.9 06/30/2020    RDW 13.2 06/29/2020    RDW 13.2 06/28/2020     06/30/2020     06/29/2020     06/28/2020     BMP:  Lab Results   Component Value Date     07/01/2020     06/30/2020     06/29/2020    K 4.3 07/01/2020    K 4.2 06/30/2020    K 3.8 06/29/2020    K 3.6 06/28/2020    K 6.1 06/28/2020     07/01/2020     06/30/2020     06/29/2020    CO2 23 07/01/2020    CO2 22 06/30/2020    CO2 21 06/29/2020    PHOS 2.9 07/09/2018    PHOS 3.7 10/18/2017    PHOS 3.2 01/07/2016    BUN 21 07/01/2020    BUN 27 06/30/2020    BUN 29 06/29/2020    CREATININE 0.9 07/01/2020    CREATININE 0.9 06/30/2020    CREATININE 1.0 06/29/2020     BNP:   Lab Results   Component Value Date    PROBNP 263 06/28/2020    PROBNP 64 11/05/2017    PROBNP 108 04/14/2016     Cardiac Imaging:  Echo 6/29/2020:  Summary   -Left ventricular systolic function is reduced with ejection fraction   estimated at 30 %. -Abnormal septal motion.   -Normal left ventricular wall thickness.   -Left ventricular size is severely increased. - Grade II diastolic dysfunction with elevated LV filling pressures.   -The right atrium is mildly dilated. -Pacemaker / ICD lead was visualized in the right atrium and ventricle.   -Aortic valve appears sclerotic but opens adequately. -Mild aortic and tricuspid regurgitation is present. -Mild to moderate mitral regurgitation.   -Systolic pulmonary artery pressure (SPAP) is normal and estimated at 24   mmHg (right atrial pressure 3 mmHg). -IVC is normal in size (< 2.1 cm) and collapses > 50% with respiration   consistent with normal RA pressure (3 mmHg). Invasive procedures and treatments. [facility] 6/30/2020:   Anatomy:   LM-Normal   LAD-Normal   Cx-Normal   OM- Normal   RCA-Dominant, Normal   RPDA- Normal   LVEF- 25  LVG- global hypokinesis  LVEDP- 5      Impression  ~Coronary Angiography w/ normal coronaries  ~LVG with LVEF of 25 and global regional wall motion abnormalities     Recommendation  ~Aggressive medical treatment and risk factor modification  ~1. Medications reviewed, no changes at this time. 2. Post cath IVF. Bedrest.  3. Cont OMT for CHF  4. Patient has been advised on the importance of regular exercise of at least 20-30 minutes daily alternating with aerobic and isometric activities. 5. Patient counseled about and offered assistance for smoking cessation   6. No indication for cardiac rehab  7. Follow up in 1-2 weeks with cardiology CHF clinic. Echo 10-19-18  - Left ventricle: The cavity size was moderately dilated. Wall    thickness was normal. Systolic function was severely reduced. The    estimated ejection fraction was in the range of 25% to 30%. There    is significant dyssynergy between the septal and lateral walls.    Mild hypokinesis of the inferoseptal myocardium. Severe    hypokinesis of the basal-midanterolateral myocardium. Moderate    hypokinesis of the apicallateral myocardium. Akinesis of the    apicalinferior myocardium. Doppler parameters are consistent with    abnormal left ventricular relaxation (grade 1 diastolic    dysfunction). - Ventricular septum: Septal motion showed abnormal function and    dyssynergy.  - Aortic valve: Trivial regurgitation.  - Right ventricle: The cavity size was mildly dilated. Wall    thickness was normal. Systolic function was normal by objective    interpretation. TAPSE: 2.6cm. Tricuspid annular systolic velocity:    29WK/U. A device lead is seen extending into the RV cavity.  - Right atrium: The atrium was mildly to moderately dilated.  Mike Andrews or catheter noted in right atrium.  - Tricuspid valve: Mild-moderate regurgitation.  - Pericardium, extracardiac: A trivial pericardial effusion was    identified.     Impressions:  Compared to the study from 0/3/72, LV systolic is  slightly more reduced. Assessment:    1. Chronic systolic heart failure (HCC) on arni, BB, digoxin and aldosterone antagonist   2. Non-ischemic cardiomyopathy ([address]    3. Biventricular ICD (implantable cardioverter-defibrillator) in place    4. PAF (paroxysmal atrial fibrillation) (HCC) no anticoag, follows with Dr Hernan Gan   5. Hypovitaminosis D        Plan:   Patient Instructions   1. Check blood work today including vitamin D  2. Continue all other medications  3. Continue low sodium diet <2000 mg and <64 ounces fluid a day  4. RTO in 1 month  5.   Call [phone] if any issues occur      I appreciate the opportunity of cooperating in the care of this individual.    Fabian Ojeda, EZRA, 7/8/2020, 11:09 AM

## 2020-07-08 NOTE — PATIENT INSTRUCTIONS
1. Check blood work today including vitamin D  2. Continue all other medications  3. Continue low sodium diet <2000 mg and <64 ounces fluid a day  4. RTO in 1 month  5.   Call [phone] if any issues occur

## 2020-07-09 ENCOUNTER — TELEPHONE (OUTPATIENT)
Dept: CARDIOLOGY CLINIC | Age: 65
End: 2020-07-09

## 2020-07-09 NOTE — TELEPHONE ENCOUNTER
----- Message from [facility] [id number], [address] [facility], APRN - CNS sent at 7/8/2020  3:44 PM EDT -----  Fluid level is normal and labs are great  Continue current medications  thanks

## 2020-08-13 ENCOUNTER — TELEPHONE (OUTPATIENT)
Dept: INTERNAL MEDICINE CLINIC | Age: 65
End: 2020-08-13

## 2020-08-13 NOTE — TELEPHONE ENCOUNTER
----- Message from Julia Gallo sent at 8/13/2020 10:45 AM EDT -----  Subject: Message to Provider    QUESTIONS  Information for Provider? patient requesting f/u visit with Dr Sarah Potter  ---------------------------------------------------------------------------  --------------  [address]  What is the best way for the office to contact you? OK to leave message on   voicemail  Preferred Call Back Phone Number? [phone]  ---------------------------------------------------------------------------  --------------  SCRIPT ANSWERS  Relationship to Patient?  Self

## 2020-08-19 ENCOUNTER — OFFICE VISIT (OUTPATIENT)
Dept: PSYCHIATRY | Age: 65
End: 2020-08-19
Payer: COMMERCIAL

## 2020-08-19 VITALS
SYSTOLIC BLOOD PRESSURE: 116 MMHG | TEMPERATURE: 98 F | OXYGEN SATURATION: 98 % | HEART RATE: 86 BPM | WEIGHT: 145 LBS | DIASTOLIC BLOOD PRESSURE: 80 MMHG | BODY MASS INDEX: 26.52 KG/M2

## 2020-08-19 PROCEDURE — 3017F COLORECTAL CA SCREEN DOC REV: CPT | Performed by: PSYCHIATRY & NEUROLOGY

## 2020-08-19 PROCEDURE — 90833 PSYTX W PT W E/M 30 MIN: CPT | Performed by: PSYCHIATRY & NEUROLOGY

## 2020-08-19 PROCEDURE — G8400 PT W/DXA NO RESULTS DOC: HCPCS | Performed by: PSYCHIATRY & NEUROLOGY

## 2020-08-19 PROCEDURE — 1090F PRES/ABSN URINE INCON ASSESS: CPT | Performed by: PSYCHIATRY & NEUROLOGY

## 2020-08-19 PROCEDURE — G8417 CALC BMI ABV UP PARAM F/U: HCPCS | Performed by: PSYCHIATRY & NEUROLOGY

## 2020-08-19 PROCEDURE — 99213 OFFICE O/P EST LOW 20 MIN: CPT | Performed by: PSYCHIATRY & NEUROLOGY

## 2020-08-19 PROCEDURE — 1036F TOBACCO NON-USER: CPT | Performed by: PSYCHIATRY & NEUROLOGY

## 2020-08-19 PROCEDURE — 4040F PNEUMOC VAC/ADMIN/RCVD: CPT | Performed by: PSYCHIATRY & NEUROLOGY

## 2020-08-19 PROCEDURE — G8427 DOCREV CUR MEDS BY ELIG CLIN: HCPCS | Performed by: PSYCHIATRY & NEUROLOGY

## 2020-08-19 PROCEDURE — 1123F ACP DISCUSS/DSCN MKR DOCD: CPT | Performed by: PSYCHIATRY & NEUROLOGY

## 2020-08-19 RX ORDER — CLONAZEPAM 0.5 MG/1
0.25 TABLET ORAL 2 TIMES DAILY PRN
Qty: 30 TABLET | Refills: 1 | Status: SHIPPED | OUTPATIENT
Start: 2020-08-19 | End: 2020-10-08 | Stop reason: SDUPTHER

## 2020-08-19 RX ORDER — ESCITALOPRAM OXALATE 10 MG/1
10 TABLET ORAL DAILY
Qty: 30 TABLET | Refills: 1 | Status: SHIPPED | OUTPATIENT
Start: 2020-08-19 | End: 2020-10-26 | Stop reason: SDUPTHER

## 2020-08-31 ENCOUNTER — OFFICE VISIT (OUTPATIENT)
Dept: CARDIOLOGY CLINIC | Age: 65
End: 2020-08-31
Payer: COMMERCIAL

## 2020-08-31 VITALS
HEART RATE: 63 BPM | BODY MASS INDEX: 27.23 KG/M2 | OXYGEN SATURATION: 97 % | SYSTOLIC BLOOD PRESSURE: 94 MMHG | WEIGHT: 148 LBS | HEIGHT: 62 IN | DIASTOLIC BLOOD PRESSURE: 58 MMHG

## 2020-08-31 PROCEDURE — 1090F PRES/ABSN URINE INCON ASSESS: CPT | Performed by: CLINICAL NURSE SPECIALIST

## 2020-08-31 PROCEDURE — 3017F COLORECTAL CA SCREEN DOC REV: CPT | Performed by: CLINICAL NURSE SPECIALIST

## 2020-08-31 PROCEDURE — G8417 CALC BMI ABV UP PARAM F/U: HCPCS | Performed by: CLINICAL NURSE SPECIALIST

## 2020-08-31 PROCEDURE — 1123F ACP DISCUSS/DSCN MKR DOCD: CPT | Performed by: CLINICAL NURSE SPECIALIST

## 2020-08-31 PROCEDURE — G8400 PT W/DXA NO RESULTS DOC: HCPCS | Performed by: CLINICAL NURSE SPECIALIST

## 2020-08-31 PROCEDURE — G8427 DOCREV CUR MEDS BY ELIG CLIN: HCPCS | Performed by: CLINICAL NURSE SPECIALIST

## 2020-08-31 PROCEDURE — 99214 OFFICE O/P EST MOD 30 MIN: CPT | Performed by: CLINICAL NURSE SPECIALIST

## 2020-08-31 PROCEDURE — 1036F TOBACCO NON-USER: CPT | Performed by: CLINICAL NURSE SPECIALIST

## 2020-08-31 PROCEDURE — 4040F PNEUMOC VAC/ADMIN/RCVD: CPT | Performed by: CLINICAL NURSE SPECIALIST

## 2020-08-31 RX ORDER — DIGOXIN 125 MCG
125 TABLET ORAL DAILY
Qty: 90 TABLET | Refills: 1 | Status: SHIPPED | OUTPATIENT
Start: 2020-08-31 | End: 2021-02-02 | Stop reason: SDUPTHER

## 2020-08-31 RX ORDER — CALCIUM CARBONATE 500(1250)
500 TABLET ORAL DAILY
COMMUNITY
End: 2020-10-28

## 2020-08-31 RX ORDER — VITAMIN E 268 MG
400 CAPSULE ORAL DAILY
COMMUNITY

## 2020-08-31 RX ORDER — CALCIUM CARBONATE 300MG(750)
TABLET,CHEWABLE ORAL
COMMUNITY
End: 2020-10-28 | Stop reason: SDUPTHER

## 2020-08-31 NOTE — PROGRESS NOTES
Family History:   Family History   Problem Relation Age of Onset    Diabetes Mother     Kidney Disease Mother         reanl failure       Home Medications:  Prior to Admission medications    Medication Sig Start Date End Date Taking? Authorizing Provider   calcium carbonate (OSCAL) 500 MG TABS tablet Take 500 mg by mouth daily   Yes Historical Provider, MD   vitamin E 400 UNIT capsule Take 400 Units by mouth daily   Yes Historical Provider, MD   Magnesium 400 MG TABS Take by mouth   Yes Historical Provider, MD   digoxin (LANOXIN) 125 MCG tablet Take 1 tablet by mouth daily 8/31/20  Yes SHAMEKA Bianchi   escitalopram (LEXAPRO) 10 MG tablet Take 1 tablet by mouth daily 8/19/20  Yes Marga Cobian MD   clonazePAM (KLONOPIN) 0.5 MG tablet Take 0.5 tablets by mouth 2 times daily as needed for Anxiety for up to 30 days. Do not fill before 7/8/2020 8/19/20 9/18/20 Yes Negro Ortega MD   vitamin D3 (CHOLECALCIFEROL) 25 MCG (1000 UT) TABS tablet Take 1 tablet by mouth daily 7/8/20  Yes SHAMEKA Burnett   carvedilol (COREG) 6.25 MG tablet Take 0.5 tablets by mouth 2 times daily 7/1/20  Yes SHAMEKA Schafer - CNP   sacubitril-valsartan (ENTRESTO) 24-26 MG per tablet Take 1/2 tablet am and 1 tablet pm 7/1/20  Yes Devang Crane MD   spironolactone (ALDACTONE) 25 MG tablet Take 1/2 tablet M,W,F only 7/1/20  Yes Devang Crane MD   torsemide (DEMADEX) 20 MG tablet Take 1 tablet by mouth as needed (for weight gain or swelling) 5/14/20  Yes Devang Crane MD   ciprofloxacin (CIPRO) 250 MG tablet TAKE 1 TABLET BY MOUTH AFTER SEXUAL INTERCOURSE 4/23/20  Yes SHAMEKA Lindsey - CNP   Omega-3 Fatty Acids (FISH OIL) 1000 MG CAPS Take 3,000 mg by mouth 2 times daily    Yes Historical Provider, MD        Allergies:  Prednisone     Review of Systems:   · Constitutional: there has been no unanticipated weight loss. There's been no change in energy level, sleep pattern, or activity level. · Eyes: No visual changes or diplopia. No scleral icterus. · ENT: No Headaches, hearing loss or vertigo. No mouth sores or sore throat. · Cardiovascular: Reviewed in HPI  · Respiratory: No cough or wheezing, no sputum production. No hematemesis. · Gastrointestinal: No abdominal pain, appetite loss, blood in stools. No change in bowel or bladder habits. · Genitourinary: No dysuria, trouble voiding, or hematuria. · Musculoskeletal:  No gait disturbance, weakness or joint complaints. · Integumentary: No rash or pruritis. · Neurological: No headache, diplopia, change in muscle strength, numbness or tingling. No change in gait, balance, coordination, mood, affect, memory, mentation, behavior. · Psychiatric: No anxiety, no depression. · Endocrine: No malaise, fatigue or temperature intolerance. No excessive thirst, fluid intake, or urination. No tremor. · Hematologic/Lymphatic: No abnormal bruising or bleeding, blood clots or swollen lymph nodes. · Allergic/Immunologic: No nasal congestion or hives. Physical Examination:    Vitals:    08/31/20 1358   BP: (!) 94/58   Site: Left Upper Arm   Position: Sitting   Cuff Size: Large Adult   Pulse: 63   SpO2: 97%   Weight: 148 lb (67.1 kg)   Height: 5' 2\" (1.575 m)        Constitutional and General Appearance: Warm and dry, no apparent distress, normal coloration  HEENT:  Normocephalic, atraumatic  Respiratory:  · Normal excursion and expansion without use of accessory muscles  · Resp Auscultation: Normal breath sounds without dullness  Cardiovascular:  · The apical impulses not displaced  · Heart tones are crisp and normal  · JVP normal cm H2O  · Regular rate and rhythm  · Peripheral pulses are symmetrical and full  · There is no clubbing, cyanosis of the extremities.   · no edema  · Pedal Pulses: 2+ and equal   Abdomen:  · No masses or tenderness  · Liver/Spleen: No Abnormalities Noted  Neurological/Psychiatric:  · Alert and oriented in all spheres  · Moves all -Systolic pulmonary artery pressure (SPAP) is normal and estimated at 24   mmHg (right atrial pressure 3 mmHg). -IVC is normal in size (< 2.1 cm) and collapses > 50% with respiration   consistent with normal RA pressure (3 mmHg). Invasive procedures and treatments. [facility] 6/30/2020: Anatomy:   LM-Normal   LAD-Normal   Cx-Normal   OM- Normal   RCA-Dominant, Normal   RPDA- Normal   LVEF- 25  LVG- global hypokinesis  LVEDP- 5      Impression  ~Coronary Angiography w/ normal coronaries  ~LVG with LVEF of 25 and global regional wall motion abnormalities     Recommendation  ~Aggressive medical treatment and risk factor modification  ~1. Medications reviewed, no changes at this time. 2. Post cath IVF. Bedrest.  3. Cont OMT for CHF  4. Patient has been advised on the importance of regular exercise of at least 20-30 minutes daily alternating with aerobic and isometric activities. 5. Patient counseled about and offered assistance for smoking cessation   6. No indication for cardiac rehab  7. Follow up in 1-2 weeks with cardiology CHF clinic. Echo 10-19-18  - Left ventricle: The cavity size was moderately dilated. Wall    thickness was normal. Systolic function was severely reduced. The    estimated ejection fraction was in the range of 25% to 30%. There    is significant dyssynergy between the septal and lateral walls.    Mild hypokinesis of the inferoseptal myocardium. Severe    hypokinesis of the basal-midanterolateral myocardium. Moderate    hypokinesis of the apicallateral myocardium. Akinesis of the    apicalinferior myocardium. Doppler parameters are consistent with    abnormal left ventricular relaxation (grade 1 diastolic    dysfunction). - Ventricular septum: Septal motion showed abnormal function and    dyssynergy.  - Aortic valve: Trivial regurgitation.  - Right ventricle: The cavity size was mildly dilated. Wall    thickness was normal. Systolic function was normal by objective    interpretation. TAPSE: 2.6cm. Tricuspid annular systolic velocity:    37OO/M. A device lead is seen extending into the RV cavity.  - Right atrium: The atrium was mildly to moderately dilated. Lubertha Budds or catheter noted in right atrium.  - Tricuspid valve: Mild-moderate regurgitation.  - Pericardium, extracardiac: A trivial pericardial effusion was    identified.     Impressions:  Compared to the study from 2/5/14, LV systolic is  slightly more reduced. Assessment:    1. Chronic systolic heart failure (HCC) on arni, BB, digoxin and aldosterone antagonist   2. Non-ischemic cardiomyopathy ([address]    3. Biventricular ICD (implantable cardioverter-defibrillator) in place    4. PAF (paroxysmal atrial fibrillation) (HCC) no anticoag, follows with Dr Bridgett Steward:   Patient Instructions   1. Check blood work today  2. Refilled digoxin  3. Continue all current medications  4. RTO with echo and appt    She will need to change echo and appt in sept due to lack of insurance.   She is meeting with  tomorrow    I appreciate the opportunity of cooperating in the care of this individual.    EZRA Thomson, 8/31/2020, 2:43 PM

## 2020-08-31 NOTE — PATIENT INSTRUCTIONS
1. Check blood work today  2. Refilled digoxin  3. Continue all current medications  4.   RTO with echo and appt

## 2020-09-16 ENCOUNTER — NURSE TRIAGE (OUTPATIENT)
Dept: OTHER | Facility: CLINIC | Age: 65
End: 2020-09-16

## 2020-09-16 NOTE — TELEPHONE ENCOUNTER
Reason for Disposition   Continuous (nonstop) coughing interferes with work or school and no improvement using cough treatment per Care Advice    Answer Assessment - Initial Assessment Questions  1. ONSET: \"When did the cough begin? \"       9/10/20  2. SEVERITY: \"How bad is the cough today? \"       Cough is consistent productive white sputum  3. RESPIRATORY DISTRESS: \"Describe your breathing. \"       Breathing is ok  4. FEVER: \"Do you have a fever? \" If so, ask: \"What is your temperature, how was it measured, and when did it start? \"      Yes, Saturday was last fever 100F  5. HEMOPTYSIS: \"Are you coughing up any blood? \" If so ask: \"How much? \" (flecks, streaks, tablespoons, etc.)      denies  6. TREATMENT: \"What have you done so far to treat the cough? \" (e.g., meds, fluids, humidifier)      Took medication from prior illness  7. CARDIAC HISTORY: \"Do you have any history of heart disease? \" (e.g., heart attack, congestive heart failure)      Congestive cardiomyopathy  8. LUNG HISTORY: \"Do you have any history of lung disease? \"  (e.g., pulmonary embolus, asthma, emphysema)     denies  9. PE RISK FACTORS: \"Do you have a history of blood clots? \" (or: recent major surgery, recent prolonged travel, bedridden)      denies  10. OTHER SYMPTOMS: \"Do you have any other symptoms? (e.g., runny nose, wheezing, chest pain)        denies  11. PREGNANCY: \"Is there any chance you are pregnant? \" \"When was your last menstrual period? \"        no  12. TRAVEL: \"Have you traveled out of the country in the last month? \" (e.g., travel history, exposures)        no    Protocols used: COUGH-ADULT-[place]  Caller provided care advice and instructed to call back with worsening symptoms. Spoke with [place] to schedule apt.

## 2020-09-17 ENCOUNTER — VIRTUAL VISIT (OUTPATIENT)
Dept: INTERNAL MEDICINE CLINIC | Age: 65
End: 2020-09-17
Payer: COMMERCIAL

## 2020-09-17 PROCEDURE — 3017F COLORECTAL CA SCREEN DOC REV: CPT | Performed by: NURSE PRACTITIONER

## 2020-09-17 PROCEDURE — 1090F PRES/ABSN URINE INCON ASSESS: CPT | Performed by: NURSE PRACTITIONER

## 2020-09-17 PROCEDURE — 1123F ACP DISCUSS/DSCN MKR DOCD: CPT | Performed by: NURSE PRACTITIONER

## 2020-09-17 PROCEDURE — G8400 PT W/DXA NO RESULTS DOC: HCPCS | Performed by: NURSE PRACTITIONER

## 2020-09-17 PROCEDURE — G8427 DOCREV CUR MEDS BY ELIG CLIN: HCPCS | Performed by: NURSE PRACTITIONER

## 2020-09-17 PROCEDURE — 99213 OFFICE O/P EST LOW 20 MIN: CPT | Performed by: NURSE PRACTITIONER

## 2020-09-17 PROCEDURE — 4040F PNEUMOC VAC/ADMIN/RCVD: CPT | Performed by: NURSE PRACTITIONER

## 2020-09-17 RX ORDER — BENZONATATE 100 MG/1
100 CAPSULE ORAL 3 TIMES DAILY PRN
Qty: 30 CAPSULE | Refills: 0 | Status: SHIPPED | OUTPATIENT
Start: 2020-09-17 | End: 2020-09-24

## 2020-09-17 ASSESSMENT — ENCOUNTER SYMPTOMS
SHORTNESS OF BREATH: 0
CHEST TIGHTNESS: 0
COUGH: 1
WHEEZING: 0

## 2020-09-17 NOTE — PROGRESS NOTES
2020    TELEHEALTH EVALUATION -- Audio/Visual (During WMPDQ-67 public health emergency)    HPI:    Giovanny Alonso (:  1955) has requested an audio/video evaluation for the following concern(s):    Pt went urgent care staurday - testing negative for covid. Still coughing some - changed from productive to non-productive. Feeling much better  Fever and chills have resolved. Wants Rx for tessalon - as this is helping her cough. Review of Systems   Constitutional: Negative for chills, fatigue and fever. Respiratory: Positive for cough. Negative for chest tightness, shortness of breath and wheezing. Cardiovascular: Negative for chest pain, palpitations and leg swelling. Neurological: Negative for dizziness, tremors, light-headedness and headaches. Prior to Visit Medications    Medication Sig Taking? Authorizing Provider   benzonatate (TESSALON) 100 MG capsule Take 1 capsule by mouth 3 times daily as needed for Cough Yes SHAMEKA Mota - CNP   calcium carbonate (OSCAL) 500 MG TABS tablet Take 500 mg by mouth daily  Historical Provider, MD   vitamin E 400 UNIT capsule Take 400 Units by mouth daily  Historical Provider, MD   Magnesium 400 MG TABS Take by mouth  Historical Provider, MD   digoxin (LANOXIN) 125 MCG tablet Take 1 tablet by mouth daily  SHAMEKA Burnett - CNS   escitalopram (LEXAPRO) 10 MG tablet Take 1 tablet by mouth daily  Negro Ortega MD   clonazePAM (KLONOPIN) 0.5 MG tablet Take 0.5 tablets by mouth 2 times daily as needed for Anxiety for up to 30 days.  Do not fill before 2020  Negro Hendrickson MD   vitamin D3 (CHOLECALCIFEROL) 25 MCG (1000 UT) TABS tablet Take 1 tablet by mouth daily  SHAMEKA Burnett - CNS   carvedilol (COREG) 6.25 MG tablet Take 0.5 tablets by mouth 2 times daily  SHAMEKA Gregorio CNP   sacubitril-valsartan (ENTRESTO) 24-26 MG per tablet Take 1/2 tablet am and 1 tablet pm  Michelle Brown MD   spironolactone (ALDACTONE) 25 MG tablet Take 1/2 tablet M,W,F only  Mark Escobedo MD   torsemide (DEMADEX) 20 MG tablet Take 1 tablet by mouth as needed (for weight gain or swelling)  Mark Escobedo MD   ciprofloxacin (CIPRO) 250 MG tablet TAKE 1 TABLET BY MOUTH AFTER SEXUAL INTERCOURSE  [place], APRN - CNP   Omega-3 Fatty Acids (FISH OIL) 1000 MG CAPS Take 3,000 mg by mouth 2 times daily   Historical Provider, MD       Social History     Tobacco Use    Smoking status: Former Smoker     Packs/day: 0.25     Years: 10.00     Pack years: 2.50     Last attempt to quit: 1998     Years since quittin.4    Smokeless tobacco: Never Used    Tobacco comment: smoked 1 cig a day,   Substance Use Topics    Alcohol use: Yes    Drug use: Yes     Frequency: 2.0 times per week     Types: Marijuana            PHYSICAL EXAMINATION:  [ INSTRUCTIONS:  \"[x]\" Indicates a positive item  \"[]\" Indicates a negative item  -- DELETE ALL ITEMS NOT EXAMINED]  Vital Signs: (As obtained by patient/caregiver or practitioner observation)    No flowsheet data found. Physical Exam  Constitutional:       General: She is not in acute distress. Appearance: Normal appearance. She is not ill-appearing. HENT:      Head: Normocephalic and atraumatic. Pulmonary:      Effort: Pulmonary effort is normal. No respiratory distress. Neurological:      General: No focal deficit present. Mental Status: She is alert and oriented to person, place, and time. Mental status is at baseline. Psychiatric:         Mood and Affect: Mood normal.         Behavior: Behavior normal.        Other pertinent observable physical exam findings-     Due to this being a TeleHealth encounter, evaluation of the following organ systems is limited: Vitals/Constitutional/EENT/Resp/CV/GI//MS/Neuro/Skin/Heme-Lymph-Imm. ASSESSMENT/PLAN:  1.  Viral URI  Stable, improving significantly since onset  Reviewed supportive care  Tessalon sent at pt request   Supportive care reviewed  Humidified air  Honey lemon tea  Nasal rinse prn  Flonase daily  NSAIDs/ tylenol for pain and fever  Mucinex prn for congestion     - benzonatate (TESSALON) 100 MG capsule; Take 1 capsule by mouth 3 times daily as needed for Cough  Dispense: 30 capsule; Refill: 0    2. Cough  See 1   - benzonatate (TESSALON) 100 MG capsule; Take 1 capsule by mouth 3 times daily as needed for Cough  Dispense: 30 capsule; Refill: 0    Follow up if not improving or worsening    An  electronic signature was used to authenticate this note. --SHAMEKA Cadet - CNP on 9/17/2020 at 12:09 PM        Pursuant to the emergency declaration under the [facility] waiver authority and the [employer] Act, this Virtual  Visit was conducted, with patient's consent, to reduce the patient's risk of exposure to COVID-19 and provide continuity of care for an established patient. Services were provided through a video synchronous discussion virtually to substitute for in-person clinic visit.

## 2020-10-08 RX ORDER — CLONAZEPAM 0.5 MG/1
0.25 TABLET ORAL 2 TIMES DAILY PRN
Qty: 30 TABLET | Refills: 0 | Status: SHIPPED | OUTPATIENT
Start: 2020-10-08 | End: 2020-10-26 | Stop reason: SDUPTHER

## 2020-10-19 ENCOUNTER — NURSE ONLY (OUTPATIENT)
Dept: CARDIOLOGY CLINIC | Age: 65
End: 2020-10-19
Payer: COMMERCIAL

## 2020-10-19 PROBLEM — I50.22 CHRONIC SYSTOLIC HEART FAILURE (HCC): Status: ACTIVE | Noted: 2020-10-19

## 2020-10-19 PROCEDURE — 93297 REM INTERROG DEV EVAL ICPMS: CPT | Performed by: INTERNAL MEDICINE

## 2020-10-19 PROCEDURE — 93296 REM INTERROG EVL PM/IDS: CPT | Performed by: INTERNAL MEDICINE

## 2020-10-19 PROCEDURE — 93295 DEV INTERROG REMOTE 1/2/MLT: CPT | Performed by: INTERNAL MEDICINE

## 2020-10-19 NOTE — LETTER
[address] [employer] [phone]  [address] [place] [address] [phone]    Pacemaker/Defibrillator Clinic          10/19/20        [address] [employer]  [address] [place] [id number]        Dear [address]    This letter is to inform you that we received the transmission from your monitor at home that checks your implanted heart device. The next date your monitor will automatically transmit will be 1-19-21. If your report needs attention we will notify you. Your device and monitor are wireless and most transmit cellularly, but please periodically check your monitor is still plugged in to the electrical outlet. If you still use the telephone land line to send please ensure the connection to the phone aris is secure. This will help to ensure successful automatic transmissions in the future. Also, the monitor needs to be close to you while sleeping at night. Please be aware that the remote device transmission sites are periodically monitored only during regular business hours during which simultaneous in-office device clinics are being run. If your transmission requires attention, we will contact you as soon as possible. Thank you.             Corin

## 2020-10-19 NOTE — PROGRESS NOTES
We received remote transmission from patient's monitor at home. Transmission shows normal sensing and pacing function. EP physician will review. See interrogation under cardiology tab in the [facility] [address] field for more details. Optivol is within normal range.

## 2020-10-26 ENCOUNTER — OFFICE VISIT (OUTPATIENT)
Dept: PSYCHIATRY | Age: 65
End: 2020-10-26
Payer: COMMERCIAL

## 2020-10-26 VITALS
HEART RATE: 74 BPM | BODY MASS INDEX: 26.98 KG/M2 | DIASTOLIC BLOOD PRESSURE: 70 MMHG | SYSTOLIC BLOOD PRESSURE: 118 MMHG | HEIGHT: 62 IN | WEIGHT: 146.6 LBS | TEMPERATURE: 96.6 F

## 2020-10-26 PROCEDURE — G8417 CALC BMI ABV UP PARAM F/U: HCPCS | Performed by: PSYCHIATRY & NEUROLOGY

## 2020-10-26 PROCEDURE — G8400 PT W/DXA NO RESULTS DOC: HCPCS | Performed by: PSYCHIATRY & NEUROLOGY

## 2020-10-26 PROCEDURE — G8427 DOCREV CUR MEDS BY ELIG CLIN: HCPCS | Performed by: PSYCHIATRY & NEUROLOGY

## 2020-10-26 PROCEDURE — 1090F PRES/ABSN URINE INCON ASSESS: CPT | Performed by: PSYCHIATRY & NEUROLOGY

## 2020-10-26 PROCEDURE — 1036F TOBACCO NON-USER: CPT | Performed by: PSYCHIATRY & NEUROLOGY

## 2020-10-26 PROCEDURE — 3017F COLORECTAL CA SCREEN DOC REV: CPT | Performed by: PSYCHIATRY & NEUROLOGY

## 2020-10-26 PROCEDURE — 4040F PNEUMOC VAC/ADMIN/RCVD: CPT | Performed by: PSYCHIATRY & NEUROLOGY

## 2020-10-26 PROCEDURE — G8484 FLU IMMUNIZE NO ADMIN: HCPCS | Performed by: PSYCHIATRY & NEUROLOGY

## 2020-10-26 PROCEDURE — 1123F ACP DISCUSS/DSCN MKR DOCD: CPT | Performed by: PSYCHIATRY & NEUROLOGY

## 2020-10-26 PROCEDURE — 99213 OFFICE O/P EST LOW 20 MIN: CPT | Performed by: PSYCHIATRY & NEUROLOGY

## 2020-10-26 PROCEDURE — 90833 PSYTX W PT W E/M 30 MIN: CPT | Performed by: PSYCHIATRY & NEUROLOGY

## 2020-10-26 RX ORDER — CLONAZEPAM 0.5 MG/1
0.25 TABLET ORAL 2 TIMES DAILY PRN
Qty: 30 TABLET | Refills: 2 | Status: SHIPPED | OUTPATIENT
Start: 2020-11-07 | End: 2021-03-08 | Stop reason: SDUPTHER

## 2020-10-26 RX ORDER — ESCITALOPRAM OXALATE 20 MG/1
20 TABLET ORAL DAILY
Qty: 30 TABLET | Refills: 2 | Status: SHIPPED | OUTPATIENT
Start: 2020-10-26 | End: 2020-12-09 | Stop reason: DRUGHIGH

## 2020-10-26 ASSESSMENT — PATIENT HEALTH QUESTIONNAIRE - PHQ9
SUM OF ALL RESPONSES TO PHQ QUESTIONS 1-9: 2
2. FEELING DOWN, DEPRESSED OR HOPELESS: 1
1. LITTLE INTEREST OR PLEASURE IN DOING THINGS: 1
SUM OF ALL RESPONSES TO PHQ QUESTIONS 1-9: 2
SUM OF ALL RESPONSES TO PHQ QUESTIONS 1-9: 2
SUM OF ALL RESPONSES TO PHQ9 QUESTIONS 1 & 2: 2

## 2020-10-26 NOTE — PROGRESS NOTES
PSYCHIATRY PROGRESS NOTE      Xavier Diop  1955  10/26/20  Total time: 30 min  Individual therapy with E/M  PCP: Jarad Whitehead MD    CC:   Chief Complaint   Patient presents with    Depression         S:   Patient continues to notice variable moods. She can feel both days in which she is pretty depressed, and other days in which she feels much better. When she is depressed, it can rarely come up with pretty severe symptoms including thoughts about wanting to die now, or suicide. However she denies any intention to act on these thoughts. She can also have problems sleeping and is only getting 5 to 6 hours per night. She will get symptoms of high anxiety, nearly having panic symptoms about 2-3 times per week. She gets a sense of loneliness, and feels an emotional void inside. On good days she is noticing she is laughing more, more interested in doing things. She feels the Lexapro has been more helpful than the sertraline for her mood. She is noticing less sexual dysfunction with this medication. However there is concerned that part of her loss of libido is more so due to the circumstances and her emotional state. Current stressors continue to be her ex- still living with her. Their relationship, however, has improved and they have better communication and this is less a source of stress for her. She gets less anxiety seeing him around. She recognizes that he struggles with significant depression, and he has expressed a desire to get help for this. THERAPY GOALS: reduce anxiety and depression  THERAPY MODALITIES: supportive  THERAPY NOTES: Explored her feelings about her current relationship with her ex-. Discussed where she sees her future. Highlighted positive things that she is doing for herself, encouraged her to continue to focus on those things that make her feel stronger. Discussed the benefit of diversifying her social network.   Discussed emotional factors involved in her reduced desire for intimacy. ROS: no headaches, vision problems, dysuria, abd pain, chest pain or SOB        Current Outpatient Medications   Medication Sig Dispense Refill    escitalopram (LEXAPRO) 20 MG tablet Take 1 tablet by mouth daily 30 tablet 2    [START ON 11/7/2020] clonazePAM (KLONOPIN) 0.5 MG tablet Take 0.5 tablets by mouth 2 times daily as needed for Anxiety for up to 90 days. Do not fill before 11/7/2020 30 tablet 2    calcium carbonate (OSCAL) 500 MG TABS tablet Take 500 mg by mouth daily      vitamin E 400 UNIT capsule Take 400 Units by mouth daily      Magnesium 400 MG TABS Take by mouth      digoxin (LANOXIN) 125 MCG tablet Take 1 tablet by mouth daily 90 tablet 1    vitamin D3 (CHOLECALCIFEROL) 25 MCG (1000 UT) TABS tablet Take 1 tablet by mouth daily 90 tablet 1    carvedilol (COREG) 6.25 MG tablet Take 0.5 tablets by mouth 2 times daily 180 tablet 0    sacubitril-valsartan (ENTRESTO) 24-26 MG per tablet Take 1/2 tablet am and 1 tablet pm 180 tablet 3    spironolactone (ALDACTONE) 25 MG tablet Take 1/2 tablet M,W,F only 30 tablet 3    torsemide (DEMADEX) 20 MG tablet Take 1 tablet by mouth as needed (for weight gain or swelling) 30 tablet 3    ciprofloxacin (CIPRO) 250 MG tablet TAKE 1 TABLET BY MOUTH AFTER SEXUAL INTERCOURSE 10 tablet 1    Omega-3 Fatty Acids (FISH OIL) 1000 MG CAPS Take 3,000 mg by mouth 2 times daily        No current facility-administered medications for this visit.         O:  Wt Readings from Last 3 Encounters:   10/26/20 146 lb 9.6 oz (66.5 kg)   08/31/20 148 lb (67.1 kg)   08/19/20 145 lb (65.8 kg)     Temp Readings from Last 3 Encounters:   10/26/20 96.6 °F (35.9 °C) (Temporal)   08/19/20 98 °F (36.7 °C) (Temporal)   07/01/20 96.6 °F (35.9 °C) (Temporal)     BP Readings from Last 3 Encounters:   10/26/20 118/70   08/31/20 (!) 94/58   08/19/20 116/80     Pulse Readings from Last 3 Encounters:   10/26/20 74   08/31/20 63   08/19/20 80       Mental Status Exam:   Appearance well dressed and groomed, no PMA/PMR  Behavior cooperative, good eye contact  Speech    spontaneous, normal rate and normal volume  Mood  \" depressed\"  Affect appears dysphoric, constricted, tearful at times  Thought Process    linear, goal directed and coherent  Thought Content   future oriented, no suicidal ideation at this time  Associations    logical connections  Attention/Concentration    intact  Memory    recent and remote memory intact  Insight/Judgement    Good / Intact    Labs:  Lab Results   Component Value Date    CREATININE 0.7 07/08/2020    BUN 21 (H) 07/08/2020     07/08/2020    K 4.5 07/08/2020    CL 98 (L) 07/08/2020    CO2 29 07/08/2020     Lab Results   Component Value Date    VITD25 30.8 07/08/2020       A:  Continues have significant depression and anxiety. Things are improving, and Lexapro seems to be helping a little bit more than sertraline. 1. MDD recurrent, moderate with anxious distress  2. Trauma and stressor related disorder      P:   1. Continue clonazepam 0.25mg BID prn anxiety /panic attacks  2. Increase Lexapro to 20 mg daily. 3. Will work on tapering off the clonazepam in the future.      I spent 16 min on psychotherapy with the patient    Follow-up: RTC in 4-6 weeks       Roni Albarran MD  Psychiatrist

## 2020-10-28 ENCOUNTER — OFFICE VISIT (OUTPATIENT)
Dept: INTERNAL MEDICINE CLINIC | Age: 65
End: 2020-10-28
Payer: COMMERCIAL

## 2020-10-28 VITALS
SYSTOLIC BLOOD PRESSURE: 100 MMHG | BODY MASS INDEX: 26.37 KG/M2 | TEMPERATURE: 97.2 F | WEIGHT: 144.2 LBS | HEART RATE: 72 BPM | DIASTOLIC BLOOD PRESSURE: 68 MMHG | OXYGEN SATURATION: 99 %

## 2020-10-28 PROCEDURE — G8417 CALC BMI ABV UP PARAM F/U: HCPCS | Performed by: NURSE PRACTITIONER

## 2020-10-28 PROCEDURE — G8400 PT W/DXA NO RESULTS DOC: HCPCS | Performed by: NURSE PRACTITIONER

## 2020-10-28 PROCEDURE — 3017F COLORECTAL CA SCREEN DOC REV: CPT | Performed by: NURSE PRACTITIONER

## 2020-10-28 PROCEDURE — G8427 DOCREV CUR MEDS BY ELIG CLIN: HCPCS | Performed by: NURSE PRACTITIONER

## 2020-10-28 PROCEDURE — G8484 FLU IMMUNIZE NO ADMIN: HCPCS | Performed by: NURSE PRACTITIONER

## 2020-10-28 PROCEDURE — 1123F ACP DISCUSS/DSCN MKR DOCD: CPT | Performed by: NURSE PRACTITIONER

## 2020-10-28 PROCEDURE — 99213 OFFICE O/P EST LOW 20 MIN: CPT | Performed by: NURSE PRACTITIONER

## 2020-10-28 PROCEDURE — 90471 IMMUNIZATION ADMIN: CPT | Performed by: NURSE PRACTITIONER

## 2020-10-28 PROCEDURE — 1036F TOBACCO NON-USER: CPT | Performed by: NURSE PRACTITIONER

## 2020-10-28 PROCEDURE — 4040F PNEUMOC VAC/ADMIN/RCVD: CPT | Performed by: NURSE PRACTITIONER

## 2020-10-28 PROCEDURE — 1090F PRES/ABSN URINE INCON ASSESS: CPT | Performed by: NURSE PRACTITIONER

## 2020-10-28 PROCEDURE — 90694 VACC AIIV4 NO PRSRV 0.5ML IM: CPT | Performed by: NURSE PRACTITIONER

## 2020-10-28 RX ORDER — CARVEDILOL 6.25 MG/1
3.12 TABLET ORAL 2 TIMES DAILY
Qty: 180 TABLET | Refills: 0 | Status: SHIPPED | OUTPATIENT
Start: 2020-10-28 | End: 2021-02-02 | Stop reason: SDUPTHER

## 2020-10-28 RX ORDER — CALCIUM CARBONATE 300MG(750)
TABLET,CHEWABLE ORAL
Qty: 30 TABLET | Refills: 5 | Status: SHIPPED | OUTPATIENT
Start: 2020-10-28

## 2020-10-28 RX ORDER — SPIRONOLACTONE 25 MG/1
TABLET ORAL
Qty: 30 TABLET | Refills: 3 | Status: SHIPPED | OUTPATIENT
Start: 2020-10-28 | End: 2021-02-02 | Stop reason: SDUPTHER

## 2020-10-28 RX ORDER — MELATONIN
1000 DAILY
Qty: 90 TABLET | Refills: 1 | Status: SHIPPED | OUTPATIENT
Start: 2020-10-28

## 2020-10-28 RX ORDER — CALCIUM CARBONATE 500(1250)
500 TABLET ORAL DAILY
Qty: 30 TABLET | Refills: 5 | Status: SHIPPED | OUTPATIENT
Start: 2020-10-28

## 2020-10-28 ASSESSMENT — ENCOUNTER SYMPTOMS
SHORTNESS OF BREATH: 0
CHEST TIGHTNESS: 0
COUGH: 0
WHEEZING: 0

## 2020-10-28 NOTE — PROGRESS NOTES
10/28/20     Chief Complaint   Patient presents with    Medication Check     HPI    Here for follow up   Doing well on current medications   Seeing cardiology (Dr. Toney Rothman) for CHF, HTN, Magalis Roberts for Thermon Donate Dr. Dinah Singh - feeling like anxiety is improving     Allergies   Allergen Reactions    Prednisone      Caused an allergic reaction. Swelling all over her body. Current Outpatient Medications   Medication Sig Dispense Refill    spironolactone (ALDACTONE) 25 MG tablet Take 1/2 tablet M,W,F only 30 tablet 3    carvedilol (COREG) 6.25 MG tablet Take 0.5 tablets by mouth 2 times daily 180 tablet 0    vitamin D3 (CHOLECALCIFEROL) 25 MCG (1000 UT) TABS tablet Take 1 tablet by mouth daily 90 tablet 1    Magnesium 400 MG TABS Take by mouth daily 30 tablet 5    calcium carbonate (OSCAL) 500 MG TABS tablet Take 1 tablet by mouth daily 30 tablet 5    escitalopram (LEXAPRO) 20 MG tablet Take 1 tablet by mouth daily 30 tablet 2    [START ON 11/7/2020] clonazePAM (KLONOPIN) 0.5 MG tablet Take 0.5 tablets by mouth 2 times daily as needed for Anxiety for up to 90 days. Do not fill before 11/7/2020 30 tablet 2    vitamin E 400 UNIT capsule Take 400 Units by mouth daily      digoxin (LANOXIN) 125 MCG tablet Take 1 tablet by mouth daily 90 tablet 1    sacubitril-valsartan (ENTRESTO) 24-26 MG per tablet Take 1/2 tablet am and 1 tablet pm 180 tablet 3    ciprofloxacin (CIPRO) 250 MG tablet TAKE 1 TABLET BY MOUTH AFTER SEXUAL INTERCOURSE 10 tablet 1    Omega-3 Fatty Acids (FISH OIL) 1000 MG CAPS Take 3,000 mg by mouth 2 times daily        No current facility-administered medications for this visit. Review of Systems   Constitutional: Negative for chills, fatigue and fever. Respiratory: Negative for cough, chest tightness, shortness of breath and wheezing. Cardiovascular: Negative for chest pain, palpitations and leg swelling.    Neurological: Negative for dizziness, present  Stable, controlled on current regimen. BW UTD   Declined other HM today     Discussed medications with patient, who voiced understanding of their use and indications. All questions answered.     Follow up in 6 months and prn     Electronically signed by SHAMEKA Fairbanks CNP on 10/28/2020 at 3:20 PM

## 2020-12-02 RX ORDER — CIPROFLOXACIN 250 MG/1
TABLET, FILM COATED ORAL
Qty: 10 TABLET | Refills: 1 | Status: SHIPPED | OUTPATIENT
Start: 2020-12-02 | End: 2021-05-21 | Stop reason: SDUPTHER

## 2020-12-02 NOTE — TELEPHONE ENCOUNTER
[facility] is requesting a refill on Ciprofloxacin 250 mg tabs. Med pended.     Last OV 10/28/2020   Next OV Visit date not found  Last [facility] 9/4/2020

## 2020-12-09 ENCOUNTER — OFFICE VISIT (OUTPATIENT)
Dept: PSYCHIATRY | Age: 65
End: 2020-12-09
Payer: COMMERCIAL

## 2020-12-09 VITALS
WEIGHT: 145 LBS | HEART RATE: 64 BPM | BODY MASS INDEX: 26.68 KG/M2 | HEIGHT: 62 IN | TEMPERATURE: 97.6 F | SYSTOLIC BLOOD PRESSURE: 122 MMHG | DIASTOLIC BLOOD PRESSURE: 68 MMHG

## 2020-12-09 PROCEDURE — G8400 PT W/DXA NO RESULTS DOC: HCPCS | Performed by: PSYCHIATRY & NEUROLOGY

## 2020-12-09 PROCEDURE — 4040F PNEUMOC VAC/ADMIN/RCVD: CPT | Performed by: PSYCHIATRY & NEUROLOGY

## 2020-12-09 PROCEDURE — 90833 PSYTX W PT W E/M 30 MIN: CPT | Performed by: PSYCHIATRY & NEUROLOGY

## 2020-12-09 PROCEDURE — 99213 OFFICE O/P EST LOW 20 MIN: CPT | Performed by: PSYCHIATRY & NEUROLOGY

## 2020-12-09 PROCEDURE — 1123F ACP DISCUSS/DSCN MKR DOCD: CPT | Performed by: PSYCHIATRY & NEUROLOGY

## 2020-12-09 PROCEDURE — 1036F TOBACCO NON-USER: CPT | Performed by: PSYCHIATRY & NEUROLOGY

## 2020-12-09 PROCEDURE — 3017F COLORECTAL CA SCREEN DOC REV: CPT | Performed by: PSYCHIATRY & NEUROLOGY

## 2020-12-09 PROCEDURE — G8484 FLU IMMUNIZE NO ADMIN: HCPCS | Performed by: PSYCHIATRY & NEUROLOGY

## 2020-12-09 PROCEDURE — 1090F PRES/ABSN URINE INCON ASSESS: CPT | Performed by: PSYCHIATRY & NEUROLOGY

## 2020-12-09 PROCEDURE — G8427 DOCREV CUR MEDS BY ELIG CLIN: HCPCS | Performed by: PSYCHIATRY & NEUROLOGY

## 2020-12-09 PROCEDURE — G8417 CALC BMI ABV UP PARAM F/U: HCPCS | Performed by: PSYCHIATRY & NEUROLOGY

## 2020-12-09 RX ORDER — BUSPIRONE HYDROCHLORIDE 5 MG/1
5 TABLET ORAL 2 TIMES DAILY
Qty: 60 TABLET | Refills: 1 | Status: SHIPPED | OUTPATIENT
Start: 2020-12-09 | End: 2021-02-02 | Stop reason: SDUPTHER

## 2020-12-09 RX ORDER — ESCITALOPRAM OXALATE 10 MG/1
15 TABLET ORAL DAILY
Qty: 45 TABLET | Refills: 1 | Status: SHIPPED | OUTPATIENT
Start: 2020-12-09 | End: 2021-02-02 | Stop reason: SDUPTHER

## 2020-12-09 NOTE — PROGRESS NOTES
PSYCHIATRY PROGRESS NOTE      Harley Adams  1955 12/09/20  Total time: 30 min  Individual therapy with E/M  PCP: Simin Lau MD    CC:   Chief Complaint   Patient presents with    1 Month Follow-Up         S:   Doing better. Feels less depression, less anxiety. Energy can be low d/t heart condition and fibromyalgia pain. Pain can be worse in the winter months. Sleep is ok, but she relies on smoking 2 puffs of MJ at night to sleep - help her pain and she reports she has failed several other treatments for fibromyalgia. Noticing at times highetened anxiety in the form of her heart racing and feeling nervousness in her stomach. This is better, no longer triggered by her being around her ex- as their relationship seems to have improved. However, can be triggered by her going to [employer] on horse racing which she used to really enjoy, still does enjoy. Gambling isn't excessive. Other stressors include her worries about how she will manage her finances after her 's financial support stops in a couple yrs. She does have enough money saved but still worries about this. Having sexual side effects from lexapro - anorgasmia. Also causing headaches. THERAPY GOALS: reduce anxiety and depression  THERAPY MODALITIES: supportive  THERAPY NOTES: explored feelings about her current relationship with ex-. Highlighted positive changes made. Discussed influence on her anxiety. Explored underlying psychological factors that may be causing acute anxiety in certain situations.       ROS: +headaches, no vision problems, dysuria, abd pain, chest pain or SOB    Current Outpatient Medications   Medication Sig Dispense Refill    ciprofloxacin (CIPRO) 250 MG tablet TAKE 1 TABLET BY MOUTH AFTER SEXUAL INTERCOURSE 10 tablet 1    spironolactone (ALDACTONE) 25 MG tablet Take 1/2 tablet M,W,F only 30 tablet 3    carvedilol (COREG) 6.25 MG tablet Take 0.5 tablets by mouth 2 times daily 180 tablet 0  vitamin D3 (CHOLECALCIFEROL) 25 MCG (1000 UT) TABS tablet Take 1 tablet by mouth daily 90 tablet 1    Magnesium 400 MG TABS Take by mouth daily 30 tablet 5    calcium carbonate (OSCAL) 500 MG TABS tablet Take 1 tablet by mouth daily 30 tablet 5    escitalopram (LEXAPRO) 20 MG tablet Take 1 tablet by mouth daily 30 tablet 2    clonazePAM (KLONOPIN) 0.5 MG tablet Take 0.5 tablets by mouth 2 times daily as needed for Anxiety for up to 90 days. Do not fill before 11/7/2020 30 tablet 2    vitamin E 400 UNIT capsule Take 400 Units by mouth daily      digoxin (LANOXIN) 125 MCG tablet Take 1 tablet by mouth daily 90 tablet 1    sacubitril-valsartan (ENTRESTO) 24-26 MG per tablet Take 1/2 tablet am and 1 tablet pm 180 tablet 3    Omega-3 Fatty Acids (FISH OIL) 1000 MG CAPS Take 3,000 mg by mouth 2 times daily        No current facility-administered medications for this visit.         O:  Wt Readings from Last 3 Encounters:   12/09/20 145 lb (65.8 kg)   10/28/20 144 lb 3.2 oz (65.4 kg)   10/26/20 146 lb 9.6 oz (66.5 kg)     Temp Readings from Last 3 Encounters:   12/09/20 97.6 °F (36.4 °C) (Temporal)   10/28/20 97.2 °F (36.2 °C)   10/26/20 96.6 °F (35.9 °C) (Temporal)     BP Readings from Last 3 Encounters:   10/28/20 100/68   10/26/20 118/70   08/31/20 (!) 94/58     Pulse Readings from Last 3 Encounters:   10/28/20 72   10/26/20 74   08/31/20 63       Mental Status Exam:   Appearance well dressed and groomed, no PMA/PMR  Behavior cooperative, good eye contact  Speech    spontaneous, normal rate and normal volume  Mood  \" ok\"  Affect full quality, good motility and range  Thought Process    linear, goal directed and coherent  Thought Content   future oriented, no suicidal ideation at this time  Associations    logical connections  Attention/Concentration    intact  Memory    recent and remote memory intact  Insight/Judgement    Good / Intact    Labs:  Lab Results   Component Value Date    CREATININE 0.7 07/08/2020 BUN 21 (H) 07/08/2020     07/08/2020    K 4.5 07/08/2020    CL 98 (L) 07/08/2020    CO2 29 07/08/2020     Lab Results   Component Value Date    VITD25 30.8 07/08/2020       A:  Doing better with higher dose of lexapro. However having sexual side effects and headaches. Part of what is helping is better communication with ex- and better establishment of boundaries in this relationship. 1. MDD recurrent, in partial remission  2. Unspecified anxiety disorder  3. Trauma and stressor related disorder      P:   1. Continue clonazepam 0.25mg BID prn anxiety /panic attacks  2. Will reduce lexapro to 15mg, move to bedtime to see if this improved side effects  3. Will add buspar 5mg BID to see if this helps anxiety/sexual dysfunction. I spent 18 min on psychotherapy with the patient. Follow-up: RTC in 6 weeks.         Surinder Rodriguez MD  Psychiatrist

## 2020-12-09 NOTE — PATIENT INSTRUCTIONS
Reduce the lexapo to 15mg (1.5 tablets of the 10mg tabs). Try taking it at nighttime - this may reduce the side effects  Start taking buspar 5mg twice daily.

## 2020-12-21 ENCOUNTER — TELEPHONE (OUTPATIENT)
Dept: CARDIOLOGY CLINIC | Age: 65
End: 2020-12-21

## 2020-12-21 NOTE — TELEPHONE ENCOUNTER
Spoke with patient. She is not concerned about her device site. She states that she can feel her heat beating and it is irritating her. She would like her lower rate decreased.  Appointment made to see MAURICIO on 12/30/2020

## 2020-12-21 NOTE — TELEPHONE ENCOUNTER
Pt states she is having anxiety about having pacemaker in chest and bump on chest. Please call pt to advise.

## 2020-12-22 ENCOUNTER — TELEPHONE (OUTPATIENT)
Dept: INTERNAL MEDICINE CLINIC | Age: 65
End: 2020-12-22

## 2020-12-22 NOTE — TELEPHONE ENCOUNTER
Pt called states the  medications you prescribed is making her have more anxiety,dry mouth,dizzy and face is very dry and weird. She states took medications for one week and stopped. taking them but doesn't know which one is making her feel this way because she takes all of them at same time

## 2020-12-23 NOTE — TELEPHONE ENCOUNTER
Spoke with patient. Likely side effects from buspar. She started 5mg BID as recommended. I told her to stop the buspar, wait 1 week until side effects resolve. Then re-try at 2.5mg QAM for 1 week, then increase to 2.5mg BID if tolerated. If she is having fevers - she may need to get tested for COVID. Although that does not seem like the case at this time.

## 2020-12-28 PROBLEM — R42 DIZZINESS: Status: ACTIVE | Noted: 2020-12-28

## 2021-02-02 ENCOUNTER — TELEPHONE (OUTPATIENT)
Dept: INTERNAL MEDICINE CLINIC | Age: 66
End: 2021-02-02

## 2021-02-02 ENCOUNTER — TELEPHONE (OUTPATIENT)
Dept: PSYCHOLOGY | Age: 66
End: 2021-02-02

## 2021-02-02 DIAGNOSIS — I10 ESSENTIAL HYPERTENSION: ICD-10-CM

## 2021-02-02 DIAGNOSIS — I42.8 NON-ISCHEMIC CARDIOMYOPATHY (HCC): Chronic | ICD-10-CM

## 2021-02-02 DIAGNOSIS — I50.22 CHRONIC SYSTOLIC HEART FAILURE (HCC): Primary | ICD-10-CM

## 2021-02-02 RX ORDER — ESCITALOPRAM OXALATE 10 MG/1
15 TABLET ORAL DAILY
Qty: 45 TABLET | Refills: 1 | Status: SHIPPED | OUTPATIENT
Start: 2021-02-02 | End: 2021-03-01

## 2021-02-02 RX ORDER — SACUBITRIL AND VALSARTAN 24; 26 MG/1; MG/1
TABLET, FILM COATED ORAL
Qty: 135 TABLET | Refills: 0 | Status: SHIPPED | OUTPATIENT
Start: 2021-02-02 | End: 2021-02-19 | Stop reason: SDUPTHER

## 2021-02-02 RX ORDER — SPIRONOLACTONE 25 MG/1
TABLET ORAL
Qty: 15 TABLET | Refills: 0 | Status: SHIPPED | OUTPATIENT
Start: 2021-02-02 | End: 2021-02-19 | Stop reason: SDUPTHER

## 2021-02-02 RX ORDER — CARVEDILOL 3.12 MG/1
3.12 TABLET ORAL 2 TIMES DAILY
Qty: 180 TABLET | Refills: 1 | Status: SHIPPED | OUTPATIENT
Start: 2021-02-02 | End: 2021-08-30

## 2021-02-02 RX ORDER — BUSPIRONE HYDROCHLORIDE 5 MG/1
5 TABLET ORAL 2 TIMES DAILY
Qty: 60 TABLET | Refills: 1 | Status: SHIPPED | OUTPATIENT
Start: 2021-02-02 | End: 2021-03-08 | Stop reason: ALTCHOICE

## 2021-02-02 RX ORDER — DIGOXIN 125 MCG
125 TABLET ORAL DAILY
Qty: 90 TABLET | Refills: 1 | Status: SHIPPED | OUTPATIENT
Start: 2021-02-02 | End: 2021-10-29

## 2021-02-02 NOTE — TELEPHONE ENCOUNTER
Received refill request for Entresto and Spironolactone from [address] [place] [address].      Last OV: 04/16/2020 w/ MAURICIO ; 08/31/2020 w/ NPRG     Last Labs: 07/08/2020 BMP and BNP      Next Appointment: 02/17/2021 w/ MAURICIO

## 2021-02-02 NOTE — TELEPHONE ENCOUNTER
Pt calling for refills of Lexapro and Buspar---plese send to [facility] does have an appt with you 3/8. Thanks.

## 2021-02-02 NOTE — TELEPHONE ENCOUNTER
Pt. Asking if she should be taking the BUSPAR you prescribed today. She has never taken it before she said. So i'm not sure if the original medication request was an error or if the patient should be taking it.

## 2021-02-02 NOTE — TELEPHONE ENCOUNTER
Received refill request for Digoxin and Carvedilol from [facility].      Last OV: 04/16/2020 w/ MXA ; 08/31/2020 w/ NPRG    Last Labs: 07/08/2020 BMP and BNP     Next Appointment: 02/17/2021 w/ MAURICIO

## 2021-02-02 NOTE — TELEPHONE ENCOUNTER
Medication Refill    Medication needing refilled: spironolactone (ALDACTONE) 25 MG tablet , carvedilol (COREG) 6.25 MG tablet, digoxin (LANOXIN) 125 MCG tablet , sacubitril-valsartan (ENTRESTO) 24-26 MG per tablet     Dosage of the medication:    How are you taking this medication (QD, BID, TID, QID, PRN):    30 or 90 day supply called in:    When will you run out of your medication:    Which Pharmacy are we sending the medication to?:   [facility] [address] [phone]

## 2021-02-03 NOTE — TELEPHONE ENCOUNTER
I'm not sure if she is taking it. She doesn't have to take it. She did have dizziness with the medication but I had recommended resuming at a lower dose (1/2 tab twice daily) and slowly increasing to 1 tab twice daily. She doesn't have to restart it if she hasn't been taking it and we can discuss it at our next visit. But she can try taking it as 1/2 tab twice daily to see if it helps her anxiety.

## 2021-02-16 NOTE — PROGRESS NOTES
[address]   Electrophysiology Follow Up   Date: 2021    CC: PAF  HPI: Mason Beckett is a 77 y.o. female with a PMH of dilated cardiomyopathy s/p ICD Cardiac defibrillator placement (); Laser lead extraction and removal and replacement of Biv ICD () Gen change 2018, Atrial fibrillation, CHF (congestive heart failure) (10/2/2008), Dilated cardiomyopathy, Fibromyalgia, GERD, Hyperlipemia, hyperlipidemia (2010) and TB (). She was previously followed by me at [facility]. Lorine Epley presents to the office in follow up. She is doing well from a cardiac standpoint. She has an echo scheduled and follow with HF routinely. Discussed that if she hears an alert from her device she should contact the office. Patient denies lightheadedness, dizziness, chest pain, palpitations, orthopnea, edema, presyncope or syncope.      Past Medical History:   Diagnosis Date    Atrial fibrillation ([address]     Cardiomyopathy, nonischemic ([address] 0    CHF (congestive heart failure) (HCC)     Fibromyalgia     GERD (gastroesophageal reflux disease)     HSV-1 (herpes simplex virus 1) infection     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    Hyperlipidemia     Sleep apnea     uses cpap    Uterine fibroids affecting pregnancy         Past Surgical History:   Procedure Laterality Date    CARDIAC DEFIBRILLATOR PLACEMENT       SECTION      3 c-sections    COLONOSCOPY  2015    polypectomy    COLONOSCOPY N/A 2019    COLONOSCOPY POLYPECTOMY SNARE/COLD ASCENDING X1 , TRANSVERSE X1, SIGMOID X1 performed by William Taylor MD at [facility]      PACEMAKER INSERTION      UPPER GASTROINTESTINAL ENDOSCOPY  2015    UPPER GASTROINTESTINAL ENDOSCOPY N/A 2019    EGD GASTRIC BIOPSY performed by William Taylor MD at [address] [facility] N/A 2020    EGD BIOPSY performed by William Taylor MD at [address] [facility] ENDOSCOPY       Allergies: · Abdominal: Soft. Bowel sounds present. No distension, No tenderness. · Musculoskeletal: No tenderness. No edema    · Lymphadenopathy: Has no cervical adenopathy. · Neurological: Alert and oriented. Cranial nerve appears intact, No Gross deficit   · Skin: Skin is warm and dry. No rash noted. · Psychiatric: Has a normal behavior       Labs, diagnostic and imaging results reviewed. Reviewed. Lab Results   Component Value Date    TSH 2.01 2019    TSH 1.90 2010    CREATININE 0.7 2020    CREATININE 0.9 2020    AST 17 2020    ALT 14 2020       EC21  Dual chamber paced    Echo: 10/19/2018  Study Conclusions    - Left ventricle: The cavity size was moderately dilated. Wall    thickness was normal. Systolic function was severely reduced. The    estimated ejection fraction was in the range of 25% to 30%. There    is significant dyssynergy between the septal and lateral walls.    Mild hypokinesis of the inferoseptal myocardium. Severe    hypokinesis of the basal-midanterolateral myocardium. Moderate    hypokinesis of the apicallateral myocardium. Akinesis of the    apicalinferior myocardium. Doppler parameters are consistent with    abnormal left ventricular relaxation (grade 1 diastolic    dysfunction). - Ventricular septum: Septal motion showed abnormal function and    dyssynergy.  - Aortic valve: Trivial regurgitation.  - Right ventricle: The cavity size was mildly dilated. Wall    thickness was normal. Systolic function was normal by objective    interpretation. TAPSE: 2.6cm. Tricuspid annular systolic velocity:    16MU/G. A device lead is seen extending into the RV cavity.  - Right atrium: The atrium was mildly to moderately dilated. Zondra Lopez or catheter noted in right atrium.  - Tricuspid valve: Mild-moderate regurgitation.  - Pericardium, extracardiac: A trivial pericardial effusion was    identified. Impressions:  Compared to the study from 0/0/41, LV systolic is  slightly more reduced. Stress test ([facility]) 4/10/2017  FINL INTERPRETATION  There is normal myocardial perfusion; however, there is left ventricular dysfunction. There is artifact consistent with sub-diaphragmatic interfering activity. Overall study quality is good.  Scan significance indicates low to moderate cardiac risk. Test sensitivity is reduced with testing on anti-anginal drugs. Medication:  Current Outpatient Medications   Medication Sig Dispense Refill    carvedilol (COREG) 3.125 MG tablet Take 1 tablet by mouth 2 times daily 180 tablet 1    digoxin (LANOXIN) 125 MCG tablet Take 1 tablet by mouth daily 90 tablet 1    sacubitril-valsartan (ENTRESTO) 24-26 MG per tablet Take 1/2 tablet am and 1 tablet pm 135 tablet 0    spironolactone (ALDACTONE) 25 MG tablet Take 1/2 tablet M,W,F only 15 tablet 0    escitalopram (LEXAPRO) 10 MG tablet Take 1.5 tablets by mouth daily 45 tablet 1    busPIRone (BUSPAR) 5 MG tablet Take 1 tablet by mouth 2 times daily 60 tablet 1    ciprofloxacin (CIPRO) 250 MG tablet TAKE 1 TABLET BY MOUTH AFTER SEXUAL INTERCOURSE 10 tablet 1    vitamin D3 (CHOLECALCIFEROL) 25 MCG (1000 UT) TABS tablet Take 1 tablet by mouth daily 90 tablet 1    Magnesium 400 MG TABS Take by mouth daily 30 tablet 5    calcium carbonate (OSCAL) 500 MG TABS tablet Take 1 tablet by mouth daily 30 tablet 5    clonazePAM (KLONOPIN) 0.5 MG tablet Take 0.5 tablets by mouth 2 times daily as needed for Anxiety for up to 90 days. Do not fill before 11/7/2020 30 tablet 2    vitamin E 400 UNIT capsule Take 400 Units by mouth daily      Omega-3 Fatty Acids (FISH OIL) 1000 MG CAPS Take 3,000 mg by mouth 2 times daily        No current facility-administered medications for this visit.         Assessment and plan:     Cardiomyopathy  S/p Biv ICD gen change 7/2018  Last EF per echo 10/2018 25% to 30% Entresto, carvedilol, spironolactone, torsemide     Biv ICD  The CIED was interrogated and programmed and I supervised and reviewed all the data. All findings and changes are in device interrogation sheat and reflect my personal interpretation and changes and is scanned to Epic.  99%, AP 90% 1 year remaining battery  She probably has closer to 10 months left, we discussed that she should let us know when she gets an alert from her device, We will also get home monitoring established    One of her leads is showing up under the skin and makes her uncomfortable. We will do revision of the pocket when her device needs to be replaced. Atrial fibrillation, paroxysmal  No recurrence  Carvedilol 6.25 mg BID  Afib risk factors including age, HTN, obesity, inactivity and ESCOBAR were discussed with patient. Risk factor modification recommended. All questions were answered. - Treatment options including cardioversion, rate control strategy, antiarrhythmics, anticoagulation and possible ablation were discussed with patient. Risks, benefits and alternative of each treatment options were explained. All questions answered        Chronic CHF  Will establish care with  as patient wants to have all her care near her home  Entresto, carvedilol, spironolactone, torsemide       Dizziness   Mild, likely due to meds      ESCOBAR   CPAP      Plan:  10 month device check for assessment of need for replacement. Also consider revision of the pocket to remove the lead under the device. I independently reviewed   and device check interrogation     Thank you for allowing me to participate in the care of [facility]. Further evaluation will be based upon the patient's clinical course and testing results. All questions and concerns were addressed to the patient/family. Alternatives to my treatment were discussed. I have discussed the above stated plan and the patient verbalized understanding and agreed with the plan. NOTE: This report was transcribed using voice recognition software. Every effort was made to ensure accuracy, however, inadvertent computerized transcription errors may be present. Lavinia Whitney MD, [facility]   Office: [phone]     Scribe attestation:  This note was scribed in the presence of Lavinia Whitney MD by Benjy Bella RN    I, Dr. Lavinia Whitney personally performed the services described in this documentation as scribed by RN in my presence, and it is both accurate and complete.

## 2021-02-17 ENCOUNTER — NURSE ONLY (OUTPATIENT)
Dept: CARDIOLOGY CLINIC | Age: 66
End: 2021-02-17
Payer: COMMERCIAL

## 2021-02-17 ENCOUNTER — OFFICE VISIT (OUTPATIENT)
Dept: CARDIOLOGY CLINIC | Age: 66
End: 2021-02-17
Payer: COMMERCIAL

## 2021-02-17 VITALS
HEART RATE: 64 BPM | HEIGHT: 60 IN | OXYGEN SATURATION: 97 % | WEIGHT: 146 LBS | BODY MASS INDEX: 28.66 KG/M2 | DIASTOLIC BLOOD PRESSURE: 66 MMHG | SYSTOLIC BLOOD PRESSURE: 106 MMHG

## 2021-02-17 DIAGNOSIS — I42.8 NON-ISCHEMIC CARDIOMYOPATHY (HCC): Chronic | ICD-10-CM

## 2021-02-17 DIAGNOSIS — I50.22 CHRONIC SYSTOLIC HEART FAILURE (HCC): ICD-10-CM

## 2021-02-17 DIAGNOSIS — I48.0 PAF (PAROXYSMAL ATRIAL FIBRILLATION) (HCC): ICD-10-CM

## 2021-02-17 DIAGNOSIS — I42.8 NON-ISCHEMIC CARDIOMYOPATHY (HCC): Primary | ICD-10-CM

## 2021-02-17 DIAGNOSIS — Z95.810 BIVENTRICULAR ICD (IMPLANTABLE CARDIOVERTER-DEFIBRILLATOR) IN PLACE: ICD-10-CM

## 2021-02-17 DIAGNOSIS — I50.9 HEART FAILURE, UNSPECIFIED HF CHRONICITY, UNSPECIFIED HEART FAILURE TYPE (HCC): ICD-10-CM

## 2021-02-17 DIAGNOSIS — I50.20 HFREF (HEART FAILURE WITH REDUCED EJECTION FRACTION) (HCC): ICD-10-CM

## 2021-02-17 DIAGNOSIS — G47.33 OSA (OBSTRUCTIVE SLEEP APNEA): ICD-10-CM

## 2021-02-17 DIAGNOSIS — Z95.810 AICD (AUTOMATIC CARDIOVERTER/DEFIBRILLATOR) PRESENT: Primary | ICD-10-CM

## 2021-02-17 DIAGNOSIS — I48.0 PAROXYSMAL ATRIAL FIBRILLATION (HCC): ICD-10-CM

## 2021-02-17 DIAGNOSIS — I44.30 AV BLOCK: ICD-10-CM

## 2021-02-17 PROCEDURE — G8400 PT W/DXA NO RESULTS DOC: HCPCS | Performed by: INTERNAL MEDICINE

## 2021-02-17 PROCEDURE — 93284 PRGRMG EVAL IMPLANTABLE DFB: CPT | Performed by: INTERNAL MEDICINE

## 2021-02-17 PROCEDURE — G8417 CALC BMI ABV UP PARAM F/U: HCPCS | Performed by: INTERNAL MEDICINE

## 2021-02-17 PROCEDURE — 93290 INTERROG DEV EVAL ICPMS IP: CPT | Performed by: INTERNAL MEDICINE

## 2021-02-17 PROCEDURE — 1123F ACP DISCUSS/DSCN MKR DOCD: CPT | Performed by: INTERNAL MEDICINE

## 2021-02-17 PROCEDURE — 99214 OFFICE O/P EST MOD 30 MIN: CPT | Performed by: INTERNAL MEDICINE

## 2021-02-17 PROCEDURE — 93000 ELECTROCARDIOGRAM COMPLETE: CPT | Performed by: INTERNAL MEDICINE

## 2021-02-17 PROCEDURE — G8427 DOCREV CUR MEDS BY ELIG CLIN: HCPCS | Performed by: INTERNAL MEDICINE

## 2021-02-17 PROCEDURE — 1090F PRES/ABSN URINE INCON ASSESS: CPT | Performed by: INTERNAL MEDICINE

## 2021-02-17 PROCEDURE — 1036F TOBACCO NON-USER: CPT | Performed by: INTERNAL MEDICINE

## 2021-02-17 PROCEDURE — G8484 FLU IMMUNIZE NO ADMIN: HCPCS | Performed by: INTERNAL MEDICINE

## 2021-02-17 PROCEDURE — 3017F COLORECTAL CA SCREEN DOC REV: CPT | Performed by: INTERNAL MEDICINE

## 2021-02-17 PROCEDURE — 4040F PNEUMOC VAC/ADMIN/RCVD: CPT | Performed by: INTERNAL MEDICINE

## 2021-02-19 ENCOUNTER — HOSPITAL ENCOUNTER (OUTPATIENT)
Dept: NON INVASIVE DIAGNOSTICS | Age: 66
Discharge: HOME | End: 2021-02-19
Payer: COMMERCIAL

## 2021-02-19 ENCOUNTER — HOSPITAL ENCOUNTER (OUTPATIENT)
Age: 66
Discharge: HOME | End: 2021-02-19
Payer: COMMERCIAL

## 2021-02-19 ENCOUNTER — TELEPHONE (OUTPATIENT)
Dept: CARDIOLOGY CLINIC | Age: 66
End: 2021-02-19

## 2021-02-19 ENCOUNTER — OFFICE VISIT (OUTPATIENT)
Dept: CARDIOLOGY CLINIC | Age: 66
End: 2021-02-19
Payer: COMMERCIAL

## 2021-02-19 VITALS
OXYGEN SATURATION: 97 % | BODY MASS INDEX: 27 KG/M2 | WEIGHT: 143 LBS | HEIGHT: 61 IN | DIASTOLIC BLOOD PRESSURE: 70 MMHG | SYSTOLIC BLOOD PRESSURE: 100 MMHG | HEART RATE: 79 BPM

## 2021-02-19 DIAGNOSIS — Z95.810 BIVENTRICULAR ICD (IMPLANTABLE CARDIOVERTER-DEFIBRILLATOR) IN PLACE: ICD-10-CM

## 2021-02-19 DIAGNOSIS — I50.22 CHRONIC SYSTOLIC HEART FAILURE (HCC): ICD-10-CM

## 2021-02-19 DIAGNOSIS — I42.8 NON-ISCHEMIC CARDIOMYOPATHY (HCC): Chronic | ICD-10-CM

## 2021-02-19 DIAGNOSIS — I10 ESSENTIAL HYPERTENSION: ICD-10-CM

## 2021-02-19 DIAGNOSIS — I48.0 PAF (PAROXYSMAL ATRIAL FIBRILLATION) (HCC): ICD-10-CM

## 2021-02-19 DIAGNOSIS — I50.22 CHRONIC SYSTOLIC HEART FAILURE (HCC): Primary | ICD-10-CM

## 2021-02-19 DIAGNOSIS — I42.8 NON-ISCHEMIC CARDIOMYOPATHY (HCC): ICD-10-CM

## 2021-02-19 LAB
ANION GAP SERPL CALCULATED.3IONS-SCNC: 7 MMOL/L (ref 3–16)
BUN BLDV-MCNC: 25 MG/DL (ref 7–20)
CALCIUM SERPL-MCNC: 10.9 MG/DL (ref 8.3–10.6)
CHLORIDE BLD-SCNC: 98 MMOL/L (ref 99–110)
CO2: 31 MMOL/L (ref 21–32)
CREAT SERPL-MCNC: 0.8 MG/DL (ref 0.6–1.2)
GFR AFRICAN AMERICAN: >60
GFR NON-AFRICAN AMERICAN: >60
GLUCOSE BLD-MCNC: 113 MG/DL (ref 70–99)
LV EF: 28 %
LVEF MODALITY: NORMAL
POTASSIUM SERPL-SCNC: 5.2 MMOL/L (ref 3.5–5.1)
PRO-BNP: 100 PG/ML (ref 0–124)
SODIUM BLD-SCNC: 136 MMOL/L (ref 136–145)

## 2021-02-19 PROCEDURE — 83880 ASSAY OF NATRIURETIC PEPTIDE: CPT

## 2021-02-19 PROCEDURE — G8484 FLU IMMUNIZE NO ADMIN: HCPCS | Performed by: CLINICAL NURSE SPECIALIST

## 2021-02-19 PROCEDURE — 1036F TOBACCO NON-USER: CPT | Performed by: CLINICAL NURSE SPECIALIST

## 2021-02-19 PROCEDURE — 3017F COLORECTAL CA SCREEN DOC REV: CPT | Performed by: CLINICAL NURSE SPECIALIST

## 2021-02-19 PROCEDURE — 93306 TTE W/DOPPLER COMPLETE: CPT

## 2021-02-19 PROCEDURE — 1123F ACP DISCUSS/DSCN MKR DOCD: CPT | Performed by: CLINICAL NURSE SPECIALIST

## 2021-02-19 PROCEDURE — 1090F PRES/ABSN URINE INCON ASSESS: CPT | Performed by: CLINICAL NURSE SPECIALIST

## 2021-02-19 PROCEDURE — G8428 CUR MEDS NOT DOCUMENT: HCPCS | Performed by: CLINICAL NURSE SPECIALIST

## 2021-02-19 PROCEDURE — 99214 OFFICE O/P EST MOD 30 MIN: CPT | Performed by: CLINICAL NURSE SPECIALIST

## 2021-02-19 PROCEDURE — G8400 PT W/DXA NO RESULTS DOC: HCPCS | Performed by: CLINICAL NURSE SPECIALIST

## 2021-02-19 PROCEDURE — 36415 COLL VENOUS BLD VENIPUNCTURE: CPT

## 2021-02-19 PROCEDURE — 4040F PNEUMOC VAC/ADMIN/RCVD: CPT | Performed by: CLINICAL NURSE SPECIALIST

## 2021-02-19 PROCEDURE — G8417 CALC BMI ABV UP PARAM F/U: HCPCS | Performed by: CLINICAL NURSE SPECIALIST

## 2021-02-19 PROCEDURE — 80048 BASIC METABOLIC PNL TOTAL CA: CPT

## 2021-02-19 RX ORDER — SACUBITRIL AND VALSARTAN 24; 26 MG/1; MG/1
TABLET, FILM COATED ORAL
Qty: 135 TABLET | Refills: 1 | Status: SHIPPED | OUTPATIENT
Start: 2021-02-19 | End: 2021-10-11

## 2021-02-19 RX ORDER — SPIRONOLACTONE 25 MG/1
TABLET ORAL
Qty: 12 TABLET | Refills: 1 | Status: SHIPPED | OUTPATIENT
Start: 2021-02-19 | End: 2022-02-14 | Stop reason: SDUPTHER

## 2021-02-19 NOTE — TELEPHONE ENCOUNTER
----- Message from SHAMEKA Husain - CNS sent at 2/19/2021  2:51 PM EST -----  Sorry change the aldactone to 12.5 on just mon and Friday  thanks

## 2021-02-19 NOTE — PROGRESS NOTES
[facility]  Progress Note    Primary Care Doctor:  Devon Cosby MD    Chief Complaint   Patient presents with    Hypertension    Cardiomyopathy        History of Present Illness:  77 y.o. female with history of NICM, BiV ICD  and , AF (not on anticoagulation, follows with Dr Zhane Alcantar), ESCOBAR on cpap, fibromyalgia, sHF on entresto since , followed at [facility]  - for chest pain, LHC done, EGD with H pylori neg and anxiety/depression (family issues) seen by psychiatry and started on klonipin/zoloft. I had the pleasure of seeing Adonay Carrillo in follow up for sHF. She is ambulatory and by her self today. She had an echo done today which is pending. She denies any edema, palpitations, lightheadedness or chest pain. She does have some sob if she does a lot of stairs. She continues on her ex  insurance. Her pace art on  shows normal thoracic impedence. Past Medical History:   has a past medical history of Atrial fibrillation ([address] Cardiomyopathy, nonischemic ([address] CHF (congestive heart failure) ([address] Fibromyalgia, GERD (gastroesophageal reflux disease), HSV-1 (herpes simplex virus 1) infection, Hyperlipidemia, Sleep apnea, and Uterine fibroids affecting pregnancy. Surgical History:   has a past surgical history that includes  section; Pacemaker insertion (); Pacemaker insertion; Upper gastrointestinal endoscopy (2015); Colonoscopy (2015); Colonoscopy (N/A, 2019); Upper gastrointestinal endoscopy (N/A, 2019); Cardiac defibrillator placement; and Upper gastrointestinal endoscopy (N/A, 2020). Social History:   reports that she quit smoking about 22 years ago. She has a 2.50 pack-year smoking history. She has never used smokeless tobacco. She reports current alcohol use. She reports current drug use. Frequency: 2.00 times per week. Drug: Marijuana.    Family History:   Family History   Problem Relation Age of Onset    Diabetes Mother     Kidney Disease Mother         reanl failure       Home Medications:  Prior to Admission medications    Medication Sig Start Date End Date Taking? Authorizing Provider   carvedilol (COREG) 3.125 MG tablet Take 1 tablet by mouth 2 times daily 2/2/21   SHAMEKA Maravilla CNP   digoxin [facility]) 125 MCG tablet Take 1 tablet by mouth daily 2/2/21   SHAMEKA Maravilla CNP   sacubitril-valsartan (ENTRESTO) 24-26 MG per tablet Take 1/2 tablet am and 1 tablet pm 2/2/21   SHAMEKA Sow   spironolactone (ALDACTONE) 25 MG tablet Take 1/2 tablet M,W,F only 2/2/21   SHAMEKA Escalante   escitalopram (LEXAPRO) 10 MG tablet Take 1.5 tablets by mouth daily 2/2/21   Negro Hameed MD   busPIRone (BUSPAR) 5 MG tablet Take 1 tablet by mouth 2 times daily 2/2/21   Negro Ortega MD   ciprofloxacin (CIPRO) 250 MG tablet TAKE 1 TABLET BY MOUTH AFTER SEXUAL INTERCOURSE 12/2/20   SHAMEKA Cosme CNP   vitamin D3 (CHOLECALCIFEROL) 25 MCG (1000 UT) TABS tablet Take 1 tablet by mouth daily 10/28/20   SHAMEKA Cosme CNP   Magnesium 400 MG TABS Take by mouth daily 10/28/20   SHAMEKA Cosme CNP   calcium carbonate (OSCAL) 500 MG TABS tablet Take 1 tablet by mouth daily 10/28/20   SHAMEKA Reynoso CNP   clonazePAM (KLONOPIN) 0.5 MG tablet Take 0.5 tablets by mouth 2 times daily as needed for Anxiety for up to 90 days. Do not fill before 11/7/2020 11/7/20 2/17/21  Negro Ortega MD   vitamin E 400 UNIT capsule Take 400 Units by mouth daily    Historical Provider, MD   Omega-3 Fatty Acids (FISH OIL) 1000 MG CAPS Take 3,000 mg by mouth 2 times daily     Historical Provider, MD        Allergies:  Prednisone     Review of Systems:   · Constitutional: there has been no unanticipated weight loss. There's been no change in energy level, sleep pattern, or activity level. · Eyes: No visual changes or diplopia. No scleral icterus.   · ENT: No Headaches, hearing loss or vertigo. No mouth sores or sore throat. · Cardiovascular: Reviewed in HPI  · Respiratory: No cough or wheezing, no sputum production. No hematemesis. · Gastrointestinal: No abdominal pain, appetite loss, blood in stools. No change in bowel or bladder habits. · Genitourinary: No dysuria, trouble voiding, or hematuria. · Musculoskeletal:  No gait disturbance, weakness or joint complaints. · Integumentary: No rash or pruritis. · Neurological: No headache, diplopia, change in muscle strength, numbness or tingling. No change in gait, balance, coordination, mood, affect, memory, mentation, behavior. · Psychiatric: No anxiety, no depression. · Endocrine: No malaise, fatigue or temperature intolerance. No excessive thirst, fluid intake, or urination. No tremor. · Hematologic/Lymphatic: No abnormal bruising or bleeding, blood clots or swollen lymph nodes. · Allergic/Immunologic: No nasal congestion or hives. Physical Examination:    Vitals:    02/19/21 0918   BP: 100/70   Site: Right Upper Arm   Position: Sitting   Cuff Size: Medium Adult   Pulse: 79   SpO2: 97%   Weight: 143 lb (64.9 kg)   Height: 5' 1\" (1.549 m)        Constitutional and General Appearance: Warm and dry, no apparent distress, normal coloration  HEENT:  Normocephalic, atraumatic  Respiratory:  · Normal excursion and expansion without use of accessory muscles  · Resp Auscultation: Normal breath sounds without dullness  Cardiovascular:  · The apical impulses not displaced  · Heart tones are crisp and normal  · JVP normal cm H2O  · Regular rate and rhythm  · Peripheral pulses are symmetrical and full  · There is no clubbing, cyanosis of the extremities.   · no edema  · Pedal Pulses: 2+ and equal   Abdomen:  · No masses or tenderness  · Liver/Spleen: No Abnormalities Noted  Neurological/Psychiatric:  · Alert and oriented in all spheres  · Moves all extremities well  · Exhibits normal gait balance and coordination  · No abnormalities of mood, affect, memory, mentation, or behavior are noted    Lab Data:    CBC:   Lab Results   Component Value Date    WBC 5.8 06/30/2020    WBC 6.2 06/29/2020    WBC 7.5 06/28/2020    RBC 4.29 06/30/2020    RBC 4.37 06/29/2020    RBC 4.92 06/28/2020    HGB 13.1 06/30/2020    HGB 13.4 06/29/2020    HGB 15.0 06/28/2020    HCT 39.1 06/30/2020    HCT 39.9 06/29/2020    HCT 44.9 06/28/2020    MCV 91.2 06/30/2020    MCV 91.3 06/29/2020    MCV 91.3 06/28/2020    RDW 12.9 06/30/2020    RDW 13.2 06/29/2020    RDW 13.2 06/28/2020     06/30/2020     06/29/2020     06/28/2020     BMP:  Lab Results   Component Value Date     07/08/2020     07/01/2020     06/30/2020    K 4.5 07/08/2020    K 4.3 07/01/2020    K 4.2 06/30/2020    K 3.8 06/29/2020    K 6.1 06/28/2020    CL 98 07/08/2020     07/01/2020     06/30/2020    CO2 29 07/08/2020    CO2 23 07/01/2020    CO2 22 06/30/2020    PHOS 2.9 07/09/2018    PHOS 3.7 10/18/2017    PHOS 3.2 01/07/2016    BUN 21 07/08/2020    BUN 21 07/01/2020    BUN 27 06/30/2020    CREATININE 0.7 07/08/2020    CREATININE 0.9 07/01/2020    CREATININE 0.9 06/30/2020     BNP:   Lab Results   Component Value Date    PROBNP 116 07/08/2020    PROBNP 263 06/28/2020    PROBNP 64 11/05/2017     Cardiac Imaging:  Echo pending 2/19/2021    Echo 6/29/2020:  Summary   -Left ventricular systolic function is reduced with ejection fraction   estimated at 30 %. -Abnormal septal motion.   -Normal left ventricular wall thickness.   -Left ventricular size is severely increased. - Grade II diastolic dysfunction with elevated LV filling pressures.   -The right atrium is mildly dilated. -Pacemaker / ICD lead was visualized in the right atrium and ventricle.   -Aortic valve appears sclerotic but opens adequately. -Mild aortic and tricuspid regurgitation is present.    -Mild to moderate mitral regurgitation.   -Systolic pulmonary artery pressure (SPAP) is normal and estimated at 24   mmHg (right atrial pressure 3 mmHg). -IVC is normal in size (< 2.1 cm) and collapses > 50% with respiration   consistent with normal RA pressure (3 mmHg). Invasive procedures and treatments. [facility] 6/30/2020: Anatomy:   LM-Normal   LAD-Normal   Cx-Normal   OM- Normal   RCA-Dominant, Normal   RPDA- Normal   LVEF- 25  LVG- global hypokinesis  LVEDP- 5      Impression  ~Coronary Angiography w/ normal coronaries  ~LVG with LVEF of 25 and global regional wall motion abnormalities     Recommendation  ~Aggressive medical treatment and risk factor modification  ~1. Medications reviewed, no changes at this time. 2. Post cath IVF. Bedrest.  3. Cont OMT for CHF  4. Patient has been advised on the importance of regular exercise of at least 20-30 minutes daily alternating with aerobic and isometric activities. 5. Patient counseled about and offered assistance for smoking cessation   6. No indication for cardiac rehab  7. Follow up in 1-2 weeks with cardiology CHF clinic. Echo 10-19-18  - Left ventricle: The cavity size was moderately dilated. Wall    thickness was normal. Systolic function was severely reduced. The    estimated ejection fraction was in the range of 25% to 30%. There    is significant dyssynergy between the septal and lateral walls.    Mild hypokinesis of the inferoseptal myocardium. Severe    hypokinesis of the basal-midanterolateral myocardium. Moderate    hypokinesis of the apicallateral myocardium. Akinesis of the    apicalinferior myocardium. Doppler parameters are consistent with    abnormal left ventricular relaxation (grade 1 diastolic    dysfunction). - Ventricular septum: Septal motion showed abnormal function and    dyssynergy.  - Aortic valve: Trivial regurgitation.  - Right ventricle: The cavity size was mildly dilated. Wall    thickness was normal. Systolic function was normal by objective    interpretation. TAPSE: 2.6cm. Tricuspid annular systolic velocity:    10cm/s. A device lead is seen extending into the RV cavity.  - Right atrium: The atrium was mildly to moderately dilated. Lacinda Locker or catheter noted in right atrium.  - Tricuspid valve: Mild-moderate regurgitation.  - Pericardium, extracardiac: A trivial pericardial effusion was    identified.     Impressions:  Compared to the study from 6/3/33, LV systolic is  slightly more reduced. Assessment:    1. Chronic systolic heart failure (HCC) on arni, BB, digoxin and aldosterone antagonist   2. Non-ischemic cardiomyopathy ([address]    3. Biventricular ICD (implantable cardioverter-defibrillator) in place    4. PAF (paroxysmal atrial fibrillation) (HCC) no anticoag, follows with Dr Guevara Sa:   Patient Instructions   1. Check blood today  2. Continue all current medications  3. Will call you with the results of your echo  4. RTO in 5 months  5.   Will refill entresto and aldactone      I appreciate the opportunity of cooperating in the care of this individual.    EZRA Song, 2/19/2021, 1:07 PM

## 2021-02-19 NOTE — PATIENT INSTRUCTIONS
1. Check blood today  2. Continue all current medications  3. Will call you with the results of your echo  4. RTO in 5 months  5.   Will refill entresto and aldactone

## 2021-03-01 RX ORDER — ESCITALOPRAM OXALATE 10 MG/1
TABLET ORAL
Qty: 45 TABLET | Refills: 1 | Status: SHIPPED | OUTPATIENT
Start: 2021-03-01 | End: 2021-03-08

## 2021-03-01 NOTE — TELEPHONE ENCOUNTER
Medication:   Requested Prescriptions     Pending Prescriptions Disp Refills    escitalopram (LEXAPRO) 10 MG tablet [Pharmacy Med Name: ESCITALOPRAM 10MG TABLETS] 45 tablet 1     Sig: TAKE 1 AND 1/2 TABLETS BY MOUTH DAILY        Last Filled:      Patient Phone Number: [phone] (home)     Last appt: 1/25/2021   Next appt: 3/8/2021    Last OARRS: No flowsheet data found.

## 2021-03-02 ENCOUNTER — TELEPHONE (OUTPATIENT)
Dept: CARDIOLOGY CLINIC | Age: 66
End: 2021-03-02

## 2021-03-08 ENCOUNTER — OFFICE VISIT (OUTPATIENT)
Dept: PSYCHIATRY | Age: 66
End: 2021-03-08
Payer: COMMERCIAL

## 2021-03-08 VITALS
WEIGHT: 146.2 LBS | SYSTOLIC BLOOD PRESSURE: 100 MMHG | DIASTOLIC BLOOD PRESSURE: 80 MMHG | BODY MASS INDEX: 27.62 KG/M2 | HEART RATE: 74 BPM | OXYGEN SATURATION: 99 % | TEMPERATURE: 96.9 F

## 2021-03-08 DIAGNOSIS — F33.42 RECURRENT MAJOR DEPRESSIVE DISORDER, IN FULL REMISSION (HCC): Primary | ICD-10-CM

## 2021-03-08 DIAGNOSIS — F41.9 ANXIETY DISORDER, UNSPECIFIED TYPE: ICD-10-CM

## 2021-03-08 DIAGNOSIS — F19.981 SEXUAL DYSFUNCTION DUE TO PSYCHOACTIVE SUBSTANCE (HCC): ICD-10-CM

## 2021-03-08 PROCEDURE — 1090F PRES/ABSN URINE INCON ASSESS: CPT | Performed by: PSYCHIATRY & NEUROLOGY

## 2021-03-08 PROCEDURE — G8400 PT W/DXA NO RESULTS DOC: HCPCS | Performed by: PSYCHIATRY & NEUROLOGY

## 2021-03-08 PROCEDURE — G8417 CALC BMI ABV UP PARAM F/U: HCPCS | Performed by: PSYCHIATRY & NEUROLOGY

## 2021-03-08 PROCEDURE — 1036F TOBACCO NON-USER: CPT | Performed by: PSYCHIATRY & NEUROLOGY

## 2021-03-08 PROCEDURE — 99214 OFFICE O/P EST MOD 30 MIN: CPT | Performed by: PSYCHIATRY & NEUROLOGY

## 2021-03-08 PROCEDURE — G8427 DOCREV CUR MEDS BY ELIG CLIN: HCPCS | Performed by: PSYCHIATRY & NEUROLOGY

## 2021-03-08 PROCEDURE — 3017F COLORECTAL CA SCREEN DOC REV: CPT | Performed by: PSYCHIATRY & NEUROLOGY

## 2021-03-08 PROCEDURE — 1123F ACP DISCUSS/DSCN MKR DOCD: CPT | Performed by: PSYCHIATRY & NEUROLOGY

## 2021-03-08 PROCEDURE — 4040F PNEUMOC VAC/ADMIN/RCVD: CPT | Performed by: PSYCHIATRY & NEUROLOGY

## 2021-03-08 PROCEDURE — 90833 PSYTX W PT W E/M 30 MIN: CPT | Performed by: PSYCHIATRY & NEUROLOGY

## 2021-03-08 PROCEDURE — G8484 FLU IMMUNIZE NO ADMIN: HCPCS | Performed by: PSYCHIATRY & NEUROLOGY

## 2021-03-08 RX ORDER — CLONAZEPAM 0.5 MG/1
0.25 TABLET ORAL 2 TIMES DAILY PRN
Qty: 15 TABLET | Refills: 2 | Status: SHIPPED | OUTPATIENT
Start: 2021-03-08 | End: 2021-07-21 | Stop reason: SDUPTHER

## 2021-03-08 RX ORDER — ESCITALOPRAM OXALATE 10 MG/1
TABLET ORAL
Qty: 30 TABLET | Refills: 3 | Status: SHIPPED | OUTPATIENT
Start: 2021-03-08 | End: 2021-04-14 | Stop reason: SDUPTHER
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
## 2021-04-05 ENCOUNTER — IMMUNIZATION (OUTPATIENT)
Dept: PRIMARY CARE CLINIC | Age: 66
End: 2021-04-05
Payer: COMMERCIAL

## 2021-04-05 PROCEDURE — 0002A COVID-19, PFIZER VACCINE 30MCG/0.3ML DOSE: CPT | Performed by: FAMILY MEDICINE

## 2021-04-05 PROCEDURE — 91300 COVID-19, PFIZER VACCINE 30MCG/0.3ML DOSE: CPT | Performed by: FAMILY MEDICINE

## 2021-04-14 ENCOUNTER — OFFICE VISIT (OUTPATIENT)
Dept: PSYCHIATRY | Age: 66
End: 2021-04-14
Payer: COMMERCIAL

## 2021-04-14 VITALS
DIASTOLIC BLOOD PRESSURE: 64 MMHG | HEIGHT: 61 IN | HEART RATE: 64 BPM | BODY MASS INDEX: 27.38 KG/M2 | SYSTOLIC BLOOD PRESSURE: 102 MMHG | WEIGHT: 145 LBS

## 2021-04-14 DIAGNOSIS — R37 SEXUAL DYSFUNCTION: ICD-10-CM

## 2021-04-14 DIAGNOSIS — F33.42 RECURRENT MAJOR DEPRESSIVE DISORDER, IN FULL REMISSION (HCC): Primary | ICD-10-CM

## 2021-04-14 DIAGNOSIS — F41.9 ANXIETY DISORDER, UNSPECIFIED TYPE: ICD-10-CM

## 2021-04-14 PROCEDURE — 1036F TOBACCO NON-USER: CPT | Performed by: PSYCHIATRY & NEUROLOGY

## 2021-04-14 PROCEDURE — 1090F PRES/ABSN URINE INCON ASSESS: CPT | Performed by: PSYCHIATRY & NEUROLOGY

## 2021-04-14 PROCEDURE — 4040F PNEUMOC VAC/ADMIN/RCVD: CPT | Performed by: PSYCHIATRY & NEUROLOGY

## 2021-04-14 PROCEDURE — 1123F ACP DISCUSS/DSCN MKR DOCD: CPT | Performed by: PSYCHIATRY & NEUROLOGY

## 2021-04-14 PROCEDURE — 99214 OFFICE O/P EST MOD 30 MIN: CPT | Performed by: PSYCHIATRY & NEUROLOGY

## 2021-04-14 PROCEDURE — G8400 PT W/DXA NO RESULTS DOC: HCPCS | Performed by: PSYCHIATRY & NEUROLOGY

## 2021-04-14 PROCEDURE — 3017F COLORECTAL CA SCREEN DOC REV: CPT | Performed by: PSYCHIATRY & NEUROLOGY

## 2021-04-14 PROCEDURE — G8417 CALC BMI ABV UP PARAM F/U: HCPCS | Performed by: PSYCHIATRY & NEUROLOGY

## 2021-04-14 PROCEDURE — G8427 DOCREV CUR MEDS BY ELIG CLIN: HCPCS | Performed by: PSYCHIATRY & NEUROLOGY

## 2021-04-14 RX ORDER — ESCITALOPRAM OXALATE 10 MG/1
TABLET ORAL
Qty: 30 TABLET | Refills: 2 | Status: SHIPPED | OUTPATIENT
Start: 2021-04-14 | End: 2021-07-21 | Stop reason: SDUPTHER

## 2021-05-21 ENCOUNTER — TELEPHONE (OUTPATIENT)
Dept: INTERNAL MEDICINE CLINIC | Age: 66
End: 2021-05-21

## 2021-05-21 DIAGNOSIS — N39.0 FREQUENT UTI: ICD-10-CM

## 2021-05-21 RX ORDER — CIPROFLOXACIN 250 MG/1
TABLET, FILM COATED ORAL
Qty: 10 TABLET | Refills: 1 | Status: SHIPPED | OUTPATIENT
Start: 2021-05-21 | End: 2022-03-24 | Stop reason: SDUPTHER

## 2021-06-17 ENCOUNTER — OFFICE VISIT (OUTPATIENT)
Dept: INTERNAL MEDICINE CLINIC | Age: 66
End: 2021-06-17
Payer: COMMERCIAL

## 2021-06-17 VITALS
WEIGHT: 146.2 LBS | DIASTOLIC BLOOD PRESSURE: 78 MMHG | SYSTOLIC BLOOD PRESSURE: 122 MMHG | HEART RATE: 75 BPM | TEMPERATURE: 97.5 F | BODY MASS INDEX: 27.62 KG/M2 | OXYGEN SATURATION: 97 %

## 2021-06-17 DIAGNOSIS — H57.89 ITCHY, WATERY, AND RED EYE: ICD-10-CM

## 2021-06-17 DIAGNOSIS — R09.81 NASAL CONGESTION: ICD-10-CM

## 2021-06-17 DIAGNOSIS — R05.9 COUGH: Primary | ICD-10-CM

## 2021-06-17 PROCEDURE — 1036F TOBACCO NON-USER: CPT | Performed by: NURSE PRACTITIONER

## 2021-06-17 PROCEDURE — G8400 PT W/DXA NO RESULTS DOC: HCPCS | Performed by: NURSE PRACTITIONER

## 2021-06-17 PROCEDURE — 1090F PRES/ABSN URINE INCON ASSESS: CPT | Performed by: NURSE PRACTITIONER

## 2021-06-17 PROCEDURE — G8427 DOCREV CUR MEDS BY ELIG CLIN: HCPCS | Performed by: NURSE PRACTITIONER

## 2021-06-17 PROCEDURE — 99213 OFFICE O/P EST LOW 20 MIN: CPT | Performed by: NURSE PRACTITIONER

## 2021-06-17 PROCEDURE — G8417 CALC BMI ABV UP PARAM F/U: HCPCS | Performed by: NURSE PRACTITIONER

## 2021-06-17 PROCEDURE — 1123F ACP DISCUSS/DSCN MKR DOCD: CPT | Performed by: NURSE PRACTITIONER

## 2021-06-17 PROCEDURE — 3017F COLORECTAL CA SCREEN DOC REV: CPT | Performed by: NURSE PRACTITIONER

## 2021-06-17 PROCEDURE — 4040F PNEUMOC VAC/ADMIN/RCVD: CPT | Performed by: NURSE PRACTITIONER

## 2021-06-17 RX ORDER — FLUTICASONE PROPIONATE 50 MCG
1 SPRAY, SUSPENSION (ML) NASAL DAILY
Qty: 1 BOTTLE | Refills: 0 | Status: CANCELLED | OUTPATIENT
Start: 2021-06-17

## 2021-06-17 RX ORDER — IPRATROPIUM BROMIDE 21 UG/1
2 SPRAY, METERED NASAL EVERY 12 HOURS
Qty: 1 BOTTLE | Refills: 1 | Status: SHIPPED | OUTPATIENT
Start: 2021-06-17 | End: 2022-03-24

## 2021-06-17 ASSESSMENT — ENCOUNTER SYMPTOMS
SHORTNESS OF BREATH: 0
WHEEZING: 0
NAUSEA: 0
DIARRHEA: 0
SORE THROAT: 0
RHINORRHEA: 1
TROUBLE SWALLOWING: 0
CONSTIPATION: 0
EYE PAIN: 0
COUGH: 1
VOMITING: 0
ABDOMINAL PAIN: 0
EYE ITCHING: 1
SINUS PRESSURE: 1

## 2021-06-17 NOTE — PATIENT INSTRUCTIONS
Patient Education        ipratropium nasal  Pronunciation:  MANSOOR ra TRO pee um  Brand: Atrovent Nasal  What is the most important information I should know about ipratropium nasal?  Follow all directions on your medicine label and package. Tell each of your healthcare providers about all your medical conditions, allergies, and all medicines you use. What is ipratropium nasal?  Ipratropium nasal (for the nose) works by reducing mucus secretions in the nose. Ipratropium nasal is used to treat runny nose caused by seasonal allergies (hay fever). This medicine will not treat stuffy nose, sneezing, or cough. Ipratropium nasal may also be used for purposes not listed in this medication guide. What should I discuss with my healthcare provider before using ipratropium nasal?  You should not use this medicine if you are allergic to ipratropium or atropine. To make sure ipratropium nasal is safe for you, tell your doctor if you have:  · narrow-angle glaucoma;  · bladder obstruction or other urination problems;  · an enlarged prostate; or  · liver or kidney disease. FDA pregnancy category B. Ipratropium nasal is not expected to harm an unborn baby. Tell your doctor if you are pregnant or plan to become pregnant during treatment. Ipratropium nasal can pass into breast milk and may harm a nursing baby. Tell your doctor if you are breast-feeding a baby. Ipratropium nasal should not be used by a child younger than 11years old. How should I use ipratropium nasal?  Follow all directions on your prescription label. Do not use this medicine in larger or smaller amounts or for longer than recommended. This medicine comes with patient instructions for safe and effective use, and directions for priming the nasal spray pump. Follow these directions carefully. Ask your doctor or pharmacist if you have any questions.   Ipratropium nasal is usually used for only a short time, such as 4 days for the common cold or 3 weeks for information about ipratropium nasal.  Remember, keep this and all other medicines out of the reach of children, never share your medicines with others, and use this medication only for the indication prescribed. Every effort has been made to ensure that the information provided by [address] is accurate, up-to-date, and complete, but no guarantee is made to that effect. Drug information contained herein may be time sensitive. [facility] information has been compiled for use by healthcare practitioners and consumers in the [place] and therefore [facility] does not warrant that uses outside of the [place] are appropriate, unless specifically indicated otherwise. [facility]'s drug information does not endorse drugs, diagnose patients or recommend therapy. [facility]'s drug information is an informational resource designed to assist licensed healthcare practitioners in caring for their patients and/or to serve consumers viewing this service as a supplement to, and not a substitute for, the expertise, skill, knowledge and judgment of healthcare practitioners. The absence of a warning for a given drug or drug combination in no way should be construed to indicate that the drug or drug combination is safe, effective or appropriate for any given patient. [facility] does not assume any responsibility for any aspect of healthcare administered with the aid of information [facility] provides. The information contained herein is not intended to cover all possible uses, directions, precautions, warnings, drug interactions, allergic reactions, or adverse effects. If you have questions about the drugs you are taking, check with your doctor, nurse or pharmacist.  Copyright 3404-5240 [address]. Version: 5.01. Revision date: 7/31/2014. Care instructions adapted under license by [facility] ([facility]).  If you have questions about a medical condition or this instruction, always ask your healthcare professional. [employer] disclaims any warranty or liability for your use of this information.

## 2021-07-02 ENCOUNTER — HOSPITAL ENCOUNTER (OUTPATIENT)
Age: 66
Discharge: HOME | End: 2021-07-02
Payer: COMMERCIAL

## 2021-07-02 ENCOUNTER — OFFICE VISIT (OUTPATIENT)
Dept: CARDIOLOGY CLINIC | Age: 66
End: 2021-07-02
Payer: COMMERCIAL

## 2021-07-02 ENCOUNTER — NURSE ONLY (OUTPATIENT)
Dept: CARDIOLOGY CLINIC | Age: 66
End: 2021-07-02
Payer: COMMERCIAL

## 2021-07-02 VITALS
WEIGHT: 146 LBS | DIASTOLIC BLOOD PRESSURE: 70 MMHG | HEIGHT: 62 IN | OXYGEN SATURATION: 98 % | HEART RATE: 73 BPM | BODY MASS INDEX: 26.87 KG/M2 | SYSTOLIC BLOOD PRESSURE: 100 MMHG

## 2021-07-02 DIAGNOSIS — I42.8 NON-ISCHEMIC CARDIOMYOPATHY (HCC): Chronic | ICD-10-CM

## 2021-07-02 DIAGNOSIS — I50.22 CHRONIC SYSTOLIC HEART FAILURE (HCC): Primary | ICD-10-CM

## 2021-07-02 DIAGNOSIS — Z95.810 BIVENTRICULAR ICD (IMPLANTABLE CARDIOVERTER-DEFIBRILLATOR) IN PLACE: ICD-10-CM

## 2021-07-02 DIAGNOSIS — Z95.810 ICD (IMPLANTABLE CARDIOVERTER-DEFIBRILLATOR) IN PLACE: ICD-10-CM

## 2021-07-02 DIAGNOSIS — I44.30 AV BLOCK: ICD-10-CM

## 2021-07-02 DIAGNOSIS — Z95.810 AICD (AUTOMATIC CARDIOVERTER/DEFIBRILLATOR) PRESENT: ICD-10-CM

## 2021-07-02 DIAGNOSIS — I48.0 PAF (PAROXYSMAL ATRIAL FIBRILLATION) (HCC): ICD-10-CM

## 2021-07-02 DIAGNOSIS — I50.22 CHRONIC SYSTOLIC HEART FAILURE (HCC): ICD-10-CM

## 2021-07-02 DIAGNOSIS — I42.8 NON-ISCHEMIC CARDIOMYOPATHY (HCC): ICD-10-CM

## 2021-07-02 DIAGNOSIS — I50.9 HEART FAILURE, UNSPECIFIED HF CHRONICITY, UNSPECIFIED HEART FAILURE TYPE (HCC): ICD-10-CM

## 2021-07-02 LAB
ANION GAP SERPL CALCULATED.3IONS-SCNC: 10 MMOL/L (ref 3–16)
BUN BLDV-MCNC: 17 MG/DL (ref 7–20)
CALCIUM SERPL-MCNC: 9.6 MG/DL (ref 8.3–10.6)
CHLORIDE BLD-SCNC: 101 MMOL/L (ref 99–110)
CO2: 25 MMOL/L (ref 21–32)
CREAT SERPL-MCNC: 0.7 MG/DL (ref 0.6–1.2)
GFR AFRICAN AMERICAN: >60
GFR NON-AFRICAN AMERICAN: >60
GLUCOSE BLD-MCNC: 101 MG/DL (ref 70–99)
HCT VFR BLD CALC: 43.8 % (ref 36–48)
HEMOGLOBIN: 14.7 G/DL (ref 12–16)
MCH RBC QN AUTO: 31.7 PG (ref 26–34)
MCHC RBC AUTO-ENTMCNC: 33.6 G/DL (ref 31–36)
MCV RBC AUTO: 94.3 FL (ref 80–100)
PDW BLD-RTO: 13.2 % (ref 12.4–15.4)
PLATELET # BLD: 135 K/UL (ref 135–450)
PMV BLD AUTO: 11.1 FL (ref 5–10.5)
POTASSIUM SERPL-SCNC: 4.6 MMOL/L (ref 3.5–5.1)
PRO-BNP: 131 PG/ML (ref 0–124)
RBC # BLD: 4.64 M/UL (ref 4–5.2)
SODIUM BLD-SCNC: 136 MMOL/L (ref 136–145)
TSH SERPL DL<=0.05 MIU/L-ACNC: 1.5 UIU/ML (ref 0.27–4.2)
WBC # BLD: 6.1 K/UL (ref 4–11)

## 2021-07-02 PROCEDURE — 3017F COLORECTAL CA SCREEN DOC REV: CPT | Performed by: CLINICAL NURSE SPECIALIST

## 2021-07-02 PROCEDURE — G8400 PT W/DXA NO RESULTS DOC: HCPCS | Performed by: CLINICAL NURSE SPECIALIST

## 2021-07-02 PROCEDURE — G8427 DOCREV CUR MEDS BY ELIG CLIN: HCPCS | Performed by: CLINICAL NURSE SPECIALIST

## 2021-07-02 PROCEDURE — 4040F PNEUMOC VAC/ADMIN/RCVD: CPT | Performed by: CLINICAL NURSE SPECIALIST

## 2021-07-02 PROCEDURE — 99214 OFFICE O/P EST MOD 30 MIN: CPT | Performed by: CLINICAL NURSE SPECIALIST

## 2021-07-02 PROCEDURE — 1036F TOBACCO NON-USER: CPT | Performed by: CLINICAL NURSE SPECIALIST

## 2021-07-02 PROCEDURE — 83880 ASSAY OF NATRIURETIC PEPTIDE: CPT

## 2021-07-02 PROCEDURE — 84443 ASSAY THYROID STIM HORMONE: CPT

## 2021-07-02 PROCEDURE — 1090F PRES/ABSN URINE INCON ASSESS: CPT | Performed by: CLINICAL NURSE SPECIALIST

## 2021-07-02 PROCEDURE — 85027 COMPLETE CBC AUTOMATED: CPT

## 2021-07-02 PROCEDURE — 93297 REM INTERROG DEV EVAL ICPMS: CPT | Performed by: CLINICAL NURSE SPECIALIST

## 2021-07-02 PROCEDURE — 36415 COLL VENOUS BLD VENIPUNCTURE: CPT

## 2021-07-02 PROCEDURE — 80048 BASIC METABOLIC PNL TOTAL CA: CPT

## 2021-07-02 PROCEDURE — 1123F ACP DISCUSS/DSCN MKR DOCD: CPT | Performed by: CLINICAL NURSE SPECIALIST

## 2021-07-02 PROCEDURE — G8417 CALC BMI ABV UP PARAM F/U: HCPCS | Performed by: CLINICAL NURSE SPECIALIST

## 2021-07-02 NOTE — PROGRESS NOTES
optivol per NPRG today. [employer] transmission. Transmission shows normal sensing and pacing function. EP physician will review. See interrogation under the cardiology tab in the [address] [place] [address] [address] field for more details. Will continue to monitor remotely. Additional Notes per GitHub. Patient Name: Malika Butler Reason for Check: presence of cardiac device DEVICE ASSESSMENT: Available daily battery/lead measurements within expected range. Lead trends stable. Battery approaching Recommended Replacement Time. Current battery measurement: approx. 10 months remain Device has not yet triggered replacement indicator. ARRHYTHMIA SUMMARY: Based on programmed zones, no arrhythmias/high rate episodes detected since last interrogation on 17-FEB-2021. OBSERVATIONS: Device appears to be currently functioning as programmed.

## 2021-07-02 NOTE — PROGRESS NOTES
[facility]  Progress Note    Primary Care Doctor:  Carolina Reddy MD    Chief Complaint   Patient presents with    Follow-up     Optivol    Congestive Heart Failure        History of Present Illness:  77 y.o. female with history of NICM, BiV ICD  and , AF (not on anticoagulation, follows with Dr Yasmeen Garcia), ESCOBAR on cpap, fibromyalgia, sHF on entresto since 2019, followed at [facility]  - for chest pain, LHC done, EGD with H pylori neg and anxiety/depression (family issues) seen by psychiatry and started on klonipin/zoloft. I had the pleasure of seeing Michelle Valdez in follow up for sHF. She is ambulatory and by her self today. Her most recent echo continues to show LVEF 25-30%. She denies missing any of her medications. She is breathing well, no shortness of breath, chest pain, palpitations or edema. She has been on entresto since 2019. Her optival shows normal thoracic impedence. She does admit that she uses marijuana 3 puffs every day for the past 6 years for her fibromyalgia. Past Medical History:   has a past medical history of Atrial fibrillation ([address] Cardiomyopathy, nonischemic ([address] CHF (congestive heart failure) ([address] Fibromyalgia, GERD (gastroesophageal reflux disease), HSV-1 (herpes simplex virus 1) infection, Hyperlipidemia, Sleep apnea, and Uterine fibroids affecting pregnancy. Surgical History:   has a past surgical history that includes  section; Pacemaker insertion (); Pacemaker insertion; Upper gastrointestinal endoscopy (2015); Colonoscopy (2015); Colonoscopy (N/A, 2019); Upper gastrointestinal endoscopy (N/A, 2019); Cardiac defibrillator placement; and Upper gastrointestinal endoscopy (N/A, 2020). Social History:   reports that she quit smoking about 23 years ago. She has a 2.50 pack-year smoking history. She has never used smokeless tobacco. She reports current alcohol use. She reports current drug use. Provider, MD        Allergies:  Prednisone     Review of Systems:   · Constitutional: there has been no unanticipated weight loss. There's been no change in energy level, sleep pattern, or activity level. · Eyes: No visual changes or diplopia. No scleral icterus. · ENT: No Headaches, hearing loss or vertigo. No mouth sores or sore throat. · Cardiovascular: Reviewed in HPI  · Respiratory: No cough or wheezing, no sputum production. No hematemesis. · Gastrointestinal: No abdominal pain, appetite loss, blood in stools. No change in bowel or bladder habits. · Genitourinary: No dysuria, trouble voiding, or hematuria. · Musculoskeletal:  No gait disturbance, weakness or joint complaints. · Integumentary: No rash or pruritis. · Neurological: No headache, diplopia, change in muscle strength, numbness or tingling. No change in gait, balance, coordination, mood, affect, memory, mentation, behavior. · Psychiatric: No anxiety, no depression. · Endocrine: No malaise, fatigue or temperature intolerance. No excessive thirst, fluid intake, or urination. No tremor. · Hematologic/Lymphatic: No abnormal bruising or bleeding, blood clots or swollen lymph nodes. · Allergic/Immunologic: No nasal congestion or hives.     Physical Examination:    Vitals:    07/02/21 1045   BP: 100/70   Site: Left Upper Arm   Position: Sitting   Cuff Size: Medium Adult   Pulse: 73   SpO2: 98%   Weight: 146 lb (66.2 kg)   Height: 5' 2\" (1.575 m)        Constitutional and General Appearance: Warm and dry, no apparent distress, normal coloration  HEENT:  Normocephalic, atraumatic  Respiratory:  · Normal excursion and expansion without use of accessory muscles  · Resp Auscultation: Normal breath sounds without dullness  Cardiovascular:  · The apical impulses not displaced  · Heart tones are crisp and normal  · JVP normal cm H2O  · Regular rate and rhythm  · Peripheral pulses are symmetrical and full  · There is no clubbing, cyanosis of the extremities. · no edema  · Pedal Pulses: 2+ and equal   Abdomen:  · No masses or tenderness  · Liver/Spleen: No Abnormalities Noted  Neurological/Psychiatric:  · Alert and oriented in all spheres  · Moves all extremities well  · Exhibits normal gait balance and coordination  · No abnormalities of mood, affect, memory, mentation, or behavior are noted    Lab Data:    CBC:   Lab Results   Component Value Date    WBC 5.8 06/30/2020    WBC 6.2 06/29/2020    WBC 7.5 06/28/2020    RBC 4.29 06/30/2020    RBC 4.37 06/29/2020    RBC 4.92 06/28/2020    HGB 13.1 06/30/2020    HGB 13.4 06/29/2020    HGB 15.0 06/28/2020    HCT 39.1 06/30/2020    HCT 39.9 06/29/2020    HCT 44.9 06/28/2020    MCV 91.2 06/30/2020    MCV 91.3 06/29/2020    MCV 91.3 06/28/2020    RDW 12.9 06/30/2020    RDW 13.2 06/29/2020    RDW 13.2 06/28/2020     06/30/2020     06/29/2020     06/28/2020     BMP:  Lab Results   Component Value Date     02/19/2021     07/08/2020     07/01/2020    K 5.2 02/19/2021    K 4.5 07/08/2020    K 4.3 07/01/2020    K 3.8 06/29/2020    K 6.1 06/28/2020    CL 98 02/19/2021    CL 98 07/08/2020     07/01/2020    CO2 31 02/19/2021    CO2 29 07/08/2020    CO2 23 07/01/2020    PHOS 2.9 07/09/2018    PHOS 3.7 10/18/2017    PHOS 3.2 01/07/2016    BUN 25 02/19/2021    BUN 21 07/08/2020    BUN 21 07/01/2020    CREATININE 0.8 02/19/2021    CREATININE 0.7 07/08/2020    CREATININE 0.9 07/01/2020     BNP:   Lab Results   Component Value Date    PROBNP 100 02/19/2021    PROBNP 116 07/08/2020    PROBNP 263 06/28/2020     Cardiac Imaging:  Echo 2/19/2021  Summary   -Severely reduced global systolic function with an ejection fraction   estimated at 25-30%. -Global hypokinesis noted.   -Average global longitudinal strain is estimated at -9.2%(Abnormal)   -Mild mitral regurgitation.   -There is mild tricuspid regurgitation with a RVSP estimation of 26 mmHg.    -Grade II diastolic dysfunction with elevated LV filling pressures. Avg.   E/e'=26.7    Echo 6/29/2020:  Summary   -Left ventricular systolic function is reduced with ejection fraction   estimated at 30 %. -Abnormal septal motion.   -Normal left ventricular wall thickness.   -Left ventricular size is severely increased. - Grade II diastolic dysfunction with elevated LV filling pressures.   -The right atrium is mildly dilated. -Pacemaker / ICD lead was visualized in the right atrium and ventricle.   -Aortic valve appears sclerotic but opens adequately. -Mild aortic and tricuspid regurgitation is present. -Mild to moderate mitral regurgitation.   -Systolic pulmonary artery pressure (SPAP) is normal and estimated at 24   mmHg (right atrial pressure 3 mmHg). -IVC is normal in size (< 2.1 cm) and collapses > 50% with respiration   consistent with normal RA pressure (3 mmHg). Invasive procedures and treatments. [facility] 6/30/2020: Anatomy:   LM-Normal   LAD-Normal   Cx-Normal   OM- Normal   RCA-Dominant, Normal   RPDA- Normal   LVEF- 25  LVG- global hypokinesis  LVEDP- 5      Impression  ~Coronary Angiography w/ normal coronaries  ~LVG with LVEF of 25 and global regional wall motion abnormalities     Recommendation  ~Aggressive medical treatment and risk factor modification  ~1. Medications reviewed, no changes at this time. 2. Post cath IVF. Bedrest.  3. Cont OMT for CHF  4. Patient has been advised on the importance of regular exercise of at least 20-30 minutes daily alternating with aerobic and isometric activities. 5. Patient counseled about and offered assistance for smoking cessation   6. No indication for cardiac rehab  7. Follow up in 1-2 weeks with cardiology CHF clinic. Echo 10-19-18  - Left ventricle: The cavity size was moderately dilated. Wall    thickness was normal. Systolic function was severely reduced. The    estimated ejection fraction was in the range of 25% to 30%.  There    is significant dyssynergy between the septal and lateral walls.    Mild hypokinesis of the inferoseptal myocardium. Severe    hypokinesis of the basal-midanterolateral myocardium. Moderate    hypokinesis of the apicallateral myocardium. Akinesis of the    apicalinferior myocardium. Doppler parameters are consistent with    abnormal left ventricular relaxation (grade 1 diastolic    dysfunction). - Ventricular septum: Septal motion showed abnormal function and    dyssynergy.  - Aortic valve: Trivial regurgitation.  - Right ventricle: The cavity size was mildly dilated. Wall    thickness was normal. Systolic function was normal by objective    interpretation. TAPSE: 2.6cm. Tricuspid annular systolic velocity:    82AT/F. A device lead is seen extending into the RV cavity.  - Right atrium: The atrium was mildly to moderately dilated. Elvira Knack or catheter noted in right atrium.  - Tricuspid valve: Mild-moderate regurgitation.  - Pericardium, extracardiac: A trivial pericardial effusion was    identified.     Impressions:  Compared to the study from 7/3/84, LV systolic is  slightly more reduced. Assessment:    1. Chronic systolic heart failure (HCC) on arni, BB, digoxin and aldosterone antagonist   2. Non-ischemic cardiomyopathy ([address]    3. Biventricular ICD (implantable cardioverter-defibrillator) in place    4. PAF (paroxysmal atrial fibrillation) (HCC) no anticoag, follows with Dr Deb Mejia:   Patient Instructions   1. Start farxiga 10 mg once a day  2. Check blood work today and in 2 weeks  3. Continue all other medications  4.   RTO in 2 months      I appreciate the opportunity of cooperating in the care of this individual.    SHAMEKA Godinez - EZRA, CNS, 7/2/2021, 11:26 AM

## 2021-07-02 NOTE — PATIENT INSTRUCTIONS
1.  Start farxiga 10 mg once a day  2. Check blood work today and in 2 weeks  3. Continue all other medications  4.   RTO in 2 months

## 2021-07-03 PROCEDURE — 93296 REM INTERROG EVL PM/IDS: CPT | Performed by: INTERNAL MEDICINE

## 2021-07-03 PROCEDURE — 93295 DEV INTERROG REMOTE 1/2/MLT: CPT | Performed by: INTERNAL MEDICINE

## 2021-07-06 ENCOUNTER — TELEPHONE (OUTPATIENT)
Dept: CARDIOLOGY CLINIC | Age: 66
End: 2021-07-06

## 2021-07-06 NOTE — TELEPHONE ENCOUNTER
----- Message from SHAMEKA Baca - CNS sent at 7/5/2021  9:31 PM EDT -----  Labs are good  Fluid level is up a little  Make sure she started the farxiga and to check labs again in 2 weeks  thanks

## 2021-07-06 NOTE — TELEPHONE ENCOUNTER
I spoke to patient with Labs results per NPRG . Patient verbalized understanding. Advised patient to call back with any questions.

## 2021-07-15 ENCOUNTER — HOSPITAL ENCOUNTER (OUTPATIENT)
Age: 66
Discharge: HOME | End: 2021-07-15
Payer: COMMERCIAL

## 2021-07-15 DIAGNOSIS — I50.22 CHRONIC SYSTOLIC HEART FAILURE (HCC): ICD-10-CM

## 2021-07-15 LAB
ANION GAP SERPL CALCULATED.3IONS-SCNC: 10 MMOL/L (ref 3–16)
BUN BLDV-MCNC: 22 MG/DL (ref 7–20)
CALCIUM SERPL-MCNC: 10.1 MG/DL (ref 8.3–10.6)
CHLORIDE BLD-SCNC: 100 MMOL/L (ref 99–110)
CO2: 28 MMOL/L (ref 21–32)
CREAT SERPL-MCNC: 0.9 MG/DL (ref 0.6–1.2)
GFR AFRICAN AMERICAN: >60
GFR NON-AFRICAN AMERICAN: >60
GLUCOSE BLD-MCNC: 100 MG/DL (ref 70–99)
POTASSIUM SERPL-SCNC: 4.8 MMOL/L (ref 3.5–5.1)
PRO-BNP: 162 PG/ML (ref 0–124)
SODIUM BLD-SCNC: 138 MMOL/L (ref 136–145)

## 2021-07-15 PROCEDURE — 36415 COLL VENOUS BLD VENIPUNCTURE: CPT

## 2021-07-15 PROCEDURE — 80048 BASIC METABOLIC PNL TOTAL CA: CPT

## 2021-07-15 PROCEDURE — 83880 ASSAY OF NATRIURETIC PEPTIDE: CPT

## 2021-07-16 ENCOUNTER — TELEPHONE (OUTPATIENT)
Dept: CARDIOLOGY CLINIC | Age: 66
End: 2021-07-16

## 2021-07-16 DIAGNOSIS — I50.22 CHRONIC SYSTOLIC HEART FAILURE (HCC): Primary | ICD-10-CM

## 2021-07-16 NOTE — TELEPHONE ENCOUNTER
Spoke to patient she is feeling good on the new medication and she will keep taking it. Patient prior authorization for Rosie Dann was denied so we are appealing. She has 1 box of samples left and will  the refill we sent in.     Called [employer] to start a new prior authorization

## 2021-07-16 NOTE — TELEPHONE ENCOUNTER
----- Message from SHAMEKA Aragon - CNS sent at 7/16/2021  8:26 AM EDT -----  Labs are stable  How is she feeling since starting the farxiga  If no issues then continue current medications  Labs in 1 month  thanks

## 2021-07-21 ENCOUNTER — OFFICE VISIT (OUTPATIENT)
Dept: PSYCHIATRY | Age: 66
End: 2021-07-21
Payer: COMMERCIAL

## 2021-07-21 VITALS
OXYGEN SATURATION: 98 % | TEMPERATURE: 97.5 F | BODY MASS INDEX: 26.13 KG/M2 | DIASTOLIC BLOOD PRESSURE: 60 MMHG | HEIGHT: 62 IN | WEIGHT: 142 LBS | HEART RATE: 86 BPM | SYSTOLIC BLOOD PRESSURE: 110 MMHG

## 2021-07-21 DIAGNOSIS — F33.42 RECURRENT MAJOR DEPRESSIVE DISORDER, IN FULL REMISSION (HCC): Primary | ICD-10-CM

## 2021-07-21 DIAGNOSIS — F41.9 ANXIETY DISORDER, UNSPECIFIED TYPE: ICD-10-CM

## 2021-07-21 DIAGNOSIS — F43.9 TRAUMA AND STRESSOR-RELATED DISORDER: ICD-10-CM

## 2021-07-21 PROCEDURE — 99214 OFFICE O/P EST MOD 30 MIN: CPT | Performed by: PSYCHIATRY & NEUROLOGY

## 2021-07-21 PROCEDURE — 1123F ACP DISCUSS/DSCN MKR DOCD: CPT | Performed by: PSYCHIATRY & NEUROLOGY

## 2021-07-21 PROCEDURE — 1090F PRES/ABSN URINE INCON ASSESS: CPT | Performed by: PSYCHIATRY & NEUROLOGY

## 2021-07-21 PROCEDURE — 4040F PNEUMOC VAC/ADMIN/RCVD: CPT | Performed by: PSYCHIATRY & NEUROLOGY

## 2021-07-21 PROCEDURE — 1036F TOBACCO NON-USER: CPT | Performed by: PSYCHIATRY & NEUROLOGY

## 2021-07-21 PROCEDURE — 3017F COLORECTAL CA SCREEN DOC REV: CPT | Performed by: PSYCHIATRY & NEUROLOGY

## 2021-07-21 PROCEDURE — G8427 DOCREV CUR MEDS BY ELIG CLIN: HCPCS | Performed by: PSYCHIATRY & NEUROLOGY

## 2021-07-21 PROCEDURE — G8400 PT W/DXA NO RESULTS DOC: HCPCS | Performed by: PSYCHIATRY & NEUROLOGY

## 2021-07-21 PROCEDURE — 90833 PSYTX W PT W E/M 30 MIN: CPT | Performed by: PSYCHIATRY & NEUROLOGY

## 2021-07-21 PROCEDURE — G8417 CALC BMI ABV UP PARAM F/U: HCPCS | Performed by: PSYCHIATRY & NEUROLOGY

## 2021-07-21 RX ORDER — ESCITALOPRAM OXALATE 10 MG/1
TABLET ORAL
Qty: 90 TABLET | Refills: 2 | Status: SHIPPED | OUTPATIENT
Start: 2021-07-21 | End: 2021-10-22 | Stop reason: SDUPTHER

## 2021-07-21 RX ORDER — CLONAZEPAM 0.5 MG/1
0.25 TABLET ORAL 2 TIMES DAILY PRN
Qty: 15 TABLET | Refills: 2 | Status: SHIPPED | OUTPATIENT
Start: 2021-07-21 | End: 2021-10-22 | Stop reason: SDUPTHER

## 2021-07-21 NOTE — PROGRESS NOTES
PSYCHIATRY PROGRESS NOTE      Anneliese Alvarez  1955 07/21/21  PCP: Jag Carroll MD    CC:   Chief Complaint   Patient presents with    Follow-up     S:   Jamar Donovan generally has been pretty stable with her anxiety. She uses clonazepam only a couple times per week for acute anxiety and this seems to be holding things together. Mood is ok, no significant issues with depressive symptoms. However, recently she has been feeling more anxiety given certain trigger. She actually remarried her  about 1 week ago - on the surface this was largely influenced by monetary reasons (health insurance, taxes, etc). She still maintain boundaries in the relationship. However there is physical intimacy (which she tends to want more of then he does) and they do many activities normal  couples do together so it blurs the boundaries between a friendship and marriage tend to get further skewed. Recently he has been requesting she make him a beneficiary in her living will, but she doesn't want to do this - but has caused her to feel very anxious about the fact that he is asking for these things. THERAPY GOALS: improve anxiety  THERAPY MODALITIES: supportive, psychodynamic  THERAPY NOTES: we explored her motivations and how she defines her current relationship. Highlights many of the complicated features of the relationship and the inevitability of volatility / strong emotions to occur d/t the hazy boundaries that make expectations (both physical and emotional) poorly defined on both ends, in an attempt to reframe her reactions as a consequence of the vary nature / foundation of the relationship itself not having clear boundaries.      ROS: No major physical issues noted    Current Outpatient Medications   Medication Sig Dispense Refill    escitalopram (LEXAPRO) 10 MG tablet Take 1 tablet daily 90 tablet 2    dapagliflozin (FARXIGA) 10 MG tablet Take 1 tablet by mouth every morning 30 tablet 1    ipratropium (ATROVENT) 0.03 % nasal spray 2 sprays by Each Nostril route every 12 hours For 3 weeks 1 Bottle 1    ciprofloxacin (CIPRO) 250 MG tablet TAKE 1 TABLET BY MOUTH AFTER SEXUAL INTERCOURSE 10 tablet 1    clonazePAM (KLONOPIN) 0.5 MG tablet Take 0.5 tablets by mouth 2 times daily as needed for Anxiety for up to 90 days. 15 tablet 2    sacubitril-valsartan (ENTRESTO) 24-26 MG per tablet Take 1/2 tablet am and 1 tablet pm 135 tablet 1    spironolactone (ALDACTONE) 25 MG tablet Take 1/2 tablet M,F only 12 tablet 1    carvedilol (COREG) 3.125 MG tablet Take 1 tablet by mouth 2 times daily 180 tablet 1    digoxin (LANOXIN) 125 MCG tablet Take 1 tablet by mouth daily 90 tablet 1    vitamin D3 (CHOLECALCIFEROL) 25 MCG (1000 UT) TABS tablet Take 1 tablet by mouth daily 90 tablet 1    Magnesium 400 MG TABS Take by mouth daily 30 tablet 5    calcium carbonate (OSCAL) 500 MG TABS tablet Take 1 tablet by mouth daily 30 tablet 5    vitamin E 400 UNIT capsule Take 400 Units by mouth daily      Omega-3 Fatty Acids (FISH OIL) 1000 MG CAPS Take 3,000 mg by mouth 2 times daily        No current facility-administered medications for this visit. O:  Wt Readings from Last 3 Encounters:   07/21/21 142 lb (64.4 kg)   07/02/21 146 lb (66.2 kg)   06/17/21 146 lb 3.2 oz (66.3 kg)     Temp Readings from Last 3 Encounters:   07/21/21 97.5 °F (36.4 °C)   06/17/21 97.5 °F (36.4 °C) (Temporal)   03/08/21 96.9 °F (36.1 °C)     BP Readings from Last 3 Encounters:   07/21/21 110/60   07/02/21 100/70   06/17/21 122/78     Pulse Readings from Last 3 Encounters:   07/21/21 86   07/02/21 73   06/17/21 75       Mental Status Exam:   Appearance well dressed and groomed, no PMA/PMR  Behavior cooperative, good eye contact  Speech    spontaneous, normal rate and normal volume  Mood  \" anxious \"   Affect full quality, good motility and range.  Occasionally tearful  Thought Process    linear, goal directed and coherent  Thought Content future oriented, no suicidal ideation at this time  Associations    logical connections  Attention/Concentration    intact  Memory    recent and remote memory intact  Insight/Judgement    Good / Intact    Labs:  Lab Results   Component Value Date    CREATININE 0.9 07/15/2021    BUN 22 (H) 07/15/2021     07/15/2021    K 4.8 07/15/2021     07/15/2021    CO2 28 07/15/2021     Lab Results   Component Value Date    VITD25 30.8 07/08/2020       A:  Current medication doses have been tolerated and effective. Having some situational anxiety and a big factor is more so the nature of the complicated relationship she has with her  that will naturally lead to some volatility as she chooses to continue to maintain a relationship with him that can fluctuate from feeling close to emotionally distant from each other. 1. MDD recurrent, in remission  2. Unspecified anxiety disorder  3. Trauma and stressor related disorder      P:   1. Continue lexapro 10mg daily  2. Continue clonazepam 0.25 mg twice daily as needed. (#15 0.5 mg tabs per month). I spent 20 min on psychotherapy with the patient.      Follow-up: RTC in 3 months       Diana Pandya MD  Psychiatrist

## 2021-07-27 NOTE — TELEPHONE ENCOUNTER
Spoke to [facility] rep and we need a letter of medical necessity faxed into them with the reference # [id number]  Called # [phone] or provider can call [phone] for peer to peer review

## 2021-08-29 DIAGNOSIS — I10 ESSENTIAL HYPERTENSION: ICD-10-CM

## 2021-08-30 RX ORDER — CARVEDILOL 3.12 MG/1
TABLET ORAL
Qty: 180 TABLET | Refills: 1 | Status: SHIPPED | OUTPATIENT
Start: 2021-08-30 | End: 2022-02-14 | Stop reason: SDUPTHER

## 2021-08-30 NOTE — TELEPHONE ENCOUNTER
Received refill request for Carvedilol from [facility] [employer] [employer].     Last ov:2021 MXA    Last EK2021    Last Refill:2021 #180 tabs w/ 1 refill    Next appointment:on recall list for 2022 w/ MXA

## 2021-09-06 DIAGNOSIS — F41.9 ANXIETY DISORDER, UNSPECIFIED TYPE: ICD-10-CM

## 2021-09-07 RX ORDER — CLONAZEPAM 0.5 MG/1
TABLET ORAL
Qty: 15 TABLET | OUTPATIENT
Start: 2021-09-07

## 2021-09-13 ENCOUNTER — OFFICE VISIT (OUTPATIENT)
Dept: CARDIOLOGY CLINIC | Age: 66
End: 2021-09-13
Payer: COMMERCIAL

## 2021-09-13 ENCOUNTER — TELEPHONE (OUTPATIENT)
Dept: CARDIOLOGY CLINIC | Age: 66
End: 2021-09-13

## 2021-09-13 VITALS
BODY MASS INDEX: 25.95 KG/M2 | HEIGHT: 62 IN | OXYGEN SATURATION: 97 % | HEART RATE: 76 BPM | DIASTOLIC BLOOD PRESSURE: 70 MMHG | WEIGHT: 141 LBS | SYSTOLIC BLOOD PRESSURE: 100 MMHG

## 2021-09-13 DIAGNOSIS — Z95.810 BIVENTRICULAR ICD (IMPLANTABLE CARDIOVERTER-DEFIBRILLATOR) IN PLACE: ICD-10-CM

## 2021-09-13 DIAGNOSIS — I42.8 NON-ISCHEMIC CARDIOMYOPATHY (HCC): ICD-10-CM

## 2021-09-13 DIAGNOSIS — I50.22 CHRONIC SYSTOLIC HEART FAILURE (HCC): Primary | ICD-10-CM

## 2021-09-13 DIAGNOSIS — I48.0 PAF (PAROXYSMAL ATRIAL FIBRILLATION) (HCC): ICD-10-CM

## 2021-09-13 PROCEDURE — 3017F COLORECTAL CA SCREEN DOC REV: CPT | Performed by: CLINICAL NURSE SPECIALIST

## 2021-09-13 PROCEDURE — 1036F TOBACCO NON-USER: CPT | Performed by: CLINICAL NURSE SPECIALIST

## 2021-09-13 PROCEDURE — G8400 PT W/DXA NO RESULTS DOC: HCPCS | Performed by: CLINICAL NURSE SPECIALIST

## 2021-09-13 PROCEDURE — G8417 CALC BMI ABV UP PARAM F/U: HCPCS | Performed by: CLINICAL NURSE SPECIALIST

## 2021-09-13 PROCEDURE — 1123F ACP DISCUSS/DSCN MKR DOCD: CPT | Performed by: CLINICAL NURSE SPECIALIST

## 2021-09-13 PROCEDURE — 4040F PNEUMOC VAC/ADMIN/RCVD: CPT | Performed by: CLINICAL NURSE SPECIALIST

## 2021-09-13 PROCEDURE — 99214 OFFICE O/P EST MOD 30 MIN: CPT | Performed by: CLINICAL NURSE SPECIALIST

## 2021-09-13 PROCEDURE — G8427 DOCREV CUR MEDS BY ELIG CLIN: HCPCS | Performed by: CLINICAL NURSE SPECIALIST

## 2021-09-13 PROCEDURE — 1090F PRES/ABSN URINE INCON ASSESS: CPT | Performed by: CLINICAL NURSE SPECIALIST

## 2021-09-13 NOTE — PROGRESS NOTES
times per week. Drug: Marijuana. Family History:   Family History   Problem Relation Age of Onset    Diabetes Mother     Kidney Disease Mother         reanl failure       Home Medications:  Prior to Admission medications    Medication Sig Start Date End Date Taking? Authorizing Provider   carvedilol (COREG) 3.125 MG tablet TAKE 1 TABLET BY MOUTH TWICE DAILY 8/30/21  Yes Gilford Hoover, APRN - CNP   escitalopram (LEXAPRO) 10 MG tablet Take 1 tablet daily 7/21/21  Yes Negro Ortega MD   clonazePAM (KLONOPIN) 0.5 MG tablet Take 0.5 tablets by mouth 2 times daily as needed for Anxiety for up to 90 days.  7/21/21 10/19/21 Yes Jimmy Maurice MD   ipratropium (ATROVENT) 0.03 % nasal spray 2 sprays by Each Nostril route every 12 hours For 3 weeks 6/17/21  Yes SHAMEKA Ye CNP   ciprofloxacin (CIPRO) 250 MG tablet TAKE 1 TABLET BY MOUTH AFTER SEXUAL INTERCOURSE 5/21/21  Yes SHAMEKA Cazares CNP   sacubitril-valsartan (ENTRESTO) 24-26 MG per tablet Take 1/2 tablet am and 1 tablet pm 2/19/21  Yes SHAMEKA Wynn   spironolactone (ALDACTONE) 25 MG tablet Take 1/2 tablet M,F only 2/19/21  Yes SHAMEKA Wynn   digoxin (LANOXIN) 125 MCG tablet Take 1 tablet by mouth daily 2/2/21  Yes Debbra Frankel, APRN - CNP   vitamin D3 (CHOLECALCIFEROL) 25 MCG (1000 UT) TABS tablet Take 1 tablet by mouth daily 10/28/20  Yes SHAMEKA Cazares CNP   Magnesium 400 MG TABS Take by mouth daily 10/28/20  Yes SHAMEKA Cazares CNP   calcium carbonate (OSCAL) 500 MG TABS tablet Take 1 tablet by mouth daily 10/28/20  Yes SHAMEKA Joel CNP   vitamin E 400 UNIT capsule Take 400 Units by mouth daily   Yes Historical Provider, MD   Omega-3 Fatty Acids (FISH OIL) 1000 MG CAPS Take 3,000 mg by mouth 2 times daily    Yes Historical Provider, MD   dapagliflozin (FARXIGA) 10 MG tablet Take 1 tablet by mouth every morning  Patient not taking: Reported on 9/13/2021 7/2/21 Carolina Marie, SHAMEKA - CNS        Allergies:  Prednisone     Review of Systems:   · Constitutional: there has been no unanticipated weight loss. There's been no change in energy level, sleep pattern, or activity level. · Eyes: No visual changes or diplopia. No scleral icterus. · ENT: No Headaches, hearing loss or vertigo. No mouth sores or sore throat. · Cardiovascular: Reviewed in HPI  · Respiratory: No cough or wheezing, no sputum production. No hematemesis. · Gastrointestinal: No abdominal pain, appetite loss, blood in stools. No change in bowel or bladder habits. · Genitourinary: No dysuria, trouble voiding, or hematuria. · Musculoskeletal:  No gait disturbance, weakness or joint complaints. · Integumentary: No rash or pruritis. · Neurological: No headache, diplopia, change in muscle strength, numbness or tingling. No change in gait, balance, coordination, mood, affect, memory, mentation, behavior. · Psychiatric: No anxiety, no depression. · Endocrine: No malaise, fatigue or temperature intolerance. No excessive thirst, fluid intake, or urination. No tremor. · Hematologic/Lymphatic: No abnormal bruising or bleeding, blood clots or swollen lymph nodes. · Allergic/Immunologic: No nasal congestion or hives.     Physical Examination:    Vitals:    09/13/21 1320   BP: 100/70   Site: Left Upper Arm   Position: Sitting   Cuff Size: Medium Adult   Pulse: 76   SpO2: 97%   Weight: 141 lb (64 kg)   Height: 5' 2\" (1.575 m)        Constitutional and General Appearance: Warm and dry, no apparent distress, normal coloration  HEENT:  Normocephalic, atraumatic  Respiratory:  · Normal excursion and expansion without use of accessory muscles  · Resp Auscultation: Normal breath sounds without dullness  Cardiovascular:  · The apical impulses not displaced  · Heart tones are crisp and normal  · JVP normal cm H2O  · Regular rate and rhythm  · Peripheral pulses are symmetrical and full  · There is no clubbing, cyanosis of the extremities. · no edema  · Pedal Pulses: 2+ and equal   Abdomen:  · No masses or tenderness  · Liver/Spleen: No Abnormalities Noted  Neurological/Psychiatric:  · Alert and oriented in all spheres  · Moves all extremities well  · Exhibits normal gait balance and coordination  · No abnormalities of mood, affect, memory, mentation, or behavior are noted    Lab Data:    CBC:   Lab Results   Component Value Date    WBC 6.1 07/02/2021    WBC 5.8 06/30/2020    WBC 6.2 06/29/2020    RBC 4.64 07/02/2021    RBC 4.29 06/30/2020    RBC 4.37 06/29/2020    HGB 14.7 07/02/2021    HGB 13.1 06/30/2020    HGB 13.4 06/29/2020    HCT 43.8 07/02/2021    HCT 39.1 06/30/2020    HCT 39.9 06/29/2020    MCV 94.3 07/02/2021    MCV 91.2 06/30/2020    MCV 91.3 06/29/2020    RDW 13.2 07/02/2021    RDW 12.9 06/30/2020    RDW 13.2 06/29/2020     07/02/2021     06/30/2020     06/29/2020     BMP:  Lab Results   Component Value Date     07/15/2021     07/02/2021     02/19/2021    K 4.8 07/15/2021    K 4.6 07/02/2021    K 5.2 02/19/2021    K 3.8 06/29/2020    K 6.1 06/28/2020     07/15/2021     07/02/2021    CL 98 02/19/2021    CO2 28 07/15/2021    CO2 25 07/02/2021    CO2 31 02/19/2021    PHOS 2.9 07/09/2018    PHOS 3.7 10/18/2017    PHOS 3.2 01/07/2016    BUN 22 07/15/2021    BUN 17 07/02/2021    BUN 25 02/19/2021    CREATININE 0.9 07/15/2021    CREATININE 0.7 07/02/2021    CREATININE 0.8 02/19/2021     BNP:   Lab Results   Component Value Date    PROBNP 162 07/15/2021    PROBNP 131 07/02/2021    PROBNP 100 02/19/2021     Cardiac Imaging:  Echo 2/19/2021  Summary   -Severely reduced global systolic function with an ejection fraction   estimated at 25-30%. -Global hypokinesis noted.   -Average global longitudinal strain is estimated at -9.2%(Abnormal)   -Mild mitral regurgitation.   -There is mild tricuspid regurgitation with a RVSP estimation of 26 mmHg.    -Grade II diastolic dysfunction with elevated LV filling pressures. Avg.   E/e'=26.7    Echo 6/29/2020:  Summary   -Left ventricular systolic function is reduced with ejection fraction   estimated at 30 %. -Abnormal septal motion.   -Normal left ventricular wall thickness.   -Left ventricular size is severely increased. - Grade II diastolic dysfunction with elevated LV filling pressures.   -The right atrium is mildly dilated. -Pacemaker / ICD lead was visualized in the right atrium and ventricle.   -Aortic valve appears sclerotic but opens adequately. -Mild aortic and tricuspid regurgitation is present. -Mild to moderate mitral regurgitation.   -Systolic pulmonary artery pressure (SPAP) is normal and estimated at 24   mmHg (right atrial pressure 3 mmHg). -IVC is normal in size (< 2.1 cm) and collapses > 50% with respiration   consistent with normal RA pressure (3 mmHg). Invasive procedures and treatments. [facility] 6/30/2020: Anatomy:   LM-Normal   LAD-Normal   Cx-Normal   OM- Normal   RCA-Dominant, Normal   RPDA- Normal   LVEF- 25  LVG- global hypokinesis  LVEDP- 5      Impression  ~Coronary Angiography w/ normal coronaries  ~LVG with LVEF of 25 and global regional wall motion abnormalities     Recommendation  ~Aggressive medical treatment and risk factor modification  ~1. Medications reviewed, no changes at this time. 2. Post cath IVF. Bedrest.  3. Cont OMT for CHF  4. Patient has been advised on the importance of regular exercise of at least 20-30 minutes daily alternating with aerobic and isometric activities. 5. Patient counseled about and offered assistance for smoking cessation   6. No indication for cardiac rehab  7. Follow up in 1-2 weeks with cardiology CHF clinic. Echo 10-19-18  - Left ventricle: The cavity size was moderately dilated. Wall    thickness was normal. Systolic function was severely reduced. The    estimated ejection fraction was in the range of 25% to 30%.  There    is significant dyssynergy between the septal and lateral walls.    Mild hypokinesis of the inferoseptal myocardium. Severe    hypokinesis of the basal-midanterolateral myocardium. Moderate    hypokinesis of the apicallateral myocardium. Akinesis of the    apicalinferior myocardium. Doppler parameters are consistent with    abnormal left ventricular relaxation (grade 1 diastolic    dysfunction). - Ventricular septum: Septal motion showed abnormal function and    dyssynergy.  - Aortic valve: Trivial regurgitation.  - Right ventricle: The cavity size was mildly dilated. Wall    thickness was normal. Systolic function was normal by objective    interpretation. TAPSE: 2.6cm. Tricuspid annular systolic velocity:    45XX/F. A device lead is seen extending into the RV cavity.  - Right atrium: The atrium was mildly to moderately dilated. Natasha Tribune or catheter noted in right atrium.  - Tricuspid valve: Mild-moderate regurgitation.  - Pericardium, extracardiac: A trivial pericardial effusion was    identified.     Impressions:  Compared to the study from 9/0/32, LV systolic is  slightly more reduced. Assessment:    1. Chronic systolic heart failure (HCC) on arni, BB, digoxin and aldosterone antagonist   2. Non-ischemic cardiomyopathy ([address]    3. Biventricular ICD (implantable cardioverter-defibrillator) in place    4. PAF (paroxysmal atrial fibrillation) (HCC) no anticoag, follows with Dr Leanne Morales:   Patient Instructions   1. Schedule an appt with Dr Rona Carr in December  2. Restart farxiga 10 mg daily  3. Check blood work in 1 month  4. We will do prior authorization for farxiga  5. Continue all other medications  6.   RTO in feb with me      I appreciate the opportunity of cooperating in the care of this individual.    SHAMEKA Santos - CNS, CNS, 9/13/2021, 2:27 PM

## 2021-09-13 NOTE — PATIENT INSTRUCTIONS
1.  Schedule an appt with Dr Rosemary Avalos in December  2. Restart farxiga 10 mg daily  3. Check blood work in 1 month  4. We will do prior authorization for farxiga  5. Continue all other medications  6.   RTO in feb with me

## 2021-09-13 NOTE — TELEPHONE ENCOUNTER
Pt seen [id number] [facility] with [facility] today, 9/13, and was told to schedule f/u w/MXA in Dec.  Can she be worked in as there is nothing available? Pls call to advise. Thank you.

## 2021-10-07 ENCOUNTER — TELEPHONE (OUTPATIENT)
Dept: CARDIOLOGY CLINIC | Age: 66
End: 2021-10-07

## 2021-10-07 RX ORDER — EMPAGLIFLOZIN 10 MG/1
1 TABLET, FILM COATED ORAL DAILY
Qty: 90 TABLET | Refills: 1 | Status: SHIPPED | OUTPATIENT
Start: 2021-10-07 | End: 2022-02-14 | Stop reason: SDUPTHER

## 2021-10-07 NOTE — TELEPHONE ENCOUNTER
Pt calling for NPRG, she put her on Rx dapagliflozin (FARXIGA) 10 MG tablet and [address] won't fill it for her and she doesn't know why. Can NPRG help with this?  Pls call to advise Thank you

## 2021-10-07 NOTE — TELEPHONE ENCOUNTER
Called [employer] prior authorization line Tomas Evans is not covered but Dasha Williamson is. Can patient be switched or do you want me to appeal the Tomas Evans?   Please advise

## 2021-10-13 ENCOUNTER — TELEPHONE (OUTPATIENT)
Dept: CARDIOLOGY CLINIC | Age: 66
End: 2021-10-13

## 2021-10-13 NOTE — TELEPHONE ENCOUNTER
Called patient and went over telephone message on 10-7-21 and explained that her Vic Antis was changed to Jardiance per [id number] [facility] because of insurance with verbal understanding from patient. Buck Sidhu, SHAMEKA - CNS       Note     Called [employer] prior authorization line Jag Avelar is not covered but Grabiel Aguilar is. Can patient be switched or do you want me to appeal the Jag Avelar?   Please advise           CW    10/7/21 3:14 PM  Harmony Onofre MA routed this conversation to Buck Sidhu, [address], SHAMEKA - CNS         10/7/21 3:20 PM  Note     Yes we can try the jardiance 10 mg   I sent a new script  Please let patient know

## 2021-10-13 NOTE — TELEPHONE ENCOUNTER
Pt called wanting make sure she got the right mediations from the pharmacy    mpagliflozin (JARDIANCE) 10 MG tablet    Pls advise thank you

## 2021-10-22 ENCOUNTER — TELEPHONE (OUTPATIENT)
Dept: PSYCHIATRY | Age: 66
End: 2021-10-22

## 2021-10-22 DIAGNOSIS — F41.9 ANXIETY DISORDER, UNSPECIFIED TYPE: ICD-10-CM

## 2021-10-22 RX ORDER — CLONAZEPAM 0.5 MG/1
0.25 TABLET ORAL 2 TIMES DAILY PRN
Qty: 15 TABLET | Refills: 0 | Status: SHIPPED | OUTPATIENT
Start: 2021-10-22 | End: 2022-02-18 | Stop reason: ALTCHOICE

## 2021-10-22 RX ORDER — ESCITALOPRAM OXALATE 10 MG/1
TABLET ORAL
Qty: 90 TABLET | Refills: 0 | Status: SHIPPED | OUTPATIENT
Start: 2021-10-22 | End: 2022-02-18 | Stop reason: SDUPTHER

## 2021-11-18 ENCOUNTER — NURSE ONLY (OUTPATIENT)
Dept: CARDIOLOGY CLINIC | Age: 66
End: 2021-11-18
Payer: COMMERCIAL

## 2021-11-18 DIAGNOSIS — I50.22 CHRONIC SYSTOLIC HEART FAILURE (HCC): ICD-10-CM

## 2021-11-18 DIAGNOSIS — Z95.810 BIVENTRICULAR ICD (IMPLANTABLE CARDIOVERTER-DEFIBRILLATOR) IN PLACE: ICD-10-CM

## 2021-11-18 DIAGNOSIS — I44.30 AV BLOCK: ICD-10-CM

## 2021-11-18 DIAGNOSIS — Z95.810 AICD (AUTOMATIC CARDIOVERTER/DEFIBRILLATOR) PRESENT: ICD-10-CM

## 2021-11-18 DIAGNOSIS — I50.9 HEART FAILURE, UNSPECIFIED HF CHRONICITY, UNSPECIFIED HEART FAILURE TYPE (HCC): ICD-10-CM

## 2021-11-18 DIAGNOSIS — I42.8 NON-ISCHEMIC CARDIOMYOPATHY (HCC): Chronic | ICD-10-CM

## 2021-11-18 DIAGNOSIS — I48.0 PAROXYSMAL ATRIAL FIBRILLATION (HCC): ICD-10-CM

## 2021-11-18 NOTE — PROGRESS NOTES
Patient comes in for programming evaluation for her defibrillator. All sensing and pacing parameters are within normal range. Battery life 9 months  AP 87.5%.  99.2%. Dependent  No episodes noted. Patient remains on Coreg and digoxin. No changes need to be made at this time. Please see interrogation for more detail. Optivol is within normal range. Patient will follow up in 1 months in office.

## 2021-11-22 PROCEDURE — 93284 PRGRMG EVAL IMPLANTABLE DFB: CPT | Performed by: INTERNAL MEDICINE

## 2021-11-22 PROCEDURE — 93290 INTERROG DEV EVAL ICPMS IP: CPT | Performed by: INTERNAL MEDICINE

## 2021-12-02 ENCOUNTER — TELEPHONE (OUTPATIENT)
Dept: INTERNAL MEDICINE CLINIC | Age: 66
End: 2021-12-02

## 2021-12-10 NOTE — PROGRESS NOTES
[facility], [place]   Electrophysiology Follow up   Date: 12/16/2021  I had the privilege of visiting Dez Cheung in the office. CC:    HPI: Dez Cheung is a 77 y.o. female  with a PMH of dilated cardiomyopathy s/p ICD Cardiac defibrillator placement (1990s); Laser lead extraction and removal and replacement of Biv ICD (2009) Gen change 7/2018, Atrial fibrillation, CHF (congestive heart failure) (10/2/2008), Dilated cardiomyopathy, Fibromyalgia, GERD, Hyperlipemia, hyperlipidemia (12/6/2010) and TB (1980s). She was previously followed by me at [facility]. Peter Hawkins presents today in routine follow up. She has been having episodes of dizziness when she stands up and states she is extremely tired from her fibromyalgia mostly. She states she has fibromyalgia pain. She is going to [facility] [place] and will return in February. She does not like to feel her pacemaker . She says it gives her anxiety. Assessment and plan:   Cardiomyopathy/Chronic Systolic  CHF     - Echo 13/32/8984 LVEF 25-30%   - 10/2018 25% to 30%   - continue Entresto 24-26 0.5 tablet in the morning and 1 tablet in the evening, Coreg 3.125 mg BID,Aldactone 25 0.5 tablet M,F only    - Follows with heart failure- Last OV NPRG 09/13/2021        Biv ICD   -S/p Biv ICD gen change 7/2018   -The CIED was interrogated and programmed and I supervised and reviewed all the data. All findings and changes are in device interrogation sheet and reflect my personal interpretation and changes and is scanned to Epic.   8 months  remaining, AP 90.8% ,   99.3%. We can adjust rate response to help with fatigue. Will demonstrate LIAN tone so she is aware.        \"One of her leads is showing up under the skin and makes her uncomfortable. We will do revision of the pocket when her device needs to be replaced. \"     Atrial fibrillation, paroxysmal   - EKG today  Dual chamber paced    - burden on device 0%   - No recurrence    - Patient has a [id number] Score of 3( age, gender, CHF)    - no AC - stopped after device placement    - Continue Digoxin 125 mcg    - Continue Carvedilol 3.125  mg BID    Afib risk factors including age, HTN, obesity, inactivity and ESCOBAR were discussed with patient. Risk factor modification recommended. All questions were answered. - Treatment options including cardioversion, rate control strategy, antiarrhythmics, anticoagulation and possible ablation were discussed with patient. Risks, benefits and alternative of each treatment options were explained. All questions answered             Dizziness              Mild, likely due to meds        ESCOBAR  -Stable: Uses CPAP   -Encourage to use machine to prevent long term effects of untreated ESCOBAR. Shortness of breath and fatigue    I will increase the rate response to see if that helps. Otherwise I think part of her symptoms are related to fibromyalgia and baseline heart failure.   Plan:    Follow up in 6 months for evaluation of battery and need for replacement    Patient Active Problem List    Diagnosis Date Noted    Dizziness 12/28/2020    Chronic systolic heart failure ([address] 10/19/2020    Major depressive disorder, recurrent episode, severe with anxious distress ([address]     Trauma and stressor-related disorder     Acute chest pain 06/28/2020    Hyperkalemia 06/28/2020    Dyspnea on exertion 06/28/2020    Anxiety 06/28/2020    GERD with esophagitis 06/28/2020    Hyponatremia 06/28/2020    Dysphagia 06/28/2020    Suicidal ideation 06/28/2020    AICD (automatic cardioverter/defibrillator) present 04/16/2020    Hypercholesteremia 05/08/2018    Obstructive sleep apnea syndrome 08/15/2017    Benign essential HTN 04/19/2016    Sebaceous cyst 05/23/2014    Paroxysmal atrial fibrillation ([address] 01/31/2014    History of pericarditis 07/13/2012    Fibromyalgia syndrome 07/13/2012    Non-ischemic cardiomyopathy ([address] 11/23/2011    Pacemaker 11/23/2011     Diagnostic studies:  EKG today:  Dual chamber paced     Echo 02/19/2021   Summary   -Severely reduced global systolic function with an ejection fraction   estimated at 25-30%. -Global hypokinesis noted.   -Average global longitudinal strain is estimated at -9.2%(Abnormal)   -Mild mitral regurgitation.   -There is mild tricuspid regurgitation with a RVSP estimation of 26 mmHg. -Grade II diastolic dysfunction with elevated LV filling pressures. Avg.   E/e'=26.7  Cath 06/03/2020   Cardiac Cath LVG:  Anatomy:   LM-Normal   LAD-Normal   Cx-Normal   OM- Normal   RCA-Dominant, Normal   RPDA- Normal   LVEF- 25  LVG- global hypokinesis  LVEDP- 5     Echo: 10/19/2018  Study Conclusions    - Left ventricle: The cavity size was moderately dilated. Wall    thickness was normal. Systolic function was severely reduced. The    estimated ejection fraction was in the range of 25% to 30%. There    is significant dyssynergy between the septal and lateral walls.    Mild hypokinesis of the inferoseptal myocardium. Severe    hypokinesis of the basal-midanterolateral myocardium. Moderate    hypokinesis of the apicallateral myocardium. Akinesis of the    apicalinferior myocardium. Doppler parameters are consistent with    abnormal left ventricular relaxation (grade 1 diastolic    dysfunction). - Ventricular septum: Septal motion showed abnormal function and    dyssynergy.  - Aortic valve: Trivial regurgitation.  - Right ventricle: The cavity size was mildly dilated. Wall    thickness was normal. Systolic function was normal by objective    interpretation. TAPSE: 2.6cm. Tricuspid annular systolic velocity:    69RO/Y. A device lead is seen extending into the RV cavity.  - Right atrium: The atrium was mildly to moderately dilated. Sukhwinder Munson or catheter noted in right atrium.  - Tricuspid valve: Mild-moderate regurgitation.  - Pericardium, extracardiac: A trivial pericardial effusion was    identified.     Impressions:  Compared to the study from 9/9/93, LV systolic is  slightly more reduced.     Stress test ([facility]) 4/10/2017  FINL INTERPRETATION  There is normal myocardial perfusion; however, there is left ventricular dysfunction. There is artifact consistent with sub-diaphragmatic interfering activity. Overall study quality is good.  Scan significance indicates low to moderate cardiac risk. Test sensitivity is reduced with testing on anti-anginal drugs. I independently reviewed the cardiac diagnostic studies, ECG and relevant imaging studies. Lab Results   Component Value Date    LVEF 28 02/19/2021    LVEFMODE Cardiac Cath 06/30/2020     Lab Results   Component Value Date    TSH 1.50 07/02/2021         Physical Examination:  Vitals:    12/16/21 0958   BP: 114/68   Pulse: 76   SpO2: 96%      Wt Readings from Last 3 Encounters:   12/16/21 136 lb 12.8 oz (62.1 kg)   09/13/21 141 lb (64 kg)   07/21/21 142 lb (64.4 kg)       · Constitutional: Oriented. No distress. · Head: Normocephalic and atraumatic. · Mouth/Throat: Oropharynx is clear and moist.   · Eyes: Conjunctivae normal. EOM are normal.   · Neck: Neck supple. No rigidity. No JVD present. · Cardiovascular: Normal rate, regular rhythm, S1&S2. · Pulmonary/Chest: Bilateral respiratory sounds. No wheezes, No rhonchi. · Abdominal: Soft. Bowel sounds present. No distension, No tenderness. · Musculoskeletal: No tenderness. No edema    · Lymphadenopathy: Has no cervical adenopathy. · Neurological: Alert and oriented. Cranial nerve appears intact, No Gross deficit   · Skin: Skin is warm and dry. No rash noted. · Psychiatric: Has a normal behavior       Review of System:  [x] Full ROS obtained and negative except as mentioned in HPI    Prior to Admission medications    Medication Sig Start Date End Date Taking?  Authorizing Provider   digoxin (LANOXIN) 125 MCG tablet TAKE 1 TABLET BY MOUTH DAILY 10/29/21  Yes SHAMEKA Ma - CNS   clonazePAM (KLONOPIN) 0.5 MG tablet Take 0.5 tablets by mouth 2 family history of SCD. Relevant and available labs, and cardiovascular diagnostics reviewed. Reviewed. I independently reviewed relevant and available cardiac diagnostic tests ECG, CXR, Echo, Stress test, Device interrogation, Holter, CT scan. Outside medical records via Care everywhere reviewed and summarized in H&P above. Complex medical condition with multiple medical problems affecting prognosis and outcome of EP interventions       - The patient is counseled to follow a low salt diet to assure blood pressure remains controlled for cardiovascular risk factor modification.   - The patient is counseled to avoid excess caffeine, and energy drinks as this may exacerbated ectopy and arrhythmia. - The patient is counseled to get regular exercise 3-5 times per week to control cardiovascular risk factors. All questions and concerns were addressed to the patient/family. Alternatives to my treatment were discussed. I have discussed the above stated plan and the patient verbalized understanding and agreed with the plan. Scribe attestation: This note was scribed in the presence of Irving Arceo MD by Bartolo Phillips RN    I, Dr. Irving Arceo personally performed the services described in this documentation as scribed by RN in my presence, and it is both accurate and complete.        NOTE: This report was transcribed using voice recognition software. Every effort was made to ensure accuracy, however, inadvertent computerized transcription errors may be present.      Irving Arceo MD, [place] Almaraz [address] [facility]   Office: [phone]  Fax: [phone]

## 2021-12-16 ENCOUNTER — OFFICE VISIT (OUTPATIENT)
Dept: CARDIOLOGY CLINIC | Age: 66
End: 2021-12-16
Payer: COMMERCIAL

## 2021-12-16 ENCOUNTER — NURSE ONLY (OUTPATIENT)
Dept: CARDIOLOGY CLINIC | Age: 66
End: 2021-12-16
Payer: COMMERCIAL

## 2021-12-16 VITALS
SYSTOLIC BLOOD PRESSURE: 114 MMHG | OXYGEN SATURATION: 96 % | BODY MASS INDEX: 22.79 KG/M2 | HEIGHT: 65 IN | WEIGHT: 136.8 LBS | HEART RATE: 76 BPM | DIASTOLIC BLOOD PRESSURE: 68 MMHG

## 2021-12-16 DIAGNOSIS — Z95.810 BIVENTRICULAR ICD (IMPLANTABLE CARDIOVERTER-DEFIBRILLATOR) IN PLACE: ICD-10-CM

## 2021-12-16 DIAGNOSIS — R53.83 OTHER FATIGUE: ICD-10-CM

## 2021-12-16 DIAGNOSIS — G47.33 OBSTRUCTIVE SLEEP APNEA SYNDROME: ICD-10-CM

## 2021-12-16 DIAGNOSIS — I48.0 PAROXYSMAL ATRIAL FIBRILLATION (HCC): ICD-10-CM

## 2021-12-16 DIAGNOSIS — I42.8 NON-ISCHEMIC CARDIOMYOPATHY (HCC): Chronic | ICD-10-CM

## 2021-12-16 DIAGNOSIS — I50.9 HEART FAILURE, UNSPECIFIED HF CHRONICITY, UNSPECIFIED HEART FAILURE TYPE (HCC): ICD-10-CM

## 2021-12-16 DIAGNOSIS — I48.0 PAF (PAROXYSMAL ATRIAL FIBRILLATION) (HCC): ICD-10-CM

## 2021-12-16 DIAGNOSIS — I50.22 CHRONIC SYSTOLIC HEART FAILURE (HCC): Primary | ICD-10-CM

## 2021-12-16 DIAGNOSIS — I44.30 AV BLOCK: ICD-10-CM

## 2021-12-16 PROCEDURE — 1123F ACP DISCUSS/DSCN MKR DOCD: CPT | Performed by: INTERNAL MEDICINE

## 2021-12-16 PROCEDURE — G8420 CALC BMI NORM PARAMETERS: HCPCS | Performed by: INTERNAL MEDICINE

## 2021-12-16 PROCEDURE — 1036F TOBACCO NON-USER: CPT | Performed by: INTERNAL MEDICINE

## 2021-12-16 PROCEDURE — G8484 FLU IMMUNIZE NO ADMIN: HCPCS | Performed by: INTERNAL MEDICINE

## 2021-12-16 PROCEDURE — G8400 PT W/DXA NO RESULTS DOC: HCPCS | Performed by: INTERNAL MEDICINE

## 2021-12-16 PROCEDURE — G8427 DOCREV CUR MEDS BY ELIG CLIN: HCPCS | Performed by: INTERNAL MEDICINE

## 2021-12-16 PROCEDURE — 1090F PRES/ABSN URINE INCON ASSESS: CPT | Performed by: INTERNAL MEDICINE

## 2021-12-16 PROCEDURE — 99214 OFFICE O/P EST MOD 30 MIN: CPT | Performed by: INTERNAL MEDICINE

## 2021-12-16 PROCEDURE — 3017F COLORECTAL CA SCREEN DOC REV: CPT | Performed by: INTERNAL MEDICINE

## 2021-12-16 PROCEDURE — 4040F PNEUMOC VAC/ADMIN/RCVD: CPT | Performed by: INTERNAL MEDICINE

## 2021-12-16 NOTE — PROGRESS NOTES
Patient comes in for programming evaluation for her defibrillator. All sensing and pacing parameters are within normal range. Battery life 8 months  AP 90.7%.  99.3%. Dependent  No episodes noted. Patient remains on Coreg and digoxin. Today we increased RR. Please see interrogation for more detail. Optivol is within normal range. Patient will see Dr. Román Ruggiero today and will follow up in 1 months in office.

## 2021-12-22 PROCEDURE — 93284 PRGRMG EVAL IMPLANTABLE DFB: CPT | Performed by: INTERNAL MEDICINE

## 2021-12-27 ENCOUNTER — TELEPHONE (OUTPATIENT)
Dept: PULMONOLOGY | Age: 66
End: 2021-12-27

## 2021-12-27 NOTE — TELEPHONE ENCOUNTER
Patient left message with only her name and phone number. Called patient and left message to call office back.

## 2022-02-14 ENCOUNTER — HOSPITAL ENCOUNTER (OUTPATIENT)
Age: 67
Discharge: HOME | End: 2022-02-14
Payer: COMMERCIAL

## 2022-02-14 ENCOUNTER — OFFICE VISIT (OUTPATIENT)
Dept: CARDIOLOGY CLINIC | Age: 67
End: 2022-02-14
Payer: COMMERCIAL

## 2022-02-14 ENCOUNTER — NURSE ONLY (OUTPATIENT)
Dept: CARDIOLOGY CLINIC | Age: 67
End: 2022-02-14
Payer: COMMERCIAL

## 2022-02-14 ENCOUNTER — OFFICE VISIT (OUTPATIENT)
Dept: PULMONOLOGY | Age: 67
End: 2022-02-14
Payer: COMMERCIAL

## 2022-02-14 VITALS
WEIGHT: 139 LBS | HEART RATE: 81 BPM | HEIGHT: 66 IN | BODY MASS INDEX: 22.34 KG/M2 | DIASTOLIC BLOOD PRESSURE: 70 MMHG | OXYGEN SATURATION: 98 % | SYSTOLIC BLOOD PRESSURE: 110 MMHG | TEMPERATURE: 97.5 F

## 2022-02-14 VITALS
WEIGHT: 139 LBS | SYSTOLIC BLOOD PRESSURE: 106 MMHG | BODY MASS INDEX: 23.16 KG/M2 | OXYGEN SATURATION: 98 % | DIASTOLIC BLOOD PRESSURE: 70 MMHG | HEART RATE: 81 BPM | HEIGHT: 65 IN

## 2022-02-14 DIAGNOSIS — I48.0 PAROXYSMAL ATRIAL FIBRILLATION (HCC): ICD-10-CM

## 2022-02-14 DIAGNOSIS — I42.8 NON-ISCHEMIC CARDIOMYOPATHY (HCC): Chronic | ICD-10-CM

## 2022-02-14 DIAGNOSIS — I50.22 CHRONIC SYSTOLIC HEART FAILURE (HCC): Primary | ICD-10-CM

## 2022-02-14 DIAGNOSIS — Z95.810 ICD (IMPLANTABLE CARDIOVERTER-DEFIBRILLATOR) IN PLACE: ICD-10-CM

## 2022-02-14 DIAGNOSIS — I50.22 CHRONIC SYSTOLIC HEART FAILURE (HCC): ICD-10-CM

## 2022-02-14 DIAGNOSIS — G47.33 OBSTRUCTIVE SLEEP APNEA SYNDROME: Primary | ICD-10-CM

## 2022-02-14 DIAGNOSIS — F41.9 ANXIETY: Chronic | ICD-10-CM

## 2022-02-14 DIAGNOSIS — Z95.810 BIVENTRICULAR ICD (IMPLANTABLE CARDIOVERTER-DEFIBRILLATOR) IN PLACE: ICD-10-CM

## 2022-02-14 DIAGNOSIS — E55.9 VITAMIN D DEFICIENCY: ICD-10-CM

## 2022-02-14 DIAGNOSIS — I42.8 NON-ISCHEMIC CARDIOMYOPATHY (HCC): ICD-10-CM

## 2022-02-14 DIAGNOSIS — I50.22 CHRONIC SYSTOLIC HEART FAILURE (HCC): Chronic | ICD-10-CM

## 2022-02-14 DIAGNOSIS — I48.0 PAROXYSMAL ATRIAL FIBRILLATION (HCC): Chronic | ICD-10-CM

## 2022-02-14 DIAGNOSIS — I10 BENIGN ESSENTIAL HTN: Chronic | ICD-10-CM

## 2022-02-14 PROBLEM — K21.00 GERD WITH ESOPHAGITIS: Chronic | Status: ACTIVE | Noted: 2020-06-28

## 2022-02-14 PROBLEM — R06.09 DYSPNEA ON EXERTION: Chronic | Status: ACTIVE | Noted: 2020-06-28

## 2022-02-14 LAB
ANION GAP SERPL CALCULATED.3IONS-SCNC: 13 MMOL/L (ref 3–16)
BUN BLDV-MCNC: 18 MG/DL (ref 7–20)
CALCIUM SERPL-MCNC: 9.9 MG/DL (ref 8.3–10.6)
CHLORIDE BLD-SCNC: 101 MMOL/L (ref 99–110)
CO2: 28 MMOL/L (ref 21–32)
CREAT SERPL-MCNC: 0.7 MG/DL (ref 0.6–1.2)
GFR AFRICAN AMERICAN: >60
GFR NON-AFRICAN AMERICAN: >60
GLUCOSE BLD-MCNC: 113 MG/DL (ref 70–99)
HCT VFR BLD CALC: 41.8 % (ref 36–48)
HEMOGLOBIN: 14.1 G/DL (ref 12–16)
MCH RBC QN AUTO: 30.9 PG (ref 26–34)
MCHC RBC AUTO-ENTMCNC: 33.8 G/DL (ref 31–36)
MCV RBC AUTO: 91.3 FL (ref 80–100)
PDW BLD-RTO: 13.5 % (ref 12.4–15.4)
PLATELET # BLD: 160 K/UL (ref 135–450)
PMV BLD AUTO: 10.1 FL (ref 5–10.5)
POTASSIUM SERPL-SCNC: 4.7 MMOL/L (ref 3.5–5.1)
PRO-BNP: 363 PG/ML (ref 0–124)
RBC # BLD: 4.57 M/UL (ref 4–5.2)
SODIUM BLD-SCNC: 142 MMOL/L (ref 136–145)
TSH SERPL DL<=0.05 MIU/L-ACNC: 1.24 UIU/ML (ref 0.27–4.2)
VITAMIN D 25-HYDROXY: 25 NG/ML
WBC # BLD: 5.6 K/UL (ref 4–11)

## 2022-02-14 PROCEDURE — 1090F PRES/ABSN URINE INCON ASSESS: CPT | Performed by: INTERNAL MEDICINE

## 2022-02-14 PROCEDURE — 1123F ACP DISCUSS/DSCN MKR DOCD: CPT | Performed by: CLINICAL NURSE SPECIALIST

## 2022-02-14 PROCEDURE — 85027 COMPLETE CBC AUTOMATED: CPT

## 2022-02-14 PROCEDURE — 93290 INTERROG DEV EVAL ICPMS IP: CPT | Performed by: CLINICAL NURSE SPECIALIST

## 2022-02-14 PROCEDURE — 99214 OFFICE O/P EST MOD 30 MIN: CPT | Performed by: CLINICAL NURSE SPECIALIST

## 2022-02-14 PROCEDURE — 84443 ASSAY THYROID STIM HORMONE: CPT

## 2022-02-14 PROCEDURE — G8400 PT W/DXA NO RESULTS DOC: HCPCS | Performed by: INTERNAL MEDICINE

## 2022-02-14 PROCEDURE — G8484 FLU IMMUNIZE NO ADMIN: HCPCS | Performed by: INTERNAL MEDICINE

## 2022-02-14 PROCEDURE — 3017F COLORECTAL CA SCREEN DOC REV: CPT | Performed by: CLINICAL NURSE SPECIALIST

## 2022-02-14 PROCEDURE — 83880 ASSAY OF NATRIURETIC PEPTIDE: CPT

## 2022-02-14 PROCEDURE — 4040F PNEUMOC VAC/ADMIN/RCVD: CPT | Performed by: CLINICAL NURSE SPECIALIST

## 2022-02-14 PROCEDURE — 80048 BASIC METABOLIC PNL TOTAL CA: CPT

## 2022-02-14 PROCEDURE — G8484 FLU IMMUNIZE NO ADMIN: HCPCS | Performed by: CLINICAL NURSE SPECIALIST

## 2022-02-14 PROCEDURE — G8427 DOCREV CUR MEDS BY ELIG CLIN: HCPCS | Performed by: CLINICAL NURSE SPECIALIST

## 2022-02-14 PROCEDURE — G8427 DOCREV CUR MEDS BY ELIG CLIN: HCPCS | Performed by: INTERNAL MEDICINE

## 2022-02-14 PROCEDURE — 1036F TOBACCO NON-USER: CPT | Performed by: CLINICAL NURSE SPECIALIST

## 2022-02-14 PROCEDURE — 1090F PRES/ABSN URINE INCON ASSESS: CPT | Performed by: CLINICAL NURSE SPECIALIST

## 2022-02-14 PROCEDURE — 82306 VITAMIN D 25 HYDROXY: CPT

## 2022-02-14 PROCEDURE — 99204 OFFICE O/P NEW MOD 45 MIN: CPT | Performed by: INTERNAL MEDICINE

## 2022-02-14 PROCEDURE — G8420 CALC BMI NORM PARAMETERS: HCPCS | Performed by: CLINICAL NURSE SPECIALIST

## 2022-02-14 PROCEDURE — 1123F ACP DISCUSS/DSCN MKR DOCD: CPT | Performed by: INTERNAL MEDICINE

## 2022-02-14 PROCEDURE — 36415 COLL VENOUS BLD VENIPUNCTURE: CPT

## 2022-02-14 PROCEDURE — G8420 CALC BMI NORM PARAMETERS: HCPCS | Performed by: INTERNAL MEDICINE

## 2022-02-14 PROCEDURE — G8400 PT W/DXA NO RESULTS DOC: HCPCS | Performed by: CLINICAL NURSE SPECIALIST

## 2022-02-14 PROCEDURE — 4040F PNEUMOC VAC/ADMIN/RCVD: CPT | Performed by: INTERNAL MEDICINE

## 2022-02-14 PROCEDURE — 3017F COLORECTAL CA SCREEN DOC REV: CPT | Performed by: INTERNAL MEDICINE

## 2022-02-14 PROCEDURE — 1036F TOBACCO NON-USER: CPT | Performed by: INTERNAL MEDICINE

## 2022-02-14 RX ORDER — CARVEDILOL 3.12 MG/1
3.12 TABLET ORAL 2 TIMES DAILY
Qty: 180 TABLET | Refills: 1 | Status: SHIPPED | OUTPATIENT
Start: 2022-02-14 | End: 2022-07-27

## 2022-02-14 RX ORDER — DIGOXIN 125 MCG
125 TABLET ORAL DAILY
Qty: 90 TABLET | Refills: 1 | Status: SHIPPED | OUTPATIENT
Start: 2022-02-14 | End: 2022-09-26

## 2022-02-14 RX ORDER — EMPAGLIFLOZIN 10 MG/1
1 TABLET, FILM COATED ORAL DAILY
Qty: 90 TABLET | Refills: 1 | Status: SHIPPED | OUTPATIENT
Start: 2022-02-14 | End: 2022-06-22 | Stop reason: ALTCHOICE

## 2022-02-14 RX ORDER — SACUBITRIL AND VALSARTAN 24; 26 MG/1; MG/1
TABLET, FILM COATED ORAL
Qty: 60 TABLET | Refills: 1 | Status: SHIPPED | OUTPATIENT
Start: 2022-02-14 | End: 2022-06-22 | Stop reason: DRUGHIGH

## 2022-02-14 RX ORDER — SACUBITRIL AND VALSARTAN 24; 26 MG/1; MG/1
TABLET, FILM COATED ORAL
Qty: 135 TABLET | Refills: 1 | Status: SHIPPED | OUTPATIENT
Start: 2022-02-14 | End: 2022-02-14 | Stop reason: DRUGHIGH

## 2022-02-14 RX ORDER — SPIRONOLACTONE 25 MG/1
TABLET ORAL
Qty: 12 TABLET | Refills: 1 | Status: SHIPPED | OUTPATIENT
Start: 2022-02-14

## 2022-02-14 ASSESSMENT — SLEEP AND FATIGUE QUESTIONNAIRES
HOW LIKELY ARE YOU TO NOD OFF OR FALL ASLEEP WHILE SITTING QUIETLY AFTER LUNCH WITHOUT ALCOHOL: 1
ESS TOTAL SCORE: 5
HOW LIKELY ARE YOU TO NOD OFF OR FALL ASLEEP WHEN YOU ARE A PASSENGER IN A CAR FOR AN HOUR WITHOUT A BREAK: 0
HOW LIKELY ARE YOU TO NOD OFF OR FALL ASLEEP WHILE SITTING AND READING: 0
HOW LIKELY ARE YOU TO NOD OFF OR FALL ASLEEP WHILE WATCHING TV: 2
HOW LIKELY ARE YOU TO NOD OFF OR FALL ASLEEP WHILE LYING DOWN TO REST IN THE AFTERNOON WHEN CIRCUMSTANCES PERMIT: 2
HOW LIKELY ARE YOU TO NOD OFF OR FALL ASLEEP WHILE SITTING AND TALKING TO SOMEONE: 0
HOW LIKELY ARE YOU TO NOD OFF OR FALL ASLEEP IN A CAR, WHILE STOPPED FOR A FEW MINUTES IN TRAFFIC: 0
HOW LIKELY ARE YOU TO NOD OFF OR FALL ASLEEP WHILE SITTING INACTIVE IN A PUBLIC PLACE: 0

## 2022-02-14 NOTE — LETTER
[facility]  [address] [facility]  Niharika Britt  [address]  Phone: [phone]  Fax: [phone]    Renan Griffin MD    February 14, 2022     Gunnar Daniel, [facility] [address] [address]    Patient: Michael Keys   MR Number: [id number]   YOB: 1955   Date of Visit: 2/14/2022       Dear Gunnar Daniel: Thank you for referring Michael Keys to me for evaluation/treatment. Below are the relevant portions of my assessment and plan of care. Visit Diagnoses and Associated Orders     Obstructive sleep apnea syndrome   (New Problem)  -  Primary    On treatment         Chronic systolic heart failure (HCC)   (Stable)           Paroxysmal atrial fibrillation (HCC)   (Stable)           Benign essential HTN   (Stable)           Anxiety   (Stable)                 Reviewed compliance download with pt. Supplies and parts as needed for her machine. These are medically necessary. Continue medications per her PCP and other physicians. Limit caffeine use after 3pm.  Encouraged her to work on weight loss through diet and exercise. The primary encounter diagnosis was Obstructive sleep apnea syndrome. Diagnoses of Chronic systolic heart failure ([address] Paroxysmal atrial fibrillation ([address] Benign essential HTN, and Anxiety were also pertinent to this visit. The chronic medical conditions listed are directly related to the primary diagnosis listed above. The management of the primary diagnosis affects the secondary diagnosis and vice versa. Discussed with patient today the recent recall issued by [employer] for their Intrakr platform Pap machines. Reviewed that it affected a very small number of machines (0.03%) and seems to be exacerbated by using ozone disinfection or machine being exposed to a very high heat/humidity environment.   Recommended the patient immediately discontinue use of any external ozone  if being used.  Discussed the risk of untreated sleep apnea (including but not limited to early morbidity mortality, motor vehicle accidents, and cardiovascular issues, etc.) versus the very small risk of foam degradation with use of the machine. Possible alternative may be to switch manufacturers for their machine but will need to see if their insurance company will allow that. Physiological data from the patient's machine was downloaded and analyzed today. Reviewed the following labs from 7-21 which were essentially normal: TSH, CBC, and BMP. This information was analyzed to assess complexity and medical decision making in regards to further testing and management. Continue meds for: CHF, a fib, HTN, and TIM. If you have questions, please do not hesitate to call me. I look forward to following Antwan Villa along with you.     Sincerely,      Louie Earl MD

## 2022-02-14 NOTE — PATIENT INSTRUCTIONS
1.  Blood work today  2. Cut entresto to half 24-26 mg twice a day  3. Continue all current medications  4. Check echo  5. RTO in 6 months  6.   Get exercising 4-5 days a week

## 2022-02-14 NOTE — PROGRESS NOTES
We received remote transmission from ANAND at [place] cardiology office. Transmission shows normal sensing and pacing function. EP physician will review. See interrogation under cardiology tab in the [address] [place] [address] [address] field for more details. Optivol is within normal range.

## 2022-02-14 NOTE — PROGRESS NOTES
Ana Telles         : 1955    Diagnosis: [x] ESCOBAR (G47.33) [] CSA (G47.31) [] Apnea (G47.30)   Length of Need: [x] 13 Months [] 99 Months [] Other:    Machine (LINDSAY!): [] Respironics Dream Station      Auto [] ResMed AirSense     Auto [] Other:     []  CPAP () [] Bilevel ()   Mode: [] Auto [] Spontaneous    Mode: [] Auto [] Spontaneous            Comfort Settings:        Humidifier: [] Heated ()        [x] Water chamber replacement ()/ 1 per 6 months        Mask:   [x] Nasal () /1 per 3 months [] Full Face () /1 per 3 months   [x] Patient choice -Size and fit mask [] Patient Choice - Size and fit mask   [] Dispense:  [] Dispense:    [x] Headgear () / 1 per 3 months [] Headgear () / 1 per 3 months   [x] Replacement Nasal Cushion ()/2 per month [] Interface Replacement ()/1 per month   [x] Replacement Nasal Pillows ()/2 per month         Tubing: [x] Heated ()/1 per 3 months    [] Standard ()/1 per 3 months [] Other:           Filters: [x] Non-disposable ()/1 per 6 months     [x] Ultra-Fine, Disposable ()/2 per month        Miscellaneous: [] Chin Strap ()/ 1 per 6 months [] O2 bleed-in:       LPM   [] Oximetry on CPAP/Bilevel []  Other:    [x] Modem: ()         Start Order Date: 22    MEDICAL JUSTIFICATION:  I, the undersigned, certify that the above prescribed supplies are medically necessary for this patients wellbeing. In my opinion, the supplies are both reasonable and necessary in reference to accepted standards of medicalpractice in treatment of this patients condition.     Cyndie Silva MD      NPI: [id number]       Order Signed Date: 22    Electronically signed by Cyndie Silva MD on 2022 at 12:51 PM    Estrellasilvino Elfego  1955  [address] [address] [employer]  [phone] (home)   [phone] (mobile)      Insurance Info (confirm with patient if correct):  Payor/Plan Subscr  Sex Relation Sub.  Ins. ID Effective Group Num

## 2022-02-14 NOTE — PROGRESS NOTES
Janan Dandy MD, [facility], [facility]  Reyes Estrella ABHISHEK B[address]  [address] [facility], [facility] [address] E [phone]   [facility] [address] Annalee Araujo. [address] [place] [address]. Hortencia Verg[address] [phone]     [address] [facility]EP [facility]  [address] [address]  Dept: [phone]  Loc: [phone]    Assessment:      Visit Diagnoses and Associated Orders     Obstructive sleep apnea syndrome   (New Problem)  -  Primary    On treatment         Chronic systolic heart failure (HCC)   (Stable)           Paroxysmal atrial fibrillation (HCC)   (Stable)           Benign essential HTN   (Stable)           Anxiety   (Stable)                  Plan:      Reviewed compliance download with pt. Supplies and parts as needed for her machine. These are medically necessary. Continue medications per her PCP and other physicians. Limit caffeine use after 3pm.  Encouraged her to work on weight loss through diet and exercise. The primary encounter diagnosis was Obstructive sleep apnea syndrome. Diagnoses of Chronic systolic heart failure ([address] Paroxysmal atrial fibrillation ([address] Benign essential HTN, and Anxiety were also pertinent to this visit. The chronic medical conditions listed are directly related to the primary diagnosis listed above. The management of the primary diagnosis affects the secondary diagnosis and vice versa. Discussed with patient today the recent recall issued by [employer] for their Stylechi platform Pap machines. Reviewed that it affected a very small number of machines (0.03%) and seems to be exacerbated by using ozone disinfection or machine being exposed to a very high heat/humidity environment. Recommended the patient immediately discontinue use of any external ozone  if being used.   Discussed the risk of untreated sleep apnea (including but not limited to early morbidity mortality, motor vehicle accidents, and cardiovascular issues, etc.) versus the very small risk of foam degradation with use of the machine. Possible alternative may be to switch manufacturers for their machine but will need to see if their insurance company will allow that. Physiological data from the patient's machine was downloaded and analyzed today. Reviewed the following labs from 7-21 which were essentially normal: TSH, CBC, and BMP. This information was analyzed to assess complexity and medical decision making in regards to further testing and management. Continue meds for: CHF, a fib, HTN, and TIM. Subjective:     Patient ID: Elissa Baker is a 74 y.o. female. Chief Complaint   Patient presents with    Sleep Apnea       HPI:      Elissa Baker is a 74 y.o. female self-referred for a sleep evaluation. She complains of: She continues to do well with her machine at the current settings. No issues with EDS, snoring, or apneas. She is waking refreshed, for the most part, in the am.  No HA, dryness, or congestion. No issues using her machine. Machine Modem/Download Info:  Compliance (hours/night): 6 hrs/night  % of nights >= 4 hrs: 76.7 %  Download AHI (/hour): 1.1 /HR  Average CPAP Pressure : 9.4 cmH2O     APAP - Settings  Pressure Min: 8 cmH2O  Pressure Max: 18 cmH2O                 Comfort Settings  Humidity Level (0-8): 2  Flex/EPR (0-3): 3       DOT/CDL - No  FAA/'s license -No    Previous Report(s) Reviewed: historical medical records, office notes, andreferral letter(s). Pertinent data has been documented.     Ganado - Total score: 5    Social History     Socioeconomic History    Marital status:      Spouse name: Not on file    Number of children: Not on file    Years of education: Not on file    Highest education level: Not on file   Occupational History    Not on file   Tobacco Use    Smoking status: Former Smoker     Packs/day: 0.25     Years: 10.00     Pack years: 2.50 Quit date: 1998     Years since quittin.8    Smokeless tobacco: Never Used    Tobacco comment: smoked 1 cig a day,   Vaping Use    Vaping Use: Never used   Substance and Sexual Activity    Alcohol use: Yes    Drug use: Yes     Frequency: 2.0 times per week     Types: Marijuana Alfreda Meza)    Sexual activity: Yes     Partners: Male     Comment:    Other Topics Concern    Not on file   Social History Narrative    Not on file     Social Determinants of Health     Financial Resource Strain:     Difficulty of Paying Living Expenses: Not on file   Food Insecurity:     Worried About Running Out of Food in the Last Year: Not on file    Yoni of Food in the Last Year: Not on file   Transportation Needs:     Lack of Transportation (Medical): Not on file    Lack of Transportation (Non-Medical):  Not on file   Physical Activity:     Days of Exercise per Week: Not on file    Minutes of Exercise per Session: Not on file   Stress:     Feeling of Stress : Not on file   Social Connections:     Frequency of Communication with Friends and Family: Not on file    Frequency of Social Gatherings with Friends and Family: Not on file    Attends Yazidism Services: Not on file    Active Member of [address] [place] [address] [employer] or Organizations: Not on file    Attends Club or Organization Meetings: Not on file    Marital Status: Not on file   Intimate Partner Violence:     Fear of Current or Ex-Partner: Not on file    Emotionally Abused: Not on file    Physically Abused: Not on file    Sexually Abused: Not on file   Housing Stability:     Unable to Pay for Housing in the Last Year: Not on file    Number of [place] in the Last Year: Not on file    Unstable Housing in the Last Year: Not on file        Current Outpatient Medications   Medication Instructions    calcium carbonate (OSCAL) 500 mg, Oral, DAILY    carvedilol (COREG) 3.125 mg, Oral, 2 TIMES DAILY    ciprofloxacin (CIPRO) 250 MG tablet TAKE 1 TABLET BY MOUTH AFTER SEXUAL INTERCOURSE    clonazePAM (KLONOPIN) 0.25 mg, Oral, 2 TIMES DAILY PRN    digoxin (LANOXIN) 125 mcg, Oral, DAILY    escitalopram (LEXAPRO) 10 MG tablet Take 1 tablet daily    fish oil 3,000 mg, Oral, 2 TIMES DAILY    ipratropium (ATROVENT) 0.03 % nasal spray 2 sprays, Each Nostril, EVERY 12 HOURS, For 3 weeks    Jardiance 10 mg, Oral, DAILY    Magnesium 400 MG TABS Take by mouth daily    sacubitril-valsartan (ENTRESTO) 24-26 MG per tablet Half of 24-26 mg twice a day    spironolactone (ALDACTONE) 25 MG tablet Take 1/2 tablet M,F only    vitamin D3 (CHOLECALCIFEROL) 1,000 Units, Oral, DAILY    vitamin E 400 Units, Oral, DAILY          Objective:     Vitals:  Weight BMI   Wt Readings from Last 3 Encounters:   02/14/22 139 lb (63 kg)   02/14/22 139 lb (63 kg)   12/16/21 136 lb 12.8 oz (62.1 kg)    Body mass index is 22.44 kg/m².      BP HR SaO2   BP Readings from Last 3 Encounters:   02/14/22 110/70   02/14/22 106/70   12/16/21 114/68    Pulse Readings from Last 3 Encounters:   02/14/22 81   02/14/22 81   12/16/21 76    SpO2 Readings from Last 3 Encounters:   02/14/22 98%   02/14/22 98%   12/16/21 96%        Electronically signed by Diana Shetty MD on2/14/2022 at 1:08 PM

## 2022-02-15 ENCOUNTER — TELEPHONE (OUTPATIENT)
Dept: CARDIOLOGY CLINIC | Age: 67
End: 2022-02-15

## 2022-02-15 NOTE — TELEPHONE ENCOUNTER
----- Message from SHAMEKA Marmolejo - CNS sent at 2/15/2022 10:17 AM EST -----  Vitamin d is a little low  Make sure she is taking vitamin d  Her fluid level was up on her labs  Ask her if she has any lasix, if none I want her to take 20 mg of lasix once a week and check blood work again in 2 weeks  thanks

## 2022-02-16 PROCEDURE — 93289 INTERROG DEVICE EVAL HEART: CPT | Performed by: INTERNAL MEDICINE

## 2022-02-18 ENCOUNTER — OFFICE VISIT (OUTPATIENT)
Dept: PSYCHIATRY | Age: 67
End: 2022-02-18
Payer: COMMERCIAL

## 2022-02-18 VITALS
OXYGEN SATURATION: 98 % | TEMPERATURE: 97.6 F | DIASTOLIC BLOOD PRESSURE: 68 MMHG | HEIGHT: 66 IN | WEIGHT: 138 LBS | HEART RATE: 85 BPM | BODY MASS INDEX: 22.18 KG/M2 | SYSTOLIC BLOOD PRESSURE: 110 MMHG

## 2022-02-18 DIAGNOSIS — F33.42 RECURRENT MAJOR DEPRESSIVE DISORDER, IN FULL REMISSION (HCC): Primary | ICD-10-CM

## 2022-02-18 DIAGNOSIS — F41.9 ANXIETY DISORDER, UNSPECIFIED TYPE: ICD-10-CM

## 2022-02-18 PROCEDURE — G8400 PT W/DXA NO RESULTS DOC: HCPCS | Performed by: PSYCHIATRY & NEUROLOGY

## 2022-02-18 PROCEDURE — G8420 CALC BMI NORM PARAMETERS: HCPCS | Performed by: PSYCHIATRY & NEUROLOGY

## 2022-02-18 PROCEDURE — 1036F TOBACCO NON-USER: CPT | Performed by: PSYCHIATRY & NEUROLOGY

## 2022-02-18 PROCEDURE — 1123F ACP DISCUSS/DSCN MKR DOCD: CPT | Performed by: PSYCHIATRY & NEUROLOGY

## 2022-02-18 PROCEDURE — G8484 FLU IMMUNIZE NO ADMIN: HCPCS | Performed by: PSYCHIATRY & NEUROLOGY

## 2022-02-18 PROCEDURE — 4040F PNEUMOC VAC/ADMIN/RCVD: CPT | Performed by: PSYCHIATRY & NEUROLOGY

## 2022-02-18 PROCEDURE — 3017F COLORECTAL CA SCREEN DOC REV: CPT | Performed by: PSYCHIATRY & NEUROLOGY

## 2022-02-18 PROCEDURE — 1090F PRES/ABSN URINE INCON ASSESS: CPT | Performed by: PSYCHIATRY & NEUROLOGY

## 2022-02-18 PROCEDURE — G8427 DOCREV CUR MEDS BY ELIG CLIN: HCPCS | Performed by: PSYCHIATRY & NEUROLOGY

## 2022-02-18 PROCEDURE — 99214 OFFICE O/P EST MOD 30 MIN: CPT | Performed by: PSYCHIATRY & NEUROLOGY

## 2022-02-18 RX ORDER — ESCITALOPRAM OXALATE 10 MG/1
TABLET ORAL
Qty: 90 TABLET | Refills: 1 | Status: SHIPPED | OUTPATIENT
Start: 2022-02-18 | End: 2022-06-22

## 2022-02-18 RX ORDER — HYDROXYZINE PAMOATE 25 MG/1
25 CAPSULE ORAL DAILY PRN
Qty: 15 CAPSULE | Refills: 1 | Status: SHIPPED | OUTPATIENT
Start: 2022-02-18 | End: 2022-09-05

## 2022-02-18 NOTE — Clinical Note
I will transfer care back to you at this time. She is doing well on lexapro. I gave her a little prn hydroxyzine to get her off the prn clonazepam, but she was barely ever needing the clonazepam. She does use MJ often for her pain.

## 2022-03-24 ENCOUNTER — TELEMEDICINE (OUTPATIENT)
Dept: INTERNAL MEDICINE CLINIC | Age: 67
End: 2022-03-24
Payer: COMMERCIAL

## 2022-03-24 DIAGNOSIS — N39.0 FREQUENT UTI: ICD-10-CM

## 2022-03-24 DIAGNOSIS — J30.2 SEASONAL ALLERGIES: Primary | ICD-10-CM

## 2022-03-24 PROCEDURE — G8427 DOCREV CUR MEDS BY ELIG CLIN: HCPCS | Performed by: NURSE PRACTITIONER

## 2022-03-24 PROCEDURE — 1123F ACP DISCUSS/DSCN MKR DOCD: CPT | Performed by: NURSE PRACTITIONER

## 2022-03-24 PROCEDURE — 99213 OFFICE O/P EST LOW 20 MIN: CPT | Performed by: NURSE PRACTITIONER

## 2022-03-24 PROCEDURE — 3017F COLORECTAL CA SCREEN DOC REV: CPT | Performed by: NURSE PRACTITIONER

## 2022-03-24 PROCEDURE — 1090F PRES/ABSN URINE INCON ASSESS: CPT | Performed by: NURSE PRACTITIONER

## 2022-03-24 PROCEDURE — G8400 PT W/DXA NO RESULTS DOC: HCPCS | Performed by: NURSE PRACTITIONER

## 2022-03-24 PROCEDURE — 4040F PNEUMOC VAC/ADMIN/RCVD: CPT | Performed by: NURSE PRACTITIONER

## 2022-03-24 RX ORDER — MONTELUKAST SODIUM 10 MG/1
10 TABLET ORAL DAILY
Qty: 30 TABLET | Refills: 5 | Status: SHIPPED | OUTPATIENT
Start: 2022-03-24 | End: 2022-08-26

## 2022-03-24 RX ORDER — AZELASTINE 1 MG/ML
1 SPRAY, METERED NASAL 2 TIMES DAILY
Qty: 60 ML | Refills: 1 | Status: SHIPPED | OUTPATIENT
Start: 2022-03-24 | End: 2022-06-22

## 2022-03-24 RX ORDER — CIPROFLOXACIN 250 MG/1
TABLET, FILM COATED ORAL
Qty: 10 TABLET | Refills: 1 | Status: SHIPPED | OUTPATIENT
Start: 2022-03-24 | End: 2022-08-08

## 2022-03-24 SDOH — ECONOMIC STABILITY: FOOD INSECURITY: WITHIN THE PAST 12 MONTHS, THE FOOD YOU BOUGHT JUST DIDN'T LAST AND YOU DIDN'T HAVE MONEY TO GET MORE.: NEVER TRUE

## 2022-03-24 SDOH — ECONOMIC STABILITY: FOOD INSECURITY: WITHIN THE PAST 12 MONTHS, YOU WORRIED THAT YOUR FOOD WOULD RUN OUT BEFORE YOU GOT MONEY TO BUY MORE.: NEVER TRUE

## 2022-03-24 ASSESSMENT — PATIENT HEALTH QUESTIONNAIRE - PHQ9
SUM OF ALL RESPONSES TO PHQ QUESTIONS 1-9: 2
SUM OF ALL RESPONSES TO PHQ9 QUESTIONS 1 & 2: 2
6. FEELING BAD ABOUT YOURSELF - OR THAT YOU ARE A FAILURE OR HAVE LET YOURSELF OR YOUR FAMILY DOWN: 0
9. THOUGHTS THAT YOU WOULD BE BETTER OFF DEAD, OR OF HURTING YOURSELF: 0
8. MOVING OR SPEAKING SO SLOWLY THAT OTHER PEOPLE COULD HAVE NOTICED. OR THE OPPOSITE, BEING SO FIGETY OR RESTLESS THAT YOU HAVE BEEN MOVING AROUND A LOT MORE THAN USUAL: 0
SUM OF ALL RESPONSES TO PHQ QUESTIONS 1-9: 2
SUM OF ALL RESPONSES TO PHQ QUESTIONS 1-9: 2
1. LITTLE INTEREST OR PLEASURE IN DOING THINGS: 1
7. TROUBLE CONCENTRATING ON THINGS, SUCH AS READING THE NEWSPAPER OR WATCHING TELEVISION: 0
3. TROUBLE FALLING OR STAYING ASLEEP: 0
5. POOR APPETITE OR OVEREATING: 0
2. FEELING DOWN, DEPRESSED OR HOPELESS: 1
SUM OF ALL RESPONSES TO PHQ QUESTIONS 1-9: 2
4. FEELING TIRED OR HAVING LITTLE ENERGY: 0

## 2022-03-24 ASSESSMENT — SOCIAL DETERMINANTS OF HEALTH (SDOH): HOW HARD IS IT FOR YOU TO PAY FOR THE VERY BASICS LIKE FOOD, HOUSING, MEDICAL CARE, AND HEATING?: NOT HARD AT ALL

## 2022-03-24 NOTE — PROGRESS NOTES
3/24/2022    TELEHEALTH EVALUATION -- Audio/Visual (During JVVIW-64 public health emergency)    HPI:    Yuniel Duran (:  1955) has requested an audio/video evaluation for the following concern(s):    VV for seasonal allergies, that have been ongoing. Worse this time of year. Reports runny nose, sneezing, PND, throat tickle, cough when laying down from PND, mild sinus pressure. Pt reports that it has been ongoing for years. Takes claritin as needed with some relief, not helping like it used to help. Has used nasal spray in the past without relief - atrovent   Denies fever, chills, fatigue, body aches or feeling ill. Frequent UTI - post coital cipro has helped significantly. Requesting refill today. Denies any symptoms      Review of Systems  Negative other than HPI     Prior to Visit Medications    Medication Sig Taking?  Authorizing Provider   montelukast (SINGULAIR) 10 MG tablet Take 1 tablet by mouth daily Yes SHAMEKA Galloway CNP   ciprofloxacin (CIPRO) 250 MG tablet TAKE 1 TABLET BY MOUTH AFTER SEXUAL INTERCOURSE Yes SHAMEKA Galloway CNP   azelastine (ASTELIN) 0.1 % nasal spray 1 spray by Nasal route 2 times daily Use in each nostril as directed Yes SHAMEKA Galloway CNP   hydrOXYzine (VISTARIL) 25 MG capsule Take 1 capsule by mouth daily as needed for Anxiety Yes Negro Ortega MD   escitalopram (LEXAPRO) 10 MG tablet Take 1 tablet daily Yes Negro Ortega MD   carvedilol (COREG) 3.125 MG tablet Take 1 tablet by mouth 2 times daily Yes SHAMEKA Burnett   digoxin (LANOXIN) 125 MCG tablet Take 1 tablet by mouth daily Yes SHAMEKA Argueta   empagliflozin (JARDIANCE) 10 MG tablet Take 1 tablet by mouth daily Yes SHAMEKA Argueta   spironolactone (ALDACTONE) 25 MG tablet Take 1/2 tablet M,F only Yes SHAMEKA Argueta   sacubitril-valsartan (ENTRESTO) 24-26 MG per tablet Half of 24-26 mg twice a day Yes Carolina SOL BRISEYDA MayesN - CNS   vitamin D3 (CHOLECALCIFEROL) 25 MCG (1000 UT) TABS tablet Take 1 tablet by mouth daily Yes SHAMEKA Thomas CNP   Magnesium 400 MG TABS Take by mouth daily Yes SHAMEKA Thomas - ABHISHEK   calcium carbonate (OSCAL) 500 MG TABS tablet Take 1 tablet by mouth daily Yes Nir Elmore APRN - CNP   vitamin E 400 UNIT capsule Take 400 Units by mouth daily Yes Historical Provider, MD   Omega-3 Fatty Acids (FISH OIL) 1000 MG CAPS Take 3,000 mg by mouth 2 times daily  Yes Historical Provider, MD       Social History     Tobacco Use    Smoking status: Former Smoker     Packs/day: 0.25     Years: 10.00     Pack years: 2.50     Quit date: 1998     Years since quittin.9    Smokeless tobacco: Never Used    Tobacco comment: smoked 1 cig a day,   Vaping Use    Vaping Use: Never used   Substance Use Topics    Alcohol use: Yes    Drug use: Yes     Frequency: 2.0 times per week     Types: Marijuana Alpheus Yady)            PHYSICAL EXAMINATION:  [ INSTRUCTIONS:  \"[x]\" Indicates a positive item  \"[]\" Indicates a negative item  -- DELETE ALL ITEMS NOT EXAMINED]  Vital Signs: (As obtained by patient/caregiver or practitioner observation)    No flowsheet data found. Physical Exam  Constitutional:       General: She is not in acute distress. Appearance: Normal appearance. She is not ill-appearing. HENT:      Head: Normocephalic and atraumatic. Nose:      Comments: Sound congested   Pulmonary:      Effort: Pulmonary effort is normal. No respiratory distress. Neurological:      General: No focal deficit present. Mental Status: She is alert and oriented to person, place, and time. Mental status is at baseline.    Psychiatric:         Mood and Affect: Mood normal.         Behavior: Behavior normal.        Other pertinent observable physical exam findings-     Due to this being a TeleHealth encounter, evaluation of the following organ systems is limited: Vitals/Constitutional/EENT/Resp/CV/GI//MS/Neuro/Skin/Heme-Lymph-Imm. ASSESSMENT/PLAN:  1. Seasonal allergies  Uncontrolled. Worse this season. Denies illness. Continue claritin. Add in singulair and astelin during trigger season. Reviewed supportive care. - montelukast (SINGULAIR) 10 MG tablet; Take 1 tablet by mouth daily  Dispense: 30 tablet; Refill: 5  - azelastine (ASTELIN) 0.1 % nasal spray; 1 spray by Nasal route 2 times daily Use in each nostril as directed  Dispense: 60 mL; Refill: 1    2. Frequent UTI  Well controlled, continue current regimen. - ciprofloxacin (CIPRO) 250 MG tablet; TAKE 1 TABLET BY MOUTH AFTER SEXUAL INTERCOURSE  Dispense: 10 tablet; Refill: 1    No follow-ups on file. and when due for CC    An  electronic signature was used to authenticate this note. --SHAMEKA Middleton - CNP on 3/24/2022 at 4:36 PM        [facility], was evaluated through a synchronous (real-time) audio-video encounter. The patient (or guardian if applicable) is aware that this is a billable service, which includes applicable co-pays. This Virtual Visit was conducted with patient's (and/or legal guardian's) consent. The visit was conducted pursuant to the emergency declaration under the [facility], [address] [place] [address] waiver authority and the [email or web address] and [employer] General Act. Patient identification was verified, and a caregiver was present when appropriate. The patient was located at home in a state where the provider was licensed to provide care.

## 2022-04-20 ENCOUNTER — TELEMEDICINE (OUTPATIENT)
Dept: INTERNAL MEDICINE CLINIC | Age: 67
End: 2022-04-20
Payer: COMMERCIAL

## 2022-04-20 DIAGNOSIS — N39.0 FREQUENT UTI: ICD-10-CM

## 2022-04-20 DIAGNOSIS — R82.90 ABNORMAL URINE ODOR: Primary | ICD-10-CM

## 2022-04-20 DIAGNOSIS — R82.90 CLOUDY URINE: ICD-10-CM

## 2022-04-20 PROCEDURE — 4040F PNEUMOC VAC/ADMIN/RCVD: CPT | Performed by: NURSE PRACTITIONER

## 2022-04-20 PROCEDURE — 99212 OFFICE O/P EST SF 10 MIN: CPT | Performed by: NURSE PRACTITIONER

## 2022-04-20 PROCEDURE — 3017F COLORECTAL CA SCREEN DOC REV: CPT | Performed by: NURSE PRACTITIONER

## 2022-04-20 PROCEDURE — 1123F ACP DISCUSS/DSCN MKR DOCD: CPT | Performed by: NURSE PRACTITIONER

## 2022-04-20 PROCEDURE — 1090F PRES/ABSN URINE INCON ASSESS: CPT | Performed by: NURSE PRACTITIONER

## 2022-04-20 PROCEDURE — G8400 PT W/DXA NO RESULTS DOC: HCPCS | Performed by: NURSE PRACTITIONER

## 2022-04-20 PROCEDURE — G8427 DOCREV CUR MEDS BY ELIG CLIN: HCPCS | Performed by: NURSE PRACTITIONER

## 2022-04-20 NOTE — PROGRESS NOTES
2022    TELEHEALTH EVALUATION -- Audio/Visual (During IEJYN-04 public health emergency)    Chief Complaint   Patient presents with    Follow-up       HPI:    Yamil Gillette (:  1955) has requested an audio/video evaluation for the following concern(s):    Pt joined VV with her  for concerns of urine odor. Pt reports her urine for past few weeks has had an odor. Sometimes yellow, sometimes cloudy. Denies dysuria, hematuria, frequency or urgency. States post coital cipro has helped a lot with frequent UTIs. Review of Systems   Negative other than HPI     Prior to Visit Medications    Medication Sig Taking?  Authorizing Provider   montelukast (SINGULAIR) 10 MG tablet Take 1 tablet by mouth daily Yes Cecelia Cowden, APRN - CNP   ciprofloxacin (CIPRO) 250 MG tablet TAKE 1 TABLET BY MOUTH AFTER SEXUAL INTERCOURSE Yes Cecelia Cowden, APRN - CNP   azelastine (ASTELIN) 0.1 % nasal spray 1 spray by Nasal route 2 times daily Use in each nostril as directed Yes Cecelia Cowden, APRN - CNP   hydrOXYzine (VISTARIL) 25 MG capsule Take 1 capsule by mouth daily as needed for Anxiety Yes Negro Ortega MD   escitalopram (LEXAPRO) 10 MG tablet Take 1 tablet daily Yes Negro Ortega MD   carvedilol (COREG) 3.125 MG tablet Take 1 tablet by mouth 2 times daily Yes SHAMEKA Burnett - CNS   digoxin (LANOXIN) 125 MCG tablet Take 1 tablet by mouth daily Yes Carolina Wilfredo Hidden, APRN - CNS   empagliflozin (JARDIANCE) 10 MG tablet Take 1 tablet by mouth daily Yes Carolina Ríosophilus Hidden, APRN - CNS   spironolactone (ALDACTONE) 25 MG tablet Take 1/2 tablet M,F only Yes Carolina Mayes, APRN - CNS   sacubitril-valsartan (ENTRESTO) 24-26 MG per tablet Half of 24-26 mg twice a day Yes SHAMEKA Burnett   vitamin D3 (CHOLECALCIFEROL) 25 MCG (1000 UT) TABS tablet Take 1 tablet by mouth daily Yes Cecelia Cowden, APRN - CNP   Magnesium 400 MG TABS Take by mouth daily Yes Cecelia Cowden, APRN - CNP calcium carbonate (OSCAL) 500 MG TABS tablet Take 1 tablet by mouth daily Yes Vandana Grams, APRN - CNP   vitamin E 400 UNIT capsule Take 400 Units by mouth daily Yes Historical Provider, MD   Omega-3 Fatty Acids (FISH OIL) 1000 MG CAPS Take 3,000 mg by mouth 2 times daily  Yes Historical Provider, MD       Social History     Tobacco Use    Smoking status: Former Smoker     Packs/day: 0.25     Years: 10.00     Pack years: 2.50     Quit date: 1998     Years since quittin.0    Smokeless tobacco: Never Used    Tobacco comment: smoked 1 cig a day,   Vaping Use    Vaping Use: Never used   Substance Use Topics    Alcohol use: Yes    Drug use: Yes     Frequency: 2.0 times per week     Types: Marijuana Amrita Beat)            PHYSICAL EXAMINATION:  [ INSTRUCTIONS:  \"[x]\" Indicates a positive item  \"[]\" Indicates a negative item  -- DELETE ALL ITEMS NOT EXAMINED]  Vital Signs: (As obtained by patient/caregiver or practitioner observation)    No flowsheet data found. Physical Exam  Constitutional:       General: She is not in acute distress. Appearance: Normal appearance. She is not ill-appearing. HENT:      Head: Normocephalic and atraumatic. Pulmonary:      Effort: Pulmonary effort is normal. No respiratory distress. Neurological:      General: No focal deficit present. Mental Status: She is alert and oriented to person, place, and time. Mental status is at baseline. Psychiatric:         Mood and Affect: Mood normal.         Behavior: Behavior normal.        Other pertinent observable physical exam findings-     Due to this being a TeleHealth encounter, evaluation of the following organ systems is limited: Vitals/Constitutional/EENT/Resp/CV/GI//MS/Neuro/Skin/Heme-Lymph-Imm. ASSESSMENT/PLAN:  Abnormal urine odor/ Cloudy urine  Uncontrolled. Pt will come to office for UA / Cx   Will treat pending results   Increase water intake   - Urinalysis;  Future  - Culture, Urine; Future    Frequent UTI  Chronic, stable with post coital cipro     Return if symptoms worsen or fail to improve. An  electronic signature was used to authenticate this note. --SHAMEKA Llanes - CNP on 4/20/2022 at 12:19 PM        [facility], was evaluated through a synchronous (real-time) audio-video encounter. The patient (or guardian if applicable) is aware that this is a billable service, which includes applicable co-pays. This Virtual Visit was conducted with patient's (and/or legal guardian's) consent. The visit was conducted pursuant to the emergency declaration under the [address] [place] [address], [address] [place] [address] authority and the [employer] and [employer] General Act. Patient identification was verified, and a caregiver was present when appropriate. The patient was located at home in a state where the provider was licensed to provide care.

## 2022-05-16 ENCOUNTER — NURSE ONLY (OUTPATIENT)
Dept: CARDIOLOGY CLINIC | Age: 67
End: 2022-05-16
Payer: COMMERCIAL

## 2022-05-16 DIAGNOSIS — Z95.810 ICD (IMPLANTABLE CARDIOVERTER-DEFIBRILLATOR) IN PLACE: ICD-10-CM

## 2022-05-16 DIAGNOSIS — I50.22 CHRONIC SYSTOLIC HEART FAILURE (HCC): Chronic | ICD-10-CM

## 2022-05-16 DIAGNOSIS — I42.8 NON-ISCHEMIC CARDIOMYOPATHY (HCC): Chronic | ICD-10-CM

## 2022-05-16 PROCEDURE — 93296 REM INTERROG EVL PM/IDS: CPT | Performed by: INTERNAL MEDICINE

## 2022-05-16 PROCEDURE — 93295 DEV INTERROG REMOTE 1/2/MLT: CPT | Performed by: INTERNAL MEDICINE

## 2022-05-16 PROCEDURE — 93297 REM INTERROG DEV EVAL ICPMS: CPT | Performed by: INTERNAL MEDICINE

## 2022-05-16 NOTE — PROGRESS NOTES
We received remote transmission from ANAND at [facility] [place] cardiology office. Transmission shows normal sensing and pacing function. Pt close to LIAN. Will monitor remotely. EP physician will review. See interrogation under cardiology tab in the [address] [place] [address] [address] field for more details. Optivol is within normal range.

## 2022-06-09 ENCOUNTER — TELEPHONE (OUTPATIENT)
Dept: CARDIOLOGY CLINIC | Age: 67
End: 2022-06-09

## 2022-06-09 NOTE — TELEPHONE ENCOUNTER
Yaakov Rojo called wanting to know if the Pt is to be taking valsartan 40mg 2x a day. As the Pt is questioning this, she's not sure.   Please call to discuss

## 2022-06-09 NOTE — TELEPHONE ENCOUNTER
I spoke with Justin Childers. I told her that pt was taken off the valsartan. The Entresto has Valsartan in it. She stated that the pt stated she wanted an alternative to [facility]. Costing her $600. I called pt to let know that we have copay cards for $10 a month.   I left at the  for

## 2022-06-20 NOTE — PROGRESS NOTES
[address]   Electrophysiology Follow up   Date: 6/23/2022  I had the privilege of visiting Renato Li in the office. CC: CHF  HPI: Renato Li is a 74 y.o. female  with a PMH of dilated cardiomyopathy s/p ICD Cardiac defibrillator placement (1990s); Laser lead extraction and removal and replacement of Biv ICD (2009) Gen change 7/2018, Atrial fibrillation, CHF (congestive heart failure) (10/2/2008), Dilated cardiomyopathy, Fibromyalgia, GERD, Hyperlipemia, hyperlipidemia (12/6/2010) and TB (1980s). She was previously followed by me at [facility]. Interval History:  Dana Irving presents to the office in follow up. She has 2 months of battery remaining until her device reaches RRT. She is doing well from a cardiac standpoint. We provided her with the number to call [email or web address] regarding her home monitor. We will replace her BiV ICD once battery has reacher RRT. Assessment and plan:   Cardiomyopathy/Chronic Systolic  CHF     - Echo 50/55/6932 LVEF 25-30%   - 10/2018 25% to 30%   - continue Entresto 24-26 0.5 tablet in the morning and 1 tablet in the evening, Coreg 3.125 mg BID,Aldactone 25 0.5 tablet M,F only    - Follows with heart failure   -S/p Biv ICD gen change 7/2018   -The CIED was interrogated and programmed and I supervised and reviewed all the data. All findings and changes are in device interrogation sheet and reflect my personal interpretation and changes and is scanned to Epic.    -2 months remaining, AP 95.5% ,  98.8%   -CHB @ 30 BPM   -Optivol WNL    -Will scheduled Gen change once RRT is reached     \"One of her leads is showing up under the skin and makes her uncomfortable. We will do revision of the pocket when her device needs to be replaced. \"     Atrial fibrillation, paroxysmal   - EKG today  Dual chamber paced    - 0% AT/AF burden per device interrogation today.     - Patient has a [id number] Score of 3( age, gender, CHF)    - no AC - stopped after device placement    - Continue Digoxin 125 mcg    - Continue Carvedilol 3.125  mg BID       Dizziness             - Mild, likely due to meds     ESCOBAR  -Stable: Uses CPAP   -Encourage to use machine to prevent long term effects of untreated ESCOBAR. Shortness of breath and fatigue   -Rate response increased at last OV   - If not improved with RR increase I think part of her symptoms are related to fibromyalgia and baseline heart failure.     Plan:   Schedule Gen Change once RRT reached  Follow-up 1 year  Patient Active Problem List    Diagnosis Date Noted    Seasonal allergies 03/24/2022    Dizziness 12/28/2020    Chronic systolic heart failure ([address] 10/19/2020    Major depressive disorder, recurrent episode, severe with anxious distress ([address]     Trauma and stressor-related disorder     Acute chest pain 06/28/2020    Hyperkalemia 06/28/2020    Dyspnea on exertion 06/28/2020    Anxiety 06/28/2020    GERD with esophagitis 06/28/2020    Hyponatremia 06/28/2020    Dysphagia 06/28/2020    Suicidal ideation 06/28/2020    AICD (automatic cardioverter/defibrillator) present 04/16/2020    Hypercholesteremia 05/08/2018    Obstructive sleep apnea syndrome 08/15/2017    Benign essential HTN 04/19/2016    Sebaceous cyst 05/23/2014    Paroxysmal atrial fibrillation ([address] 01/31/2014    History of pericarditis 07/13/2012    Fibromyalgia syndrome 07/13/2012    Non-ischemic cardiomyopathy ([address] 11/23/2011    Pacemaker 11/23/2011     Diagnostic studies:  ECG 6/23/22  Dual Chamber paced   456 QTcH, 190 QRS      Echo 02/19/2021   Summary   -Severely reduced global systolic function with an ejection fraction   estimated at 25-30%. -Global hypokinesis noted.   -Average global longitudinal strain is estimated at -9.2%(Abnormal)   -Mild mitral regurgitation.   -There is mild tricuspid regurgitation with a RVSP estimation of 26 mmHg. -Grade II diastolic dysfunction with elevated LV filling pressures.  Avg.   E/e'=26.7    Cath 06/03/2020 Anatomy:   LM-Normal   LAD-Normal   Cx-Normal   OM- Normal   RCA-Dominant, Normal   RPDA- Normal   LVEF- 25  LVG- global hypokinesis  LVEDP- 5     Echo: 10/19/2018  Study Conclusions    - Left ventricle: The cavity size was moderately dilated. Wall    thickness was normal. Systolic function was severely reduced. The    estimated ejection fraction was in the range of 25% to 30%. There    is significant dyssynergy between the septal and lateral walls.    Mild hypokinesis of the inferoseptal myocardium. Severe    hypokinesis of the basal-midanterolateral myocardium. Moderate    hypokinesis of the apicallateral myocardium. Akinesis of the    apicalinferior myocardium. Doppler parameters are consistent with    abnormal left ventricular relaxation (grade 1 diastolic    dysfunction). - Ventricular septum: Septal motion showed abnormal function and    dyssynergy.  - Aortic valve: Trivial regurgitation.  - Right ventricle: The cavity size was mildly dilated. Wall    thickness was normal. Systolic function was normal by objective    interpretation. TAPSE: 2.6cm. Tricuspid annular systolic velocity:    47QH/M. A device lead is seen extending into the RV cavity.  - Right atrium: The atrium was mildly to moderately dilated. Danice Hole or catheter noted in right atrium.  - Tricuspid valve: Mild-moderate regurgitation.  - Pericardium, extracardiac: A trivial pericardial effusion was    identified. Impressions:  Compared to the study from 7/8/51, LV systolic is  slightly more reduced.     Stress test ([facility]) 4/10/2017  FIN INTERPRETATION  There is normal myocardial perfusion; however, there is left ventricular dysfunction. There is artifact consistent with sub-diaphragmatic interfering activity. Overall study quality is good.  Scan significance indicates low to moderate cardiac risk. Test sensitivity is reduced with testing on anti-anginal drugs.     I independently reviewed the cardiac diagnostic studies, ECG and relevant imaging studies. Lab Results   Component Value Date    LVEF 28 02/19/2021    LVEFMODE Cardiac Cath 06/30/2020     Lab Results   Component Value Date    TSH 1.24 02/14/2022         Physical Examination:  Vitals:    06/23/22 0941   BP: (!) 93/51   Pulse: 82   SpO2: 99%      Wt Readings from Last 3 Encounters:   06/23/22 137 lb (62.1 kg)   06/22/22 140 lb (63.5 kg)   02/18/22 138 lb (62.6 kg)       · Constitutional: Oriented. No distress. · Head: Normocephalic and atraumatic. · Mouth/Throat: Oropharynx is clear and moist.   · Eyes: Conjunctivae normal. EOM are normal.   · Neck: Neck supple. No rigidity. No JVD present. · Cardiovascular: Normal rate, regular rhythm, S1&S2. · Pulmonary/Chest: Bilateral respiratory sounds. No wheezes, No rhonchi. · Abdominal: Soft. Bowel sounds present. No distension, No tenderness. · Musculoskeletal: No tenderness. No edema    · Lymphadenopathy: Has no cervical adenopathy. · Neurological: Alert and oriented. Cranial nerve appears intact, No Gross deficit   · Skin: Skin is warm and dry. No rash noted. · Psychiatric: Has a normal behavior       Review of System:  [x] Full ROS obtained and negative except as mentioned in HPI    Prior to Admission medications    Medication Sig Start Date End Date Taking?  Authorizing Provider   sacubitril-valsartan (ENTRESTO) 24-26 MG per tablet Half of 24-26 mg morning and whole tablet at night 6/22/22  Yes SHAMEKA Painter   montelukast (SINGULAIR) 10 MG tablet Take 1 tablet by mouth daily 3/24/22  Yes SHAMEKA Almanzar CNP   ciprofloxacin (CIPRO) 250 MG tablet TAKE 1 TABLET BY MOUTH AFTER SEXUAL INTERCOURSE 3/24/22  Yes SHAMEKA Almanzar CNP   carvedilol (COREG) 3.125 MG tablet Take 1 tablet by mouth 2 times daily 2/14/22  Yes SHAMEKA Painter   digoxin (LANOXIN) 125 MCG tablet Take 1 tablet by mouth daily 2/14/22  Yes SHAMEKA Painter   spironolactone (ALDACTONE) 25 MG tablet Take 1/2 tablet M,F only  Patient taking differently: Take 12.5 mg by mouth three times a week Mon, Wed, Fri 2/14/22  Yes SHAMEKA Iverson   vitamin D3 (CHOLECALCIFEROL) 25 MCG (1000 UT) TABS tablet Take 1 tablet by mouth daily 10/28/20  Yes SHAMEKA George CNP   Magnesium 400 MG TABS Take by mouth daily 10/28/20  Yes SHAMEKA George CNP   calcium carbonate (OSCAL) 500 MG TABS tablet Take 1 tablet by mouth daily 10/28/20  Yes SHAMEKA George CNP   vitamin E 400 UNIT capsule Take 400 Units by mouth daily   Yes Historical Provider, MD   Omega-3 Fatty Acids (FISH OIL) 1000 MG CAPS Take 3,000 mg by mouth 2 times daily    Yes Historical Provider, MD       Allergies   Allergen Reactions    Prednisone      Caused an allergic reaction. Swelling all over her body. Social History:  Reviewed. reports that she quit smoking about 24 years ago. She has a 2.50 pack-year smoking history. She has never used smokeless tobacco. She reports current alcohol use. She reports current drug use. Frequency: 2.00 times per week. Drug: Marijuana [employer]). Family History:  Reviewed. Reviewed. No family history of SCD. Relevant and available labs, and cardiovascular diagnostics reviewed. Reviewed. I independently reviewed relevant and available cardiac diagnostic tests ECG, CXR, Echo, Stress test, Device interrogation, Holter, CT scan. Outside medical records via Care everywhere reviewed and summarized in H&P above. Complex medical condition with multiple medical problems affecting prognosis and outcome of EP interventions       - The patient is counseled to follow a low salt diet to assure blood pressure remains controlled for cardiovascular risk factor modification.   - The patient is counseled to avoid excess caffeine, and energy drinks as this may exacerbated ectopy and arrhythmia.    - The patient is counseled to get regular exercise 3-5 times per week to control cardiovascular risk factors. All questions and concerns were addressed to the patient/family. Alternatives to my treatment were discussed. I have discussed the above stated plan and the patient verbalized understanding and agreed with the plan. Scribe attestation: This note was scribed in the presence of Yves Jeans, MD by Adela Sultana RN    I, Dr. Yves Jeans personally performed the services described in this documentation as scribed by RN in my presence, and it is both accurate and complete.        NOTE: This report was transcribed using voice recognition software. Every effort was made to ensure accuracy, however, inadvertent computerized transcription errors may be present.      Yves Jeans MD, [facility] [address] [address] [facility]   Office: [phone]  Fax: [phone]

## 2022-06-20 NOTE — TELEPHONE ENCOUNTER
[facility] states pt was getting dizzy with jardiance and was cutting it in half. [facility] RN told her she should not do that and to call cardiologist. Please call pt to discuss. Also states pt did not hear back about reduction for entresto cost. Please advise.

## 2022-06-21 NOTE — TELEPHONE ENCOUNTER
Please call patient and find out what her BP is and weights  Find out more about the entresto cost as well, last note someone gave her a copay card but she probably does not qualify for this due to medicare  She might need an appt   thanks

## 2022-06-21 NOTE — TELEPHONE ENCOUNTER
Called  And spoke with patient she states that she does not check her bp. She is not sure what it is. He weight today is 141lb she states that she has enough Entresto for 1 more month very costly. Please advise if patient can be added to tomorrow's schedule.  Please advise thanks

## 2022-06-22 ENCOUNTER — OFFICE VISIT (OUTPATIENT)
Dept: CARDIOLOGY CLINIC | Age: 67
End: 2022-06-22
Payer: COMMERCIAL

## 2022-06-22 ENCOUNTER — TELEPHONE (OUTPATIENT)
Dept: CARDIOLOGY CLINIC | Age: 67
End: 2022-06-22

## 2022-06-22 ENCOUNTER — HOSPITAL ENCOUNTER (OUTPATIENT)
Age: 67
Discharge: HOME | End: 2022-06-22
Payer: COMMERCIAL

## 2022-06-22 VITALS
HEART RATE: 69 BPM | DIASTOLIC BLOOD PRESSURE: 72 MMHG | SYSTOLIC BLOOD PRESSURE: 102 MMHG | OXYGEN SATURATION: 97 % | BODY MASS INDEX: 22.5 KG/M2 | WEIGHT: 140 LBS | HEIGHT: 66 IN

## 2022-06-22 DIAGNOSIS — Z95.810 BIVENTRICULAR ICD (IMPLANTABLE CARDIOVERTER-DEFIBRILLATOR) IN PLACE: ICD-10-CM

## 2022-06-22 DIAGNOSIS — I42.8 NON-ISCHEMIC CARDIOMYOPATHY (HCC): ICD-10-CM

## 2022-06-22 DIAGNOSIS — I48.0 PAROXYSMAL ATRIAL FIBRILLATION (HCC): ICD-10-CM

## 2022-06-22 DIAGNOSIS — E55.9 VITAMIN D DEFICIENCY: ICD-10-CM

## 2022-06-22 DIAGNOSIS — I50.22 CHRONIC SYSTOLIC HEART FAILURE (HCC): Primary | ICD-10-CM

## 2022-06-22 DIAGNOSIS — I50.22 CHRONIC SYSTOLIC HEART FAILURE (HCC): ICD-10-CM

## 2022-06-22 LAB
ANION GAP SERPL CALCULATED.3IONS-SCNC: 14 MMOL/L (ref 3–16)
BUN BLDV-MCNC: 21 MG/DL (ref 7–20)
CALCIUM SERPL-MCNC: 11.6 MG/DL (ref 8.3–10.6)
CHLORIDE BLD-SCNC: 100 MMOL/L (ref 99–110)
CO2: 25 MMOL/L (ref 21–32)
CREAT SERPL-MCNC: 0.9 MG/DL (ref 0.6–1.2)
GFR AFRICAN AMERICAN: >60
GFR NON-AFRICAN AMERICAN: >60
GLUCOSE BLD-MCNC: 111 MG/DL (ref 70–99)
HCT VFR BLD CALC: 47.4 % (ref 36–48)
HEMOGLOBIN: 16.3 G/DL (ref 12–16)
MCH RBC QN AUTO: 30.9 PG (ref 26–34)
MCHC RBC AUTO-ENTMCNC: 34.3 G/DL (ref 31–36)
MCV RBC AUTO: 90 FL (ref 80–100)
PDW BLD-RTO: 13.3 % (ref 12.4–15.4)
PLATELET # BLD: 130 K/UL (ref 135–450)
PMV BLD AUTO: 10.7 FL (ref 5–10.5)
POTASSIUM SERPL-SCNC: 4 MMOL/L (ref 3.5–5.1)
PRO-BNP: 208 PG/ML (ref 0–124)
RBC # BLD: 5.27 M/UL (ref 4–5.2)
SODIUM BLD-SCNC: 139 MMOL/L (ref 136–145)
WBC # BLD: 6.9 K/UL (ref 4–11)

## 2022-06-22 PROCEDURE — G8420 CALC BMI NORM PARAMETERS: HCPCS | Performed by: CLINICAL NURSE SPECIALIST

## 2022-06-22 PROCEDURE — 99214 OFFICE O/P EST MOD 30 MIN: CPT | Performed by: CLINICAL NURSE SPECIALIST

## 2022-06-22 PROCEDURE — 1036F TOBACCO NON-USER: CPT | Performed by: CLINICAL NURSE SPECIALIST

## 2022-06-22 PROCEDURE — 3017F COLORECTAL CA SCREEN DOC REV: CPT | Performed by: CLINICAL NURSE SPECIALIST

## 2022-06-22 PROCEDURE — G8427 DOCREV CUR MEDS BY ELIG CLIN: HCPCS | Performed by: CLINICAL NURSE SPECIALIST

## 2022-06-22 PROCEDURE — 36415 COLL VENOUS BLD VENIPUNCTURE: CPT

## 2022-06-22 PROCEDURE — 1090F PRES/ABSN URINE INCON ASSESS: CPT | Performed by: CLINICAL NURSE SPECIALIST

## 2022-06-22 PROCEDURE — 85027 COMPLETE CBC AUTOMATED: CPT

## 2022-06-22 PROCEDURE — 83880 ASSAY OF NATRIURETIC PEPTIDE: CPT

## 2022-06-22 PROCEDURE — 1123F ACP DISCUSS/DSCN MKR DOCD: CPT | Performed by: CLINICAL NURSE SPECIALIST

## 2022-06-22 PROCEDURE — 80048 BASIC METABOLIC PNL TOTAL CA: CPT

## 2022-06-22 PROCEDURE — G8400 PT W/DXA NO RESULTS DOC: HCPCS | Performed by: CLINICAL NURSE SPECIALIST

## 2022-06-22 RX ORDER — SACUBITRIL AND VALSARTAN 24; 26 MG/1; MG/1
TABLET, FILM COATED ORAL
Qty: 60 TABLET | Refills: 1 | Status: SHIPPED
Start: 2022-06-22 | End: 2022-09-26

## 2022-06-22 NOTE — TELEPHONE ENCOUNTER
----- Message from SHAMEKA Freeman - CNS sent at 6/22/2022  4:02 PM EDT -----  Labs are good  Continue plan discussed today

## 2022-06-22 NOTE — PATIENT INSTRUCTIONS
1.  Stop jardiance   2. Continue all current medications  3. Check blood work today  4.   Keep august appt with me

## 2022-06-22 NOTE — PROGRESS NOTES
[address]  Progress Note    Primary Care Doctor:  Candace Price MD    Chief Complaint   Patient presents with    Congestive Heart Failure    Hypotension    Dizziness        History of Present Illness:  74 y.o. female with history of NICM, BiV ICD  and , AF (not on anticoagulation, follows with Dr Martha Hawkins), ESCOBAR on cpap, fibromyalgia, sHF on entresto since , followed at [facility]  - for chest pain, LHC done, EGD with H pylori neg and anxiety/depression (family issues) seen by psychiatry and started on klonipin/zoloft. I had the pleasure of seeing Edgardo Bolanos in follow up for systolic heart failure. She is ambulatory and with her . She has been complaining of dizziness ever since starting jardiance in 2021. She started taking the jardiance at night but still has dizziness. Her optival shows normal thoracic impedence. Her weight is stable. Her BP is soft when she is lying to sitting/standing position. She denies any chest pain, palpitations, edema or shortness of breath. Past Medical History:   has a past medical history of Atrial fibrillation ([address] Cardiomyopathy, nonischemic ([address] CHF (congestive heart failure) ([address] Fibromyalgia, GERD (gastroesophageal reflux disease), HSV-1 (herpes simplex virus 1) infection, Hyperlipidemia, Sleep apnea, and Uterine fibroids affecting pregnancy. Surgical History:   has a past surgical history that includes  section; Pacemaker insertion (); Pacemaker insertion; Upper gastrointestinal endoscopy (2015); Colonoscopy (2015); Colonoscopy (N/A, 2019); Upper gastrointestinal endoscopy (N/A, 2019); Cardiac defibrillator placement; and Upper gastrointestinal endoscopy (N/A, 2020). Social History:   reports that she quit smoking about 24 years ago. She has a 2.50 pack-year smoking history. She has never used smokeless tobacco. She reports current alcohol use.  She reports current drug use. Frequency: 2.00 times per week. Drug: Marijuana [employer]). Family History:   Family History   Problem Relation Age of Onset    Diabetes Mother     Kidney Disease Mother         reanl failure       Home Medications:  Prior to Admission medications    Medication Sig Start Date End Date Taking? Authorizing Provider   sacubitril-valsartan (ENTRESTO) 24-26 MG per tablet Half of 24-26 mg morning and whole tablet at night 6/22/22  Yes SHAMEKA Burnett   montelukast (SINGULAIR) 10 MG tablet Take 1 tablet by mouth daily 3/24/22  Yes SHAMEKA Orellana CNP   ciprofloxacin (CIPRO) 250 MG tablet TAKE 1 TABLET BY MOUTH AFTER SEXUAL INTERCOURSE 3/24/22  Yes SHAMEKA Orellana CNP   carvedilol (COREG) 3.125 MG tablet Take 1 tablet by mouth 2 times daily 2/14/22  Yes SHAMEKA Sellers   digoxin (LANOXIN) 125 MCG tablet Take 1 tablet by mouth daily 2/14/22  Yes SHAMEKA Sellers   spironolactone (ALDACTONE) 25 MG tablet Take 1/2 tablet M,F only  Patient taking differently: Take 12.5 mg by mouth three times a week Mon, Wed, Fri 2/14/22  Yes SHAMEKA Sellers   vitamin D3 (CHOLECALCIFEROL) 25 MCG (1000 UT) TABS tablet Take 1 tablet by mouth daily 10/28/20  Yes SHAMEKA Orellana CNP   Magnesium 400 MG TABS Take by mouth daily 10/28/20  Yes SHAMEKA Orellana CNP   calcium carbonate (OSCAL) 500 MG TABS tablet Take 1 tablet by mouth daily 10/28/20  Yes SHAMEKA Orellana CNP   vitamin E 400 UNIT capsule Take 400 Units by mouth daily   Yes Historical Provider, MD   Omega-3 Fatty Acids (FISH OIL) 1000 MG CAPS Take 3,000 mg by mouth 2 times daily    Yes Historical Provider, MD        Allergies:  Prednisone     Review of Systems:   · Constitutional: there has been no unanticipated weight loss. There's been no change in energy level, sleep pattern, or activity level. · Eyes: No visual changes or diplopia. No scleral icterus.   · ENT: No Headaches, hearing loss or vertigo. No mouth sores or sore throat. · Cardiovascular: Reviewed in HPI  · Respiratory: No cough or wheezing, no sputum production. No hematemesis. · Gastrointestinal: No abdominal pain, appetite loss, blood in stools. No change in bowel or bladder habits. · Genitourinary: No dysuria, trouble voiding, or hematuria. · Musculoskeletal:  No gait disturbance, weakness or joint complaints. · Integumentary: No rash or pruritis. · Neurological: No headache, diplopia, change in muscle strength, numbness or tingling. No change in gait, balance, coordination, mood, affect, memory, mentation, behavior. · Psychiatric: No anxiety, no depression. · Endocrine: No malaise, fatigue or temperature intolerance. No excessive thirst, fluid intake, or urination. No tremor. · Hematologic/Lymphatic: No abnormal bruising or bleeding, blood clots or swollen lymph nodes. · Allergic/Immunologic: No nasal congestion or hives. Physical Examination:    Vitals:    06/22/22 0918 06/22/22 0923 06/22/22 0925   BP: 86/60 102/72 102/72   Position: Supine Sitting Standing   Pulse: 70 69 69   SpO2: 97% 98% 97%   Weight: 140 lb (63.5 kg)     Height: 5' 6\" (1.676 m)          Constitutional and General Appearance: Warm and dry, no apparent distress, normal coloration  HEENT:  Normocephalic, atraumatic  Respiratory:  · Normal excursion and expansion without use of accessory muscles  · Resp Auscultation: Normal breath sounds without dullness  Cardiovascular:  · The apical impulses not displaced  · Heart tones are crisp and normal  · JVP normal cm H2O  · Regular rate and rhythm  · Peripheral pulses are symmetrical and full  · There is no clubbing, cyanosis of the extremities.   · no edema  · Pedal Pulses: 2+ and equal   Abdomen:  · No masses or tenderness  · Liver/Spleen: No Abnormalities Noted  Neurological/Psychiatric:  · Alert and oriented in all spheres  · Moves all extremities well  · Exhibits normal gait balance and coordination  · No abnormalities of mood, affect, memory, mentation, or behavior are noted    Lab Data:    CBC:   Lab Results   Component Value Date    WBC 5.6 02/14/2022    WBC 6.1 07/02/2021    WBC 5.8 06/30/2020    RBC 4.57 02/14/2022    RBC 4.64 07/02/2021    RBC 4.29 06/30/2020    HGB 14.1 02/14/2022    HGB 14.7 07/02/2021    HGB 13.1 06/30/2020    HCT 41.8 02/14/2022    HCT 43.8 07/02/2021    HCT 39.1 06/30/2020    MCV 91.3 02/14/2022    MCV 94.3 07/02/2021    MCV 91.2 06/30/2020    RDW 13.5 02/14/2022    RDW 13.2 07/02/2021    RDW 12.9 06/30/2020     02/14/2022     07/02/2021     06/30/2020     BMP:  Lab Results   Component Value Date     02/14/2022     07/15/2021     07/02/2021    K 4.7 02/14/2022    K 4.8 07/15/2021    K 4.6 07/02/2021    K 3.8 06/29/2020    K 6.1 06/28/2020     02/14/2022     07/15/2021     07/02/2021    CO2 28 02/14/2022    CO2 28 07/15/2021    CO2 25 07/02/2021    PHOS 2.9 07/09/2018    PHOS 3.7 10/18/2017    PHOS 3.2 01/07/2016    BUN 18 02/14/2022    BUN 22 07/15/2021    BUN 17 07/02/2021    CREATININE 0.7 02/14/2022    CREATININE 0.9 07/15/2021    CREATININE 0.7 07/02/2021     BNP:   Lab Results   Component Value Date    PROBNP 363 02/14/2022    PROBNP 162 07/15/2021    PROBNP 131 07/02/2021     Cardiac Imaging:  Echo 2/19/2021  Summary   -Severely reduced global systolic function with an ejection fraction   estimated at 25-30%. -Global hypokinesis noted.   -Average global longitudinal strain is estimated at -9.2%(Abnormal)   -Mild mitral regurgitation.   -There is mild tricuspid regurgitation with a RVSP estimation of 26 mmHg. -Grade II diastolic dysfunction with elevated LV filling pressures. Avg.   E/e'=26.7    Echo 6/29/2020:  Summary   -Left ventricular systolic function is reduced with ejection fraction   estimated at 30 %.    -Abnormal septal motion.   -Normal left ventricular wall thickness.   -Left ventricular size consistent with    abnormal left ventricular relaxation (grade 1 diastolic    dysfunction). - Ventricular septum: Septal motion showed abnormal function and    dyssynergy.  - Aortic valve: Trivial regurgitation.  - Right ventricle: The cavity size was mildly dilated. Wall    thickness was normal. Systolic function was normal by objective    interpretation. TAPSE: 2.6cm. Tricuspid annular systolic velocity:    80OJ/A. A device lead is seen extending into the RV cavity.  - Right atrium: The atrium was mildly to moderately dilated. Marina Quarry or catheter noted in right atrium.  - Tricuspid valve: Mild-moderate regurgitation.  - Pericardium, extracardiac: A trivial pericardial effusion was    identified.     Impressions:  Compared to the study from 8/8/14, LV systolic is  slightly more reduced. Assessment:    1. Chronic systolic heart failure (HCC) on arni, BB, digoxin, sglt2 and aldosterone antagonist   2. Non-ischemic cardiomyopathy ([address]    3. Biventricular ICD (implantable cardioverter-defibrillator) in place    4. PAF (paroxysmal atrial fibrillation) (HCC) no anticoag, follows with Dr Lindy Araiza   5. Vitamin d deficiency    Plan:   Patient Instructions   1. Stop jardiance   2. Continue all current medications  3. Check blood work today  4.   Keep august appt with me      I appreciate the opportunity of cooperating in the care of this individual.    Remona Opitz, APRN - CNS, CNS, 6/22/2022, 11:08 AM

## 2022-06-23 ENCOUNTER — NURSE ONLY (OUTPATIENT)
Dept: CARDIOLOGY CLINIC | Age: 67
End: 2022-06-23
Payer: COMMERCIAL

## 2022-06-23 ENCOUNTER — OFFICE VISIT (OUTPATIENT)
Dept: CARDIOLOGY CLINIC | Age: 67
End: 2022-06-23

## 2022-06-23 VITALS
SYSTOLIC BLOOD PRESSURE: 93 MMHG | HEIGHT: 62 IN | DIASTOLIC BLOOD PRESSURE: 51 MMHG | WEIGHT: 137 LBS | BODY MASS INDEX: 25.21 KG/M2 | OXYGEN SATURATION: 99 % | HEART RATE: 82 BPM

## 2022-06-23 DIAGNOSIS — I50.9 HEART FAILURE, UNSPECIFIED HF CHRONICITY, UNSPECIFIED HEART FAILURE TYPE (HCC): ICD-10-CM

## 2022-06-23 DIAGNOSIS — I42.8 NON-ISCHEMIC CARDIOMYOPATHY (HCC): Chronic | ICD-10-CM

## 2022-06-23 DIAGNOSIS — I44.30 AV BLOCK: ICD-10-CM

## 2022-06-23 DIAGNOSIS — Z95.810 AICD (AUTOMATIC CARDIOVERTER/DEFIBRILLATOR) PRESENT: ICD-10-CM

## 2022-06-23 DIAGNOSIS — I10 BENIGN ESSENTIAL HTN: Chronic | ICD-10-CM

## 2022-06-23 DIAGNOSIS — I48.0 PAROXYSMAL ATRIAL FIBRILLATION (HCC): Chronic | ICD-10-CM

## 2022-06-23 DIAGNOSIS — I42.8 NON-ISCHEMIC CARDIOMYOPATHY (HCC): Primary | Chronic | ICD-10-CM

## 2022-06-23 DIAGNOSIS — I50.22 CHRONIC SYSTOLIC HEART FAILURE (HCC): Chronic | ICD-10-CM

## 2022-06-23 DIAGNOSIS — G47.33 OBSTRUCTIVE SLEEP APNEA SYNDROME: Chronic | ICD-10-CM

## 2022-06-23 DIAGNOSIS — Z95.810 BIVENTRICULAR ICD (IMPLANTABLE CARDIOVERTER-DEFIBRILLATOR) IN PLACE: ICD-10-CM

## 2022-06-23 DIAGNOSIS — R42 DIZZINESS: ICD-10-CM

## 2022-06-23 PROCEDURE — G8417 CALC BMI ABV UP PARAM F/U: HCPCS | Performed by: INTERNAL MEDICINE

## 2022-06-23 PROCEDURE — 1123F ACP DISCUSS/DSCN MKR DOCD: CPT | Performed by: INTERNAL MEDICINE

## 2022-06-23 PROCEDURE — 1090F PRES/ABSN URINE INCON ASSESS: CPT | Performed by: INTERNAL MEDICINE

## 2022-06-23 PROCEDURE — G8400 PT W/DXA NO RESULTS DOC: HCPCS | Performed by: INTERNAL MEDICINE

## 2022-06-23 PROCEDURE — 93284 PRGRMG EVAL IMPLANTABLE DFB: CPT | Performed by: INTERNAL MEDICINE

## 2022-06-23 PROCEDURE — 93290 INTERROG DEV EVAL ICPMS IP: CPT | Performed by: INTERNAL MEDICINE

## 2022-06-23 PROCEDURE — 1036F TOBACCO NON-USER: CPT | Performed by: INTERNAL MEDICINE

## 2022-06-23 PROCEDURE — 3017F COLORECTAL CA SCREEN DOC REV: CPT | Performed by: INTERNAL MEDICINE

## 2022-06-23 PROCEDURE — 99214 OFFICE O/P EST MOD 30 MIN: CPT | Performed by: INTERNAL MEDICINE

## 2022-06-23 PROCEDURE — G8427 DOCREV CUR MEDS BY ELIG CLIN: HCPCS | Performed by: INTERNAL MEDICINE

## 2022-06-23 NOTE — RESULT ENCOUNTER NOTE
Reviewed. Continue monitoring.     Michelle Mckeon MD [facility]

## 2022-06-23 NOTE — PROGRESS NOTES
Patient comes in for programming evaluation for her defibrillator. All sensing and pacing parameters are within normal range. Battery life 2 months until LIAN  AP 95.5%.  98.8%. Dependent at 30 bpm today. No episodes noted. Patient remains on Coreg and digoxin. No changes need to be made at this time. Please see interrogation for more detail. Optivol is within normal range. Patient will see Dr. Lindy Araiza today and follow up in 2 months in office. Discussed Home Monitor with Patient. She states it is plugged in by her bedside but doesn't believe it is working correctly. I gave her the stay connected information to call and get a new Home Monitor mailed out to her.

## 2022-07-27 RX ORDER — EMPAGLIFLOZIN 10 MG/1
TABLET, FILM COATED ORAL DAILY
Qty: 90 TABLET | Refills: 1 | OUTPATIENT
Start: 2022-07-27

## 2022-07-27 RX ORDER — CARVEDILOL 3.12 MG/1
TABLET ORAL
Qty: 180 TABLET | Refills: 1 | Status: SHIPPED | OUTPATIENT
Start: 2022-07-27

## 2022-07-27 NOTE — TELEPHONE ENCOUNTER
carvedilol (COREG) 3.125 MG tablet [[id number]]   Last OV: 6/23/22 MXA  Last labs: 6/23/22 EKG  Appt scheduled :  8/12/22 NPRG      She has been complaining of dizziness ever since starting jardiance in November 2021    Plan:   Patient Instructions   1. Stop jardiance  2. Continue all current medications  3. Check blood work today  4. Keep august appt with me     Germain was refused d/c.

## 2022-07-30 DIAGNOSIS — N39.0 FREQUENT UTI: ICD-10-CM

## 2022-08-08 RX ORDER — CIPROFLOXACIN 250 MG/1
TABLET, FILM COATED ORAL
Qty: 10 TABLET | Refills: 1 | Status: SHIPPED | OUTPATIENT
Start: 2022-08-08 | End: 2022-11-03 | Stop reason: SDUPTHER

## 2022-08-12 ENCOUNTER — NURSE ONLY (OUTPATIENT)
Dept: CARDIOLOGY CLINIC | Age: 67
End: 2022-08-12

## 2022-08-12 NOTE — PROGRESS NOTES
Received [facility] Alert on patient's CRT-D. Device triggered LIAN today 8/12/22. She has an office appt with MARIAMA TUCKER today. Dr. Criselda Tadeo will review. See interrogation under cardiology tab in the [address] [place] [address] [address] field for more details. Will continue to monitor remotely.

## 2022-08-16 ENCOUNTER — NURSE ONLY (OUTPATIENT)
Dept: CARDIOLOGY CLINIC | Age: 67
End: 2022-08-16
Payer: COMMERCIAL

## 2022-08-16 DIAGNOSIS — I42.8 NON-ISCHEMIC CARDIOMYOPATHY (HCC): Chronic | ICD-10-CM

## 2022-08-16 DIAGNOSIS — I50.22 CHRONIC SYSTOLIC HEART FAILURE (HCC): Primary | Chronic | ICD-10-CM

## 2022-08-16 DIAGNOSIS — Z95.810 AICD (AUTOMATIC CARDIOVERTER/DEFIBRILLATOR) PRESENT: ICD-10-CM

## 2022-08-16 PROCEDURE — 93296 REM INTERROG EVL PM/IDS: CPT | Performed by: INTERNAL MEDICINE

## 2022-08-16 PROCEDURE — 93295 DEV INTERROG REMOTE 1/2/MLT: CPT | Performed by: INTERNAL MEDICINE

## 2022-08-16 PROCEDURE — 93297 REM INTERROG DEV EVAL ICPMS: CPT | Performed by: CLINICAL NURSE SPECIALIST

## 2022-08-16 NOTE — PROGRESS NOTES
Remote transmission received from patient's CRT-D home monitor. Transmission shows normal sensing and pacing function. No new arrhythmias/ events recorded. Triggered RRT on 8/12/22-- being scheduled for a gen change. AP 92.0%, CRT 99.3%, VSRp <0.1%    Optivol is within normal range. TriageHF Heart Failure Risk Status on 16-Aug-2022 is Low*    EP/ NP will review. See interrogation under cardiology tab in the [address] [place] [address] [address] field for more details. Will continue to monitor remotely. (End of 91-day monitoring period 8/16/22).

## 2022-08-26 DIAGNOSIS — J30.2 SEASONAL ALLERGIES: ICD-10-CM

## 2022-08-26 RX ORDER — MONTELUKAST SODIUM 10 MG/1
10 TABLET ORAL DAILY
Qty: 30 TABLET | Refills: 5 | Status: SHIPPED | OUTPATIENT
Start: 2022-08-26

## 2022-08-31 ENCOUNTER — TELEPHONE (OUTPATIENT)
Dept: CARDIOLOGY CLINIC | Age: 67
End: 2022-08-31

## 2022-08-31 NOTE — TELEPHONE ENCOUNTER
Completed.      [id number] change  Auth # [id number]    [id number]  Lead revision   Auth # [id number]    Both valid 8/31/2022-10/15/2022    SHAMEKA James-CNP

## 2022-08-31 NOTE — TELEPHONE ENCOUNTER
Please schedule for peer to peer to be completed, either NP can complete it    Ronda Reyes, SHAMEKA-CNP

## 2022-08-31 NOTE — TELEPHONE ENCOUNTER
Patient is scheduled to have a BIV ICD generator change on 9/16 with Dr. Penni Halsted. Patient's insurance REQUIRES a peer to peer prior to authorizing the procedure. Please call [phone], Opt #3 and refer to the following two cases: [id number] and [id number]. If authorization is obtained, please provide.  Thanks

## 2022-09-02 NOTE — TELEPHONE ENCOUNTER
Medication:   Requested Prescriptions     Pending Prescriptions Disp Refills    hydrOXYzine pamoate (VISTARIL) 25 MG capsule [Pharmacy Med Name: HYDROXYZINE PAMOATE 25MG CAPSULES] 15 capsule 1     Sig: TAKE 1 CAPSULE BY MOUTH DAILY AS NEEDED FOR ANXIETY       Last Filled:  2/18/22    Patient Phone Number: [phone] (home)     Last appt: 2/18/2022   Next appt: Visit date not found

## 2022-09-05 RX ORDER — HYDROXYZINE PAMOATE 25 MG/1
25 CAPSULE ORAL DAILY PRN
Qty: 15 CAPSULE | Refills: 1 | Status: SHIPPED | OUTPATIENT
Start: 2022-09-05 | End: 2022-09-22

## 2022-09-16 ENCOUNTER — TELEPHONE (OUTPATIENT)
Dept: CARDIOLOGY CLINIC | Age: 67
End: 2022-09-16

## 2022-09-16 ENCOUNTER — HOSPITAL ENCOUNTER (OUTPATIENT)
Dept: CARDIAC CATH/INVASIVE PROCEDURES | Age: 67
Discharge: HOME | End: 2022-09-16
Attending: INTERNAL MEDICINE | Admitting: INTERNAL MEDICINE
Payer: COMMERCIAL

## 2022-09-16 VITALS
HEIGHT: 62 IN | WEIGHT: 137 LBS | DIASTOLIC BLOOD PRESSURE: 61 MMHG | BODY MASS INDEX: 25.21 KG/M2 | SYSTOLIC BLOOD PRESSURE: 94 MMHG | HEART RATE: 77 BPM | RESPIRATION RATE: 16 BRPM

## 2022-09-16 PROBLEM — Z45.02 ICD (IMPLANTABLE CARDIOVERTER-DEFIBRILLATOR) BATTERY DEPLETION: Status: ACTIVE | Noted: 2022-09-16

## 2022-09-16 LAB
EKG ATRIAL RATE: 68 BPM
EKG DIAGNOSIS: NORMAL
EKG Q-T INTERVAL: 474 MS
EKG QRS DURATION: 192 MS
EKG QTC CALCULATION (BAZETT): 536 MS
EKG R AXIS: 131 DEGREES
EKG T AXIS: 27 DEGREES
EKG VENTRICULAR RATE: 77 BPM
HCT VFR BLD CALC: 43.6 % (ref 36–48)
HEMOGLOBIN: 14.6 G/DL (ref 12–16)
MCH RBC QN AUTO: 30.6 PG (ref 26–34)
MCHC RBC AUTO-ENTMCNC: 33.4 G/DL (ref 31–36)
MCV RBC AUTO: 91.5 FL (ref 80–100)
PDW BLD-RTO: 13.7 % (ref 12.4–15.4)
PLATELET # BLD: 161 K/UL (ref 135–450)
PMV BLD AUTO: 10 FL (ref 5–10.5)
RBC # BLD: 4.76 M/UL (ref 4–5.2)
REASON FOR REJECTION: NORMAL
REJECTED TEST: NORMAL
WBC # BLD: 6.7 K/UL (ref 4–11)

## 2022-09-16 PROCEDURE — 2500000003 HC RX 250 WO HCPCS

## 2022-09-16 PROCEDURE — 36415 COLL VENOUS BLD VENIPUNCTURE: CPT

## 2022-09-16 PROCEDURE — 99152 MOD SED SAME PHYS/QHP 5/>YRS: CPT

## 2022-09-16 PROCEDURE — 99153 MOD SED SAME PHYS/QHP EA: CPT

## 2022-09-16 PROCEDURE — 6360000002 HC RX W HCPCS

## 2022-09-16 PROCEDURE — 33264 RMVL & RPLCMT DFB GEN MLT LD: CPT | Performed by: INTERNAL MEDICINE

## 2022-09-16 PROCEDURE — 33223 RELOCATE POCKET FOR DEFIB: CPT

## 2022-09-16 PROCEDURE — C1882 AICD, OTHER THAN SING/DUAL: HCPCS

## 2022-09-16 PROCEDURE — C1889 IMPLANT/INSERT DEVICE, NOC: HCPCS

## 2022-09-16 PROCEDURE — 99152 MOD SED SAME PHYS/QHP 5/>YRS: CPT | Performed by: INTERNAL MEDICINE

## 2022-09-16 PROCEDURE — 93010 ELECTROCARDIOGRAM REPORT: CPT | Performed by: INTERNAL MEDICINE

## 2022-09-16 PROCEDURE — 93005 ELECTROCARDIOGRAM TRACING: CPT | Performed by: INTERNAL MEDICINE

## 2022-09-16 PROCEDURE — 33223 RELOCATE POCKET FOR DEFIB: CPT | Performed by: INTERNAL MEDICINE

## 2022-09-16 PROCEDURE — 33264 RMVL & RPLCMT DFB GEN MLT LD: CPT

## 2022-09-16 PROCEDURE — 2580000003 HC RX 258

## 2022-09-16 PROCEDURE — 85027 COMPLETE CBC AUTOMATED: CPT

## 2022-09-16 RX ORDER — SODIUM CHLORIDE 0.9 % (FLUSH) 0.9 %
5-40 SYRINGE (ML) INJECTION PRN
Status: DISCONTINUED | OUTPATIENT
Start: 2022-09-16 | End: 2022-09-16 | Stop reason: HOSPADM

## 2022-09-16 NOTE — DISCHARGE INSTRUCTIONS
ICD/PACEMAKER GENERATOR REPLACEMENT DISCHARGE INSTRUCTIONS    No Driving for 24 hours. We strongly recommend that a responsible adult stay with you for the next 24 hours. Remove outer dressing in 48 hours. Leave steri strips intact. Do not get the incision wet for one week. You may be sore, bruised and have a small amount of drainage around the incision site.     Please contact the office if you notice any signs of infection:  Redness / swelling at the incision site  Fever  Yellow drainage at the site  Pain      [address] will call you to schedule your 3month follow up

## 2022-09-16 NOTE — TELEPHONE ENCOUNTER
Cath lab is requesting a 3 mo fu with NP EP with device ck. Please advise of date and time pt can be added and call pt to schedule.

## 2022-09-16 NOTE — H&P
[facility]   Electrophysiology      CC: CHF  HPI: Mariangel Cardona is a 74 y.o. female  with a PMH of dilated cardiomyopathy s/p ICD Cardiac defibrillator placement (1990s); Laser lead extraction and removal and replacement of Biv ICD (2009) Gen change 7/2018, Atrial fibrillation, CHF (congestive heart failure) (10/2/2008), Dilated cardiomyopathy, Fibromyalgia, GERD, Hyperlipemia, hyperlipidemia (12/6/2010) and TB (1980s). She was previously followed by me at [facility]. Interval History:   . She has 2 months of battery remaining until her device reaches RRT. She is doing well from a cardiac standpoint. We provided her with the number to call [employer] regarding her home monitor. We will replace her BiV ICD once battery has reacher RRT. Assessment and plan:   Cardiomyopathy/Chronic Systolic  CHF     - Echo 66/39/2980 LVEF 25-30%   - 10/2018 25% to 30%   - continue Entresto 24-26 0.5 tablet in the morning and 1 tablet in the evening, Coreg 3.125 mg BID,Aldactone 25 0.5 tablet M,F only    - Follows with heart failure   -S/p Biv ICD gen change 7/2018   -The CIED was interrogated and programmed and I supervised and reviewed all the data. All findings and changes are in device interrogation sheet and reflect my personal interpretation and changes and is scanned to Epic.    -2 months remaining, AP 95.5% ,  98.8%   -CHB @ 30 BPM   -Optivol WNL    -Will scheduled Gen change once RRT is reached     \"One of her leads is showing up under the skin and makes her uncomfortable. We will do revision of the pocket when her device needs to be replaced. \"     Atrial fibrillation, paroxysmal   - EKG today  Dual chamber paced    - 0% AT/AF burden per device interrogation today.     - Patient has a [id number] Score of 3( age, gender, CHF)    - no AC - stopped after device placement    - Continue Digoxin 125 mcg    - Continue Carvedilol 3.125  mg BID       Dizziness             - Mild, likely due to meds     ESCOBAR  -Stable: Uses CPAP   -Encourage to use machine to prevent long term effects of untreated ESCOBAR. Shortness of breath and fatigue   -Rate response increased at last OV   - If not improved with RR increase I think part of her symptoms are related to fibromyalgia and baseline heart failure. Plan:    Gen Change  and pocket revision  Follow-up 1 year  Patient Active Problem List    Diagnosis Date Noted    Seasonal allergies 03/24/2022    Dizziness 12/28/2020    Chronic systolic heart failure ([address] 10/19/2020    Major depressive disorder, recurrent episode, severe with anxious distress ([address]     Trauma and stressor-related disorder     Acute chest pain 06/28/2020    Hyperkalemia 06/28/2020    Dyspnea on exertion 06/28/2020    Anxiety 06/28/2020    GERD with esophagitis 06/28/2020    Hyponatremia 06/28/2020    Dysphagia 06/28/2020    Suicidal ideation 06/28/2020    AICD (automatic cardioverter/defibrillator) present 04/16/2020    Hypercholesteremia 05/08/2018    Obstructive sleep apnea syndrome 08/15/2017    Benign essential HTN 04/19/2016    Sebaceous cyst 05/23/2014    Paroxysmal atrial fibrillation ([address] 01/31/2014    History of pericarditis 07/13/2012    Fibromyalgia syndrome 07/13/2012    Non-ischemic cardiomyopathy ([address] 11/23/2011    Pacemaker 11/23/2011     Diagnostic studies:  ECG 6/23/22  Dual Chamber paced   456 QTcH, 190 QRS      Echo 02/19/2021   Summary   -Severely reduced global systolic function with an ejection fraction   estimated at 25-30%. -Global hypokinesis noted.   -Average global longitudinal strain is estimated at -9.2%(Abnormal)   -Mild mitral regurgitation.   -There is mild tricuspid regurgitation with a RVSP estimation of 26 mmHg. -Grade II diastolic dysfunction with elevated LV filling pressures.  Avg.   E/e'=26.7    Cath 06/03/2020   Anatomy:   LM-Normal   LAD-Normal   Cx-Normal   OM- Normal   RCA-Dominant, Normal   RPDA- Normal   LVEF- 25  LVG- global hypokinesis  LVEDP- 5     Echo: 10/19/2018  Study Conclusions    - Left ventricle: The cavity size was moderately dilated. Wall    thickness was normal. Systolic function was severely reduced. The    estimated ejection fraction was in the range of 25% to 30%. There    is significant dyssynergy between the septal and lateral walls. Mild hypokinesis of the inferoseptal myocardium. Severe    hypokinesis of the basal-midanterolateral myocardium. Moderate    hypokinesis of the apicallateral myocardium. Akinesis of the    apicalinferior myocardium. Doppler parameters are consistent with    abnormal left ventricular relaxation (grade 1 diastolic    dysfunction). - Ventricular septum: Septal motion showed abnormal function and    dyssynergy.  - Aortic valve: Trivial regurgitation.  - Right ventricle: The cavity size was mildly dilated. Wall    thickness was normal. Systolic function was normal by objective    interpretation. TAPSE: 2.6cm. Tricuspid annular systolic velocity:    24WM/U. A device lead is seen extending into the RV cavity.  - Right atrium: The atrium was mildly to moderately dilated. Pacer    wire or catheter noted in right atrium.  - Tricuspid valve: Mild-moderate regurgitation.  - Pericardium, extracardiac: A trivial pericardial effusion was    identified. Impressions:  Compared to the study from 5/7/16, LV systolic is  slightly more reduced. Stress test ([facility]) 4/10/2017  FINL INTERPRETATION  There is normal myocardial perfusion; however, there is left ventricular dysfunction. There is artifact consistent with sub-diaphragmatic interfering activity. Overall study quality is good. Scan significance indicates low to moderate cardiac risk. Test sensitivity is reduced with testing on anti-anginal drugs. I independently reviewed the cardiac diagnostic studies, ECG and relevant imaging studies.      Lab Results   Component Value Date    LVEF 28 02/19/2021    LVEFMODE Cardiac Cath 06/30/2020     Lab Results   Component Value Date TSH 1.24 02/14/2022         Physical Examination:  Vitals:    06/23/22 0941   BP: (!) 93/51   Pulse: 82   SpO2: 99%      Wt Readings from Last 3 Encounters:   06/23/22 137 lb (62.1 kg)   06/22/22 140 lb (63.5 kg)   02/18/22 138 lb (62.6 kg)       Constitutional: Oriented. No distress. Head: Normocephalic and atraumatic. Mouth/Throat: Oropharynx is clear and moist.   Eyes: Conjunctivae normal. EOM are normal.   Neck: Neck supple. No rigidity. No JVD present. Cardiovascular: Normal rate, regular rhythm, S1&S2. Pulmonary/Chest: Bilateral respiratory sounds. No wheezes, No rhonchi. Abdominal: Soft. Bowel sounds present. No distension, No tenderness. Musculoskeletal: No tenderness. No edema    Lymphadenopathy: Has no cervical adenopathy. Neurological: Alert and oriented. Cranial nerve appears intact, No Gross deficit   Skin: Skin is warm and dry. No rash noted. Psychiatric: Has a normal behavior       Review of System:  [x] Full ROS obtained and negative except as mentioned in HPI    Prior to Admission medications    Medication Sig Start Date End Date Taking?  Authorizing Provider   sacubitril-valsartan (ENTRESTO) 24-26 MG per tablet Half of 24-26 mg morning and whole tablet at night 6/22/22  Yes SHAMEKA Burnett   montelukast (SINGULAIR) 10 MG tablet Take 1 tablet by mouth daily 3/24/22  Yes SHAMEKA Andrews CNP   ciprofloxacin (CIPRO) 250 MG tablet TAKE 1 TABLET BY MOUTH AFTER SEXUAL INTERCOURSE 3/24/22  Yes SHAMEKA Andrews CNP   carvedilol (COREG) 3.125 MG tablet Take 1 tablet by mouth 2 times daily 2/14/22  Yes SHAMEKA Rodrigues   digoxin (LANOXIN) 125 MCG tablet Take 1 tablet by mouth daily 2/14/22  Yes SHAMEKA Burnett   spironolactone (ALDACTONE) 25 MG tablet Take 1/2 tablet M,F only  Patient taking differently: Take 12.5 mg by mouth three times a week Mon, Wed, Fri 2/14/22  Yes SHAMEKA Vela   vitamin D3 (CHOLECALCIFEROL) 25 MCG (1000 UT) TABS tablet Take 1 tablet by mouth daily 10/28/20  Yes SHAMEKA Hylton CNP   Magnesium 400 MG TABS Take by mouth daily 10/28/20  Yes SHAMEKA Hylton CNP   calcium carbonate (OSCAL) 500 MG TABS tablet Take 1 tablet by mouth daily 10/28/20  Yes SHAMEKA Hylton CNP   vitamin E 400 UNIT capsule Take 400 Units by mouth daily   Yes Historical Provider, MD   Omega-3 Fatty Acids (FISH OIL) 1000 MG CAPS Take 3,000 mg by mouth 2 times daily    Yes Historical Provider, MD       Allergies   Allergen Reactions    Prednisone      Caused an allergic reaction. Swelling all over her body. Social History:  Reviewed. reports that she quit smoking about 24 years ago. She has a 2.50 pack-year smoking history. She has never used smokeless tobacco. She reports current alcohol use. She reports current drug use. Frequency: 2.00 times per week. Drug: Marijuana Monika Postin). Family History:  Reviewed. Reviewed. No family history of SCD. Relevant and available labs, and cardiovascular diagnostics reviewed. Reviewed.

## 2022-09-16 NOTE — PROCEDURES
[facility]     Electrophysiology Procedure Note       Date of Procedure: 9/16/2022  Patient's Name: Brittany Zamorano  YOB: 1955   Medical Record Number: [id number]  Referring Physician: Marni Rogel MD  Procedure Performed by: Marni Rogel MD    Procedures performed:        Replacement of  an MRI compatible  BIVICD  ICD pocket revision  IV sedation. Programming and analysis of the device      Indication of the procedure:    Brittany Zamorano is a 74 y.o. female with cardiomyopathy   Device at [facility]   She also had a lead loop protruding through the the pocket under the skin which makes her uncomfortable. Details of procedure: The patient was brought to the electrophysiology laboratory in stable condition. The patient was in a fasting and non-sedated state. The risks, benefits and alternatives of the procedure were discussed with the patient . The risks including, but not limited to, the risks of vascular injury, bleeding, infection, device malfunction, lead dislodgement, radiation exposure, injury to cardiac and surrounding structures (including pneumothorax), stroke, myocardial infarction and death were discussed in detail. Patient opted to proceed with the device implantation. Written informed consent was signed and placed in the chart. Prophylactic antibiotic was given. An independent trained observer pushed medications at my direction. We monitored the patient's level of consciousness and vital signs/physiologic status throughout the procedure duration (see start and stop times below). Sedation:  3.5 mg Versed, 175 mcg Fentanyl  Sedation start: 1108  Sedation stop: 1213     The patient was prepped and draped in a sterile fashion. A timeout protocol was completed to identify the patient and the procedure being performed. Pre-sedation evaluation and airway assessment (Mallampatti classification, class lI) was completed.    IV sedation was provided with IV Versed, Fentanyl. An incision was made in the left pectoral area after administration of lidocaine. Using electrocautery and blunt dissection,  pocket was opened and device was removed. Leads were tested. I then revised the pocket by extending it superiorly and medially. Using PlasmaBlade I released the atrial lead which was protruding and looping from the scar tissue. I also released the defibrillator lead. This leads to and then tucked under the device and a segment of the tissue was sutured over them to hold them in place. The pocket was irrigated with saline solution. The leads were then connected to the new pulse generator, which was then placed into the cleaned pocket. The pocket was then closed in three separate subcutaneous layers using  3-0 Vicryl and subcuticular layer using 4-0 Vicryl . The skin was covered with pressure dressing. Steristrips was used on the skin. Antibiotic envelope was used. All sponge and needle counts were reported as correct at the end of the procedure. The patient tolerated the procedure well and there were no complications. Post-sedation evaluation was completed. Patient was transported to the holding area in stable condition. Blood SDXI<61 cc  No complication  Lead and device information:         Plan:     The patient will be followed in office.

## 2022-09-21 NOTE — TELEPHONE ENCOUNTER
Pt needs 3 mo fu with NP EP with device ck. Please advise of date and time pt can be added and call pt to schedule.

## 2022-09-22 ENCOUNTER — HOSPITAL ENCOUNTER (EMERGENCY)
Age: 67
Discharge: HOME | End: 2022-09-22
Attending: EMERGENCY MEDICINE
Payer: COMMERCIAL

## 2022-09-22 ENCOUNTER — APPOINTMENT (OUTPATIENT)
Dept: GENERAL RADIOLOGY | Age: 67
End: 2022-09-22
Payer: COMMERCIAL

## 2022-09-22 VITALS
RESPIRATION RATE: 17 BRPM | SYSTOLIC BLOOD PRESSURE: 103 MMHG | OXYGEN SATURATION: 97 % | TEMPERATURE: 98.1 F | HEART RATE: 62 BPM | DIASTOLIC BLOOD PRESSURE: 68 MMHG

## 2022-09-22 DIAGNOSIS — R07.9 CHEST PAIN, UNSPECIFIED TYPE: Primary | ICD-10-CM

## 2022-09-22 LAB
A/G RATIO: 1.3 (ref 1.1–2.2)
ALBUMIN SERPL-MCNC: 4.4 G/DL (ref 3.4–5)
ALP BLD-CCNC: 83 U/L (ref 40–129)
ALT SERPL-CCNC: 14 U/L (ref 10–40)
ANION GAP SERPL CALCULATED.3IONS-SCNC: 12 MMOL/L (ref 3–16)
AST SERPL-CCNC: 19 U/L (ref 15–37)
BASOPHILS ABSOLUTE: 0.1 K/UL (ref 0–0.2)
BASOPHILS RELATIVE PERCENT: 0.8 %
BILIRUB SERPL-MCNC: 0.3 MG/DL (ref 0–1)
BUN BLDV-MCNC: 22 MG/DL (ref 7–20)
CALCIUM SERPL-MCNC: 10 MG/DL (ref 8.3–10.6)
CHLORIDE BLD-SCNC: 99 MMOL/L (ref 99–110)
CO2: 26 MMOL/L (ref 21–32)
CREAT SERPL-MCNC: 0.7 MG/DL (ref 0.6–1.2)
DIGOXIN LEVEL: 0.4 NG/ML (ref 0.8–2)
EOSINOPHILS ABSOLUTE: 0.1 K/UL (ref 0–0.6)
EOSINOPHILS RELATIVE PERCENT: 1.4 %
GFR AFRICAN AMERICAN: >60
GFR NON-AFRICAN AMERICAN: >60
GLUCOSE BLD-MCNC: 144 MG/DL (ref 70–99)
HCT VFR BLD CALC: 41.7 % (ref 36–48)
HEMOGLOBIN: 13.9 G/DL (ref 12–16)
LIPASE: 29 U/L (ref 13–60)
LYMPHOCYTES ABSOLUTE: 2.7 K/UL (ref 1–5.1)
LYMPHOCYTES RELATIVE PERCENT: 31.5 %
MCH RBC QN AUTO: 30.6 PG (ref 26–34)
MCHC RBC AUTO-ENTMCNC: 33.2 G/DL (ref 31–36)
MCV RBC AUTO: 92.2 FL (ref 80–100)
MONOCYTES ABSOLUTE: 0.5 K/UL (ref 0–1.3)
MONOCYTES RELATIVE PERCENT: 6.3 %
NEUTROPHILS ABSOLUTE: 5.1 K/UL (ref 1.7–7.7)
NEUTROPHILS RELATIVE PERCENT: 60 %
PDW BLD-RTO: 13.4 % (ref 12.4–15.4)
PLATELET # BLD: 166 K/UL (ref 135–450)
PMV BLD AUTO: 9.7 FL (ref 5–10.5)
POTASSIUM SERPL-SCNC: 4.6 MMOL/L (ref 3.5–5.1)
PRO-BNP: 324 PG/ML (ref 0–124)
RBC # BLD: 4.52 M/UL (ref 4–5.2)
SODIUM BLD-SCNC: 137 MMOL/L (ref 136–145)
TOTAL PROTEIN: 7.9 G/DL (ref 6.4–8.2)
TROPONIN: <0.01 NG/ML
TROPONIN: <0.01 NG/ML
WBC # BLD: 8.5 K/UL (ref 4–11)

## 2022-09-22 PROCEDURE — 80053 COMPREHEN METABOLIC PANEL: CPT

## 2022-09-22 PROCEDURE — 80162 ASSAY OF DIGOXIN TOTAL: CPT

## 2022-09-22 PROCEDURE — 83690 ASSAY OF LIPASE: CPT

## 2022-09-22 PROCEDURE — 36415 COLL VENOUS BLD VENIPUNCTURE: CPT

## 2022-09-22 PROCEDURE — 99285 EMERGENCY DEPT VISIT HI MDM: CPT

## 2022-09-22 PROCEDURE — 6370000000 HC RX 637 (ALT 250 FOR IP): Performed by: NURSE PRACTITIONER

## 2022-09-22 PROCEDURE — 84443 ASSAY THYROID STIM HORMONE: CPT

## 2022-09-22 PROCEDURE — 83880 ASSAY OF NATRIURETIC PEPTIDE: CPT

## 2022-09-22 PROCEDURE — 84484 ASSAY OF TROPONIN QUANT: CPT

## 2022-09-22 PROCEDURE — 85025 COMPLETE CBC W/AUTO DIFF WBC: CPT

## 2022-09-22 PROCEDURE — 93005 ELECTROCARDIOGRAM TRACING: CPT | Performed by: NURSE PRACTITIONER

## 2022-09-22 PROCEDURE — 71046 X-RAY EXAM CHEST 2 VIEWS: CPT

## 2022-09-22 RX ORDER — HYDROXYZINE PAMOATE 25 MG/1
25 CAPSULE ORAL 3 TIMES DAILY PRN
Qty: 30 CAPSULE | Refills: 0 | Status: SHIPPED | OUTPATIENT
Start: 2022-09-22 | End: 2022-10-06

## 2022-09-22 RX ORDER — HYDROXYZINE PAMOATE 25 MG/1
50 CAPSULE ORAL ONCE
Status: COMPLETED | OUTPATIENT
Start: 2022-09-22 | End: 2022-09-22

## 2022-09-22 RX ADMIN — LIDOCAINE HYDROCHLORIDE: 20 SOLUTION ORAL; TOPICAL at 16:15

## 2022-09-22 RX ADMIN — HYDROXYZINE PAMOATE 50 MG: 25 CAPSULE ORAL at 16:15

## 2022-09-22 ASSESSMENT — HEART SCORE
ECG: 1
ECG: 1

## 2022-09-22 ASSESSMENT — LIFESTYLE VARIABLES
HOW OFTEN DO YOU HAVE A DRINK CONTAINING ALCOHOL: NEVER
HOW MANY STANDARD DRINKS CONTAINING ALCOHOL DO YOU HAVE ON A TYPICAL DAY: PATIENT DOES NOT DRINK

## 2022-09-22 NOTE — ED PROVIDER NOTES
[address] [place] [address] EMERGENCY DEPARTMENT  [phone] [id number]  Dept: [phone]  Loc: [phone]    [address] [place] [address] COMPLAINT    Chief Complaint   Patient presents with    Chest Pain     Chest tightness all week, patient feeling anxious         HPI    Theodora Campbell is a 74 y.o. female who presents to the emergency department with feelings of her entire body having a \"ball of fire\" inside and localizing to the center of her chest.  She became nauseated and started having palpitations. She states she has a history of anxiety and this is usually how it presents. She states she has a lot of issues going on at home but she does feel safe at home. She recently had her battery changed to her pacemaker. She denies actual chest pain, lightheadedness, dizziness, numbness or abdominal pain. REVIEW OF SYSTEMS    Cardiac: see HPI, no syncope  Respiratory: no shortness of breath, no cough, no hemoptysis  GI: No vomiting or diarrhea  : No dysuria or hematuria  General: No fever or chills  All other systems reviewed and are negative.     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 apnea     uses cpap    Uterine fibroids affecting pregnancy      Past Surgical History:   Procedure Laterality Date    CARDIAC DEFIBRILLATOR PLACEMENT       SECTION      3 c-sections    COLONOSCOPY  2015    polypectomy    COLONOSCOPY N/A 2019    COLONOSCOPY POLYPECTOMY SNARE/COLD ASCENDING X1 , TRANSVERSE X1, SIGMOID X1 performed by Odalys Vaughn MD at [address] [place] [address]      PACEMAKER INSERTION      UPPER GASTROINTESTINAL ENDOSCOPY  4/21/2015    UPPER GASTROINTESTINAL ENDOSCOPY N/A 12/19/2019    EGD GASTRIC BIOPSY performed by Cyndee Hu MD at [address] N/A 6/30/2020    EGD BIOPSY performed by Cyndee Hu MD at [facility]  (may include discharge medications prescribed in the ED)  Current Outpatient Rx   Medication Sig Dispense Refill    hydrOXYzine pamoate (VISTARIL) 25 MG capsule Take 1 capsule by mouth 3 times daily as needed for Itching 30 capsule 0    montelukast (SINGULAIR) 10 MG tablet TAKE 1 TABLET BY MOUTH DAILY 30 tablet 5    ciprofloxacin (CIPRO) 250 MG tablet TAKE 1 TABLET BY MOUTH AFTER SEXUAL ACTIVITY 10 tablet 1    carvedilol (COREG) 3.125 MG tablet TAKE 1 TABLET BY MOUTH TWICE DAILY 180 tablet 1    sacubitril-valsartan (ENTRESTO) 24-26 MG per tablet Half of 24-26 mg morning and whole tablet at night 60 tablet 1    digoxin (LANOXIN) 125 MCG tablet Take 1 tablet by mouth daily 90 tablet 1    spironolactone (ALDACTONE) 25 MG tablet Take 1/2 tablet M,F only (Patient taking differently: Take 12.5 mg by mouth three times a week Mon, Wed, Fri) 12 tablet 1    vitamin D3 (CHOLECALCIFEROL) 25 MCG (1000 UT) TABS tablet Take 1 tablet by mouth daily 90 tablet 1    Magnesium 400 MG TABS Take by mouth daily 30 tablet 5    calcium carbonate (OSCAL) 500 MG TABS tablet Take 1 tablet by mouth daily 30 tablet 5    vitamin E 400 UNIT capsule Take 400 Units by mouth daily      Omega-3 Fatty Acids (FISH OIL) 1000 MG CAPS Take 3,000 mg by mouth 2 times daily          ALLERGIES    Allergies   Allergen Reactions    Prednisone      Caused an allergic reaction. Swelling all over her body.        SOCIAL & FAMILY HISTORY    Social History     Socioeconomic History    Marital status:      Spouse name: None    Number of children: None    Years of education: None    Highest education level: None   Tobacco Use    Smoking status: Former Packs/day: 0.25     Years: 10.00     Pack years: 2.50     Types: Cigarettes     Quit date: 1998     Years since quittin.4    Smokeless tobacco: Never    Tobacco comments:     smoked 1 cig a day,   Vaping Use    Vaping Use: Never used   Substance and Sexual Activity    Alcohol use: Yes    Drug use: Yes     Frequency: 2.0 times per week     Types: Marijuana Rhianna Edilberto)    Sexual activity: Yes     Partners: Male     Comment:      Social Determinants of Health     Financial Resource Strain: Low Risk     Difficulty of Paying Living Expenses: Not hard at all   Food Insecurity: No Food Insecurity    Worried About Running Out of Food in the Last Year: Never true    Ran Out of Food in the Last Year: Never true     Family History   Problem Relation Age of Onset    Diabetes Mother     Kidney Disease Mother         reanl failure       PHYSICAL EXAM    VITAL SIGNS: /68   Pulse 62   Temp 98.1 °F (36.7 °C)   Resp 17   SpO2 97%    Constitutional:  Well developed, well nourished, no acute distress   HENT:  Atraumatic, moist mucus membranes  Neck: supple, no JVD   Respiratory:  Lungs clear to auscultation bilaterally, no retractions   Cardiovascular: Regular rhythm and rate, S1-S2. Left chest wall pacemaker site dressing clean, dry and intact  Vascular: Radial and DP pulses 2+ and equal bilaterally  GI:  Soft, nontender, normal bowel sounds  Musculoskeletal:  no lower extremity edema, no lower extremity asymmetry, no calf tenderness, no thigh tenderness, no acute deformities  Integument:  Skin warm and dry, no petechiae   Neurologic:  Alert & oriented, no slurred speech  Psych: Pleasant affect, very anxious and tearful, no hallucinations    EKG    1520 EKG reviewed by myself and interpreted by my attending physician Dr. Abel Wang  Ventricular rate 72 bpm, parable 144 ms, QRS duration 146 ms,  ms  No ST elevation or depression.   Interpretation is a ventricular paced rhythm with occasional PVC    Today's tracing was compared to the one on September 16, 2022 with no significant changes noted      RADIOLOGY/PROCEDURES    XR CHEST (2 VW)   Final Result      No acute findings in the chest.           Labs Reviewed   COMPREHENSIVE METABOLIC PANEL - Abnormal; Notable for the following components:       Result Value    Glucose 144 (*)     BUN 22 (*)     All other components within normal limits   BRAIN NATRIURETIC PEPTIDE - Abnormal; Notable for the following components:    Pro- (*)     All other components within normal limits   DIGOXIN LEVEL - Abnormal; Notable for the following components:    Digoxin Lvl 0.4 (*)     All other components within normal limits   CBC WITH AUTO DIFFERENTIAL   LIPASE   TROPONIN   TROPONIN   TSH WITH REFLEX TO FT4       ED COURSE & MEDICAL DECISION MAKING    Pertinent Labs & Imaging studies reviewed and interpreted. (See chart for details)  The patient was immediately placed on the cardiac monitor. IV access obtained. See chart for details of medications given during the ED stay.     Medications   aluminum & magnesium hydroxide-simethicone (MAALOX) 30 mL, lidocaine viscous hcl (XYLOCAINE) 5 mL (GI COCKTAIL) ( Oral Given 9/22/22 1615)   hydrOXYzine pamoate (VISTARIL) capsule 50 mg (50 mg Oral Given 9/22/22 1615)         Vitals:    09/22/22 2000 09/22/22 2015 09/22/22 2030 09/22/22 2104   BP:    103/68   Pulse: 60 63 60 62   Resp: 22 27 19 17   Temp:       SpO2: 99% 98% 97%        Medications   aluminum & magnesium hydroxide-simethicone (MAALOX) 30 mL, lidocaine viscous hcl (XYLOCAINE) 5 mL (GI COCKTAIL) ( Oral Given 9/22/22 1615)   hydrOXYzine pamoate (VISTARIL) capsule 50 mg (50 mg Oral Given 9/22/22 1615)       Patient was seen evaluated by myself and my attending physician Dr. Claudia Lutz    Differential diagnosis includes but is not limited to electrolyte derangement, renal failure, thyroid disorder, anxiety, ACS, other    She is nontoxic in appearance and hemodynamically stable. Chief complaint on the board says chest pain however when I talked to her about this she states that she feels like her entire body is on fire especially inside of her chest.  She also states that she has some anxiety issues and she is having a lot going on at home. She is very tearful on arrival.  She states that this sensation is how she is felt in the past with anxiety exacerbations. She has a ventricular paced EKG. White count is 8.5. no anemia    BUN 22 with a creatinine of 0.7. Electrolytes are unremarkable. Serum glucose 144. proBNP 324 with a troponin less than 0.01. LFTs are unremarkable. Chest x-ray interpreted by radiology and reviewed by myself showed no acute findings in the chest.    She was given a GI cocktail and Vistaril. On reevaluation she stated that she was feeling a lot better but that the burning sensation still remain in her chest.  She is no longer tearful and appears more calm. Thyroid studies were collected and are pending at the time of this dictation. Chart review reveals she had a TSH of 1.24 back in February of this year ago. She occasionally drinks 1 cup of coffee on the weekends. Otherwise she denies caffeine use. Heart score is 4    Repeat EKG unremarkable    Repeat troponin less than 0.01    I think it is safe for the patient to be discharged with close follow-up and she will be given outpatient stress testing. She was instructed to return to the emergency department for worsening symptoms. Patient and her spouse verbalized understanding of the discharge instructions and she ambulated out of the department with a steady gait. FINAL IMPRESSION    1.  Chest pain, unspecified type        PLAN  Discharge with close follow-up    (Please note that this note was completed with a voice recognition program.  Every attempt was made to edit the dictations, but inevitably there remain words that are mis-transcribed.)        SHAMEKA Vasquez - CNP  09/23/22 0132

## 2022-09-22 NOTE — ED PROVIDER NOTES
symptoms worsen    [facility] - Leopoldo Memos  [address] [facility]  [address] [phone]  Schedule an appointment as soon as possible for a visit   for your outpatient stress test in 24-72 hours    Discharge Medications:  Discharge Medication List as of 9/22/2022  9:11 PM          FINAL IMPRESSION  1. Chest pain, unspecified type        Blood pressure 103/68, pulse 62, temperature 98.1 °F (36.7 °C), resp. rate 17, SpO2 97 %. For further details of [facility] emergency department encounter, please see documentation by advanced practice provider, Christophe Fregoso CNP.        Amanda Roberts MD  09/22/22 9858

## 2022-09-23 LAB
EKG ATRIAL RATE: 56 BPM
EKG DIAGNOSIS: NORMAL
EKG P AXIS: 57 DEGREES
EKG P-R INTERVAL: 124 MS
EKG Q-T INTERVAL: 500 MS
EKG QRS DURATION: 158 MS
EKG QTC CALCULATION (BAZETT): 482 MS
EKG R AXIS: -53 DEGREES
EKG T AXIS: 83 DEGREES
EKG VENTRICULAR RATE: 56 BPM
TSH REFLEX FT4: 1.26 UIU/ML (ref 0.27–4.2)

## 2022-09-23 PROCEDURE — 93010 ELECTROCARDIOGRAM REPORT: CPT | Performed by: INTERNAL MEDICINE

## 2022-09-26 ENCOUNTER — NURSE ONLY (OUTPATIENT)
Dept: CARDIOLOGY CLINIC | Age: 67
End: 2022-09-26
Payer: COMMERCIAL

## 2022-09-26 DIAGNOSIS — Z95.810 BIVENTRICULAR ICD (IMPLANTABLE CARDIOVERTER-DEFIBRILLATOR) IN PLACE: ICD-10-CM

## 2022-09-26 DIAGNOSIS — I42.0 DILATED CARDIOMYOPATHY (HCC): ICD-10-CM

## 2022-09-26 DIAGNOSIS — I44.30 AV BLOCK: ICD-10-CM

## 2022-09-26 DIAGNOSIS — I48.0 PAROXYSMAL ATRIAL FIBRILLATION (HCC): Chronic | ICD-10-CM

## 2022-09-26 DIAGNOSIS — I50.9 HEART FAILURE, UNSPECIFIED HF CHRONICITY, UNSPECIFIED HEART FAILURE TYPE (HCC): ICD-10-CM

## 2022-09-26 DIAGNOSIS — Z45.02 ICD (IMPLANTABLE CARDIOVERTER-DEFIBRILLATOR) BATTERY DEPLETION: ICD-10-CM

## 2022-09-26 DIAGNOSIS — I50.22 CHRONIC SYSTOLIC HEART FAILURE (HCC): Chronic | ICD-10-CM

## 2022-09-26 DIAGNOSIS — Z95.810 AICD (AUTOMATIC CARDIOVERTER/DEFIBRILLATOR) PRESENT: Primary | ICD-10-CM

## 2022-09-26 DIAGNOSIS — I42.8 NON-ISCHEMIC CARDIOMYOPATHY (HCC): Chronic | ICD-10-CM

## 2022-09-26 PROCEDURE — 93284 PRGRMG EVAL IMPLANTABLE DFB: CPT | Performed by: INTERNAL MEDICINE

## 2022-09-26 PROCEDURE — 93290 INTERROG DEV EVAL ICPMS IP: CPT | Performed by: INTERNAL MEDICINE

## 2022-09-26 RX ORDER — DIGOXIN 125 MCG
125 TABLET ORAL DAILY
Qty: 14 TABLET | Refills: 0 | Status: SHIPPED | OUTPATIENT
Start: 2022-09-26

## 2022-09-26 RX ORDER — SACUBITRIL AND VALSARTAN 24; 26 MG/1; MG/1
TABLET, FILM COATED ORAL
Qty: 28 TABLET | Refills: 0 | Status: SHIPPED | OUTPATIENT
Start: 2022-09-26

## 2022-09-26 NOTE — TELEPHONE ENCOUNTER
Prescription refill    Last OV:06/22/22    Last Refill:02/14/2022    Labs:09/22/2022    Future Appt: none scheduled and not on the recall list

## 2022-09-26 NOTE — PROGRESS NOTES
Patient comes in for programming evaluation for her defibrillator. All sensing and pacing parameters are within normal range. S/p Gen change on 9/16/2022  Battery life 3.1 years   AP 72.8%.  96.4%. Dependent at 30 bpm today. No episodes noted. PVC Runs (2-4 beats) 0.2 per hour  PVC Singles 66.2 per hour  Patient remains on Coreg and digoxin. Changed settings back to settings prior to Gen change-increased battery life. Please see interrogation for more detail. Optivol is initializing. Patient will follow up in 3 months in office.

## 2022-09-27 ENCOUNTER — HOSPITAL ENCOUNTER (OUTPATIENT)
Dept: NON INVASIVE DIAGNOSTICS | Age: 67
Discharge: HOME | End: 2022-09-27
Payer: COMMERCIAL

## 2022-09-27 DIAGNOSIS — I50.22 CHRONIC SYSTOLIC HEART FAILURE (HCC): ICD-10-CM

## 2022-09-27 PROCEDURE — 93308 TTE F-UP OR LMTD: CPT

## 2022-09-28 ENCOUNTER — TELEPHONE (OUTPATIENT)
Dept: CARDIOLOGY CLINIC | Age: 67
End: 2022-09-28

## 2022-09-28 NOTE — TELEPHONE ENCOUNTER
----- Message from SHAMEKA Alex sent at 9/28/2022  8:33 AM EDT -----  Echo squeeze is still 25-30%  She needs to make an appt with me  thanks

## 2022-10-07 ENCOUNTER — OFFICE VISIT (OUTPATIENT)
Dept: CARDIOLOGY CLINIC | Age: 67
End: 2022-10-07
Payer: COMMERCIAL

## 2022-10-07 VITALS
BODY MASS INDEX: 25.58 KG/M2 | OXYGEN SATURATION: 98 % | DIASTOLIC BLOOD PRESSURE: 60 MMHG | HEART RATE: 62 BPM | HEIGHT: 62 IN | SYSTOLIC BLOOD PRESSURE: 100 MMHG | WEIGHT: 139 LBS

## 2022-10-07 DIAGNOSIS — E55.9 VITAMIN D DEFICIENCY: ICD-10-CM

## 2022-10-07 DIAGNOSIS — I48.0 PAF (PAROXYSMAL ATRIAL FIBRILLATION) (HCC): ICD-10-CM

## 2022-10-07 DIAGNOSIS — I50.22 CHRONIC SYSTOLIC HEART FAILURE (HCC): Primary | ICD-10-CM

## 2022-10-07 DIAGNOSIS — Z95.810 BIVENTRICULAR ICD (IMPLANTABLE CARDIOVERTER-DEFIBRILLATOR) IN PLACE: ICD-10-CM

## 2022-10-07 DIAGNOSIS — I42.8 NON-ISCHEMIC CARDIOMYOPATHY (HCC): ICD-10-CM

## 2022-10-07 PROCEDURE — G8417 CALC BMI ABV UP PARAM F/U: HCPCS | Performed by: CLINICAL NURSE SPECIALIST

## 2022-10-07 PROCEDURE — 99214 OFFICE O/P EST MOD 30 MIN: CPT | Performed by: CLINICAL NURSE SPECIALIST

## 2022-10-07 PROCEDURE — G8484 FLU IMMUNIZE NO ADMIN: HCPCS | Performed by: CLINICAL NURSE SPECIALIST

## 2022-10-07 PROCEDURE — 1090F PRES/ABSN URINE INCON ASSESS: CPT | Performed by: CLINICAL NURSE SPECIALIST

## 2022-10-07 PROCEDURE — 3017F COLORECTAL CA SCREEN DOC REV: CPT | Performed by: CLINICAL NURSE SPECIALIST

## 2022-10-07 PROCEDURE — 1123F ACP DISCUSS/DSCN MKR DOCD: CPT | Performed by: CLINICAL NURSE SPECIALIST

## 2022-10-07 PROCEDURE — 1036F TOBACCO NON-USER: CPT | Performed by: CLINICAL NURSE SPECIALIST

## 2022-10-07 PROCEDURE — G8400 PT W/DXA NO RESULTS DOC: HCPCS | Performed by: CLINICAL NURSE SPECIALIST

## 2022-10-07 PROCEDURE — G8427 DOCREV CUR MEDS BY ELIG CLIN: HCPCS | Performed by: CLINICAL NURSE SPECIALIST

## 2022-10-07 NOTE — PROGRESS NOTES
[facility]  Progress Note    Primary Care Doctor:  Dante Munoz MD    Chief Complaint   Patient presents with    Follow-up    Congestive Heart Failure        History of Present Illness:  74 y.o. female with history of NICM, BiV ICD  and , AF (not on anticoagulation, follows with Dr Hammad Beltrán), ESCOBAR on cpap, fibromyalgia, sHF on entresto since , followed at [facility]  - for chest pain, LHC done, EGD with H pylori neg and anxiety/depression (family issues) seen by psychiatry and started on klonipin/zoloft. I had the pleasure of seeing Josue Mora in follow up for systolic heart failure. She is ambulatory and with her . She had an echo done and LVEF continues to show 25-30%. Dizziness is better after stopping the jardiance. She is going to [facility] for 3 weeks. No chest pain, palpitations, edema or lightheadedness. She is not interested in trying farxiga. She continues to walk every other day    Past Medical History:   has a past medical history of Atrial fibrillation ([address] Cardiomyopathy, nonischemic ([address] CHF (congestive heart failure) ([address] Fibromyalgia, GERD (gastroesophageal reflux disease), HSV-1 (herpes simplex virus 1) infection, Hyperlipidemia, Sleep apnea, and Uterine fibroids affecting pregnancy. Surgical History:   has a past surgical history that includes  section; Pacemaker insertion (); Pacemaker insertion; Upper gastrointestinal endoscopy (2015); Colonoscopy (2015); Colonoscopy (N/A, 2019); Upper gastrointestinal endoscopy (N/A, 2019); Cardiac defibrillator placement; and Upper gastrointestinal endoscopy (N/A, 2020). Social History:   reports that she quit smoking about 24 years ago. She has a 2.50 pack-year smoking history. She has never used smokeless tobacco. She reports current alcohol use. She reports current drug use. Frequency: 2.00 times per week. Drug: Marijuana Garon Palma).    Family History:   Family History   Problem Relation Age of Onset    Diabetes Mother     Kidney Disease Mother         reanl failure       Home Medications:  Prior to Admission medications    Medication Sig Start Date End Date Taking? Authorizing Provider   sacubitril-valsartan (ENTRESTO) 24-26 MG per tablet TAKE ONE-HALF TABLET BY MOUTH EVERY MORNING AND ONE TABLET BY MOUTH EVERY EVENING 9/26/22  Yes SHAMEKA Mayberry   digoxin (LANOXIN) 125 MCG tablet TAKE 1 TABLET BY MOUTH DAILY 9/26/22  Yes SHAMEKA Mayberry   montelukast (SINGULAIR) 10 MG tablet TAKE 1 TABLET BY MOUTH DAILY 8/26/22  Yes SHAMEKA Amezcua CNP   ciprofloxacin (CIPRO) 250 MG tablet TAKE 1 TABLET BY MOUTH AFTER SEXUAL ACTIVITY 8/8/22  Yes Johanne Jiang MD   carvedilol (COREG) 3.125 MG tablet TAKE 1 TABLET BY MOUTH TWICE DAILY 7/27/22  Yes SHAMEKA Burnett   spironolactone (ALDACTONE) 25 MG tablet Take 1/2 tablet M,F only  Patient taking differently: Take 12.5 mg by mouth three times a week Mon, Wed, Fri 2/14/22  Yes SHAMEKA Mayberry   vitamin D3 (CHOLECALCIFEROL) 25 MCG (1000 UT) TABS tablet Take 1 tablet by mouth daily 10/28/20  Yes SHAMEKA Amezcua CNP   Magnesium 400 MG TABS Take by mouth daily 10/28/20  Yes SHAMEKA Amezcua CNP   calcium carbonate (OSCAL) 500 MG TABS tablet Take 1 tablet by mouth daily 10/28/20  Yes SHAMEKA Amezcua CNP   vitamin E 400 UNIT capsule Take 400 Units by mouth daily   Yes Historical Provider, MD   Omega-3 Fatty Acids (FISH OIL) 1000 MG CAPS Take 3,000 mg by mouth 2 times daily    Yes Historical Provider, MD        Allergies:  Jardiance [empagliflozin] and Prednisone     Review of Systems:   Constitutional: there has been no unanticipated weight loss. There's been no change in energy level, sleep pattern, or activity level. Eyes: No visual changes or diplopia. No scleral icterus. ENT: No Headaches, hearing loss or vertigo.  No mouth sores or sore throat. Cardiovascular: Reviewed in HPI  Respiratory: No cough or wheezing, no sputum production. No hematemesis. Gastrointestinal: No abdominal pain, appetite loss, blood in stools. No change in bowel or bladder habits. Genitourinary: No dysuria, trouble voiding, or hematuria. Musculoskeletal:  No gait disturbance, weakness or joint complaints. Integumentary: No rash or pruritis. Neurological: No headache, diplopia, change in muscle strength, numbness or tingling. No change in gait, balance, coordination, mood, affect, memory, mentation, behavior. Psychiatric: No anxiety, no depression. Endocrine: No malaise, fatigue or temperature intolerance. No excessive thirst, fluid intake, or urination. No tremor. Hematologic/Lymphatic: No abnormal bruising or bleeding, blood clots or swollen lymph nodes. Allergic/Immunologic: No nasal congestion or hives. Physical Examination:    Vitals:    10/07/22 1059   BP: 100/60   Site: Right Upper Arm   Position: Sitting   Cuff Size: Medium Adult   Pulse: 62   SpO2: 98%   Weight: 139 lb (63 kg)   Height: 5' 2\" (1.575 m)        Constitutional and General Appearance: Warm and dry, no apparent distress, normal coloration  HEENT:  Normocephalic, atraumatic  Respiratory:  Normal excursion and expansion without use of accessory muscles  Resp Auscultation: Normal breath sounds without dullness  Cardiovascular: The apical impulses not displaced  Heart tones are crisp and normal  JVP normal cm H2O  Regular rate and rhythm  Peripheral pulses are symmetrical and full  There is no clubbing, cyanosis of the extremities.   no edema  Pedal Pulses: 2+ and equal   Abdomen:  No masses or tenderness  Liver/Spleen: No Abnormalities Noted  Neurological/Psychiatric:  Alert and oriented in all spheres  Moves all extremities well  Exhibits normal gait balance and coordination  No abnormalities of mood, affect, memory, mentation, or behavior are noted    Lab Data:    CBC:   Lab Results Component Value Date/Time    WBC 8.5 09/22/2022 03:44 PM    WBC 6.7 09/16/2022 10:30 AM    WBC 6.9 06/22/2022 10:25 AM    RBC 4.52 09/22/2022 03:44 PM    RBC 4.76 09/16/2022 10:30 AM    RBC 5.27 06/22/2022 10:25 AM    HGB 13.9 09/22/2022 03:44 PM    HGB 14.6 09/16/2022 10:30 AM    HGB 16.3 06/22/2022 10:25 AM    HCT 41.7 09/22/2022 03:44 PM    HCT 43.6 09/16/2022 10:30 AM    HCT 47.4 06/22/2022 10:25 AM    MCV 92.2 09/22/2022 03:44 PM    MCV 91.5 09/16/2022 10:30 AM    MCV 90.0 06/22/2022 10:25 AM    RDW 13.4 09/22/2022 03:44 PM    RDW 13.7 09/16/2022 10:30 AM    RDW 13.3 06/22/2022 10:25 AM     09/22/2022 03:44 PM     09/16/2022 10:30 AM     06/22/2022 10:25 AM     BMP:  Lab Results   Component Value Date/Time     09/22/2022 03:44 PM     06/22/2022 10:25 AM     02/14/2022 01:41 PM    K 4.6 09/22/2022 03:44 PM    K 4.0 06/22/2022 10:25 AM    K 4.7 02/14/2022 01:41 PM    K 3.8 06/29/2020 05:43 AM    K 6.1 06/28/2020 09:36 AM    CL 99 09/22/2022 03:44 PM     06/22/2022 10:25 AM     02/14/2022 01:41 PM    CO2 26 09/22/2022 03:44 PM    CO2 25 06/22/2022 10:25 AM    CO2 28 02/14/2022 01:41 PM    PHOS 2.9 07/09/2018 11:05 AM    PHOS 3.7 10/18/2017 10:10 AM    PHOS 3.2 01/07/2016 09:46 AM    BUN 22 09/22/2022 03:44 PM    BUN 21 06/22/2022 10:25 AM    BUN 18 02/14/2022 01:41 PM    CREATININE 0.7 09/22/2022 03:44 PM    CREATININE 0.9 06/22/2022 10:25 AM    CREATININE 0.7 02/14/2022 01:41 PM     BNP:   Lab Results   Component Value Date/Time    PROBNP 324 09/22/2022 03:44 PM    PROBNP 208 06/22/2022 10:25 AM    PROBNP 363 02/14/2022 01:41 PM     Cardiac Imaging:  Echo 9/27/2022  Summary  Left ventricular cavity size is dilated with normal left ventricular wall thickness. Overall left ventricular systolic function appears severely reduced. Ejection fraction is visually estimated to be 25-30%. There is severe diffuse hypokinesis.   Global longitudinal strain: -9% (abnormal). The right ventricle is mildly enlarged. Right ventricular systolic function is mildly reduced. Echo 2/19/2021  Summary   -Severely reduced global systolic function with an ejection fraction   estimated at 25-30%. -Global hypokinesis noted.   -Average global longitudinal strain is estimated at -9.2%(Abnormal)   -Mild mitral regurgitation.   -There is mild tricuspid regurgitation with a RVSP estimation of 26 mmHg. -Grade II diastolic dysfunction with elevated LV filling pressures. Avg.   E/e'=26.7    Echo 6/29/2020:  Summary   -Left ventricular systolic function is reduced with ejection fraction   estimated at 30 %. -Abnormal septal motion.   -Normal left ventricular wall thickness.   -Left ventricular size is severely increased. - Grade II diastolic dysfunction with elevated LV filling pressures.   -The right atrium is mildly dilated. -Pacemaker / ICD lead was visualized in the right atrium and ventricle.   -Aortic valve appears sclerotic but opens adequately. -Mild aortic and tricuspid regurgitation is present. -Mild to moderate mitral regurgitation.   -Systolic pulmonary artery pressure (SPAP) is normal and estimated at 24   mmHg (right atrial pressure 3 mmHg). -IVC is normal in size (< 2.1 cm) and collapses > 50% with respiration   consistent with normal RA pressure (3 mmHg). Invasive procedures and treatments. [facility] 6/30/2020: Anatomy:   LM-Normal   LAD-Normal   Cx-Normal   OM- Normal   RCA-Dominant, Normal   RPDA- Normal   LVEF- 25  LVG- global hypokinesis  LVEDP- 5      Impression  ~Coronary Angiography w/ normal coronaries  ~LVG with LVEF of 25 and global regional wall motion abnormalities     Recommendation  ~Aggressive medical treatment and risk factor modification  ~1. Medications reviewed, no changes at this time. 2. Post cath IVF. Bedrest.  3. Cont OMT for CHF  4.  Patient has been advised on the importance of regular exercise of at least 20-30 minutes daily alternating with aerobic and isometric activities. 5. Patient counseled about and offered assistance for smoking cessation   6. No indication for cardiac rehab  7. Follow up in 1-2 weeks with cardiology CHF clinic. Echo 10-19-18  - Left ventricle: The cavity size was moderately dilated. Wall    thickness was normal. Systolic function was severely reduced. The    estimated ejection fraction was in the range of 25% to 30%. There    is significant dyssynergy between the septal and lateral walls. Mild hypokinesis of the inferoseptal myocardium. Severe    hypokinesis of the basal-midanterolateral myocardium. Moderate    hypokinesis of the apicallateral myocardium. Akinesis of the    apicalinferior myocardium. Doppler parameters are consistent with    abnormal left ventricular relaxation (grade 1 diastolic    dysfunction). - Ventricular septum: Septal motion showed abnormal function and    dyssynergy.  - Aortic valve: Trivial regurgitation.  - Right ventricle: The cavity size was mildly dilated. Wall    thickness was normal. Systolic function was normal by objective    interpretation. TAPSE: 2.6cm. Tricuspid annular systolic velocity:    06IG/D. A device lead is seen extending into the RV cavity.  - Right atrium: The atrium was mildly to moderately dilated. Pacer    wire or catheter noted in right atrium.  - Tricuspid valve: Mild-moderate regurgitation.  - Pericardium, extracardiac: A trivial pericardial effusion was    identified. Impressions:  Compared to the study from 8/1/33, LV systolic is  slightly more reduced. Assessment:    1. Chronic systolic heart failure (HCC) on arni, BB, digoxin, sglt2 and aldosterone antagonist   2. Non-ischemic cardiomyopathy ([address]    3. Biventricular ICD (implantable cardioverter-defibrillator) in place    4. PAF (paroxysmal atrial fibrillation) (HCC) no anticoag, follows with Dr Calista Snider   5.       Vitamin d deficiency    Plan:   Patient Instructions   Continue current medications   Blood work in 3 months   RTO in 6 months    I appreciate the opportunity of cooperating in the care of this individual.    SHAMEKA Anderson - CNS, CNS, 10/7/2022, 11:27 AM

## 2022-10-29 NOTE — TELEPHONE ENCOUNTER
Encounter Date: 10/29/2022    SCRIBE #1 NOTE: I, Lexus Carolina, am scribing for, and in the presence of,  Anam Bullock MD.     History     Chief Complaint   Patient presents with    Weakness     Patient comes in with weakness bilateral leg pain, states was seen on Tuesday for same complaints, states too weak to go to dialysis this AM is lethargic in triage unable to get temp.      56 year old female, with pertinent medical history of anemia, kidney cancer, CHF, HTN, prediabetes, and myocardiopathy, presents to the ED to evaluate her worsening bilateral leg pain and weakness for 5 days. Patient's  states she was seen for the same symptoms 5 days ago as well as sore throat, chest pain, and nausea. She had labwork done and her results were all negative. Sore throat, chest pain, and nausea have resolved. Patient woke up this morning very weak and fatigued. She was late for dialysis and when she arrived, she was advised to come to the ED. Patient received her dialysis 2 days ago as normal. Patient was able to ambulate alone yesterday with pain, but needs support today.  reports she has decreased appetite and is no longer producing urine. No pain medications taken today.  notes that a week ago, patient attended an event and possibly had sick contacts. Patient denies congestion, cough, or other associated symptoms.    The history is provided by the patient and the spouse. No  was used.   Review of patient's allergies indicates:   Allergen Reactions    Coreg [carvedilol] Other (See Comments)     Nausea/vomiting    Allopurinol      Other reaction(s): abnormal transaminases     Past Medical History:   Diagnosis Date    Anemia     Bronchitis 03/01/2017    Cancer 2016    kidney cancer    CHF (congestive heart failure), NYHA class II, chronic, systolic     CMV (cytomegalovirus) antibody positive     Essential hypertension 9/23/2015    H/O herpes simplex type 2 infection     Herpes  I switched her to losartan. Please contact patient so she is aware. simplex type 1 antibody positive     History of kidney cancer     s/p left nephrectomy 1/2016    Hyperparathyroidism, unspecified     Hyperuricemia without signs of inflammatory arthritis and tophaceous disease     Kidney stones     LGSIL (low grade squamous intraepithelial dysplasia)     Myocardiopathy 7/21/2017    Prediabetes     Proteinuria     Renal disorder     Thyroid nodule     Urate nephropathy      Past Surgical History:   Procedure Laterality Date    BREAST CYST EXCISION      COLONOSCOPY N/A 11/12/2015    COLONOSCOPY N/A 3/12/2021    Procedure: COLONOSCOPY;  Surgeon: Brendon Lanier MD;  Location: [facility];  Service: Endoscopy;  Laterality: N/A;  covid test 3/9, labs prior, prep instr mailed -ml    INSERTION OF BIVENTRICULAR IMPLANTABLE CARDIOVERTER-DEFIBRILLATOR (ICD)  04/2021    NEPHRECTOMY-LAPAROSCOPIC Left 01/12/2016    PERITONEAL CATHETER INSERTION      Permacath insertion  01/12/2017    SALPINGOOPHORECTOMY Right 2016    KJB---DAVINCI    TONSILLECTOMY      TUBAL LIGATION       Family History   Problem Relation Age of Onset    Hypertension Mother     Diabetes Father     Kidney disease Father     No Known Problems Son     No Known Problems Son     Diabetes Maternal Grandfather     No Known Problems Sister     No Known Problems Brother     No Known Problems Maternal Grandmother     No Known Problems Paternal Grandmother     No Known Problems Paternal Grandfather     Heart disease Sister     No Known Problems Sister     No Known Problems Brother     No Known Problems Maternal Aunt     No Known Problems Maternal Uncle     No Known Problems Paternal Aunt     No Known Problems Paternal Uncle     Breast cancer Neg Hx     Colon cancer Neg Hx     Cancer Neg Hx     Stroke Neg Hx     Amblyopia Neg Hx     Blindness Neg Hx     Cataracts Neg Hx     Glaucoma Neg Hx     Macular degeneration Neg Hx     Retinal detachment Neg Hx     Strabismus Neg Hx     Thyroid disease Neg Hx      Social History     Tobacco Use     Smoking status: Never    Smokeless tobacco: Never   Substance Use Topics    Alcohol use: No     Comment: . 2 children. works at [employer].    Drug use: No     Review of Systems   Constitutional:  Positive for appetite change and fatigue.   HENT: Negative.     Eyes: Negative.    Respiratory: Negative.     Cardiovascular: Negative.    Gastrointestinal: Negative.    Genitourinary: Negative.    Musculoskeletal:  Positive for gait problem.        (+) bilateral leg pain   Skin: Negative.    Neurological:  Positive for weakness.     Physical Exam     Initial Vitals   BP Pulse Resp Temp SpO2   10/29/22 0715 10/29/22 0715 10/29/22 0715 10/29/22 0947 10/29/22 0715   (!) 191/91 89 20 (!) 93.7 °F (34.3 °C) 99 %      MAP       --                Physical Exam    Nursing note and vitals reviewed.  Constitutional: She is not diaphoretic. She appears distressed.   HENT:   Head: Normocephalic and atraumatic.   Nose: Nose normal.   Eyes: EOM are normal. Pupils are equal, round, and reactive to light.   Neck: Neck supple. No JVD present.   Normal range of motion.  Cardiovascular:  Regular rhythm, normal heart sounds and intact distal pulses.           Pulmonary/Chest: Breath sounds normal. No stridor. No respiratory distress. She has no wheezes. She has no rales.   Abdominal: Abdomen is soft. Bowel sounds are normal. She exhibits no distension. There is no abdominal tenderness.   Musculoskeletal:         General: No tenderness or edema. Normal range of motion.      Cervical back: Normal range of motion and neck supple.      Comments: AV fistula to left upper extremity with palpable thrill.     Neurological: She has normal strength.   Drowsy appearing, 4/5 strength in all extremities, no asymmetric findings noted, no focal neuro deficit noted.  Oriented to self and place as well as to situation.   Skin: Skin is warm and dry. Capillary refill takes less than 2 seconds. No rash noted. No erythema.       ED Course   Critical  Care    Date/Time: 10/29/2022 7:50 AM  Performed by: Anam Bullock MD  Authorized by: Anam Bullock MD   Direct patient critical care time: 15 minutes  Additional history critical care time: 5 minutes  Ordering / reviewing critical care time: 5 minutes  Documentation critical care time: 5 minutes  Consulting other physicians critical care time: 5 minutes  Consult with family critical care time: 5 minutes  Total critical care time (exclusive of procedural time) : 40 minutes  Critical care time was exclusive of separately billable procedures and treating other patients and teaching time.  Critical care was necessary to treat or prevent imminent or life-threatening deterioration of the following conditions: cardiac failure, circulatory failure and shock.  Critical care was time spent personally by me on the following activities: development of treatment plan with patient or surrogate, discussions with consultants, evaluation of patient's response to treatment, examination of patient, obtaining history from patient or surrogate, ordering and performing treatments and interventions, ordering and review of laboratory studies, ordering and review of radiographic studies, re-evaluation of patient's condition and review of old charts.      Labs Reviewed   CBC W/ AUTO DIFFERENTIAL - Abnormal; Notable for the following components:       Result Value    WBC 14.59 (*)     RBC 2.69 (*)     Hemoglobin 9.1 (*)     Hematocrit 29.8 (*)      (*)     MCH 33.8 (*)     MCHC 30.5 (*)     RDW 21.6 (*)     Gran # (ANC) 13.4 (*)     Immature Grans (Abs) 0.07 (*)     Lymph # 0.4 (*)     nRBC 4 (*)     Gran % 91.8 (*)     Lymph % 2.8 (*)     All other components within normal limits   COMPREHENSIVE METABOLIC PANEL - Abnormal; Notable for the following components:    Potassium 6.0 (*)     CO2 8 (*)     BUN 78 (*)     Creatinine 6.1 (*)     Calcium 10.6 (*)     Albumin 3.0 (*)     Total Bilirubin 2.8 (*)     Alkaline  Phosphatase 249 (*)      (*)      (*)     Anion Gap 34 (*)     eGFR 7 (*)     All other components within normal limits    Narrative:     co2   critical result(s) called and verbal readback obtained from Lucas Huerta by [employer] 10/29/2022 09:39   TROPONIN I - Abnormal; Notable for the following components:    Troponin I 2.896 (*)     All other components within normal limits   B-TYPE NATRIURETIC PEPTIDE - Abnormal; Notable for the following components:    BNP >4,900 (*)     All other components within normal limits    Narrative:        BNP critical result(s) called and verbal readback obtained from   Lucas Huerta by [employer] 10/29/2022 09:40   LACTIC ACID, PLASMA - Abnormal; Notable for the following components:    Lactate (Lactic Acid) >12.0 (*)     All other components within normal limits    Narrative:       LA critical result(s) called and verbal readback obtained from Lucas Huerta by [employer] 10/29/2022 09:39   LACTIC ACID, PLASMA - Abnormal; Notable for the following components:    Lactate (Lactic Acid) >12.0 (*)     All other components within normal limits    Narrative:     LACTIC ACID critical result(s) called and verbal readback obtained   from SIMONE GODOY by [employer] 10/29/2022 11:00   POCT GLUCOSE - Abnormal; Notable for the following components:    POCT Glucose 69 (*)     All other components within normal limits   POCT INFLUENZA A/B MOLECULAR   SARS-COV-2 RDRP GENE   TYPE & SCREEN   POCT GLUCOSE   POCT GLUCOSE MONITORING CONTINUOUS   POCT GLUCOSE MONITORING CONTINUOUS          Imaging Results              CT Head Without Contrast (Final result)  Result time 10/29/22 08:09:09      Final result by Edmond Queen MD (10/29/22 08:09:09)                   Impression:      No CT evidence of acute intracranial abnormality.    Paranasal sinus disease.    Small right and trace left mastoid effusions.      Electronically signed by: Edmond Queen MD  Date:    10/29/2022  Time:    08:09               Narrative:     "EXAMINATION:  CT HEAD WITHOUT CONTRAST    CLINICAL HISTORY:  Mental status change, unknown cause;    TECHNIQUE:  Low dose axial images were obtained through the head.  Coronal and sagittal reformations were also performed. Contrast was not administered.    COMPARISON:  MRI brain, 01/19/2016.    FINDINGS:  No evidence of acute territorial infarct, hemorrhage, mass effect, or midline shift.    Ventricles are normal in size and configuration.    No displaced calvarial fracture.    Partially visualized complete opacification of the left maxillary sinus.  Near complete opacification of the anterior left ethmoid air cells.  No significant mucosal thickening in the left frontal sinus.  Significant mucosal thickening in the right maxillary sinus and mild mucosal thickening in the sphenoid sinus.  No air-fluid levels.  Small right and trace left mastoid effusions.                                       X-Ray Chest 1 View (Final result)  Result time 10/29/22 07:50:25      Final result by Edmond Queen MD (10/29/22 07:50:25)                   Impression:      Cardiomegaly and probable mild interstitial edema.  No detrimental change when compared with 10/25/2022 allowing for differences in lung volumes and technique.      Electronically signed by: Edmond Queen MD  Date:    10/29/2022  Time:    07:50               Narrative:    EXAMINATION:  XR CHEST 1 VIEW    CLINICAL HISTORY:  Provided history is "  Weakness".    TECHNIQUE:  One view of the chest.    COMPARISON:  10/25/2022.    FINDINGS:  Cardiac wires overlie the chest.  Lung volumes are relatively low, accentuating basilar markings.  Cardiomediastinal silhouette is enlarged and similar to the prior study.  Atherosclerotic calcifications overlie the aortic arch.  There is central vascular congestion with prominent perihilar interstitial lung markings suggestive of interstitial edema.  No new area of consolidation.  No large pleural effusion.  No pneumothorax.  Left " chest wall AICD and left axillary vascular stent again noted.  Surgical clips overlie the left upper extremity.                                       Medications   dextrose 10% bolus 250 mL (0 mLs Intravenous Hold 10/29/22 0900)   sodium bicarbonate 150 mEq in dextrose 5 % 1,000 mL infusion ( Intravenous Verify Only 10/31/22 0556)   amLODIPine tablet 10 mg (10 mg Oral Given 10/30/22 0906)   hydrALAZINE tablet 100 mg (100 mg Oral Given 10/31/22 0535)   acetaminophen tablet 650 mg (has no administration in time range)   metoprolol succinate (TOPROL-XL) 24 hr tablet 100 mg (100 mg Oral Given 10/30/22 0907)   sodium zirconium cyclosilicate packet 10 g (10 g Oral Given 10/30/22 0906)   mupirocin 2 % ointment ( Nasal Given 10/30/22 2119)   0.9%  NaCl infusion (has no administration in time range)   0.9%  NaCl infusion (has no administration in time range)   sodium chloride 0.9% bolus 250 mL (has no administration in time range)   hydrALAZINE injection 20 mg (20 mg Intravenous Given 10/30/22 0001)   lactulose 20 gram/30 mL solution Soln 10 g (10 g Oral Given 10/30/22 2120)   rifAXIMin tablet 550 mg (550 mg Oral Given 10/30/22 2118)   sacubitriL-valsartan 24-26 mg per tablet 2 tablet (2 tablets Oral Given 10/30/22 2118)   labetaloL injection 10 mg (10 mg Intravenous Given 10/30/22 0107)   piperacillin-tazobactam 4.5 g in dextrose 5 % 100 mL IVPB (ready to mix system) (0 g Intravenous Stopped 10/31/22 0120)   vancomycin in dextrose 5 % 1 gram/250 mL IVPB 1,000 mg (0 mg Intravenous Stopped 10/29/22 1149)   piperacillin-tazobactam 4.5 g in dextrose 5 % 100 mL IVPB (ready to mix system) (0 g Intravenous Stopped 10/30/22 0939)   ondansetron injection 4 mg (4 mg Intravenous Given 10/30/22 0146)     Medical Decision Making:   History:   Old Medical Records: I decided to obtain old medical records.  Independently Interpreted Test(s):   I have ordered and independently interpreted EKG Reading(s) - see prior notes  Clinical Tests:    Lab Tests: Ordered and Reviewed  Radiological Study: Ordered and Reviewed  Medical Tests: Ordered and Reviewed       MDM:    56-year-old female with past medical history as noted above presenting with weakness.  Patient found to be profoundly weak, 4/5 strength in all extremities, and hypothermic.  Compared with 3 days prior patient has developed a leukocytosis of 14.59. EKG showing sinus tachycardia 104 bpm, nonspecific ST and T-wave changes noted postoperative prior, no STEMI.  CMP showing potassium 6.0, creatinine 6.1, BUN 78, troponin 2.89, BNP greater than 4500, chest x-ray showing some interstitial edema presently.  Given patient's presentation, mild hypoglycemia initial a development of leukocytosis and not diffuse weakness concern for possible sepsis versus cardiogenic shock.  Broad-spectrum antibiotics were given, blood cultures were drawn.  Given significant electrolyte derangements, evidence of NSTEMI as well as significantly elevated lactic acidosis at greater than 12 will admit to the ICU for further evaluation and management. Discussed diagnosis and further treatment with patient and family at bedside. All questions answered, patient transferred to ICU improved and stable.       Scribe Attestation:   Scribe #1: I performed the above scribed service and the documentation accurately describes the services I performed. I attest to the accuracy of the note.                   Clinical Impression:   Final diagnoses:  [R53.1] Weakness  [A41.9] Sepsis        ED Disposition Condition    Admit            I, Anam Bullock M.D., personally performed the services described in this documentation. All medical record entries made by the scribe were at my direction and in my presence. I have reviewed the chart and agree that the record reflects my personal performance and is accurate and complete.      Anam Bullock MD  10/31/22 6442

## 2022-11-03 ENCOUNTER — OFFICE VISIT (OUTPATIENT)
Dept: INTERNAL MEDICINE CLINIC | Age: 67
End: 2022-11-03
Payer: COMMERCIAL

## 2022-11-03 VITALS
WEIGHT: 141 LBS | HEART RATE: 90 BPM | DIASTOLIC BLOOD PRESSURE: 64 MMHG | SYSTOLIC BLOOD PRESSURE: 116 MMHG | BODY MASS INDEX: 25.79 KG/M2 | OXYGEN SATURATION: 98 %

## 2022-11-03 DIAGNOSIS — Z23 NEED FOR PNEUMOCOCCAL VACCINATION: ICD-10-CM

## 2022-11-03 DIAGNOSIS — R32 URINARY INCONTINENCE, UNSPECIFIED TYPE: Primary | ICD-10-CM

## 2022-11-03 DIAGNOSIS — Z23 NEED FOR INFLUENZA VACCINATION: ICD-10-CM

## 2022-11-03 DIAGNOSIS — N39.0 FREQUENT UTI: ICD-10-CM

## 2022-11-03 LAB
BILIRUBIN, POC: NORMAL
BLOOD URINE, POC: NORMAL
CLARITY, POC: CLEAR
COLOR, POC: YELLOW
GLUCOSE URINE, POC: NORMAL
KETONES, POC: NORMAL
LEUKOCYTE EST, POC: NORMAL
NITRITE, POC: POSITIVE
PH, POC: 6.5
PROTEIN, POC: NORMAL
SPECIFIC GRAVITY, POC: 1.02
UROBILINOGEN, POC: 0.2

## 2022-11-03 PROCEDURE — 90694 VACC AIIV4 NO PRSRV 0.5ML IM: CPT | Performed by: NURSE PRACTITIONER

## 2022-11-03 PROCEDURE — 81002 URINALYSIS NONAUTO W/O SCOPE: CPT | Performed by: NURSE PRACTITIONER

## 2022-11-03 PROCEDURE — G8417 CALC BMI ABV UP PARAM F/U: HCPCS | Performed by: NURSE PRACTITIONER

## 2022-11-03 PROCEDURE — 90677 PCV20 VACCINE IM: CPT | Performed by: NURSE PRACTITIONER

## 2022-11-03 PROCEDURE — 1036F TOBACCO NON-USER: CPT | Performed by: NURSE PRACTITIONER

## 2022-11-03 PROCEDURE — 0509F URINE INCON PLAN DOCD: CPT | Performed by: NURSE PRACTITIONER

## 2022-11-03 PROCEDURE — 99214 OFFICE O/P EST MOD 30 MIN: CPT | Performed by: NURSE PRACTITIONER

## 2022-11-03 PROCEDURE — 1090F PRES/ABSN URINE INCON ASSESS: CPT | Performed by: NURSE PRACTITIONER

## 2022-11-03 PROCEDURE — 90472 IMMUNIZATION ADMIN EACH ADD: CPT | Performed by: NURSE PRACTITIONER

## 2022-11-03 PROCEDURE — 1123F ACP DISCUSS/DSCN MKR DOCD: CPT | Performed by: NURSE PRACTITIONER

## 2022-11-03 PROCEDURE — G8484 FLU IMMUNIZE NO ADMIN: HCPCS | Performed by: NURSE PRACTITIONER

## 2022-11-03 PROCEDURE — 3074F SYST BP LT 130 MM HG: CPT | Performed by: NURSE PRACTITIONER

## 2022-11-03 PROCEDURE — 90471 IMMUNIZATION ADMIN: CPT | Performed by: NURSE PRACTITIONER

## 2022-11-03 PROCEDURE — G8400 PT W/DXA NO RESULTS DOC: HCPCS | Performed by: NURSE PRACTITIONER

## 2022-11-03 PROCEDURE — 3078F DIAST BP <80 MM HG: CPT | Performed by: NURSE PRACTITIONER

## 2022-11-03 PROCEDURE — G8427 DOCREV CUR MEDS BY ELIG CLIN: HCPCS | Performed by: NURSE PRACTITIONER

## 2022-11-03 PROCEDURE — 3017F COLORECTAL CA SCREEN DOC REV: CPT | Performed by: NURSE PRACTITIONER

## 2022-11-03 RX ORDER — NITROFURANTOIN 25; 75 MG/1; MG/1
100 CAPSULE ORAL 2 TIMES DAILY
Qty: 10 CAPSULE | Refills: 0 | Status: SHIPPED | OUTPATIENT
Start: 2022-11-03 | End: 2022-11-08

## 2022-11-03 RX ORDER — CIPROFLOXACIN 250 MG/1
TABLET, FILM COATED ORAL
Qty: 10 TABLET | Refills: 2 | Status: SHIPPED | OUTPATIENT
Start: 2022-11-03

## 2022-11-03 NOTE — PROGRESS NOTES
11/3/22     Chief Complaint   Patient presents with    Urinary Frequency     Every time she is excited she has issues controlling her bladder     HPI    Here with decreased bladder control the past month  Dribbles with excitement and sex   Mild with certain movements and after urination  Has noticed some cloudiness and odor the past month  Denies any burning, hematuria, frequency, urgency. Does report it is uncomfortable    Feels different than previous UTI   Symptoms are worsening since onset   She is on post coital cipro for UTI prophylaxis   She is exercising for her heart and seeing cardiology as recommended - recently had labs complete. Allergies   Allergen Reactions    Jardiance [Empagliflozin]      dizziness    Prednisone      Caused an allergic reaction. Swelling all over her body.      Current Outpatient Medications   Medication Sig Dispense Refill    ciprofloxacin (CIPRO) 250 MG tablet TAKE 1 TABLET BY MOUTH AFTER SEXUAL ACTIVITY 10 tablet 2    nitrofurantoin, macrocrystal-monohydrate, (MACROBID) 100 MG capsule Take 1 capsule by mouth 2 times daily for 5 days 10 capsule 0    sacubitril-valsartan (ENTRESTO) 24-26 MG per tablet TAKE ONE-HALF TABLET BY MOUTH EVERY MORNING AND ONE TABLET BY MOUTH EVERY EVENING 28 tablet 0    digoxin (LANOXIN) 125 MCG tablet TAKE 1 TABLET BY MOUTH DAILY 14 tablet 0    montelukast (SINGULAIR) 10 MG tablet TAKE 1 TABLET BY MOUTH DAILY 30 tablet 5    carvedilol (COREG) 3.125 MG tablet TAKE 1 TABLET BY MOUTH TWICE DAILY 180 tablet 1    spironolactone (ALDACTONE) 25 MG tablet Take 1/2 tablet M,F only (Patient taking differently: Take 12.5 mg by mouth three times a week Mon, Wed, Fri) 12 tablet 1    vitamin D3 (CHOLECALCIFEROL) 25 MCG (1000 UT) TABS tablet Take 1 tablet by mouth daily 90 tablet 1    Magnesium 400 MG TABS Take by mouth daily 30 tablet 5    calcium carbonate (OSCAL) 500 MG TABS tablet Take 1 tablet by mouth daily 30 tablet 5    vitamin E 400 UNIT capsule Take 400 Units by mouth daily      Omega-3 Fatty Acids (FISH OIL) 1000 MG CAPS Take 3,000 mg by mouth 2 times daily        No current facility-administered medications for this visit. Review of Systems  Negative other than HPI    Vitals:    11/03/22 1118   BP: 116/64   Pulse: 90   SpO2: 98%   Weight: 141 lb (64 kg)      Physical Exam  Constitutional:       General: She is not in acute distress. Appearance: Normal appearance. She is not ill-appearing. HENT:      Head: Normocephalic and atraumatic. Pulmonary:      Effort: Pulmonary effort is normal. No respiratory distress. Neurological:      Mental Status: She is alert and oriented to person, place, and time. Mental status is at baseline. Psychiatric:         Mood and Affect: Mood normal.         Behavior: Behavior normal.     Assessment/Plan:  1. Urinary incontinence, unspecified type  Acute and uncontrolled   UA + nitrates - covering for acute infection   Discussed seeing pelvic floor PT if not improving or worsening   - POCT Urinalysis no Micro  - Culture, Urine  - nitrofurantoin, macrocrystal-monohydrate, (MACROBID) 100 MG capsule; Take 1 capsule by mouth 2 times daily for 5 days  Dispense: 10 capsule; Refill: 0    2. Frequent UTI  Chronic, has been controlled with prn cipro until recently   - ciprofloxacin (CIPRO) 250 MG tablet; TAKE 1 TABLET BY MOUTH AFTER SEXUAL ACTIVITY  Dispense: 10 tablet; Refill: 2    3. Need for influenza vaccination  - Influenza, FLUAD, (age 72 y+), IM, PF, 0.5 mL    4. Need for pneumococcal vaccination  - Pneumococcal, PCV20, PREVNAR 21, (age 25 yrs+), IM, PF    Discussed medications with patient, who voiced understanding of their use and indications. All questions answered. Return if symptoms worsen or fail to improve.       Electronically signed by SHAMEKA Nath CNP on 11/3/2022 at 11:50 AM

## 2022-11-06 LAB
ORGANISM: ABNORMAL
URINE CULTURE, ROUTINE: ABNORMAL

## 2022-11-07 RX ORDER — DIGOXIN 125 MCG
125 TABLET ORAL DAILY
Qty: 30 TABLET | Refills: 2 | Status: SHIPPED | OUTPATIENT
Start: 2022-11-07

## 2022-11-07 RX ORDER — NITROFURANTOIN 25; 75 MG/1; MG/1
100 CAPSULE ORAL 2 TIMES DAILY
Qty: 10 CAPSULE | Refills: 0 | Status: SHIPPED | OUTPATIENT
Start: 2022-11-07 | End: 2022-11-12

## 2022-12-16 NOTE — TELEPHONE ENCOUNTER
----- Message from Anh Snyder sent at 12/16/2022  1:18 PM EST -----  Subject: Refill Request    QUESTIONS  Name of Medication? sacubitril-valsartan (ENTRESTO) 24-26 MG per tablet  Patient-reported dosage and instructions? 1 at night and half in morning  How many days do you have left? 0  Preferred Pharmacy? [id number]  Pharmacy phone number (if available)? [phone]  Additional Information for Provider? patient took the last half this   morning 12/16/22  ---------------------------------------------------------------------------  --------------  CALL BACK INFO  What is the best way for the office to contact you? OK to leave message on   [employer], [place] to respond with electronic message via [employer] portal (only   for patients who have registered [employer] account)  Preferred Call Back Phone Number? [phone]  ---------------------------------------------------------------------------  --------------  SCRIPT ANSWERS  Relationship to Patient?  Self

## 2022-12-16 NOTE — TELEPHONE ENCOUNTER
Last OV: 11/3/2022  Next OV: Visit date not found      Last fill:9/26/22  Refills:0      ----- Message from Cadence Julius sent at 12/16/2022  1:18 PM EST -----  Subject: Refill Request    QUESTIONS  Name of Medication? sacubitril-valsartan (ENTRESTO) 24-26 MG per tablet  Patient-reported dosage and instructions? 1 at night and half in morning  How many days do you have left? 0  [id number]  Pharmacy phone number (if available)? [phone]  Additional Information for Provider? patient took the last half this   morning 12/16/22  ---------------------------------------------------------------------------  --------------  CALL BACK INFO  What is the best way for the office to contact you? OK to leave message on   [employer], [place] to respond with electronic message via [employer] portal (only   for patients who have registered [employer] account)  Preferred Call Back Phone Number? [phone]  ---------------------------------------------------------------------------  --------------  SCRIPT ANSWERS  Relationship to Patient?  Self

## 2022-12-16 NOTE — TELEPHONE ENCOUNTER
----- Message from Freya Lundy sent at 12/16/2022  1:18 PM EST -----  Subject: Refill Request    QUESTIONS  Name of Medication? sacubitril-valsartan (ENTRESTO) 24-26 MG per tablet  Patient-reported dosage and instructions? 1 at night and half in morning  How many days do you have left? 0  [facility]? [id number]  Pharmacy phone number (if available)? [phone]  Additional Information for Provider? patient took the last half this   morning 12/16/22  ---------------------------------------------------------------------------  --------------  CALL BACK INFO  What is the best way for the office to contact you? OK to leave message on   [employer], [place] to respond with electronic message via [facility] portal (only   for patients who have registered [facility] account)  Preferred Call Back Phone Number? [phone]  ---------------------------------------------------------------------------  --------------  SCRIPT ANSWERS  Relationship to Patient?  Self

## 2022-12-18 RX ORDER — SACUBITRIL AND VALSARTAN 24; 26 MG/1; MG/1
TABLET, FILM COATED ORAL
Qty: 28 TABLET | Refills: 0 | OUTPATIENT
Start: 2022-12-18

## 2022-12-19 RX ORDER — SACUBITRIL AND VALSARTAN 24; 26 MG/1; MG/1
TABLET, FILM COATED ORAL
Qty: 28 TABLET | Refills: 0 | OUTPATIENT
Start: 2022-12-19

## 2022-12-19 NOTE — TELEPHONE ENCOUNTER
Prescription refill    Last OV:10/07/2022    Last Refill:09/26/2022    Labs:09/22/2022    Future Appt: 04/10/2022

## 2022-12-20 RX ORDER — SACUBITRIL AND VALSARTAN 24; 26 MG/1; MG/1
TABLET, FILM COATED ORAL
Qty: 28 TABLET | Refills: 0 | Status: SHIPPED | OUTPATIENT
Start: 2022-12-20

## 2023-01-09 RX ORDER — SACUBITRIL AND VALSARTAN 24; 26 MG/1; MG/1
TABLET, FILM COATED ORAL
Qty: 28 TABLET | Refills: 0 | OUTPATIENT
Start: 2023-01-09

## 2023-01-09 RX ORDER — SPIRONOLACTONE 25 MG/1
12.5 TABLET ORAL
OUTPATIENT
Start: 2023-01-09

## 2023-01-09 NOTE — TELEPHONE ENCOUNTER
----- Message from Caio Burciaga sent at 1/9/2023  3:18 PM EST -----  Subject: Refill Request    QUESTIONS  Name of Medication? sacubitril-valsartan (ENTRESTO) 24-26 MG per tablet  Patient-reported dosage and instructions? 1/2 in mooring and 1 at night  How many days do you have left? 0  Preferred Pharmacy? [facility] #[id number]  Pharmacy phone number (if available)? [phone]  Additional Information for Provider? future appt on 1/13  ---------------------------------------------------------------------------  --------------,  Name of Medication? spironolactone (ALDACTONE) 25 MG tablet  Patient-reported dosage and instructions? 1/2 mon, wed and fri  How many days do you have left? 8  Preferred Pharmacy? [facility] #[id number]  Pharmacy phone number (if available)? [phone]  ---------------------------------------------------------------------------  --------------  Terry MACARIO  What is the best way for the office to contact you? OK to leave message on   voicemail  Preferred Call Back Phone Number? [phone]  ---------------------------------------------------------------------------  --------------  SCRIPT ANSWERS  Relationship to Patient?  Self

## 2023-01-09 NOTE — TELEPHONE ENCOUNTER
----- Message from Salas Estrada sent at 1/9/2023  3:18 PM EST -----  Subject: Refill Request    QUESTIONS  Name of Medication? sacubitril-valsartan (ENTRESTO) 24-26 MG per tablet  Patient-reported dosage and instructions? 1/2 in mooring and 1 at night  How many days do you have left? 0  Preferred Pharmacy? [facility] #[id number]  Pharmacy phone number (if available)? [phone]  Additional Information for Provider? future appt on 1/13  ---------------------------------------------------------------------------  --------------,  Name of Medication? spironolactone (ALDACTONE) 25 MG tablet  Patient-reported dosage and instructions? 1/2 mon, wed and fri  How many days do you have left? 8  Preferred Pharmacy? [facility] #[id number]  Pharmacy phone number (if available)? [phone]  ---------------------------------------------------------------------------  --------------  Khloe Drain INFO  What is the best way for the office to contact you? OK to leave message on   voicemail  Preferred Call Back Phone Number? [phone]  ---------------------------------------------------------------------------  --------------  SCRIPT ANSWERS  Relationship to Patient?  Self [address] [facility], [place], [facility]  [address]  [phone] Ted Sparks 83  Code 32      NAME: Selene Cotton  AGE: 78 y o   SEX: female    : 1933    Code Status: AND/DNR    DATE OF ENCOUNTER: 2020    Assessment and Plan     Problem List Items Addressed This Visit        Digestive    Dysphagia     -continue mechanical soft diet and aspiration precautions  -consult speech language pathology as needed  -continue monitoring            Endocrine    Diabetes mellitus ([facility] [address] ) - Primary (Chronic)     -last hemoglobin A1c 8 8% in 2019; ordered to repeat lab work in 2020 for continued periodic surveillance  -reviewed blood glucose logs; fasting readings for the past month range from 162-262 (more readings in the 200s)-->will increase Levemir from 4 units to 6 units at HS  -continue metformin 500 mg every evening  -continue carb controlled diet  -continue to monitor for any signs or symptoms of hyper/hypoglycemia  -continue to monitor         Hypothyroid (Chronic)     -stable and doing well with levothyroxine 70 mcg daily  -last TSH 1 14 in 2019  -continue periodic lab surveillance            Cardiovascular and Mediastinum    Essential hypertension (Chronic)     -BP logs reviewed and stable  -continue medication management with Norvasc 2 5 mg daily            Genitourinary    Chronic kidney disease, stage 3 (HCC) (Chronic)     -last creatinine 1 10 and GFR 45 (10/2019)  -continue to renal dose medications and avoid nephrotoxic drugs  -continue periodic lab surveillance            Other    Adjustment disorder with depressed mood (Chronic)     -occasional episodes of sadness in relation to her grandson not visiting  -continue active listening and support when needed  -continue to encourage activity and socialization with other residents  -continue conservative measures and medication management with duloxetine 40 mg  -continue to monitor for any change in mood or behavior         Hyperlipidemia     -stable and doing well with with rosuvastatin 5 mg daily  -no recent lipid panel to review since 01/2019; will order lipid panel with next lab draw in March 2020  -continue lifestyle modifications         Pain in lower back     -no recent episodes of exacerbation  -stable and doing well with low-dose oxycodone 2 5 mg every 4 hours as needed  -continue to encourage activity as tolerated  -continue monitoring         Constipation     -stable and doing well with senna S at HS  -continue monitoring               No orders of the defined types were placed in this encounter  Chief Complaint     Follow-up of chronic medical conditions  History of Present Illness     78year-old female, resident of [address] [place] [address], seen in collaboration with nursing and bilingual staff to evaluate her chronic medical conditions including but not limited to hypertension, CKD, adjustment disorder, diabetes type 2, hypothyroidism, and constipation  No concerns from Nursing  Upon exam, resident sitting comfortably in her wheelchair with no symptoms of distress or discomfort  She denies headache, pain, shortness of breath, chest pain N/V/D or constipation during visit  She only complains of her heart "aching" secondary to missing her grandson when he does not visit  The following portions of the patient's history were reviewed and updated as appropriate: allergies, current medications, past family history, past medical history and problem list     Review of Systems     Review of Systems   Constitutional: Positive for activity change (2/2 to chronic medical conditions )  Negative for appetite change, chills and fever  HENT: Negative  Respiratory: Negative for cough, chest tightness, shortness of breath and wheezing  Cardiovascular: Negative for chest pain, palpitations and leg swelling     Gastrointestinal: Negative for abdominal distention, abdominal pain, constipation, diarrhea and nausea  Musculoskeletal: Positive for back pain (chronic ) and gait problem (2/2 to chronic medical conditions )  Neurological: Positive for weakness (2/2 to chronic medical conditions )  Negative for dizziness and headaches  Psychiatric/Behavioral: Negative for sleep disturbance  The patient is nervous/anxious (Occasionally )  Active Problem List     Patient Active Problem List   Diagnosis    Dementia ([facility] )    Essential hypertension    Diabetes mellitus ([facility] )    Chronic kidney disease, stage 3 ([facility] )    Hypothyroid    Dysphagia    Debility    Hyperlipidemia    Vitamin D deficiency    Pain in lower back    Dextroscoliosis    Urinary retention    Constipation    Trigger ring finger of left hand    Adjustment disorder with depressed mood       Objective     /70, HR 68, T 97 2, 20 RR, , weight: 123 8 lbs (stable)    Physical Exam   Constitutional: No distress  Appearing chronically ill   Eyes: Pupils are equal, round, and reactive to light  Conjunctivae are normal  Right eye exhibits no discharge  Left eye exhibits no discharge  Cardiovascular: Normal rate, regular rhythm and intact distal pulses  No edema noted   Pulmonary/Chest: Effort normal and breath sounds normal  No respiratory distress  She has no wheezes  She has no rales  Abdominal: Soft  Bowel sounds are normal  She exhibits no distension  There is no tenderness  Musculoskeletal: Normal range of motion  Wheelchair-bound   Neurological: She is alert  Oriented to name and date of birth; able to follow commands   Skin: Skin is warm and dry  She is not diaphoretic  No erythema  No pallor  Psychiatric:   Cooperative exam   Nursing note and vitals reviewed        Pertinent Laboratory/Diagnostic Studies:  Reviewed  BMP 10/2019  CMP, TSH, CBC 08/2019  Lipid panel 01/2019  Hemoglobin A1c 03/2019  ** ordered lipid panel and hemoglobin A1c for 03/2020 for periodic lab surveillance **    Consults  Podiatry 01/2020  Dentist 12/2019  AILYN 10/2018    Current Medications       Medications reviewed and updated, see facility [facility] for details  PRN usage:  Oxycodone administered 27 times in the past 30 days      GINA Luis   [facility]  2/1/2020 6:50 PM

## 2023-01-10 RX ORDER — SACUBITRIL AND VALSARTAN 24; 26 MG/1; MG/1
TABLET, FILM COATED ORAL
Qty: 30 TABLET | OUTPATIENT
Start: 2023-01-10

## 2023-01-10 RX ORDER — SACUBITRIL AND VALSARTAN 24; 26 MG/1; MG/1
TABLET, FILM COATED ORAL
Qty: 90 TABLET | Refills: 0 | Status: SHIPPED | OUTPATIENT
Start: 2023-01-10

## 2023-01-10 NOTE — TELEPHONE ENCOUNTER
Prescription refill     Last OV:10/07/2022     Last Refill:09/26/2022     Labs:09/22/2022     Future Appt: 04/10/2023

## 2023-01-10 NOTE — TELEPHONE ENCOUNTER
Pt called to inform the office that [facility] does not have the script for Entresto and Spironolactone. Per Pt Entresto she is out. Per Pt please call her script into the Pharmacy.   Please advise independent

## 2023-01-10 NOTE — TELEPHONE ENCOUNTER
Medication Refill    Medication needing refilled:  sacubitril-valsartan (ENTRESTO)  Dosaspironolactone (ALDACTONE)    ge of the medication:  24-26 MG per tablet  25 MG tablet    How are you taking this medication (QD, BID, TID, QID, PRN):TAKE ONE-HALF TABLET BY MOUTH EVERY MORNING AND 1 TABLET BY MOUTH EVERY EVENING    Take 1/2 tablet M,F only   Patient taking differently: Take 12.5 mg by mouth three times a week Mon, Wed, Fri       30 or 90 day supply called in:  30 tablet   12 tablet    When will you run out of your medication:  Currently Out    Which Pharmacy are we sending the medication to?:    [facility] [address] [place] [address] 171, [address] [address]   Phone:  [phone]  Fax:  [phone]

## 2023-01-10 NOTE — TELEPHONE ENCOUNTER
Prescription refill     Last OV:10/07/2022     Last Refill:09/26/2022     Labs:09/22/2022     Future Appt: 04/10/2023    Viji sent in today.

## 2023-01-11 RX ORDER — SPIRONOLACTONE 25 MG/1
TABLET ORAL
Qty: 12 TABLET | Refills: 1 | Status: SHIPPED | OUTPATIENT
Start: 2023-01-11

## 2023-01-11 RX ORDER — SACUBITRIL AND VALSARTAN 24; 26 MG/1; MG/1
TABLET, FILM COATED ORAL
Qty: 135 TABLET | OUTPATIENT
Start: 2023-01-11

## 2023-01-17 NOTE — PROGRESS NOTES
[address]   Electrophysiology  DIANA Bueno  Attending EP: Dr. Isbaella Grissom    Date: 1/24/2023  I had the privilege of visiting Christiano Sharif in the office. Chief Complaint:   Chief Complaint   Patient presents with    Atrial Fibrillation    Hypertension     History of Present Illness: History obtained from patient and medical record. Christiano Sharif is 69 y.o. female with a past medical history of dilated cardiomyopathy s/p AICD (1990s) and hx of laser lead extraction with Biv placement (2009), AF, CHF, HTN, and HLD. S/p gen change (9/22)    -Interval history: Today, Christiano Sharif is being seen for routine follow up. She is doing well. Her device site is well healed and functioning normally. No arrhythmias on her device. She feels fine and denies any significant cardiac complaints. We discussed her device and battery life. She is leaving soon to go to [facility] for a few weeks. Denies having chest pain, palpitations, shortness of breath, orthopnea/PND, cough, or dizziness at the time of this visit. With regard to medication therapy the patient has been compliant with prescribed regimen. They have tolerated therapy to date. Allergies: Allergies   Allergen Reactions    Jardiance [Empagliflozin]      dizziness    Prednisone      Caused an allergic reaction. Swelling all over her body. Home Medications:  Prior to Visit Medications    Medication Sig Taking?  Authorizing Provider   spironolactone (ALDACTONE) 25 MG tablet Take 1/2 tablet M,W,F only Yes SHAMEKA Warren - CNP   ENTRESTO 24-26 MG per tablet TAKE ONE-HALF TABLET BY MOUTH EVERY MORNING AND 1 TABLET BY MOUTH EVERY EVENING Yes Carolina Mayes, APRN - CNS   digoxin (LANOXIN) 125 MCG tablet TAKE 1 TABLET BY MOUTH DAILY Yes Carolina Mayes, APRN - CNS   ciprofloxacin (CIPRO) 250 MG tablet TAKE 1 TABLET BY MOUTH AFTER SEXUAL ACTIVITY Yes SHAMEKA Simmons CNP   montelukast (SINGULAIR) 10 MG tablet TAKE 1 TABLET BY MOUTH DAILY Yes SHAMEKA Andrews CNP   carvedilol (COREG) 3.125 MG tablet TAKE 1 TABLET BY MOUTH TWICE DAILY Yes SHAMEKA Vela   vitamin D3 (CHOLECALCIFEROL) 25 MCG (1000 UT) TABS tablet Take 1 tablet by mouth daily Yes SHAMEKA Andrews CNP   Magnesium 400 MG TABS Take by mouth daily Yes SHAMEKA Andrews CNP   calcium carbonate (OSCAL) 500 MG TABS tablet Take 1 tablet by mouth daily Yes SHAMEKA Andrews CNP   vitamin E 400 UNIT capsule Take 400 Units by mouth daily Yes Historical Provider, MD   Omega-3 Fatty Acids (FISH OIL) 1000 MG CAPS Take 3,000 mg by mouth 2 times daily  Yes Historical Provider, MD      Past Medical History:  Past Medical History:   Diagnosis Date    Atrial fibrillation ([facility] [address]     Cardiomyopathy, nonischemic ([facility] [address]     CHF (congestive heart failure) (HCC)     Fibromyalgia     GERD (gastroesophageal reflux disease)     HSV-1 (herpes simplex virus 1) 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 apnea     uses cpap    Uterine fibroids affecting pregnancy      Past Surgical History:    has a past surgical history that includes  section; Pacemaker insertion (); Pacemaker insertion; Upper gastrointestinal endoscopy (2015); Colonoscopy (2015); Colonoscopy (N/A, 2019); Upper gastrointestinal endoscopy (N/A, 2019); Cardiac defibrillator placement; and Upper gastrointestinal endoscopy (N/A, 2020). Social History:  Personally Reviewed. reports that she quit smoking about 24 years ago. Her smoking use included cigarettes. She has a 2.50 pack-year smoking history. She has never used smokeless tobacco. She reports current alcohol use. She reports current drug use. Frequency: 2.00 times per week. Drug: Marijuana Lucianne Bars). Family History:  Personally Reviewed. family history includes Diabetes in her mother; Kidney Disease in her mother.      Review of Systems:  Constitutional: Negative for fever, night sweats, chills, weight changes, or weakness  Skin: Negative for rash, dry skin, pruritus, bruising, bleeding, blood clots, or changes in skin pigment  HEENT: Negative for vision changes, ringing in the ears, sore throat, dysphagia, or swollen lymph nodes  Respiratory: Reviewed in HPI  Cardiovascular: Reviewed in HPI  Gastrointestinal: Negative for abdominal pain, N/V/D, constipation, or black/tarry stools  Genito-Urinary: Negative for dysuria, incontinence, urgency, or hematuria  Musculoskeletal: Negative for joint swelling, muscle pain, or injuries  Neurological/Psych: Negative for confusion, seizures, dizziness, headaches, balance issues or TIA-like symptoms. No anxiety, depression, or insomnia    Physical Examination:  Vitals:    01/24/23 1019   BP: 98/60   Pulse: 70   SpO2: 98%      Wt Readings from Last 3 Encounters:   01/24/23 142 lb 3.2 oz (64.5 kg)   11/03/22 141 lb (64 kg)   10/07/22 139 lb (63 kg)     Constitutional: Cooperative and in no apparent distress, and appears well nourished  Skin: Warm and pink; no pallor, cyanosis, bruising, or clubbing  HEENT: Symmetric and normocephalic. PERRL, EOM intact. Conjunctiva pink with clear sclera. Mucus membranes pink and moist. Teeth intact. Thyroid smooth without nodules or goiter  Respiratory: Respirations symmetric and unlabored. Lungs clear to auscultation bilaterally, no wheezing, rhonchi, or crackles  Cardiovascular:  Regular rate and rhythm. S1/S2 present without murmurs, rubs, or gallops. Peripheral pulses 2+, capillary refill < 3 seconds. No elevation of JVP. No peripheral edema  Gastrointestinal: Abdomen soft and round. Bowel sounds normoactive in all quadrants without tenderness or masses. Musculoskeletal: Bilateral upper and lower extremity strength 5/5 with full ROM. Neurological/Psych: Awake and orientated to person, place and time. Calm affect, appropriate mood.      Pertinent labs, diagnostic, device, and imaging results reviewed as a part of this visit    LABS    CBC:   Lab Results   Component Value Date    WBC 8.5 2022    HGB 13.9 2022    HCT 41.7 2022    MCV 92.2 2022     2022     BMP:   Lab Results   Component Value Date    CREATININE 0.7 2022    BUN 22 (H) 2022     2022    K 4.6 2022    CL 99 2022    CO2 26 2022     Estimated Creatinine Clearance: 67 mL/min (based on SCr of 0.7 mg/dL). Thyroid:   Lab Results   Component Value Date    TSH 1.24 2022     Lipid Panel:   Lab Results   Component Value Date/Time    CHOL 189 2020 05:43 AM    HDL 31 2020 05:43 AM    HDL 46 2010 10:43 AM    TRIG 210 2020 05:43 AM     LFTs:  Lab Results   Component Value Date    ALT 14 2022    AST 19 2022    ALKPHOS 83 2022    BILITOT 0.3 2022     Coags:   Lab Results   Component Value Date    PROTIME 12.1 2018    INR 1.06 2018    APTT 29.4 2018       EC2023 (Personally Interpreted)  - AV paced. Rate 95, , QTc 511    Echo:    Left ventricular cavity size is dilated with normal left ventricular wall   thickness. Overall left ventricular systolic function appears severely reduced. Ejection fraction is visually estimated to be 25-30%. There is severe diffuse hypokinesis. Global longitudinal strain: -9% (abnormal). The right ventricle is mildly enlarged. Right ventricular systolic function   is mildly reduced. Cath: 2020  Anatomy:   LM-Normal   LAD-Normal   Cx-Normal   OM- Normal   RCA-Dominant, Normal   RPDA- Normal   LVEF- 25  LVG- global hypokinesis  LVEDP- 5             Assessment: 1. Dilated Cardiomyopathy  - S/p Biv AICD. S/p gen change ()  - I reviewed device interrogation today.  Device functioning normally.   ~ A-paced 82% V-paced 95%  ~ 2.6 years left on battery life expectancy  - Discussed with patient  - Follow up with device clinic as scheduled     - Her LV threshold remains high, which is likely leading to poor battery life. We adjusted her settings today to optimize battery life. Briefly discussed that she may need lead revision/extraction in future of LV lead to help with battery life    2. Paroxysmal Atrial Fibrillation  - Currently in NSR  - Continue coreg 3.125 mg BID, digoxin 125 mcg QD  - [id number] score:high; [id number] Vasc score and anticoagulation discussed. High risk for stroke and thromboembolism. Anticoagulation is recommended. Risk of bleeding was discussed. ~ Will need anti-coagulation if recurrence noted on device    3. Chronic systolic heart failure (NYHA Class II)  - Appears compensated   ~ EF 25-30% per echo  - Continue with coreg 3.125 mg BID, entresto 24-26 mg (1/2 tab) BID, digoxin 125 mcg QD, spironolactone 12.5 mg (MWF)  - Aggressive medical therapy with risk factor modification  - Discussed with patient importance of monitoring weight, low sodium diet and fluid restriction   - Followed by CHF team    4. HTN  - Controlled: Goal <130/80  - Continue current medications  - Encouraged to monitor and log BP readings at home, then bring log to next visit  - Discussed importance of low sodium diet, weight control and exercise    5. ESCOBAR  - Stable: Uses CPAP  - Encourage to use CPAP to prevent long term effects of untreated ESCOBAR    Plan:  No changes  Device checks every 3 months  Refills sent to your pharmacy    F/U: Follow-up with EP in 6 months  -Follow up with device clinic as scheduled  -Call Corin at [phone] with any questions    Diet & Exercise: The patient is counseled to follow a low salt diet to assure blood pressure remains controlled for cardiovascular risk factor modification  The patient is counseled to avoid excess caffeine, and energy drinks as this may exacerbated ectopy and arrhythmia  The patient is counseled to lose weight to control cardiovascular risk factors  Exercise program discussed:  To improve overall cardiovascular health, the patient is instructed to increase cardiovascular related activities with a goal of 150 min/week of moderate level activity or 10,000 steps per day. Encouraged to perform as much activity as tolerated    I have addressed the patient's cardiac risk factors and adjusted pharmacologic treatment as needed. In addition, I have reinforced the need for patient directed risk factor modification. I independently reviewed the device check interrogation and ECG    All questions and concerns were addressed with the patient. Alternatives to treatment were discussed. Thank you for allowing to us to participate in the care of [address]    Time  30 minutes spent preparing to see patient including reviewing patient history/prior tests/prior consults, performing a medical exam, counseling and educating patient/family/caregiver, ordering medications/tests/procedures, referring and communicating with PCPs and other pertinent consultants, documenting information in the EMR, independently interpreting results and communicating to family and coordination of patient care.     SHAMEKA Iglesias-CNP  [facility]   Office: [phone]

## 2023-01-24 ENCOUNTER — OFFICE VISIT (OUTPATIENT)
Dept: CARDIOLOGY CLINIC | Age: 68
End: 2023-01-24
Payer: COMMERCIAL

## 2023-01-24 ENCOUNTER — NURSE ONLY (OUTPATIENT)
Dept: CARDIOLOGY CLINIC | Age: 68
End: 2023-01-24

## 2023-01-24 VITALS
OXYGEN SATURATION: 98 % | BODY MASS INDEX: 26.17 KG/M2 | SYSTOLIC BLOOD PRESSURE: 98 MMHG | WEIGHT: 142.2 LBS | HEIGHT: 62 IN | HEART RATE: 70 BPM | DIASTOLIC BLOOD PRESSURE: 60 MMHG

## 2023-01-24 DIAGNOSIS — I44.30 AV BLOCK: ICD-10-CM

## 2023-01-24 DIAGNOSIS — Z45.02 ICD (IMPLANTABLE CARDIOVERTER-DEFIBRILLATOR) BATTERY DEPLETION: ICD-10-CM

## 2023-01-24 DIAGNOSIS — I50.9 HEART FAILURE, UNSPECIFIED HF CHRONICITY, UNSPECIFIED HEART FAILURE TYPE (HCC): ICD-10-CM

## 2023-01-24 DIAGNOSIS — I42.8 NON-ISCHEMIC CARDIOMYOPATHY (HCC): Chronic | ICD-10-CM

## 2023-01-24 DIAGNOSIS — Z95.810 AICD (AUTOMATIC CARDIOVERTER/DEFIBRILLATOR) PRESENT: ICD-10-CM

## 2023-01-24 DIAGNOSIS — I10 BENIGN ESSENTIAL HTN: Chronic | ICD-10-CM

## 2023-01-24 DIAGNOSIS — I50.22 CHRONIC SYSTOLIC HEART FAILURE (HCC): Chronic | ICD-10-CM

## 2023-01-24 DIAGNOSIS — I48.0 PAROXYSMAL ATRIAL FIBRILLATION (HCC): Primary | Chronic | ICD-10-CM

## 2023-01-24 DIAGNOSIS — I42.0 DILATED CARDIOMYOPATHY (HCC): ICD-10-CM

## 2023-01-24 DIAGNOSIS — Z95.810 BIVENTRICULAR ICD (IMPLANTABLE CARDIOVERTER-DEFIBRILLATOR) IN PLACE: ICD-10-CM

## 2023-01-24 DIAGNOSIS — Z95.0 PACEMAKER: ICD-10-CM

## 2023-01-24 DIAGNOSIS — G47.33 OBSTRUCTIVE SLEEP APNEA SYNDROME: Chronic | ICD-10-CM

## 2023-01-24 PROCEDURE — 3017F COLORECTAL CA SCREEN DOC REV: CPT | Performed by: NURSE PRACTITIONER

## 2023-01-24 PROCEDURE — 99214 OFFICE O/P EST MOD 30 MIN: CPT | Performed by: NURSE PRACTITIONER

## 2023-01-24 PROCEDURE — G8417 CALC BMI ABV UP PARAM F/U: HCPCS | Performed by: NURSE PRACTITIONER

## 2023-01-24 PROCEDURE — G8484 FLU IMMUNIZE NO ADMIN: HCPCS | Performed by: NURSE PRACTITIONER

## 2023-01-24 PROCEDURE — 93000 ELECTROCARDIOGRAM COMPLETE: CPT | Performed by: NURSE PRACTITIONER

## 2023-01-24 PROCEDURE — 1090F PRES/ABSN URINE INCON ASSESS: CPT | Performed by: NURSE PRACTITIONER

## 2023-01-24 PROCEDURE — 3078F DIAST BP <80 MM HG: CPT | Performed by: NURSE PRACTITIONER

## 2023-01-24 PROCEDURE — G8427 DOCREV CUR MEDS BY ELIG CLIN: HCPCS | Performed by: NURSE PRACTITIONER

## 2023-01-24 PROCEDURE — 1123F ACP DISCUSS/DSCN MKR DOCD: CPT | Performed by: NURSE PRACTITIONER

## 2023-01-24 PROCEDURE — 3074F SYST BP LT 130 MM HG: CPT | Performed by: NURSE PRACTITIONER

## 2023-01-24 PROCEDURE — G8400 PT W/DXA NO RESULTS DOC: HCPCS | Performed by: NURSE PRACTITIONER

## 2023-01-24 PROCEDURE — 1036F TOBACCO NON-USER: CPT | Performed by: NURSE PRACTITIONER

## 2023-01-24 RX ORDER — CARVEDILOL 3.12 MG/1
TABLET ORAL
Qty: 180 TABLET | Refills: 1 | Status: SHIPPED | OUTPATIENT
Start: 2023-01-24

## 2023-01-24 RX ORDER — DIGOXIN 125 MCG
125 TABLET ORAL DAILY
Qty: 30 TABLET | Refills: 2 | Status: SHIPPED | OUTPATIENT
Start: 2023-01-24

## 2023-01-24 RX ORDER — SPIRONOLACTONE 25 MG/1
TABLET ORAL
Qty: 12 TABLET | Refills: 1 | Status: SHIPPED | OUTPATIENT
Start: 2023-01-24

## 2023-01-24 NOTE — PROGRESS NOTES
Patient comes in for programming evaluation for her defibrillator. All sensing and pacing parameters are within normal range. S/p Gen change on 9/16/2022  Battery life 1.7 years, 2.6 years after changes. AP 81.9%.  95.4%. Dependent at 30 bpm today. No episodes noted. PVC Runs (2-4 beats) 0.4 per hour  PVC Singles 68.2 per hour  Patient remains on Coreg and digoxin. Changed outputs to preserve battery life. Please see interrogation for more detail. Optivol is WNL    Patient will see NPSR today and follow up in 3 months in office. Informed Patient that her Eagle Monitor had not yet been paired to her device and asked her to send a manual transmission in when she got home. Gave her instructions on how to send and the Number to Stay [employer] if she might have any issues sending.

## 2023-03-16 RX ORDER — SACUBITRIL AND VALSARTAN 24; 26 MG/1; MG/1
TABLET, FILM COATED ORAL
Qty: 90 TABLET | Refills: 0 | Status: CANCELLED | OUTPATIENT
Start: 2023-03-16

## 2023-03-17 RX ORDER — SACUBITRIL AND VALSARTAN 24; 26 MG/1; MG/1
TABLET, FILM COATED ORAL
Qty: 90 TABLET | Refills: 0 | Status: SHIPPED | OUTPATIENT
Start: 2023-03-17

## 2023-03-17 RX ORDER — SPIRONOLACTONE 25 MG/1
TABLET ORAL
Qty: 12 TABLET | Refills: 3 | Status: SHIPPED | OUTPATIENT
Start: 2023-03-17

## 2023-03-17 NOTE — TELEPHONE ENCOUNTER
----- Message from Eneida Vegas sent at 3/17/2023 11:43 AM EDT -----  Subject: Refill Request    QUESTIONS  Name of Medication? sacubitril-valsartan (ENTRESTO) 24-26 MG per tablet  Patient-reported dosage and instructions? TAKE ONE-HALF TABLET BY MOUTH   EVERY MORNING AND 1 EVERY EVENING  How many days do you have left? 0  Preferred Pharmacy? [facility] #[id number]  Pharmacy phone number (if available)? [phone]  ---------------------------------------------------------------------------  --------------,  Name of Medication? spironolactone (ALDACTONE) 25 MG tablet  Patient-reported dosage and instructions? Take 1/2 tablet M,W,F only  How many days do you have left? 3  [facility][id number]  Pharmacy phone number (if available)? [phone]  ---------------------------------------------------------------------------  --------------  Nuria MACARIO  What is the best way for the office to contact you? OK to leave message on   voicemail  Preferred Call Back Phone Number? [phone]  ---------------------------------------------------------------------------  --------------  SCRIPT ANSWERS  Relationship to Patient?  Self

## 2023-04-24 ENCOUNTER — OFFICE VISIT (OUTPATIENT)
Dept: PSYCHIATRY | Age: 68
End: 2023-04-24

## 2023-04-24 VITALS
OXYGEN SATURATION: 98 % | DIASTOLIC BLOOD PRESSURE: 68 MMHG | HEART RATE: 73 BPM | HEIGHT: 62 IN | BODY MASS INDEX: 26.31 KG/M2 | WEIGHT: 143 LBS | RESPIRATION RATE: 16 BRPM | SYSTOLIC BLOOD PRESSURE: 104 MMHG

## 2023-04-24 DIAGNOSIS — F41.9 ANXIETY DISORDER, UNSPECIFIED TYPE: ICD-10-CM

## 2023-04-24 DIAGNOSIS — F33.1 MODERATE EPISODE OF RECURRENT MAJOR DEPRESSIVE DISORDER (HCC): Primary | ICD-10-CM

## 2023-04-24 DIAGNOSIS — J30.2 SEASONAL ALLERGIES: ICD-10-CM

## 2023-04-24 RX ORDER — HYDROXYZINE PAMOATE 25 MG/1
25 CAPSULE ORAL DAILY PRN
Qty: 30 CAPSULE | Refills: 2 | Status: SHIPPED | OUTPATIENT
Start: 2023-04-24

## 2023-04-24 RX ORDER — ESCITALOPRAM OXALATE 10 MG/1
10 TABLET ORAL DAILY
Qty: 30 TABLET | Refills: 2 | Status: SHIPPED | OUTPATIENT
Start: 2023-04-24

## 2023-04-24 RX ORDER — MONTELUKAST SODIUM 10 MG/1
10 TABLET ORAL DAILY
Qty: 30 TABLET | Refills: 0 | OUTPATIENT
Start: 2023-04-24

## 2023-04-24 ASSESSMENT — PATIENT HEALTH QUESTIONNAIRE - PHQ9
9. THOUGHTS THAT YOU WOULD BE BETTER OFF DEAD, OR OF HURTING YOURSELF: 0
10. IF YOU CHECKED OFF ANY PROBLEMS, HOW DIFFICULT HAVE THESE PROBLEMS MADE IT FOR YOU TO DO YOUR WORK, TAKE CARE OF THINGS AT HOME, OR GET ALONG WITH OTHER PEOPLE: 1
4. FEELING TIRED OR HAVING LITTLE ENERGY: 1
5. POOR APPETITE OR OVEREATING: 1
2. FEELING DOWN, DEPRESSED OR HOPELESS: 1
6. FEELING BAD ABOUT YOURSELF - OR THAT YOU ARE A FAILURE OR HAVE LET YOURSELF OR YOUR FAMILY DOWN: 1
SUM OF ALL RESPONSES TO PHQ QUESTIONS 1-9: 9
1. LITTLE INTEREST OR PLEASURE IN DOING THINGS: 1
8. MOVING OR SPEAKING SO SLOWLY THAT OTHER PEOPLE COULD HAVE NOTICED. OR THE OPPOSITE, BEING SO FIGETY OR RESTLESS THAT YOU HAVE BEEN MOVING AROUND A LOT MORE THAN USUAL: 1
SUM OF ALL RESPONSES TO PHQ QUESTIONS 1-9: 9
SUM OF ALL RESPONSES TO PHQ QUESTIONS 1-9: 9
7. TROUBLE CONCENTRATING ON THINGS, SUCH AS READING THE NEWSPAPER OR WATCHING TELEVISION: 2
SUM OF ALL RESPONSES TO PHQ9 QUESTIONS 1 & 2: 2
SUM OF ALL RESPONSES TO PHQ QUESTIONS 1-9: 9
3. TROUBLE FALLING OR STAYING ASLEEP: 1

## 2023-04-24 ASSESSMENT — ANXIETY QUESTIONNAIRES
3. WORRYING TOO MUCH ABOUT DIFFERENT THINGS: 3
4. TROUBLE RELAXING: 1
IF YOU CHECKED OFF ANY PROBLEMS ON THIS QUESTIONNAIRE, HOW DIFFICULT HAVE THESE PROBLEMS MADE IT FOR YOU TO DO YOUR WORK, TAKE CARE OF THINGS AT HOME, OR GET ALONG WITH OTHER PEOPLE: SOMEWHAT DIFFICULT
GAD7 TOTAL SCORE: 14
6. BECOMING EASILY ANNOYED OR IRRITABLE: 2
7. FEELING AFRAID AS IF SOMETHING AWFUL MIGHT HAPPEN: 2
1. FEELING NERVOUS, ANXIOUS, OR ON EDGE: 2
5. BEING SO RESTLESS THAT IT IS HARD TO SIT STILL: 2
2. NOT BEING ABLE TO STOP OR CONTROL WORRYING: 2

## 2023-04-24 NOTE — PROGRESS NOTES
Patient notes that she is taking the second medication prescribed but does not remember the name of the medication

## 2023-04-24 NOTE — PROGRESS NOTES
PSYCHIATRY PROGRESS NOTE      Trisha Esparza  1955 04/24/23  PCP: None None    CC:   Chief Complaint   Patient presents with    Depression     S:   Pt last seen 14 months ago. She has not been taking the lexapro for most of this time. Only taking hydroxyzine prn. Thought she wasn't supposed to take both. She has been doing worse over the last 1 week due to recent stressors. Having more anxiety, feeling of anger, irritability, sadness and guilt. Anxiety can make her heart feel like its racing. Main stressor is her daughter - daughter she feels manipulated her, made her feel bad for not helping her financially (daughter is in a lot from poor financial decisions), and about 1 week ago pt wrote daughter a check for [de-identified] but upset that she did that now. Relationship with  is ok, regards more as a friendship. They are not intimate. Still using MJ    THERAPY MODALITIES: cognitive behavioral   THERAPY GOALS: improve insight  THERAPY NOTES: pt was fixated on several negative aspects about her decision to give her daughter money. We clarified the underlying psycholgical factors that influenced her to make this decision and clarified the potential benefits that she saw of her giving her daughter that large sum of money. We were able to clarify that her decision in a way relieved some emotional burden she had about having to care for her daughter.      ROS: occasional heart racing, palpitations    Psychiatric Medications:   Lexapro 10mg daily (not taking for almost 1 yr)  Hydroxyzine 25mg qday prn anxiety     O:  Wt Readings from Last 3 Encounters:   04/24/23 143 lb (64.9 kg)   01/24/23 142 lb 3.2 oz (64.5 kg)   11/03/22 141 lb (64 kg)     Temp Readings from Last 3 Encounters:   09/22/22 98.1 °F (36.7 °C)   02/18/22 97.6 °F (36.4 °C)   02/14/22 97.5 °F (36.4 °C) (Temporal)     BP Readings from Last 3 Encounters:   04/24/23 104/68   01/24/23 98/60   11/03/22 116/64     Pulse Readings from Last 3

## 2023-04-24 NOTE — TELEPHONE ENCOUNTER
Last OV: 11/3/2022  Next OV: Visit date not found    Next appointment due:around 7/24/2023    Last fill:8/26/22  Refills:5

## 2023-05-01 ENCOUNTER — TELEPHONE (OUTPATIENT)
Dept: INTERNAL MEDICINE CLINIC | Age: 68
End: 2023-05-01

## 2023-05-01 NOTE — TELEPHONE ENCOUNTER
----- Message from Glo Crawford sent at 5/1/2023  3:13 PM EDT -----  Subject: Medication Problem    Medication: escitalopram (LEXAPRO) 10 MG tablet  Dosage: 10 mg 1x a day  Ordering Provider: Richelle Ceja MD    Question/Problem: Pt states this medication is not working for her. She   did stop taking this due to not feeling well.       Pharmacy: [facility] [id number], [address] [facility] [place] [phone]    ---------------------------------------------------------------------------  --------------  Alf MACARIO  [phone]; OK to leave message on voicemail  ---------------------------------------------------------------------------  --------------    SCRIPT ANSWERS  Relationship to Patient: Self

## 2023-05-08 ENCOUNTER — NURSE ONLY (OUTPATIENT)
Dept: CARDIOLOGY CLINIC | Age: 68
End: 2023-05-08

## 2023-05-08 DIAGNOSIS — Z45.02 ICD (IMPLANTABLE CARDIOVERTER-DEFIBRILLATOR) BATTERY DEPLETION: Primary | ICD-10-CM

## 2023-05-09 ENCOUNTER — NURSE ONLY (OUTPATIENT)
Dept: CARDIOLOGY CLINIC | Age: 68
End: 2023-05-09
Payer: COMMERCIAL

## 2023-05-09 ENCOUNTER — OFFICE VISIT (OUTPATIENT)
Dept: CARDIOLOGY CLINIC | Age: 68
End: 2023-05-09
Payer: COMMERCIAL

## 2023-05-09 ENCOUNTER — HOSPITAL ENCOUNTER (OUTPATIENT)
Age: 68
Discharge: HOME | End: 2023-05-09
Payer: COMMERCIAL

## 2023-05-09 VITALS
BODY MASS INDEX: 26.31 KG/M2 | HEIGHT: 62 IN | OXYGEN SATURATION: 98 % | DIASTOLIC BLOOD PRESSURE: 62 MMHG | WEIGHT: 143 LBS | SYSTOLIC BLOOD PRESSURE: 92 MMHG | HEART RATE: 65 BPM

## 2023-05-09 DIAGNOSIS — I50.22 CHRONIC SYSTOLIC HEART FAILURE (HCC): Chronic | ICD-10-CM

## 2023-05-09 DIAGNOSIS — I42.0 DILATED CARDIOMYOPATHY (HCC): ICD-10-CM

## 2023-05-09 DIAGNOSIS — Z95.810 AICD (AUTOMATIC CARDIOVERTER/DEFIBRILLATOR) PRESENT: Primary | ICD-10-CM

## 2023-05-09 DIAGNOSIS — I48.0 PAF (PAROXYSMAL ATRIAL FIBRILLATION) (HCC): ICD-10-CM

## 2023-05-09 DIAGNOSIS — I50.22 CHRONIC SYSTOLIC HEART FAILURE (HCC): Primary | ICD-10-CM

## 2023-05-09 DIAGNOSIS — Z95.810 BIVENTRICULAR ICD (IMPLANTABLE CARDIOVERTER-DEFIBRILLATOR) IN PLACE: ICD-10-CM

## 2023-05-09 DIAGNOSIS — E55.9 VITAMIN D DEFICIENCY: ICD-10-CM

## 2023-05-09 DIAGNOSIS — I50.22 CHRONIC SYSTOLIC HEART FAILURE (HCC): ICD-10-CM

## 2023-05-09 DIAGNOSIS — I42.8 NON-ISCHEMIC CARDIOMYOPATHY (HCC): ICD-10-CM

## 2023-05-09 LAB
ANION GAP SERPL CALCULATED.3IONS-SCNC: 13 MMOL/L (ref 3–16)
BUN SERPL-MCNC: 18 MG/DL (ref 7–20)
CALCIUM SERPL-MCNC: 10.1 MG/DL (ref 8.3–10.6)
CHLORIDE SERPL-SCNC: 102 MMOL/L (ref 99–110)
CO2 SERPL-SCNC: 25 MMOL/L (ref 21–32)
CREAT SERPL-MCNC: 0.7 MG/DL (ref 0.6–1.2)
GFR SERPLBLD CREATININE-BSD FMLA CKD-EPI: >60 ML/MIN/{1.73_M2}
GLUCOSE SERPL-MCNC: 119 MG/DL (ref 70–99)
MAGNESIUM SERPL-MCNC: 2.2 MG/DL (ref 1.8–2.4)
NT-PROBNP SERPL-MCNC: 170 PG/ML (ref 0–124)
POTASSIUM SERPL-SCNC: 4.6 MMOL/L (ref 3.5–5.1)
SODIUM SERPL-SCNC: 140 MMOL/L (ref 136–145)

## 2023-05-09 PROCEDURE — 93290 INTERROG DEV EVAL ICPMS IP: CPT | Performed by: CLINICAL NURSE SPECIALIST

## 2023-05-09 PROCEDURE — G8427 DOCREV CUR MEDS BY ELIG CLIN: HCPCS | Performed by: CLINICAL NURSE SPECIALIST

## 2023-05-09 PROCEDURE — 83735 ASSAY OF MAGNESIUM: CPT

## 2023-05-09 PROCEDURE — 83880 ASSAY OF NATRIURETIC PEPTIDE: CPT

## 2023-05-09 PROCEDURE — G8400 PT W/DXA NO RESULTS DOC: HCPCS | Performed by: CLINICAL NURSE SPECIALIST

## 2023-05-09 PROCEDURE — 3074F SYST BP LT 130 MM HG: CPT | Performed by: CLINICAL NURSE SPECIALIST

## 2023-05-09 PROCEDURE — 1090F PRES/ABSN URINE INCON ASSESS: CPT | Performed by: CLINICAL NURSE SPECIALIST

## 2023-05-09 PROCEDURE — 1036F TOBACCO NON-USER: CPT | Performed by: CLINICAL NURSE SPECIALIST

## 2023-05-09 PROCEDURE — 36415 COLL VENOUS BLD VENIPUNCTURE: CPT

## 2023-05-09 PROCEDURE — 93289 INTERROG DEVICE EVAL HEART: CPT | Performed by: INTERNAL MEDICINE

## 2023-05-09 PROCEDURE — G8417 CALC BMI ABV UP PARAM F/U: HCPCS | Performed by: CLINICAL NURSE SPECIALIST

## 2023-05-09 PROCEDURE — 3078F DIAST BP <80 MM HG: CPT | Performed by: CLINICAL NURSE SPECIALIST

## 2023-05-09 PROCEDURE — 1123F ACP DISCUSS/DSCN MKR DOCD: CPT | Performed by: CLINICAL NURSE SPECIALIST

## 2023-05-09 PROCEDURE — 80048 BASIC METABOLIC PNL TOTAL CA: CPT

## 2023-05-09 PROCEDURE — 3017F COLORECTAL CA SCREEN DOC REV: CPT | Performed by: CLINICAL NURSE SPECIALIST

## 2023-05-09 PROCEDURE — 99214 OFFICE O/P EST MOD 30 MIN: CPT | Performed by: CLINICAL NURSE SPECIALIST

## 2023-05-09 RX ORDER — SACUBITRIL AND VALSARTAN 24; 26 MG/1; MG/1
TABLET, FILM COATED ORAL
Qty: 90 TABLET | Refills: 0 | Status: SHIPPED
Start: 2023-05-09

## 2023-05-09 RX ORDER — DIGOXIN 125 MCG
125 TABLET ORAL DAILY
Qty: 90 TABLET | Refills: 1 | Status: SHIPPED | OUTPATIENT
Start: 2023-05-09

## 2023-05-09 RX ORDER — CARVEDILOL 3.12 MG/1
3.12 TABLET ORAL 2 TIMES DAILY
Qty: 180 TABLET | Refills: 1 | Status: SHIPPED | OUTPATIENT
Start: 2023-05-09

## 2023-05-09 RX ORDER — SPIRONOLACTONE 25 MG/1
TABLET ORAL
Qty: 36 TABLET | Refills: 1 | Status: SHIPPED | OUTPATIENT
Start: 2023-05-09

## 2023-05-09 RX ORDER — SACUBITRIL AND VALSARTAN 24; 26 MG/1; MG/1
TABLET, FILM COATED ORAL
Qty: 90 TABLET | Refills: 1 | Status: SHIPPED | OUTPATIENT
Start: 2023-05-09

## 2023-05-09 NOTE — PROGRESS NOTES
[address] transmission received 5/9/23 from [address] for patient's CRT-D. Remote transmission received from patient's CRT-D home monitor. Transmission shows normal sensing and pacing function. No new arrhythmias/ events recorded. AP 76.6%, CRT 95.0%, VSRp 0.2%    Optivol is within normal range. TriageHF Heart Failure Risk Status on 09-May-2023 is Medium    EP/ NP will review. See interrogation under cardiology tab in the [address] [place] [place] [address] [address] field for more details.  Will continue to monitor remotely.    (ANAND)

## 2023-05-09 NOTE — PATIENT INSTRUCTIONS
Call medtronic to sync device with monitor  Decrease entresto to half a pill twice a day   Refills completed  Continue all other medications  RTO in 4-5 months with Dr Nix People in case GYN surgery is needed

## 2023-05-09 NOTE — PROGRESS NOTES
[facility]  Progress Note    Primary Care Doctor:  None None    Chief Complaint   Patient presents with    Congestive Heart Failure    Dizziness    Fatigue        History of Present Illness:  69 y.o. female with history of NICM, BiV ICD  and , AF (not on anticoagulation, follows with Dr Antonieta Howard), ESCOBAR on cpap, fibromyalgia, sHF on entresto since , followed at [facility]  - for chest pain, LHC done, EGD with H pylori neg and anxiety/depression (family issues) seen by psychiatry and started on klonipin/zoloft. I had the pleasure of seeing Ruby Villavicencio in follow up for systolic heart failure. She is ambulatory and by her self. She had a new ICD placed and has not synched her remote device with the new ICD. She continues to have some dizziness. She felt better after stopping the jardiance but now how this dizziness again. Her BP is on the soft side. Weight is stable. Optival today shows normal thoracic impedence. No chest pain, palpitations or shortness of breath. She is working on a new PCP and needs to have some vaginal surgery but not sure what yet. Past Medical History:   has a past medical history of Atrial fibrillation ([address] Cardiomyopathy, nonischemic ([address] CHF (congestive heart failure) ([address] Fibromyalgia, GERD (gastroesophageal reflux disease), HSV-1 (herpes simplex virus 1) infection, Hyperlipidemia, Sleep apnea, and Uterine fibroids affecting pregnancy. Surgical History:   has a past surgical history that includes  section; Pacemaker insertion (); Pacemaker insertion; Upper gastrointestinal endoscopy (2015); Colonoscopy (2015); Colonoscopy (N/A, 2019); Upper gastrointestinal endoscopy (N/A, 2019); Cardiac defibrillator placement; and Upper gastrointestinal endoscopy (N/A, 2020). Social History:   reports that she quit smoking about 25 years ago. Her smoking use included cigarettes. She has a 2.50 pack-year smoking history.

## 2023-05-10 ENCOUNTER — OFFICE VISIT (OUTPATIENT)
Dept: FAMILY MEDICINE CLINIC | Age: 68
End: 2023-05-10
Payer: COMMERCIAL

## 2023-05-10 ENCOUNTER — NURSE TRIAGE (OUTPATIENT)
Dept: OTHER | Facility: CLINIC | Age: 68
End: 2023-05-10

## 2023-05-10 VITALS
OXYGEN SATURATION: 98 % | TEMPERATURE: 97.9 F | DIASTOLIC BLOOD PRESSURE: 84 MMHG | WEIGHT: 141.8 LBS | BODY MASS INDEX: 25.94 KG/M2 | HEART RATE: 83 BPM | SYSTOLIC BLOOD PRESSURE: 112 MMHG

## 2023-05-10 DIAGNOSIS — B02.9 HERPES ZOSTER WITHOUT COMPLICATION: Primary | ICD-10-CM

## 2023-05-10 PROCEDURE — G8400 PT W/DXA NO RESULTS DOC: HCPCS | Performed by: NURSE PRACTITIONER

## 2023-05-10 PROCEDURE — 3079F DIAST BP 80-89 MM HG: CPT | Performed by: NURSE PRACTITIONER

## 2023-05-10 PROCEDURE — 99213 OFFICE O/P EST LOW 20 MIN: CPT | Performed by: NURSE PRACTITIONER

## 2023-05-10 PROCEDURE — G8417 CALC BMI ABV UP PARAM F/U: HCPCS | Performed by: NURSE PRACTITIONER

## 2023-05-10 PROCEDURE — 3074F SYST BP LT 130 MM HG: CPT | Performed by: NURSE PRACTITIONER

## 2023-05-10 PROCEDURE — G8427 DOCREV CUR MEDS BY ELIG CLIN: HCPCS | Performed by: NURSE PRACTITIONER

## 2023-05-10 PROCEDURE — 3017F COLORECTAL CA SCREEN DOC REV: CPT | Performed by: NURSE PRACTITIONER

## 2023-05-10 PROCEDURE — 1123F ACP DISCUSS/DSCN MKR DOCD: CPT | Performed by: NURSE PRACTITIONER

## 2023-05-10 PROCEDURE — 1090F PRES/ABSN URINE INCON ASSESS: CPT | Performed by: NURSE PRACTITIONER

## 2023-05-10 PROCEDURE — 1036F TOBACCO NON-USER: CPT | Performed by: NURSE PRACTITIONER

## 2023-05-10 RX ORDER — VALACYCLOVIR HYDROCHLORIDE 1 G/1
1000 TABLET, FILM COATED ORAL 3 TIMES DAILY
Qty: 21 TABLET | Refills: 0 | Status: SHIPPED | OUTPATIENT
Start: 2023-05-10 | End: 2023-05-17

## 2023-05-10 RX ORDER — CAPSAICIN 0.75 MG/G
CREAM TOPICAL
Qty: 120 G | Refills: 1 | Status: SHIPPED | OUTPATIENT
Start: 2023-05-10 | End: 2023-06-09

## 2023-05-10 RX ORDER — GABAPENTIN 100 MG/1
100 CAPSULE ORAL 3 TIMES DAILY
Qty: 45 CAPSULE | Refills: 1 | Status: SHIPPED | OUTPATIENT
Start: 2023-05-10 | End: 2023-06-09

## 2023-05-10 ASSESSMENT — ENCOUNTER SYMPTOMS
RHINORRHEA: 0
COUGH: 0
CHEST TIGHTNESS: 0
EYE PAIN: 0
STRIDOR: 0
WHEEZING: 0
APNEA: 0
SORE THROAT: 0
TROUBLE SWALLOWING: 0
SINUS PRESSURE: 0
VOICE CHANGE: 0
SINUS PAIN: 0
SHORTNESS OF BREATH: 0
FACIAL SWELLING: 0

## 2023-05-10 NOTE — PROGRESS NOTES
2015    polypectomy    COLONOSCOPY N/A 2019    COLONOSCOPY POLYPECTOMY SNARE/COLD ASCENDING X1 , TRANSVERSE X1, SIGMOID X1 performed by Catherin Sandhoff, MD at [address] [facility]  9/05    PACEMAKER INSERTION      UPPER GASTROINTESTINAL ENDOSCOPY  2015    UPPER GASTROINTESTINAL ENDOSCOPY N/A 2019    EGD GASTRIC BIOPSY performed by Catherin Sandhoff, MD at [facility] ENDOSCOPY N/A 2020    EGD BIOPSY performed by Catherin Sandhoff, MD at [address] History     Socioeconomic History    Marital status:      Spouse name: Not on file    Number of children: Not on file    Years of education: Not on file    Highest education level: Not on file   Occupational History    Not on file   Tobacco Use    Smoking status: Former     Packs/day: 0.25     Years: 10.00     Pack years: 2.50     Types: Cigarettes     Quit date: 1998     Years since quittin.1    Smokeless tobacco: Never    Tobacco comments:     smoked 1 cig a day,   Vaping Use    Vaping Use: Never used   Substance and Sexual Activity    Alcohol use: Yes    Drug use: Yes     Frequency: 2.0 times per week     Types: Marijuana Lynette Rocker)    Sexual activity: Yes     Partners: Male     Comment:    Other Topics Concern    Not on file   Social History Narrative    Not on file     Social Determinants of Health     Financial Resource Strain: Not on file   Food Insecurity: Not on file   Transportation Needs: Not on file   Physical Activity: Not on file   Stress: Not on file   Social Connections: Not on file   Intimate Partner Violence: Not on file   Housing Stability: Not on file         Review of Systems   Constitutional:  Positive for fatigue. Negative for fever.    HENT:  Negative for congestion, dental problem, drooling, ear discharge, ear pain, facial swelling, hearing loss, mouth sores, nosebleeds, postnasal drip, rhinorrhea, sinus pressure, sinus pain, sneezing,

## 2023-05-10 NOTE — TELEPHONE ENCOUNTER
Location of patient: [place]    Received call from Jenae Buchanan at [facility]; Patient with Red Flag Complaint requesting to establish care with Zac. Current Symptoms: red painful rash on left neck    Onset: 1 day ago;     Pain Severity: 7/10; Temperature: denies     What has been tried: baking soda    Denies - shortness of breath / chest pain / itching    Recommended disposition: See in Office Today  Patient does not have a PCP and was advised to go to an [address] [place] [address] Ave for current problem. Care advice provided, patient verbalizes understanding; denies any other questions or concerns; instructed to call back for any new or worsening symptoms. Patient/Caller agrees with recommended disposition; writer provided warm transfer to [facility] at [facility] for appointment scheduling    Attention Provider: Thank you for allowing me to participate in the care of your patient. The patient was connected to triage in response to information provided to the ECC. Please do not respond through this encounter as the response is not directed to a shared pool.     Reason for Disposition   Localized rash is very painful (no fever)    Protocols used: Rash or Redness - Localized-ADULT-[place]

## 2023-05-10 NOTE — ASSESSMENT & PLAN NOTE
Start antiviral therapy today, gabapentin as needed, will start low dose and increase as needed for pain, educated on disease process, capsaicin cream for topical relief.  Discussed reasons to return to office/ED

## 2023-06-26 ENCOUNTER — NURSE ONLY (OUTPATIENT)
Dept: CARDIOLOGY CLINIC | Age: 68
End: 2023-06-26
Payer: COMMERCIAL

## 2023-06-26 DIAGNOSIS — I42.0 DILATED CARDIOMYOPATHY (HCC): ICD-10-CM

## 2023-06-26 DIAGNOSIS — I50.22 CHRONIC SYSTOLIC HEART FAILURE (HCC): Primary | Chronic | ICD-10-CM

## 2023-06-26 DIAGNOSIS — Z95.810 ICD (IMPLANTABLE CARDIOVERTER-DEFIBRILLATOR) IN PLACE: ICD-10-CM

## 2023-06-26 PROCEDURE — G2066 INTER DEVC REMOTE 30D: HCPCS | Performed by: CLINICAL NURSE SPECIALIST

## 2023-06-26 PROCEDURE — 93297 REM INTERROG DEV EVAL ICPMS: CPT | Performed by: CLINICAL NURSE SPECIALIST

## 2023-07-14 ENCOUNTER — TELEPHONE (OUTPATIENT)
Dept: INTERNAL MEDICINE CLINIC | Age: 68
End: 2023-07-14

## 2023-07-17 RX ORDER — HYDROXYZINE PAMOATE 25 MG/1
25 CAPSULE ORAL DAILY PRN
Qty: 30 CAPSULE | Refills: 0 | Status: SHIPPED | OUTPATIENT
Start: 2023-07-17

## 2023-07-17 NOTE — TELEPHONE ENCOUNTER
Patient would need an appointment. But unfortunately she is no longer establish with a PCP in our office so I won't be able to see her as a patient.  I will send just one more month of hydroxyzine, but she should discuss further refills with her new provider or locate another psychiatrist.

## 2023-07-21 ENCOUNTER — TELEPHONE (OUTPATIENT)
Dept: INTERNAL MEDICINE CLINIC | Age: 68
End: 2023-07-21

## 2023-07-21 DIAGNOSIS — J30.2 SEASONAL ALLERGIES: ICD-10-CM

## 2023-07-21 NOTE — TELEPHONE ENCOUNTER
----- Message from Marielena Bone sent at 7/21/2023 10:25 AM EDT -----  Subject: Refill Request    QUESTIONS  Name of Medication? digoxin (LANOXIN) 125 MCG tablet  Patient-reported dosage and instructions? 1/2 tablet by mouth Mon, Wed,   Fri. How many days do you have left? 1  Preferred Pharmacy? [facility] #[id number]  Pharmacy phone number (if available)? [phone]  Additional Information for Provider? Pt was seeing Dr. Shawanda Carrillo and has   been seeing [facility] People. Pt is trying to est care with another PCP, but   needs refills now.   ---------------------------------------------------------------------------  --------------,  Name of Medication? montelukast (SINGULAIR) 10 MG tablet  Patient-reported dosage and instructions? 1 tablet daily at 2:00pm  How many days do you have left? 6  Preferred Pharmacy? [facility] #[id number]  Pharmacy phone number (if available)? [phone]  Additional Information for Provider? Pt was seeing Dr. Shawanda Carrillo and has   been seeing [facility]. Pt is trying to est care with another PCP, but   needs refills now.   ---------------------------------------------------------------------------  --------------  CALL BACK INFO  What is the best way for the office to contact you? OK to leave message on   voicemail  Preferred Call Back Phone Number? [phone]  ---------------------------------------------------------------------------  --------------  SCRIPT ANSWERS  Relationship to Patient?  Self

## 2023-07-23 RX ORDER — MONTELUKAST SODIUM 10 MG/1
10 TABLET ORAL DAILY
Qty: 30 TABLET | Refills: 5 | Status: SHIPPED | OUTPATIENT
Start: 2023-07-23

## 2023-07-23 NOTE — TELEPHONE ENCOUNTER
Singular refill sent to pharmacy, Digoxin refills  managed by cardiology but refills should be available      Disp Refills Start End    digoxin (LANOXIN) 125 MCG tablet 90 tablet 1 5/9/2023     Sig - Route:  Take 1 tablet by mouth daily - Oral    Sent to pharmacy as: Digoxin 125 MCG Oral Tablet (LANOXIN)    E-Prescribing Status: Receipt confirmed by pharmacy (5/9/2023 11:55 AM EDT)

## 2023-07-26 ENCOUNTER — OFFICE VISIT (OUTPATIENT)
Dept: INTERNAL MEDICINE CLINIC | Age: 68
End: 2023-07-26
Payer: COMMERCIAL

## 2023-07-26 VITALS
SYSTOLIC BLOOD PRESSURE: 118 MMHG | WEIGHT: 142 LBS | BODY MASS INDEX: 25.97 KG/M2 | HEART RATE: 85 BPM | OXYGEN SATURATION: 98 % | DIASTOLIC BLOOD PRESSURE: 70 MMHG

## 2023-07-26 DIAGNOSIS — F41.9 ANXIETY: Chronic | ICD-10-CM

## 2023-07-26 DIAGNOSIS — G47.33 OBSTRUCTIVE SLEEP APNEA SYNDROME: Chronic | ICD-10-CM

## 2023-07-26 DIAGNOSIS — R32 URINARY INCONTINENCE, UNSPECIFIED TYPE: ICD-10-CM

## 2023-07-26 DIAGNOSIS — B02.9 HERPES ZOSTER WITHOUT COMPLICATION: Primary | ICD-10-CM

## 2023-07-26 DIAGNOSIS — I10 BENIGN ESSENTIAL HTN: Chronic | ICD-10-CM

## 2023-07-26 DIAGNOSIS — G89.4 CHRONIC PAIN SYNDROME: ICD-10-CM

## 2023-07-26 DIAGNOSIS — I42.0 DILATED CARDIOMYOPATHY (HCC): ICD-10-CM

## 2023-07-26 DIAGNOSIS — I48.0 PAROXYSMAL ATRIAL FIBRILLATION (HCC): Chronic | ICD-10-CM

## 2023-07-26 DIAGNOSIS — F33.2 MAJOR DEPRESSIVE DISORDER, RECURRENT EPISODE, SEVERE WITH ANXIOUS DISTRESS (HCC): Chronic | ICD-10-CM

## 2023-07-26 DIAGNOSIS — N39.0 FREQUENT UTI: ICD-10-CM

## 2023-07-26 DIAGNOSIS — Z12.31 ENCOUNTER FOR SCREENING MAMMOGRAM FOR BREAST CANCER: ICD-10-CM

## 2023-07-26 DIAGNOSIS — M79.7 FIBROMYALGIA SYNDROME: Chronic | ICD-10-CM

## 2023-07-26 DIAGNOSIS — B02.29 OTHER POSTHERPETIC NERVOUS SYSTEM INVOLVEMENT: ICD-10-CM

## 2023-07-26 PROCEDURE — 3078F DIAST BP <80 MM HG: CPT | Performed by: NURSE PRACTITIONER

## 2023-07-26 PROCEDURE — 1123F ACP DISCUSS/DSCN MKR DOCD: CPT | Performed by: NURSE PRACTITIONER

## 2023-07-26 PROCEDURE — 99215 OFFICE O/P EST HI 40 MIN: CPT | Performed by: NURSE PRACTITIONER

## 2023-07-26 PROCEDURE — 3074F SYST BP LT 130 MM HG: CPT | Performed by: NURSE PRACTITIONER

## 2023-07-26 PROCEDURE — 1090F PRES/ABSN URINE INCON ASSESS: CPT | Performed by: NURSE PRACTITIONER

## 2023-07-26 PROCEDURE — G8427 DOCREV CUR MEDS BY ELIG CLIN: HCPCS | Performed by: NURSE PRACTITIONER

## 2023-07-26 PROCEDURE — G8400 PT W/DXA NO RESULTS DOC: HCPCS | Performed by: NURSE PRACTITIONER

## 2023-07-26 PROCEDURE — 0509F URINE INCON PLAN DOCD: CPT | Performed by: NURSE PRACTITIONER

## 2023-07-26 PROCEDURE — 1036F TOBACCO NON-USER: CPT | Performed by: NURSE PRACTITIONER

## 2023-07-26 PROCEDURE — 3017F COLORECTAL CA SCREEN DOC REV: CPT | Performed by: NURSE PRACTITIONER

## 2023-07-26 PROCEDURE — G8417 CALC BMI ABV UP PARAM F/U: HCPCS | Performed by: NURSE PRACTITIONER

## 2023-07-26 RX ORDER — GABAPENTIN 100 MG/1
100 CAPSULE ORAL 3 TIMES DAILY
Qty: 45 CAPSULE | Refills: 1 | Status: SHIPPED | OUTPATIENT
Start: 2023-07-26 | End: 2023-08-25

## 2023-07-26 RX ORDER — CIPROFLOXACIN 250 MG/1
TABLET, FILM COATED ORAL
Qty: 10 TABLET | Refills: 2 | Status: SHIPPED | OUTPATIENT
Start: 2023-07-26

## 2023-07-26 SDOH — ECONOMIC STABILITY: INCOME INSECURITY: HOW HARD IS IT FOR YOU TO PAY FOR THE VERY BASICS LIKE FOOD, HOUSING, MEDICAL CARE, AND HEATING?: NOT HARD AT ALL

## 2023-07-26 SDOH — ECONOMIC STABILITY: FOOD INSECURITY: WITHIN THE PAST 12 MONTHS, YOU WORRIED THAT YOUR FOOD WOULD RUN OUT BEFORE YOU GOT MONEY TO BUY MORE.: NEVER TRUE

## 2023-07-26 SDOH — ECONOMIC STABILITY: FOOD INSECURITY: WITHIN THE PAST 12 MONTHS, THE FOOD YOU BOUGHT JUST DIDN'T LAST AND YOU DIDN'T HAVE MONEY TO GET MORE.: NEVER TRUE

## 2023-07-26 SDOH — ECONOMIC STABILITY: HOUSING INSECURITY
IN THE LAST 12 MONTHS, WAS THERE A TIME WHEN YOU DID NOT HAVE A STEADY PLACE TO SLEEP OR SLEPT IN A SHELTER (INCLUDING NOW)?: NO

## 2023-07-26 NOTE — ASSESSMENT & PLAN NOTE
Chronic, controlled. Continue seeing psychiatry as recommended. Continue lexapro and hydroxyzine as prescribed.

## 2023-07-26 NOTE — ASSESSMENT & PLAN NOTE
Chronic, uncontrolled. Discussed in detail. Referral to see Dr. Haley Ba at [facility] as she has seen him in the past and done well.

## 2023-07-26 NOTE — PROGRESS NOTES
23     Chief Complaint   Patient presents with    New Patient     NTP from Dr. Martha Altman -Has not had a mammogram in the last 2 years. Herpes Zoster     Shingles pain x2 months left upper back/should. HPI    Here to transfer care from Dr. Martha Altman. She had shingles 2 months ago - her upper back and left shoulder. She continues to have pain and itching that is not resolving. She completed her antivirals and rash has improved. She reports the gabapentin helped with the pain but she is out of it. Medical marijuana oil helped with fibromyalgia pain but she is not longer seeing pain management and let her card . She reports worsening urgency and intermittent incontinence, that has been going on for years but getting worse. She denies any dysuria or hematuria. Denies infection concerns. She sees EP, Dr. Linh Guerrero  for history of dilated cardiomyopathy and AF she is not on [facility]. She denies any cardiac concerns today. She also sees general cardiology/ HF team for her systolic heart failure. Allergies   Allergen Reactions    Jardiance [Empagliflozin]      dizziness    Prednisone      Caused an allergic reaction. Swelling all over her body. Current Outpatient Medications   Medication Sig Dispense Refill    ciprofloxacin (CIPRO) 250 MG tablet TAKE 1 TABLET BY MOUTH AFTER SEXUAL ACTIVITY 10 tablet 2    triamcinolone (KENALOG) 0.1 % ointment Apply topically 2 times daily for 7 days 1 each 0    gabapentin (NEURONTIN) 100 MG capsule Take 1 capsule by mouth 3 times daily for 30 days.  45 capsule 1    montelukast (SINGULAIR) 10 MG tablet Take 1 tablet by mouth daily 30 tablet 5    hydrOXYzine pamoate (VISTARIL) 25 MG capsule Take 1 capsule by mouth daily as needed for Anxiety 30 capsule 0    sacubitril-valsartan (ENTRESTO) 24-26 MG per tablet TAKE ONE-HALF TABLET BY MOUTH EVERY MORNING AND 1 EVERY EVENING 90 tablet 1    carvedilol (COREG) 3.125 MG tablet Take 1 tablet by mouth 2 times daily

## 2023-07-26 NOTE — ASSESSMENT & PLAN NOTE
Chronic, uncontrolled neuropathy. Rash has resolved. Reviewed supportive care in detail. Topical steroid for itching. Encouraged lidocaine patches. Refilling gabapentin.

## 2023-07-26 NOTE — ASSESSMENT & PLAN NOTE
Chronic, controlled. Continue seeing EP and cardiology as recommended and scheduled. Continue carvedilol 3.125 mg, digoxin 125 mcg daily, entresto 24-26 mg daily and spironolactone 25 mg daily.

## 2023-08-02 NOTE — PROGRESS NOTES
History:  Reviewed. reports that she quit smoking about 25 years ago. Her smoking use included cigarettes. She has a 2.50 pack-year smoking history. She has never used smokeless tobacco. She reports current alcohol use. She reports current drug use. Frequency: 2.00 times per week. Drug: Marijuana Marletta Hoes). Family History:  Reviewed. Reviewed. No family history of SCD. Relevant and available labs, and cardiovascular diagnostics reviewed. Reviewed. I independently reviewed relevant and available cardiac diagnostic tests ECG, CXR, Echo, Stress test, Device interrogation, Holter, CT scan. Outside medical records via Care everywhere reviewed and summarized in H&P above. Complex medical condition with multiple medical problems affecting prognosis and outcome of EP interventions       - The patient is counseled to follow a low salt diet to assure blood pressure remains controlled for cardiovascular risk factor modification.   - The patient is counseled to avoid excess caffeine, and energy drinks as this may exacerbated ectopy and arrhythmia. - The patient is counseled to get regular exercise 3-5 times per week to control cardiovascular risk factors. All questions and concerns were addressed to the patient/family. Alternatives to my treatment were discussed. I have discussed the above stated plan and the patient verbalized understanding and agreed with the plan. Scribe attestation: This note was scribed in the presence of Stevie Ruth MD by Krzysztof Randolph RN    I, Dr. Stevie Ruth personally performed the services described in this documentation as scribed by RN in my presence, and it is both accurate and complete. NOTE: This report was transcribed using voice recognition software. Every effort was made to ensure accuracy, however, inadvertent computerized transcription errors may be present.      Stevie Ruth MD, [facility]   Office: [phone]  Fax:

## 2023-08-03 ENCOUNTER — OFFICE VISIT (OUTPATIENT)
Dept: CARDIOLOGY CLINIC | Age: 68
End: 2023-08-03

## 2023-08-03 ENCOUNTER — NURSE ONLY (OUTPATIENT)
Dept: CARDIOLOGY CLINIC | Age: 68
End: 2023-08-03

## 2023-08-03 VITALS
OXYGEN SATURATION: 97 % | BODY MASS INDEX: 25.95 KG/M2 | WEIGHT: 141 LBS | HEART RATE: 66 BPM | DIASTOLIC BLOOD PRESSURE: 66 MMHG | HEIGHT: 62 IN | SYSTOLIC BLOOD PRESSURE: 104 MMHG

## 2023-08-03 DIAGNOSIS — I42.0 DILATED CARDIOMYOPATHY (HCC): ICD-10-CM

## 2023-08-03 DIAGNOSIS — R06.02 SOB (SHORTNESS OF BREATH): ICD-10-CM

## 2023-08-03 DIAGNOSIS — I48.0 PAROXYSMAL ATRIAL FIBRILLATION (HCC): Chronic | ICD-10-CM

## 2023-08-03 DIAGNOSIS — I42.0 DILATED CARDIOMYOPATHY (HCC): Primary | ICD-10-CM

## 2023-08-03 DIAGNOSIS — G47.33 OBSTRUCTIVE SLEEP APNEA SYNDROME: Chronic | ICD-10-CM

## 2023-08-03 DIAGNOSIS — Z45.02 ICD (IMPLANTABLE CARDIOVERTER-DEFIBRILLATOR) BATTERY DEPLETION: Primary | ICD-10-CM

## 2023-08-03 RX ORDER — HYDROXYZINE PAMOATE 25 MG/1
25 CAPSULE ORAL DAILY PRN
Qty: 30 CAPSULE | Refills: 0 | Status: SHIPPED | OUTPATIENT
Start: 2023-08-03

## 2023-08-03 RX ORDER — ESCITALOPRAM OXALATE 10 MG/1
10 TABLET ORAL DAILY
Qty: 30 TABLET | Refills: 2 | Status: SHIPPED | OUTPATIENT
Start: 2023-08-03

## 2023-08-11 ENCOUNTER — OFFICE VISIT (OUTPATIENT)
Dept: UROGYNECOLOGY | Age: 68
End: 2023-08-11

## 2023-08-11 VITALS
TEMPERATURE: 97.5 F | DIASTOLIC BLOOD PRESSURE: 59 MMHG | HEART RATE: 68 BPM | RESPIRATION RATE: 18 BRPM | OXYGEN SATURATION: 98 % | SYSTOLIC BLOOD PRESSURE: 93 MMHG

## 2023-08-11 DIAGNOSIS — R35.0 URINARY FREQUENCY: ICD-10-CM

## 2023-08-11 DIAGNOSIS — R39.15 URINARY URGENCY: Primary | ICD-10-CM

## 2023-08-11 DIAGNOSIS — N39.41 URGE INCONTINENCE: ICD-10-CM

## 2023-08-11 DIAGNOSIS — N39.3 STRESS INCONTINENCE: ICD-10-CM

## 2023-08-11 LAB
BILIRUBIN, POC: NORMAL
BLOOD URINE, POC: NORMAL
CLARITY, POC: NORMAL
COLOR, POC: YELLOW
EMPTY COUGH STRESS TEST: NORMAL
FIRST SENSATION: 150 CC
FULL COUGH STRESS TEST: NORMAL
GLUCOSE URINE, POC: NORMAL
KETONES, POC: NORMAL
LEUKOCYTE EST, POC: NORMAL
MAX SENSATION: 250 CC
NITRATE, URINE POC: NORMAL
NITRITE, POC: NORMAL
PH, POC: 6.5
POST VOID RESIDUAL (PVR): 40 ML
PROTEIN, POC: NORMAL
RBC URINE, POC: NORMAL
SECOND SENSATION: 180 CC
SPASM: NORMAL
SPECIFIC GRAVITY, POC: 1.01
UROBILINOGEN, POC: NORMAL
WBC URINE, POC: NORMAL

## 2023-08-11 ASSESSMENT — ENCOUNTER SYMPTOMS
SORE THROAT: 1
BACK PAIN: 1
SINUS PRESSURE: 1
EYE ITCHING: 1

## 2023-08-11 NOTE — PROGRESS NOTES
0.25     Years: 10.00     Pack years: 2.50     Types: Cigarettes     Quit date: 1998     Years since quittin.3    Smokeless tobacco: Never    Tobacco comments:     smoked 1 cig a day,   Vaping Use    Vaping Use: Never used   Substance and Sexual Activity    Alcohol use: Yes     Comment: occasionally    Drug use: Never     Frequency: 2.0 times per week    Sexual activity: Yes     Partners: Male     Comment:    Other Topics Concern    Not on file   Social History Narrative    Not on file     Social Determinants of Health     Financial Resource Strain: Low Risk     Difficulty of Paying Living Expenses: Not hard at all   Food Insecurity: No Food Insecurity    Worried About Running Out of Food in the Last Year: Never true    [address] in the Last Year: Never true   Transportation Needs: Unknown    Lack of Transportation (Medical): Not on file    Lack of Transportation (Non-Medical): No   Physical Activity: Not on file   Stress: Not on file   Social Connections: Not on file   Intimate Partner Violence: Not on file   Housing Stability: Unknown    Unable to Pay for Housing in the Last Year: Not on file    Number of Places Lived in the Last Year: Not on file    Unstable Housing in the Last Year: No     Family History:   Family History   Problem Relation Age of Onset    Diabetes Mother     Kidney Disease Mother         reanl failure     Review of Systems:  Review of Systems   Constitutional:  Positive for chills and diaphoresis. HENT:  Positive for congestion, sinus pressure, sneezing and sore throat. Eyes:  Positive for itching. Cardiovascular:  Positive for palpitations. Endocrine: Positive for cold intolerance and heat intolerance. Genitourinary:  Positive for enuresis. Musculoskeletal:  Positive for back pain, myalgias, neck pain and neck stiffness. Allergic/Immunologic: Positive for environmental allergies. Neurological:  Positive for dizziness, light-headedness and headaches.    All

## 2023-08-24 PROCEDURE — G2066 INTER DEVC REMOTE 30D: HCPCS | Performed by: CLINICAL NURSE SPECIALIST

## 2023-08-24 PROCEDURE — 93297 REM INTERROG DEV EVAL ICPMS: CPT | Performed by: CLINICAL NURSE SPECIALIST

## 2023-09-17 DIAGNOSIS — B02.9 HERPES ZOSTER WITHOUT COMPLICATION: ICD-10-CM

## 2023-09-18 ENCOUNTER — PROCEDURE VISIT (OUTPATIENT)
Dept: UROGYNECOLOGY | Age: 68
End: 2023-09-18
Payer: COMMERCIAL

## 2023-09-18 VITALS
TEMPERATURE: 97.6 F | OXYGEN SATURATION: 99 % | RESPIRATION RATE: 16 BRPM | HEART RATE: 77 BPM | DIASTOLIC BLOOD PRESSURE: 76 MMHG | SYSTOLIC BLOOD PRESSURE: 113 MMHG

## 2023-09-18 DIAGNOSIS — N39.3 STRESS INCONTINENCE: Primary | ICD-10-CM

## 2023-09-18 DIAGNOSIS — N39.41 URGE INCONTINENCE: ICD-10-CM

## 2023-09-18 DIAGNOSIS — R39.15 URINARY URGENCY: ICD-10-CM

## 2023-09-18 DIAGNOSIS — R35.0 URINARY FREQUENCY: ICD-10-CM

## 2023-09-18 LAB
BILIRUBIN, POC: NORMAL
BLOOD URINE, POC: NORMAL
CLARITY, POC: CLEAR
COLOR, POC: YELLOW
GLUCOSE URINE, POC: NORMAL
KETONES, POC: NORMAL
LEUKOCYTE EST, POC: NORMAL
NITRITE, POC: NORMAL
PH, POC: 6.5
PROTEIN, POC: NORMAL
SPECIFIC GRAVITY, POC: 1.01
UROBILINOGEN, POC: NORMAL

## 2023-09-18 PROCEDURE — 51797 INTRAABDOMINAL PRESSURE TEST: CPT | Performed by: OBSTETRICS & GYNECOLOGY

## 2023-09-18 PROCEDURE — 51784 ANAL/URINARY MUSCLE STUDY: CPT | Performed by: OBSTETRICS & GYNECOLOGY

## 2023-09-18 PROCEDURE — 51741 ELECTRO-UROFLOWMETRY FIRST: CPT | Performed by: OBSTETRICS & GYNECOLOGY

## 2023-09-18 PROCEDURE — 81002 URINALYSIS NONAUTO W/O SCOPE: CPT | Performed by: OBSTETRICS & GYNECOLOGY

## 2023-09-18 PROCEDURE — 51729 CYSTOMETROGRAM W/VP&UP: CPT | Performed by: OBSTETRICS & GYNECOLOGY

## 2023-09-18 RX ORDER — GABAPENTIN 100 MG/1
100 CAPSULE ORAL 3 TIMES DAILY
Qty: 45 CAPSULE | Refills: 1 | OUTPATIENT
Start: 2023-09-18

## 2023-09-18 NOTE — TELEPHONE ENCOUNTER
Short Rx for shingles. She was also referred to see Dr. Theodora Bray with pain management at [facility] for her chronic pain management.

## 2023-09-18 NOTE — TELEPHONE ENCOUNTER
Last OV: 7/26/2023  Next OV: Visit date not found    Next appointment due:1/26/24    Last fill:7/26/23  Refills:1

## 2023-09-18 NOTE — PROGRESS NOTES
Urodynamic Procedure Note    Daron Porras  1955    Urodynamicist: Dr. Pj Cool    Equipment: Patria Hodgkin    Brief History/Indication:    Patient is a 69 y.o. female with subjective complaints of urinary urgency, frequency, and stress incontinence for a urodynamic evaluation. Cystometrogram   WBC Urine, POC   Date Value Ref Range Status   08/11/2023 neg  Final     RBC, UA   Date Value Ref Range Status   04/14/2016 2 0 - 4 /HPF Final     RBC Urine, POC   Date Value Ref Range Status   08/11/2023 neg  Final     Nitrate, UA POC   Date Value Ref Range Status   08/11/2023 neg  Final     post void residual   Date Value Ref Range Status   08/11/2023 40 ml Final     FIRST SENSATION   Date Value Ref Range Status   08/11/2023 150 cc Final     SECOND SENSATION   Date Value Ref Range Status   08/11/2023 180 cc Final     MAX SENSATION   Date Value Ref Range Status   08/11/2023 250 cc Final     EMPTY COUGH STRESS TEST   Date Value Ref Range Status   08/11/2023 pos  Final     FULL COUGH STRESS TEST   Date Value Ref Range Status   08/11/2023 pos sitting and standing  Final     SPASM   Date Value Ref Range Status   08/11/2023 pos  Final       Pelvic Organ Prolapse Quantification  Anterior Wall (Aa): -3   Anterior Wall (Ba): -3   Cervix or Cuff (C): -8     Genital Hiatus (gh): 2.5   Perineal Body (pb): 3   Total Vaginal Length (tvl): 9     Posterior Wall (Ap): -3   Posterior Wall (Bp): -3   Posterior Fornix (D): -7     Oxford Scale Muscle Strength: 1          Procedure: The Patient was taken to the Urodynamics suite and a free urine flow was obtained followed by catheterization for residual urine. A double-lumen urodynamic catheter was introduced to the bladder for measuring bladder pressure, and for filling. EMG patch electrodes were placed perianally for recording activity of the anal sphincter. A vaginal catheter was inserted to record the abdominal pressure.  The entire process was displayed and analyzed using the

## 2023-09-24 PROCEDURE — G2066 INTER DEVC REMOTE 30D: HCPCS | Performed by: CLINICAL NURSE SPECIALIST

## 2023-09-24 PROCEDURE — 93297 REM INTERROG DEV EVAL ICPMS: CPT | Performed by: CLINICAL NURSE SPECIALIST

## 2023-10-02 ENCOUNTER — PROCEDURE VISIT (OUTPATIENT)
Dept: CARDIOLOGY CLINIC | Age: 68
End: 2023-10-02
Payer: COMMERCIAL

## 2023-10-02 DIAGNOSIS — I48.0 PAROXYSMAL ATRIAL FIBRILLATION (HCC): Chronic | ICD-10-CM

## 2023-10-02 DIAGNOSIS — I42.0 DILATED CARDIOMYOPATHY (HCC): ICD-10-CM

## 2023-10-02 PROCEDURE — 93306 TTE W/DOPPLER COMPLETE: CPT | Performed by: INTERNAL MEDICINE

## 2023-10-03 ENCOUNTER — HOSPITAL ENCOUNTER (OUTPATIENT)
Dept: PHYSICAL THERAPY | Age: 68
Setting detail: THERAPIES SERIES
Discharge: HOME | End: 2023-10-03

## 2023-10-03 NOTE — PROGRESS NOTES
[facility], [address] [place] [address]  Phone: [phone]  Fax [phone]      Physical Therapy  Cancellation/No-show Note  Patient Name:  Felicitas Cagle  :  1955   Date:  10/3/2023  Cancels to date: 0  No-shows to date: 1    For today's appointment patient:  [] Cancelled  [] Rescheduled appointment  [x] No-show     Reason given by patient:  [] Patient ill  [] Conflicting appointment  [] No transportation    [] Conflict with work  [] No reason given  [x] Other:     Comments:  Patient did not show for today's PFPT evaluation.      Electronically signed by:  Oscar Shepard PT

## 2023-10-03 NOTE — PROGRESS NOTES
sounds without dullness  Cardiovascular: The apical impulses not displaced  Heart tones are crisp and normal  Cervical veins are not engorged  The carotid upstroke is normal in amplitude and contour without delay or bruit  JVP less than 8 cm H2O  RRR with nl S1 and S2 without m,r,g  Peripheral pulses are symmetrical and full  There is no clubbing, cyanosis of the extremities. No edema  Femoral Arteries: 2+ and equal  Pedal Pulses: 2+ and equal   Neck:  No thyromegaly  Abdomen:  No masses or tenderness  Liver/Spleen: No Abnormalities Noted  Neurological/Psychiatric:  Alert and oriented in all spheres  Moves all extremities well  Exhibits normal gait balance and coordination  No abnormalities of mood, affect, memory, mentation, or behavior are noted      Diagnostic Testing:  Echo 10/2/23  The left ventricle is moderately dilated. Normal wall thickness. Severely decreased left ventricular systolic function with an estimated EF   20-25%. E/e'=15. Trivial mitral regurgitation   Mild aortic regurgitation. Mild tricuspid regurgitation. RVSP 18mmHg. Pacemaker / ICD lead is visualized in the right atrium. There is a trivial localized near right atrium pericardial effusion noted. IVC size is normal (<2.1cm) and collapses > 50% with respiration consistent   with normal RA pressure (3mmHg). Echo 10-19-18  - Left ventricle: The cavity size was moderately dilated. Wall    thickness was normal. Systolic function was severely reduced. The    estimated ejection fraction was in the range of 25% to 30%. There    is significant dyssynergy between the septal and lateral walls. Mild hypokinesis of the inferoseptal myocardium. Severe    hypokinesis of the basal-midanterolateral myocardium. Moderate    hypokinesis of the apicallateral myocardium. Akinesis of the    apicalinferior myocardium. Doppler parameters are consistent with    abnormal left ventricular relaxation (grade 1 diastolic    dysfunction).   -

## 2023-10-04 RX ORDER — HYDROXYZINE PAMOATE 25 MG/1
25 CAPSULE ORAL DAILY PRN
Qty: 30 CAPSULE | Refills: 2 | Status: SHIPPED | OUTPATIENT
Start: 2023-10-04

## 2023-10-06 ENCOUNTER — OFFICE VISIT (OUTPATIENT)
Dept: CARDIOLOGY CLINIC | Age: 68
End: 2023-10-06
Payer: COMMERCIAL

## 2023-10-06 VITALS
BODY MASS INDEX: 25.76 KG/M2 | HEART RATE: 73 BPM | OXYGEN SATURATION: 99 % | DIASTOLIC BLOOD PRESSURE: 54 MMHG | WEIGHT: 140 LBS | SYSTOLIC BLOOD PRESSURE: 96 MMHG | HEIGHT: 62 IN

## 2023-10-06 DIAGNOSIS — I48.0 PAROXYSMAL ATRIAL FIBRILLATION (HCC): ICD-10-CM

## 2023-10-06 DIAGNOSIS — I50.22 CHRONIC SYSTOLIC HEART FAILURE (HCC): Primary | ICD-10-CM

## 2023-10-06 DIAGNOSIS — Z95.810 ICD (IMPLANTABLE CARDIOVERTER-DEFIBRILLATOR) IN PLACE: ICD-10-CM

## 2023-10-06 DIAGNOSIS — R06.02 SOB (SHORTNESS OF BREATH): ICD-10-CM

## 2023-10-06 DIAGNOSIS — Z13.220 SCREENING CHOLESTEROL LEVEL: ICD-10-CM

## 2023-10-06 DIAGNOSIS — G47.33 OBSTRUCTIVE SLEEP APNEA SYNDROME: ICD-10-CM

## 2023-10-06 DIAGNOSIS — I42.8 NON-ISCHEMIC CARDIOMYOPATHY (HCC): ICD-10-CM

## 2023-10-06 DIAGNOSIS — Z01.810 PREOP CARDIOVASCULAR EXAM: ICD-10-CM

## 2023-10-06 PROCEDURE — G8417 CALC BMI ABV UP PARAM F/U: HCPCS | Performed by: INTERNAL MEDICINE

## 2023-10-06 PROCEDURE — 3074F SYST BP LT 130 MM HG: CPT | Performed by: INTERNAL MEDICINE

## 2023-10-06 PROCEDURE — 99215 OFFICE O/P EST HI 40 MIN: CPT | Performed by: INTERNAL MEDICINE

## 2023-10-06 PROCEDURE — 3017F COLORECTAL CA SCREEN DOC REV: CPT | Performed by: INTERNAL MEDICINE

## 2023-10-06 PROCEDURE — 1090F PRES/ABSN URINE INCON ASSESS: CPT | Performed by: INTERNAL MEDICINE

## 2023-10-06 PROCEDURE — G8427 DOCREV CUR MEDS BY ELIG CLIN: HCPCS | Performed by: INTERNAL MEDICINE

## 2023-10-06 PROCEDURE — G8484 FLU IMMUNIZE NO ADMIN: HCPCS | Performed by: INTERNAL MEDICINE

## 2023-10-06 PROCEDURE — 3078F DIAST BP <80 MM HG: CPT | Performed by: INTERNAL MEDICINE

## 2023-10-06 PROCEDURE — 93000 ELECTROCARDIOGRAM COMPLETE: CPT | Performed by: INTERNAL MEDICINE

## 2023-10-06 PROCEDURE — 1123F ACP DISCUSS/DSCN MKR DOCD: CPT | Performed by: INTERNAL MEDICINE

## 2023-10-06 PROCEDURE — 1036F TOBACCO NON-USER: CPT | Performed by: INTERNAL MEDICINE

## 2023-10-06 PROCEDURE — G8400 PT W/DXA NO RESULTS DOC: HCPCS | Performed by: INTERNAL MEDICINE

## 2023-10-06 RX ORDER — CARVEDILOL 3.12 MG/1
3.12 TABLET ORAL 2 TIMES DAILY
Qty: 180 TABLET | Refills: 1 | Status: SHIPPED | OUTPATIENT
Start: 2023-10-06

## 2023-10-06 RX ORDER — SACUBITRIL AND VALSARTAN 24; 26 MG/1; MG/1
TABLET, FILM COATED ORAL
Qty: 90 TABLET | Refills: 1 | Status: SHIPPED | OUTPATIENT
Start: 2023-10-06

## 2023-10-06 RX ORDER — SPIRONOLACTONE 25 MG/1
TABLET ORAL
Qty: 38 TABLET | OUTPATIENT
Start: 2023-10-06

## 2023-10-06 RX ORDER — DIGOXIN 125 MCG
125 TABLET ORAL DAILY
Qty: 90 TABLET | Refills: 1 | Status: SHIPPED | OUTPATIENT
Start: 2023-10-06

## 2023-10-06 RX ORDER — SPIRONOLACTONE 25 MG/1
TABLET ORAL
Qty: 36 TABLET | Refills: 1 | Status: SHIPPED | OUTPATIENT
Start: 2023-10-06

## 2023-10-06 NOTE — PATIENT INSTRUCTIONS
Plan:  1. Get Labs soon FASTING( Lipid, CBC, CMP,BNP)  2. Get an EKG today.    3 See NPRG in 4 months

## 2023-10-07 ENCOUNTER — HOSPITAL ENCOUNTER (OUTPATIENT)
Age: 68
Discharge: HOME | End: 2023-10-07
Payer: COMMERCIAL

## 2023-10-07 DIAGNOSIS — R06.02 SOB (SHORTNESS OF BREATH): ICD-10-CM

## 2023-10-07 DIAGNOSIS — I48.0 PAROXYSMAL ATRIAL FIBRILLATION (HCC): ICD-10-CM

## 2023-10-07 DIAGNOSIS — G47.33 OBSTRUCTIVE SLEEP APNEA SYNDROME: ICD-10-CM

## 2023-10-07 DIAGNOSIS — Z13.220 SCREENING CHOLESTEROL LEVEL: ICD-10-CM

## 2023-10-07 DIAGNOSIS — Z95.810 ICD (IMPLANTABLE CARDIOVERTER-DEFIBRILLATOR) IN PLACE: ICD-10-CM

## 2023-10-07 DIAGNOSIS — I50.22 CHRONIC SYSTOLIC HEART FAILURE (HCC): ICD-10-CM

## 2023-10-07 DIAGNOSIS — I42.8 NON-ISCHEMIC CARDIOMYOPATHY (HCC): ICD-10-CM

## 2023-10-07 LAB
ALBUMIN SERPL-MCNC: 4.4 G/DL (ref 3.4–5)
ALBUMIN/GLOB SERPL: 1.3 {RATIO} (ref 1.1–2.2)
ALP SERPL-CCNC: 60 U/L (ref 40–129)
ALT SERPL-CCNC: 20 U/L (ref 10–40)
ANION GAP SERPL CALCULATED.3IONS-SCNC: 10 MMOL/L (ref 3–16)
AST SERPL-CCNC: 22 U/L (ref 15–37)
BILIRUB SERPL-MCNC: 0.5 MG/DL (ref 0–1)
BUN SERPL-MCNC: 19 MG/DL (ref 7–20)
CALCIUM SERPL-MCNC: 10 MG/DL (ref 8.3–10.6)
CHLORIDE SERPL-SCNC: 100 MMOL/L (ref 99–110)
CHOLEST SERPL-MCNC: 251 MG/DL (ref 0–199)
CO2 SERPL-SCNC: 26 MMOL/L (ref 21–32)
CREAT SERPL-MCNC: 0.8 MG/DL (ref 0.6–1.2)
DEPRECATED RDW RBC AUTO: 12.7 % (ref 12.4–15.4)
GFR SERPLBLD CREATININE-BSD FMLA CKD-EPI: >60 ML/MIN/{1.73_M2}
GLUCOSE SERPL-MCNC: 137 MG/DL (ref 70–99)
HCT VFR BLD AUTO: 44.9 % (ref 36–48)
HDLC SERPL-MCNC: 43 MG/DL (ref 40–60)
HGB BLD-MCNC: 14.9 G/DL (ref 12–16)
LDLC SERPL CALC-MCNC: 161 MG/DL
MCH RBC QN AUTO: 30.3 PG (ref 26–34)
MCHC RBC AUTO-ENTMCNC: 33.2 G/DL (ref 31–36)
MCV RBC AUTO: 91.5 FL (ref 80–100)
NT-PROBNP SERPL-MCNC: 114 PG/ML (ref 0–124)
PLATELET # BLD AUTO: 152 K/UL (ref 135–450)
PMV BLD AUTO: 10.6 FL (ref 5–10.5)
POTASSIUM SERPL-SCNC: 4.5 MMOL/L (ref 3.5–5.1)
PROT SERPL-MCNC: 7.9 G/DL (ref 6.4–8.2)
RBC # BLD AUTO: 4.9 M/UL (ref 4–5.2)
SODIUM SERPL-SCNC: 136 MMOL/L (ref 136–145)
TRIGL SERPL-MCNC: 233 MG/DL (ref 0–150)
VLDLC SERPL CALC-MCNC: 47 MG/DL
WBC # BLD AUTO: 7.8 K/UL (ref 4–11)

## 2023-10-07 PROCEDURE — 36415 COLL VENOUS BLD VENIPUNCTURE: CPT

## 2023-10-07 PROCEDURE — 80053 COMPREHEN METABOLIC PANEL: CPT

## 2023-10-07 PROCEDURE — 83880 ASSAY OF NATRIURETIC PEPTIDE: CPT

## 2023-10-07 PROCEDURE — 80061 LIPID PANEL: CPT

## 2023-10-07 PROCEDURE — 85027 COMPLETE CBC AUTOMATED: CPT

## 2023-10-10 ENCOUNTER — TELEPHONE (OUTPATIENT)
Dept: CARDIOLOGY CLINIC | Age: 68
End: 2023-10-10

## 2023-10-10 RX ORDER — ATORVASTATIN CALCIUM 20 MG/1
20 TABLET, FILM COATED ORAL DAILY
COMMUNITY
End: 2023-10-10 | Stop reason: SDUPTHER

## 2023-10-10 RX ORDER — ATORVASTATIN CALCIUM 20 MG/1
20 TABLET, FILM COATED ORAL DAILY
Qty: 90 TABLET | Refills: 3 | Status: SHIPPED | OUTPATIENT
Start: 2023-10-10

## 2023-10-10 NOTE — TELEPHONE ENCOUNTER
----- Message from Raquel Tucker sent at 10/9/2023  3:48 PM EDT -----    ----- Message -----  From: Ramon Villeda MD  Sent: 10/9/2023   3:45 PM EDT  To: [facility] Cardio Heart Failure    See below. Call patient. If willing, start atorvastatin 20 mg po qd. Elise Harada, your labs show that your cholesterol has increased significantly from 116 to 161. I would like for you to be on a cholesterol lowering medication. I will have the office call you to discuss.   Ramon Villeda

## 2023-10-10 NOTE — TELEPHONE ENCOUNTER
Spoke with pt. Relayed Results and instructions as directed    Agreeable to start Atorvastatin.   Script sent to [address]

## 2023-10-17 ENCOUNTER — PROCEDURE VISIT (OUTPATIENT)
Dept: UROGYNECOLOGY | Age: 68
End: 2023-10-17
Payer: COMMERCIAL

## 2023-10-17 VITALS
TEMPERATURE: 97.9 F | SYSTOLIC BLOOD PRESSURE: 110 MMHG | OXYGEN SATURATION: 97 % | HEART RATE: 71 BPM | DIASTOLIC BLOOD PRESSURE: 70 MMHG | RESPIRATION RATE: 18 BRPM

## 2023-10-17 DIAGNOSIS — R35.0 URINARY FREQUENCY: ICD-10-CM

## 2023-10-17 DIAGNOSIS — R39.15 URINARY URGENCY: Primary | ICD-10-CM

## 2023-10-17 DIAGNOSIS — N39.41 URGE INCONTINENCE: ICD-10-CM

## 2023-10-17 PROCEDURE — 52285 CYSTOSCOPY AND TREATMENT: CPT | Performed by: OBSTETRICS & GYNECOLOGY

## 2023-10-17 RX ORDER — SOLIFENACIN SUCCINATE 10 MG/1
10 TABLET, FILM COATED ORAL DAILY
Qty: 30 TABLET | Refills: 1 | Status: SHIPPED | OUTPATIENT
Start: 2023-10-17

## 2023-10-17 NOTE — PROGRESS NOTES
10/17/2023     HPI:     Name: Clive Solorzano  YOB: 1955    CC: Clive Solorzano is a 69 y.o. female presenting for an evaluation of urinary urgency, frequency, and stress incontinence. Patient is here today for a cystoscopy. HPI: How long have you had this problem? years  Please rate the severity of your problem: moderate  Anything make it better?      Ob/Gyn History:    OB History    Para Term  AB Living   3 3 3         SAB IAB Ectopic Molar Multiple Live Births                    # Outcome Date GA Lbr Adams/2nd Weight Sex Delivery Anes PTL Lv   3 Term 18     CS-Classical      2 Term 12     CS-Classical      1 Term 5     CS-Classical        Past Medical History:   Past Medical History:   Diagnosis Date    Atrial fibrillation ([address])     Cardiomyopathy, nonischemic ([address])     CHF (congestive heart failure) (HCC)     Fibromyalgia     GERD (gastroesophageal reflux disease)     Heart disease     HSV-1 (herpes simplex virus 1) 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 apnea     uses cpap    Uterine fibroids affecting pregnancy      Past Surgical History:   Past Surgical History:   Procedure Laterality Date    CARDIAC DEFIBRILLATOR PLACEMENT       SECTION      3 c-sections    COLONOSCOPY  2015    polypectomy    COLONOSCOPY N/A 2019    COLONOSCOPY POLYPECTOMY SNARE/COLD ASCENDING X1 , TRANSVERSE X1, SIGMOID X1 performed by [facility]ikis Baptiste MD at [facility]      PACEMAKER INSERTION      UPPER GASTROINTESTINAL ENDOSCOPY  2015    UPPER GASTROINTESTINAL ENDOSCOPY N/A 2019    EGD GASTRIC BIOPSY performed by Chikis Baptiste MD at [address] [place] [address] N/A 2020    EGD BIOPSY performed by Chikis Baptiste MD at [facility]     Current Medications:  Current Outpatient Medications   Medication Sig Dispense Refill    solifenacin (VESICARE) 10 MG tablet

## 2023-10-23 ENCOUNTER — OFFICE VISIT (OUTPATIENT)
Dept: INTERNAL MEDICINE CLINIC | Age: 68
End: 2023-10-23
Payer: COMMERCIAL

## 2023-10-23 VITALS
BODY MASS INDEX: 26.06 KG/M2 | DIASTOLIC BLOOD PRESSURE: 72 MMHG | WEIGHT: 141.6 LBS | SYSTOLIC BLOOD PRESSURE: 106 MMHG | TEMPERATURE: 98.2 F | HEART RATE: 76 BPM | OXYGEN SATURATION: 98 % | HEIGHT: 62 IN

## 2023-10-23 DIAGNOSIS — E55.9 VITAMIN D DEFICIENCY: ICD-10-CM

## 2023-10-23 DIAGNOSIS — K21.00 GASTROESOPHAGEAL REFLUX DISEASE WITH ESOPHAGITIS WITHOUT HEMORRHAGE: Chronic | ICD-10-CM

## 2023-10-23 DIAGNOSIS — H65.02 NON-RECURRENT ACUTE SEROUS OTITIS MEDIA OF LEFT EAR: Primary | ICD-10-CM

## 2023-10-23 PROCEDURE — G8400 PT W/DXA NO RESULTS DOC: HCPCS

## 2023-10-23 PROCEDURE — 99214 OFFICE O/P EST MOD 30 MIN: CPT

## 2023-10-23 PROCEDURE — G8428 CUR MEDS NOT DOCUMENT: HCPCS

## 2023-10-23 PROCEDURE — 3017F COLORECTAL CA SCREEN DOC REV: CPT

## 2023-10-23 PROCEDURE — G8484 FLU IMMUNIZE NO ADMIN: HCPCS

## 2023-10-23 PROCEDURE — 1090F PRES/ABSN URINE INCON ASSESS: CPT

## 2023-10-23 PROCEDURE — 1123F ACP DISCUSS/DSCN MKR DOCD: CPT

## 2023-10-23 PROCEDURE — G8417 CALC BMI ABV UP PARAM F/U: HCPCS

## 2023-10-23 PROCEDURE — 3078F DIAST BP <80 MM HG: CPT

## 2023-10-23 PROCEDURE — 3074F SYST BP LT 130 MM HG: CPT

## 2023-10-23 PROCEDURE — 1036F TOBACCO NON-USER: CPT

## 2023-10-23 RX ORDER — FLUTICASONE PROPIONATE 50 MCG
1 SPRAY, SUSPENSION (ML) NASAL DAILY
Qty: 16 G | Refills: 0 | Status: SHIPPED | OUTPATIENT
Start: 2023-10-23

## 2023-10-23 RX ORDER — AMOXICILLIN AND CLAVULANATE POTASSIUM 875; 125 MG/1; MG/1
1 TABLET, FILM COATED ORAL 2 TIMES DAILY
Qty: 20 TABLET | Refills: 0 | Status: SHIPPED | OUTPATIENT
Start: 2023-10-23 | End: 2023-11-02

## 2023-10-23 RX ORDER — MELATONIN
1000 DAILY
Qty: 90 TABLET | Refills: 1 | Status: SHIPPED | OUTPATIENT
Start: 2023-10-23

## 2023-10-23 ASSESSMENT — ENCOUNTER SYMPTOMS: SORE THROAT: 1

## 2023-10-23 NOTE — ASSESSMENT & PLAN NOTE
Start Augmentin BID x10 days. Start flonase 1 puff each notril daily for 10-14 days. Can continue tylenol as needed, or start ibuprofen 600 mg TID as needed for pain or fever. Reviewed symptoms which would warrant follow-up.

## 2023-10-23 NOTE — PROGRESS NOTES
tablet Take 1 tablet by mouth daily 90 tablet 1    amoxicillin-clavulanate (AUGMENTIN) 875-125 MG per tablet Take 1 tablet by mouth 2 times daily for 10 days 20 tablet 0    fluticasone (FLONASE) 50 MCG/ACT nasal spray 1 spray by Each Nostril route daily 16 g 0    solifenacin (VESICARE) 10 MG tablet Take 1 tablet by mouth daily 30 tablet 1    atorvastatin (LIPITOR) 20 MG tablet Take 1 tablet by mouth daily 90 tablet 3    sacubitril-valsartan (ENTRESTO) 24-26 MG per tablet TAKE ONE-HALF TABLET BY MOUTH EVERY MORNING AND 1 EVERY EVENING 90 tablet 1    carvedilol (COREG) 3.125 MG tablet Take 1 tablet by mouth 2 times daily 180 tablet 1    spironolactone (ALDACTONE) 25 MG tablet Take 1/2 tablet M,W,F only 36 tablet 1    digoxin (LANOXIN) 125 MCG tablet Take 1 tablet by mouth daily 90 tablet 1    hydrOXYzine pamoate (VISTARIL) 25 MG capsule TAKE 1 CAPSULE BY MOUTH DAILY AS NEEDED FOR ANXIETY 30 capsule 2    escitalopram (LEXAPRO) 10 MG tablet TAKE 1 TABLET BY MOUTH DAILY 30 tablet 2    ciprofloxacin (CIPRO) 250 MG tablet TAKE 1 TABLET BY MOUTH AFTER SEXUAL ACTIVITY 10 tablet 2    montelukast (SINGULAIR) 10 MG tablet Take 1 tablet by mouth daily 30 tablet 5    Magnesium 400 MG TABS Take by mouth daily (Patient taking differently: three times a week Take by mouth daily) 30 tablet 5    calcium carbonate (OSCAL) 500 MG TABS tablet Take 1 tablet by mouth daily (Patient taking differently: Take 1 tablet by mouth three times a week) 30 tablet 5    vitamin E 400 UNIT capsule Take 1 capsule by mouth daily      Omega-3 Fatty Acids (FISH OIL) 1000 MG CAPS Take 3 capsules by mouth 2 times daily       No current facility-administered medications for this visit. Review of Systems   Constitutional:  Negative for chills, diaphoresis, fatigue and fever. HENT:  Positive for ear pain, postnasal drip and sore throat. Negative for congestion and ear discharge. Neurological:  Positive for headaches.  Negative for dizziness, weakness

## 2023-10-23 NOTE — ASSESSMENT & PLAN NOTE
Reports symptoms unchanged. Takes nexium 3 times weekly. Requesting refill. Unable to find Nexium dose on med rec. & patient is unaware of what dose she takes. Instructed her to call the office with dose from bottle she has at home and this will be refilled.

## 2023-10-26 ENCOUNTER — TELEPHONE (OUTPATIENT)
Dept: CARDIOLOGY CLINIC | Age: 68
End: 2023-10-26

## 2023-10-26 ENCOUNTER — NURSE ONLY (OUTPATIENT)
Dept: CARDIOLOGY CLINIC | Age: 68
End: 2023-10-26

## 2023-10-26 DIAGNOSIS — I42.0 DILATED CARDIOMYOPATHY (HCC): ICD-10-CM

## 2023-10-26 DIAGNOSIS — Z45.02 ICD (IMPLANTABLE CARDIOVERTER-DEFIBRILLATOR) BATTERY DEPLETION: Primary | ICD-10-CM

## 2023-10-26 NOTE — TELEPHONE ENCOUNTER
Pt called to addie MARTÍNEZ her pacemaker has moved, feels a popping inside her chest with pain  Please call to discuss this matter.   Thank you

## 2023-10-26 NOTE — PROGRESS NOTES
Patient comes in to the office due to device moving. Checked device in the office today and it shows normal function. Review device site. Unable to manipulate out of the pocket today. No signs of infection. No painful. Patient will call with and changes/signs of infection.

## 2023-10-31 RX ORDER — ESCITALOPRAM OXALATE 10 MG/1
10 TABLET ORAL DAILY
Qty: 30 TABLET | Refills: 1 | Status: SHIPPED | OUTPATIENT
Start: 2023-10-31

## 2023-10-31 NOTE — TELEPHONE ENCOUNTER
Medication:   Requested Prescriptions     Pending Prescriptions Disp Refills    escitalopram (LEXAPRO) 10 MG tablet [Pharmacy Med Name: ESCITALOPRAM 10MG TABLETS] 30 tablet 2     Sig: TAKE 1 TABLET BY MOUTH DAILY       Last Filled: 8/3/23     Patient Phone Number: [phone] (home)     Last appt: 4/24/2023   Next appt: Visit date not found

## 2023-11-29 ENCOUNTER — CLINICAL DOCUMENTATION (OUTPATIENT)
Dept: UROGYNECOLOGY | Age: 68
End: 2023-11-29

## 2023-11-29 RX ORDER — SOLIFENACIN SUCCINATE 10 MG/1
10 TABLET, FILM COATED ORAL DAILY
Qty: 90 TABLET | OUTPATIENT
Start: 2023-11-29

## 2023-11-29 RX ORDER — CARVEDILOL 3.12 MG/1
3.12 TABLET ORAL 2 TIMES DAILY
Qty: 180 TABLET | Refills: 1 | Status: SHIPPED | OUTPATIENT
Start: 2023-11-29

## 2023-11-29 NOTE — PROGRESS NOTES
Received refill request this AM. Patient prescribed 60 tablets on 10/17. Patient has follow up appointment scheduled for tomorrow. Denial sent at this time. Patient may receive refill tomorrow per provider if appropriate.

## 2023-11-30 ENCOUNTER — OFFICE VISIT (OUTPATIENT)
Dept: UROGYNECOLOGY | Age: 68
End: 2023-11-30
Payer: COMMERCIAL

## 2023-11-30 VITALS
OXYGEN SATURATION: 97 % | TEMPERATURE: 98.1 F | DIASTOLIC BLOOD PRESSURE: 63 MMHG | RESPIRATION RATE: 16 BRPM | HEART RATE: 66 BPM | SYSTOLIC BLOOD PRESSURE: 113 MMHG

## 2023-11-30 DIAGNOSIS — N39.41 URGE INCONTINENCE: ICD-10-CM

## 2023-11-30 DIAGNOSIS — K21.9 GASTROESOPHAGEAL REFLUX DISEASE, UNSPECIFIED WHETHER ESOPHAGITIS PRESENT: ICD-10-CM

## 2023-11-30 DIAGNOSIS — T88.7XXA MEDICATION SIDE EFFECT: ICD-10-CM

## 2023-11-30 DIAGNOSIS — R68.2 DRY MOUTH: ICD-10-CM

## 2023-11-30 DIAGNOSIS — R39.15 URINARY URGENCY: Primary | ICD-10-CM

## 2023-11-30 PROCEDURE — 3074F SYST BP LT 130 MM HG: CPT | Performed by: NURSE PRACTITIONER

## 2023-11-30 PROCEDURE — 1090F PRES/ABSN URINE INCON ASSESS: CPT | Performed by: NURSE PRACTITIONER

## 2023-11-30 PROCEDURE — 99213 OFFICE O/P EST LOW 20 MIN: CPT | Performed by: NURSE PRACTITIONER

## 2023-11-30 PROCEDURE — G8427 DOCREV CUR MEDS BY ELIG CLIN: HCPCS | Performed by: NURSE PRACTITIONER

## 2023-11-30 PROCEDURE — 3017F COLORECTAL CA SCREEN DOC REV: CPT | Performed by: NURSE PRACTITIONER

## 2023-11-30 PROCEDURE — G8400 PT W/DXA NO RESULTS DOC: HCPCS | Performed by: NURSE PRACTITIONER

## 2023-11-30 PROCEDURE — G8484 FLU IMMUNIZE NO ADMIN: HCPCS | Performed by: NURSE PRACTITIONER

## 2023-11-30 PROCEDURE — 0509F URINE INCON PLAN DOCD: CPT | Performed by: NURSE PRACTITIONER

## 2023-11-30 PROCEDURE — 3078F DIAST BP <80 MM HG: CPT | Performed by: NURSE PRACTITIONER

## 2023-11-30 PROCEDURE — G8417 CALC BMI ABV UP PARAM F/U: HCPCS | Performed by: NURSE PRACTITIONER

## 2023-11-30 PROCEDURE — 1036F TOBACCO NON-USER: CPT | Performed by: NURSE PRACTITIONER

## 2023-11-30 PROCEDURE — 1123F ACP DISCUSS/DSCN MKR DOCD: CPT | Performed by: NURSE PRACTITIONER

## 2023-12-01 ENCOUNTER — TELEPHONE (OUTPATIENT)
Dept: UROGYNECOLOGY | Age: 68
End: 2023-12-01

## 2023-12-01 ENCOUNTER — TELEPHONE (OUTPATIENT)
Dept: INTERNAL MEDICINE CLINIC | Age: 68
End: 2023-12-01

## 2023-12-01 RX ORDER — TOLTERODINE 4 MG/1
4 CAPSULE, EXTENDED RELEASE ORAL DAILY
Qty: 90 CAPSULE | Refills: 0 | Status: SHIPPED | OUTPATIENT
Start: 2023-12-01

## 2023-12-01 RX ORDER — TOLTERODINE 4 MG/1
4 CAPSULE, EXTENDED RELEASE ORAL DAILY
Qty: 30 CAPSULE | Refills: 1 | Status: SHIPPED | OUTPATIENT
Start: 2023-12-01 | End: 2023-12-01

## 2023-12-01 NOTE — TELEPHONE ENCOUNTER
Left patient voicemail stating the Myrbetriq prescribed her is not going to be covered by her insurance. Informed her we have changed the medication to Tolterodine. Instructed her on how to use this medication. Patient encouraged to call office back should any questions/concerns arise. Per Rita Trejo NP ok to send order for Tolterodine 4 mg to patient's pharmacy at this time. Order placed.

## 2023-12-08 ENCOUNTER — HOSPITAL ENCOUNTER (OUTPATIENT)
Dept: PHYSICAL THERAPY | Age: 68
Setting detail: THERAPIES SERIES
Discharge: HOME | End: 2023-12-08
Attending: OBSTETRICS & GYNECOLOGY

## 2023-12-08 NOTE — PROGRESS NOTES
[facility], [address] [place] [address]  Phone: [phone]  Fax [phone]      Physical Therapy  Cancellation/No-show Note  Patient Name:  Dedra Gallardo  :  1955   Date:  2023  Cancels to date: 1  No-shows to date: 1    For today's appointment patient:  [x] Cancelled  [] Rescheduled appointment  [] No-show     Reason given by patient:  [] Patient ill  [] Conflicting appointment  [] No transportation    [] Conflict with work  [] No reason given  [x] Other:     Comments:  Patient called to cancel today's PFPT evaluation due to \"it being too far away\".      Electronically signed by:  Zehra Casillas PT

## 2023-12-11 ENCOUNTER — TELEPHONE (OUTPATIENT)
Dept: CARDIOLOGY CLINIC | Age: 68
End: 2023-12-11

## 2023-12-11 PROCEDURE — 93297 REM INTERROG DEV EVAL ICPMS: CPT | Performed by: CLINICAL NURSE SPECIALIST

## 2023-12-11 PROCEDURE — G2066 INTER DEVC REMOTE 30D: HCPCS | Performed by: CLINICAL NURSE SPECIALIST

## 2023-12-11 RX ORDER — SACUBITRIL AND VALSARTAN 24; 26 MG/1; MG/1
TABLET, FILM COATED ORAL
Qty: 90 TABLET | Refills: 1 | Status: CANCELLED | OUTPATIENT
Start: 2023-12-11

## 2023-12-11 NOTE — TELEPHONE ENCOUNTER
Spoke to patient no swelling, no sob, no weight right now but 3 days ago she had swelling but it gone now. She went to the pharmacy and needs a refill entresto. Taking medications as prescribed.     Spoke to pharmacy they will reach out to the patient refill is ready at another location

## 2023-12-11 NOTE — TELEPHONE ENCOUNTER
Last ov:10/06/2023 [facility]   Next ov:02/06/2024 [facility]    Last EKG:10/06/2023  Last labs:10/07/2023  Last filled:  sacubitril-valsartan (ENTRESTO) 24-26 MG per tablet 90 tablet 1 10/6/2023     Sig: TAKE ONE-HALF TABLET BY MOUTH EVERY MORNING AND 1 EVERY EVENING    Sent to pharmacy as: Entresto 24-26 MG Oral Tablet (sacubitril-valsartan)    Cosign for Ordering: Accepted by Izzy Trinh MD on 10/6/2023  9:13 AM    E-Prescribing Status: Receipt confirmed by pharmacy (10/6/2023  8:43 AM EDT)

## 2023-12-13 RX ORDER — SACUBITRIL AND VALSARTAN 24; 26 MG/1; MG/1
TABLET, FILM COATED ORAL
Qty: 90 TABLET | Refills: 1 | OUTPATIENT
Start: 2023-12-13

## 2023-12-13 RX ORDER — SACUBITRIL AND VALSARTAN 24; 26 MG/1; MG/1
TABLET, FILM COATED ORAL
Qty: 180 TABLET | Refills: 3 | Status: SHIPPED | OUTPATIENT
Start: 2023-12-13

## 2023-12-13 NOTE — PROGRESS NOTES
Spoke to pt. The script on 10/6/23 was only for 90 tablets (in error)    Will send in the refill. She saw [facility] on 10/6    Pt has been without medication for 2 days. She confirmed her Pharmacy and will send in the medication with refills.

## 2024-01-02 ENCOUNTER — TELEPHONE (OUTPATIENT)
Dept: CARDIOLOGY CLINIC | Age: 69
End: 2024-01-02

## 2024-01-02 RX ORDER — HYDROCHLOROTHIAZIDE 25 MG/1
25 TABLET ORAL DAILY
Qty: 90 TABLET | Refills: 3 | Status: SHIPPED | OUTPATIENT
Start: 2024-01-02

## 2024-01-02 RX ORDER — ESCITALOPRAM OXALATE 10 MG/1
10 TABLET ORAL DAILY
Qty: 30 TABLET | Refills: 1 | Status: SHIPPED | OUTPATIENT
Start: 2024-01-02

## 2024-01-02 RX ORDER — HYDROXYZINE PAMOATE 25 MG/1
25 CAPSULE ORAL DAILY PRN
Qty: 90 CAPSULE | Refills: 1 | Status: SHIPPED | OUTPATIENT
Start: 2024-01-02

## 2024-01-02 NOTE — TELEPHONE ENCOUNTER
Medication:   Requested Prescriptions     Pending Prescriptions Disp Refills    escitalopram (LEXAPRO) 10 MG tablet [Pharmacy Med Name: ESCITALOPRAM 10MG TABLETS] 30 tablet 1     Sig: TAKE 1 TABLET BY MOUTH DAILY       Last Filled:  10/31/23    Patient Phone Number: [phone] (home)     Last appt: 4/24/2023   Next appt: Visit date not found

## 2024-01-02 NOTE — TELEPHONE ENCOUNTER
Call patient.  Her optivol shows that she is holding fluid.  It looks like this happened about the same time a her bladder surgery.    Weight increase?  Meds changed at surgery?  Still on spironolactone, entresto, etc?  Short of breath?    Does she have any diuretics other than spironolacone?    [facility]

## 2024-01-02 NOTE — TELEPHONE ENCOUNTER
Spoke with patient she is agreeable to cut down on her sodium intake and needs to start hctz 25 mg po qd . Script was sent to requested Pharmacy.

## 2024-01-02 NOTE — TELEPHONE ENCOUNTER
Last OV: 10/23/2023  Next OV: Visit date not found    Next appointment due:101/26/23    Last fill:10/04/23  Refills:2

## 2024-01-02 NOTE — TELEPHONE ENCOUNTER
Spoke with pt  She did not have surgery.  States that the surgery was cancelled and MD said that with therapy and medication she would not need surgery.    No weight increase.  Today 140#  No SOB or swelling  States she has been eating a lot of foods with salt during the holidays.    Reviewed medications with pt.    Taking Entresto and Spironolactone as prescribed    A new medication is on her list dated 12/1/23:  Tolterodine 4mg QD (patient could not tell me the medication as she was driving).    Also complaining of pain and redness at the site of her defibrillator.  I will send this info to Dr. Gusman.

## 2024-01-02 NOTE — TELEPHONE ENCOUNTER
Spoke with pt today regarding elevated optivol reading.    During our conversation patient states that she would like to be seen right away because she is experiencing pain and redness at the defibrillator site.

## 2024-01-03 ENCOUNTER — NURSE ONLY (OUTPATIENT)
Dept: CARDIOLOGY CLINIC | Age: 69
End: 2024-01-03

## 2024-01-03 ENCOUNTER — OFFICE VISIT (OUTPATIENT)
Dept: CARDIOLOGY CLINIC | Age: 69
End: 2024-01-03
Payer: COMMERCIAL

## 2024-01-03 DIAGNOSIS — I44.2 CHB (COMPLETE HEART BLOCK) (HCC): ICD-10-CM

## 2024-01-03 DIAGNOSIS — R22.2 SWELLING IN CHEST: ICD-10-CM

## 2024-01-03 DIAGNOSIS — M54.2 PAIN IN THE NECK: ICD-10-CM

## 2024-01-03 DIAGNOSIS — R07.89 CHEST PAIN RADIATING TO UPPER EXTREMITY: ICD-10-CM

## 2024-01-03 DIAGNOSIS — I42.0 DILATED CARDIOMYOPATHY (HCC): ICD-10-CM

## 2024-01-03 DIAGNOSIS — I48.0 PAROXYSMAL ATRIAL FIBRILLATION (HCC): Chronic | ICD-10-CM

## 2024-01-03 DIAGNOSIS — R22.1 NECK SWELLING: ICD-10-CM

## 2024-01-03 DIAGNOSIS — I48.0 PAF (PAROXYSMAL ATRIAL FIBRILLATION) (HCC): ICD-10-CM

## 2024-01-03 DIAGNOSIS — Z45.02 ICD (IMPLANTABLE CARDIOVERTER-DEFIBRILLATOR) BATTERY DEPLETION: Primary | ICD-10-CM

## 2024-01-03 DIAGNOSIS — M79.89 SHOULDER SWELLING: Primary | ICD-10-CM

## 2024-01-03 PROCEDURE — 1036F TOBACCO NON-USER: CPT | Performed by: INTERNAL MEDICINE

## 2024-01-03 PROCEDURE — 99214 OFFICE O/P EST MOD 30 MIN: CPT | Performed by: INTERNAL MEDICINE

## 2024-01-03 PROCEDURE — G8400 PT W/DXA NO RESULTS DOC: HCPCS | Performed by: INTERNAL MEDICINE

## 2024-01-03 PROCEDURE — 1090F PRES/ABSN URINE INCON ASSESS: CPT | Performed by: INTERNAL MEDICINE

## 2024-01-03 PROCEDURE — 1123F ACP DISCUSS/DSCN MKR DOCD: CPT | Performed by: INTERNAL MEDICINE

## 2024-01-03 PROCEDURE — G8484 FLU IMMUNIZE NO ADMIN: HCPCS | Performed by: INTERNAL MEDICINE

## 2024-01-03 PROCEDURE — G8417 CALC BMI ABV UP PARAM F/U: HCPCS | Performed by: INTERNAL MEDICINE

## 2024-01-03 PROCEDURE — 3017F COLORECTAL CA SCREEN DOC REV: CPT | Performed by: INTERNAL MEDICINE

## 2024-01-03 PROCEDURE — G8428 CUR MEDS NOT DOCUMENT: HCPCS | Performed by: INTERNAL MEDICINE

## 2024-01-03 RX ORDER — DOXYCYCLINE HYCLATE 100 MG
100 TABLET ORAL 2 TIMES DAILY
Qty: 20 TABLET | Refills: 0 | Status: SHIPPED | OUTPATIENT
Start: 2024-01-03 | End: 2024-01-13

## 2024-01-03 NOTE — PROGRESS NOTES
Patient in for device check. MXA in to assess patient. She has complaints of pain, redness, swelling in her neck and chest. Labs ordered, stat Echo, and stat CT of chest and neck.

## 2024-01-03 NOTE — PROGRESS NOTES
[facility]   Electrophysiology Follow up   Date: 1/3/2024  I had the privilege of visiting Tess Atkinson in the office.     CC: cardiomyopathy    HPI: Tess Atkinson is a 68 y.o. female  with a PMH of dilated cardiomyopathy s/p ICD Cardiac defibrillator placement (1990s);Laser lead extraction and removal and replacement of Biv ICD (2009) Gen change 7/2018, Atrial fibrillation, CHF (congestive heart failure) (10/2/2008), Dilated cardiomyopathy, Fibromyalgia, GERD, Hyperlipemia, hyperlipidemia (12/6/2010) and TB (1980s). She was previously followed by me at .     Generator change with pocket revision 09/16/2022     Interval History: Tess presents today in urgent follow up. She has swelling in her neck and chest.     Assessment and plan:   Shoulder and neck swelling  Concerns for Infection   -Orders placed for blood cultures, CBC, BMP, ESR, RA, JOANNE, CRP, Echo to r/o lead vegetation, CT of chest and neck with and without contrast     Cardiomyopathy/Chronic Systolic  CHF     - Echo 02/19/2021 LVEF 25-30%   - 10/2018 25% to 30%   - continue Entresto, Coreg , Aldactone, digoxin    - dizziness with Farxiga and Jardiance    - Follows with heart failure     -S/p Biv ICD gen change 7/2018, 09/16/2022  with pocket revision   ~Interrogation today shows:1.3 years remaining, AP 83.0% ,  97.8%,  < 0.1%  AT/AF burden per device interrogation today, Underlying CHB, presenting AP-BVP   -  optivol WNL     - Known high LV threshold - lead revision/extraction discussed at appt with NPSR 01/2023 - we revisited this today in more detail.  We discussed LV lead removal and replacement with next generator change.  Discussed risks and benefits.  She is agreeable       She did not tolerate Farxiga or Jardiance in the past.       Atrial fibrillation, paroxysmal   - Patient has a [id number] Score of 3( age, gender, CHF)     - no AC - stopped after device placement     - will need to resume if recurrent    - Continue

## 2024-01-04 ENCOUNTER — HOSPITAL ENCOUNTER (OUTPATIENT)
Age: 69
Discharge: HOME | End: 2024-01-04
Payer: COMMERCIAL

## 2024-01-04 ENCOUNTER — HOSPITAL ENCOUNTER (OUTPATIENT)
Dept: CT IMAGING | Age: 69
Discharge: HOME | End: 2024-01-04
Attending: INTERNAL MEDICINE
Payer: COMMERCIAL

## 2024-01-04 ENCOUNTER — HOSPITAL ENCOUNTER (OUTPATIENT)
Dept: NON INVASIVE DIAGNOSTICS | Age: 69
Discharge: HOME | End: 2024-01-04
Payer: COMMERCIAL

## 2024-01-04 DIAGNOSIS — M54.2 PAIN IN THE NECK: ICD-10-CM

## 2024-01-04 DIAGNOSIS — Z45.02 ICD (IMPLANTABLE CARDIOVERTER-DEFIBRILLATOR) BATTERY DEPLETION: ICD-10-CM

## 2024-01-04 DIAGNOSIS — R22.2 SWELLING IN CHEST: ICD-10-CM

## 2024-01-04 DIAGNOSIS — R22.1 NECK SWELLING: ICD-10-CM

## 2024-01-04 DIAGNOSIS — R07.89 CHEST PAIN RADIATING TO UPPER EXTREMITY: ICD-10-CM

## 2024-01-04 LAB
ANION GAP SERPL CALCULATED.3IONS-SCNC: 10 MMOL/L (ref 3–16)
BASOPHILS # BLD: 0.1 K/UL (ref 0–0.2)
BASOPHILS NFR BLD: 0.9 %
BUN SERPL-MCNC: 24 MG/DL (ref 7–20)
CALCIUM SERPL-MCNC: 9.7 MG/DL (ref 8.3–10.6)
CHLORIDE SERPL-SCNC: 101 MMOL/L (ref 99–110)
CO2 SERPL-SCNC: 27 MMOL/L (ref 21–32)
CREAT SERPL-MCNC: 0.6 MG/DL (ref 0.6–1.2)
CREAT SERPL-MCNC: 0.8 MG/DL (ref 0.6–1.2)
CRP SERPL-MCNC: 6.1 MG/L (ref 0–5.1)
DEPRECATED RDW RBC AUTO: 13 % (ref 12.4–15.4)
EOSINOPHIL # BLD: 0.1 K/UL (ref 0–0.6)
EOSINOPHIL NFR BLD: 1.8 %
ERYTHROCYTE [SEDIMENTATION RATE] IN BLOOD BY WESTERGREN METHOD: 13 MM/HR (ref 0–30)
GFR SERPLBLD CREATININE-BSD FMLA CKD-EPI: >60 ML/MIN/{1.73_M2}
GFR SERPLBLD CREATININE-BSD FMLA CKD-EPI: >60 ML/MIN/{1.73_M2}
GLUCOSE SERPL-MCNC: 160 MG/DL (ref 70–99)
HCT VFR BLD AUTO: 42.3 % (ref 36–48)
HGB BLD-MCNC: 14.3 G/DL (ref 12–16)
LYMPHOCYTES # BLD: 2.8 K/UL (ref 1–5.1)
LYMPHOCYTES NFR BLD: 38.7 %
MCH RBC QN AUTO: 30.1 PG (ref 26–34)
MCHC RBC AUTO-ENTMCNC: 33.9 G/DL (ref 31–36)
MCV RBC AUTO: 88.9 FL (ref 80–100)
MONOCYTES # BLD: 0.6 K/UL (ref 0–1.3)
MONOCYTES NFR BLD: 7.6 %
NEUTROPHILS # BLD: 3.7 K/UL (ref 1.7–7.7)
NEUTROPHILS NFR BLD: 51 %
PLATELET # BLD AUTO: 143 K/UL (ref 135–450)
PMV BLD AUTO: 10.9 FL (ref 5–10.5)
POTASSIUM SERPL-SCNC: 4.2 MMOL/L (ref 3.5–5.1)
RBC # BLD AUTO: 4.76 M/UL (ref 4–5.2)
RHEUMATOID FACT SER IA-ACNC: 12 IU/ML
SODIUM SERPL-SCNC: 138 MMOL/L (ref 136–145)
WBC # BLD AUTO: 7.3 K/UL (ref 4–11)

## 2024-01-04 PROCEDURE — 86038 ANTINUCLEAR ANTIBODIES: CPT

## 2024-01-04 PROCEDURE — 6360000004 HC RX CONTRAST MEDICATION: Performed by: INTERNAL MEDICINE

## 2024-01-04 PROCEDURE — 85652 RBC SED RATE AUTOMATED: CPT

## 2024-01-04 PROCEDURE — 82565 ASSAY OF CREATININE: CPT

## 2024-01-04 PROCEDURE — 85025 COMPLETE CBC W/AUTO DIFF WBC: CPT

## 2024-01-04 PROCEDURE — 70491 CT SOFT TISSUE NECK W/DYE: CPT

## 2024-01-04 PROCEDURE — 87040 BLOOD CULTURE FOR BACTERIA: CPT

## 2024-01-04 PROCEDURE — 36415 COLL VENOUS BLD VENIPUNCTURE: CPT

## 2024-01-04 PROCEDURE — 86140 C-REACTIVE PROTEIN: CPT

## 2024-01-04 PROCEDURE — 71260 CT THORAX DX C+: CPT

## 2024-01-04 PROCEDURE — C8929 TTE W OR WO FOL WCON,DOPPLER: HCPCS

## 2024-01-04 PROCEDURE — 80048 BASIC METABOLIC PNL TOTAL CA: CPT

## 2024-01-04 PROCEDURE — 86431 RHEUMATOID FACTOR QUANT: CPT

## 2024-01-04 RX ADMIN — IOPAMIDOL 75 ML: 755 INJECTION, SOLUTION INTRAVENOUS at 10:14

## 2024-01-04 RX ADMIN — PERFLUTREN 1.5 ML: 6.52 INJECTION, SUSPENSION INTRAVENOUS at 09:22

## 2024-01-04 NOTE — PROGRESS NOTES
IV started in LT hand for OP Definity per echo protocol. Definity given per IV per echo protocol. Pt tolerated well.   Per Gretchen in CT, leave IV in patient's hand for CT scheduled after echocardiogram. Patient sent to CT with IV remaining in LT hand at this time.

## 2024-01-05 LAB
ANA SER QL IA: NEGATIVE
BACTERIA BLD CULT: NORMAL

## 2024-01-08 ENCOUNTER — OFFICE VISIT (OUTPATIENT)
Dept: UROGYNECOLOGY | Age: 69
End: 2024-01-08
Payer: COMMERCIAL

## 2024-01-08 ENCOUNTER — TELEPHONE (OUTPATIENT)
Dept: CARDIOLOGY CLINIC | Age: 69
End: 2024-01-08

## 2024-01-08 VITALS
RESPIRATION RATE: 16 BRPM | SYSTOLIC BLOOD PRESSURE: 107 MMHG | TEMPERATURE: 98.2 F | OXYGEN SATURATION: 98 % | HEART RATE: 83 BPM | DIASTOLIC BLOOD PRESSURE: 72 MMHG

## 2024-01-08 DIAGNOSIS — N39.41 URGE INCONTINENCE: ICD-10-CM

## 2024-01-08 DIAGNOSIS — R68.2 DRY MOUTH: ICD-10-CM

## 2024-01-08 DIAGNOSIS — T88.7XXA MEDICATION SIDE EFFECT: ICD-10-CM

## 2024-01-08 DIAGNOSIS — K21.9 GASTROESOPHAGEAL REFLUX DISEASE, UNSPECIFIED WHETHER ESOPHAGITIS PRESENT: ICD-10-CM

## 2024-01-08 DIAGNOSIS — R39.15 URINARY URGENCY: Primary | ICD-10-CM

## 2024-01-08 LAB — BACTERIA BLD CULT: NORMAL

## 2024-01-08 PROCEDURE — 1036F TOBACCO NON-USER: CPT | Performed by: NURSE PRACTITIONER

## 2024-01-08 PROCEDURE — 99213 OFFICE O/P EST LOW 20 MIN: CPT | Performed by: NURSE PRACTITIONER

## 2024-01-08 PROCEDURE — G8400 PT W/DXA NO RESULTS DOC: HCPCS | Performed by: NURSE PRACTITIONER

## 2024-01-08 PROCEDURE — 3078F DIAST BP <80 MM HG: CPT | Performed by: NURSE PRACTITIONER

## 2024-01-08 PROCEDURE — G8417 CALC BMI ABV UP PARAM F/U: HCPCS | Performed by: NURSE PRACTITIONER

## 2024-01-08 PROCEDURE — 1090F PRES/ABSN URINE INCON ASSESS: CPT | Performed by: NURSE PRACTITIONER

## 2024-01-08 PROCEDURE — 0509F URINE INCON PLAN DOCD: CPT | Performed by: NURSE PRACTITIONER

## 2024-01-08 PROCEDURE — 3017F COLORECTAL CA SCREEN DOC REV: CPT | Performed by: NURSE PRACTITIONER

## 2024-01-08 PROCEDURE — G8427 DOCREV CUR MEDS BY ELIG CLIN: HCPCS | Performed by: NURSE PRACTITIONER

## 2024-01-08 PROCEDURE — 3074F SYST BP LT 130 MM HG: CPT | Performed by: NURSE PRACTITIONER

## 2024-01-08 PROCEDURE — G8484 FLU IMMUNIZE NO ADMIN: HCPCS | Performed by: NURSE PRACTITIONER

## 2024-01-08 PROCEDURE — 1123F ACP DISCUSS/DSCN MKR DOCD: CPT | Performed by: NURSE PRACTITIONER

## 2024-01-08 NOTE — PROGRESS NOTES
2024       HPI:     Name: Tess Atkinson  YOB: 1955    CC: Patient is a 68 y.o. presenting for evaluation of urge incontinence .       HPI: How long have you had this problem?  years  Please rate the severity of your problem: moderate  Anything make it better?    Patient has been on Tolteridine 4mg for the last month - noticing some improvement but having heartburn  Myrbetriq was not affordable    Has tried Vesicare with negative side effects    Ob/Gyn History:    OB History    Para Term  AB Living   3 3 3         SAB IAB Ectopic Molar Multiple Live Births                    # Outcome Date GA Lbr Adams/2nd Weight Sex Delivery Anes PTL Lv   3 Term      CS-Classical      2 Term      CS-Classical      1 Term      CS-Classical        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 apnea     uses cpap    Uterine fibroids affecting pregnancy      Past Surgical History:   Past Surgical History:   Procedure Laterality Date    CARDIAC DEFIBRILLATOR PLACEMENT       SECTION      3 c-sections    COLONOSCOPY  2015    polypectomy    COLONOSCOPY N/A 2019    COLONOSCOPY POLYPECTOMY SNARE/COLD ASCENDING X1 , TRANSVERSE X1, SIGMOID X1 performed by Dell Munguia MD at [facility] ENDOSCOPY    PACEMAKER INSERTION      PACEMAKER INSERTION      UPPER GASTROINTESTINAL ENDOSCOPY  2015    UPPER GASTROINTESTINAL ENDOSCOPY N/A 2019    EGD GASTRIC BIOPSY performed by Dell Munguia MD at [facility] ENDOSCOPY    UPPER GASTROINTESTINAL ENDOSCOPY N/A 2020    EGD BIOPSY performed by Dell Munguia MD at [facility] ENDOSCOPY     Allergies:   Allergies   Allergen

## 2024-01-08 NOTE — PROGRESS NOTES
patients satisfaction.  The patient elected to undergo cystourethroscopy and botox 100 units .  The risk and benefits of synthetic material, including mesh, were explained to the patient and all questions were answered.   Preprocedure medications were called in  No orders of the defined types were placed in this encounter.    No orders of the defined types were placed in this encounter.      DARRELL BURTON MD

## 2024-01-08 NOTE — TELEPHONE ENCOUNTER
----- Message from Albert Gusman MD sent at 1/8/2024  5:20 PM EST -----  Please call her and let her know that so far her culture and blood test were generally okay and no sign of inflammation.  We will continue to monitor.  Also see how she is doing in regards to her pacemaker site  Thanks

## 2024-01-09 ENCOUNTER — TELEPHONE (OUTPATIENT)
Dept: CARDIOLOGY CLINIC | Age: 69
End: 2024-01-09

## 2024-01-09 ENCOUNTER — OFFICE VISIT (OUTPATIENT)
Dept: UROGYNECOLOGY | Age: 69
End: 2024-01-09
Payer: COMMERCIAL

## 2024-01-09 VITALS
TEMPERATURE: 97.9 F | SYSTOLIC BLOOD PRESSURE: 122 MMHG | RESPIRATION RATE: 16 BRPM | HEART RATE: 87 BPM | OXYGEN SATURATION: 97 % | DIASTOLIC BLOOD PRESSURE: 54 MMHG

## 2024-01-09 DIAGNOSIS — T88.7XXA MEDICATION SIDE EFFECT: ICD-10-CM

## 2024-01-09 DIAGNOSIS — N39.41 URGE INCONTINENCE: ICD-10-CM

## 2024-01-09 DIAGNOSIS — R39.15 URINARY URGENCY: Primary | ICD-10-CM

## 2024-01-09 DIAGNOSIS — K21.9 GASTROESOPHAGEAL REFLUX DISEASE, UNSPECIFIED WHETHER ESOPHAGITIS PRESENT: ICD-10-CM

## 2024-01-09 PROCEDURE — 3078F DIAST BP <80 MM HG: CPT | Performed by: OBSTETRICS & GYNECOLOGY

## 2024-01-09 PROCEDURE — G8400 PT W/DXA NO RESULTS DOC: HCPCS | Performed by: OBSTETRICS & GYNECOLOGY

## 2024-01-09 PROCEDURE — 3017F COLORECTAL CA SCREEN DOC REV: CPT | Performed by: OBSTETRICS & GYNECOLOGY

## 2024-01-09 PROCEDURE — 3074F SYST BP LT 130 MM HG: CPT | Performed by: OBSTETRICS & GYNECOLOGY

## 2024-01-09 PROCEDURE — 1123F ACP DISCUSS/DSCN MKR DOCD: CPT | Performed by: OBSTETRICS & GYNECOLOGY

## 2024-01-09 PROCEDURE — G8484 FLU IMMUNIZE NO ADMIN: HCPCS | Performed by: OBSTETRICS & GYNECOLOGY

## 2024-01-09 PROCEDURE — G8427 DOCREV CUR MEDS BY ELIG CLIN: HCPCS | Performed by: OBSTETRICS & GYNECOLOGY

## 2024-01-09 PROCEDURE — G8417 CALC BMI ABV UP PARAM F/U: HCPCS | Performed by: OBSTETRICS & GYNECOLOGY

## 2024-01-09 PROCEDURE — 99214 OFFICE O/P EST MOD 30 MIN: CPT | Performed by: OBSTETRICS & GYNECOLOGY

## 2024-01-09 PROCEDURE — 1090F PRES/ABSN URINE INCON ASSESS: CPT | Performed by: OBSTETRICS & GYNECOLOGY

## 2024-01-09 PROCEDURE — 0509F URINE INCON PLAN DOCD: CPT | Performed by: OBSTETRICS & GYNECOLOGY

## 2024-01-09 PROCEDURE — 1036F TOBACCO NON-USER: CPT | Performed by: OBSTETRICS & GYNECOLOGY

## 2024-01-11 ENCOUNTER — TELEPHONE (OUTPATIENT)
Dept: CARDIOLOGY CLINIC | Age: 69
End: 2024-01-11

## 2024-01-11 PROCEDURE — 93297 REM INTERROG DEV EVAL ICPMS: CPT | Performed by: INTERNAL MEDICINE

## 2024-01-17 RX ORDER — IBUPROFEN 600 MG/1
600 TABLET ORAL ONCE
Qty: 1 TABLET | Refills: 0 | Status: SHIPPED | OUTPATIENT
Start: 2024-01-17 | End: 2024-01-17

## 2024-01-17 RX ORDER — NITROFURANTOIN 25; 75 MG/1; MG/1
100 CAPSULE ORAL 2 TIMES DAILY
Qty: 14 CAPSULE | Refills: 0 | Status: SHIPPED | OUTPATIENT
Start: 2024-01-17 | End: 2024-01-24

## 2024-01-17 NOTE — PROGRESS NOTES
Spoke with patient over the phone. Medication allergies reviewed. Sent in Macrobid 100 mg to take twice a day for 7 days starting 3 days prior to the procedure,  and sent in ibuprofen 600 mg to take 1 hour prior to the procedure. No valium at this time. Electronically signed by Analy Raymundo RN on 1/17/2024 at 10:01 AM

## 2024-01-19 ENCOUNTER — TELEPHONE (OUTPATIENT)
Dept: CARDIOLOGY CLINIC | Age: 69
End: 2024-01-19

## 2024-01-19 NOTE — TELEPHONE ENCOUNTER
Optival elevated since the beginning of November  Please call and ask HF questions and make sure she is taking all her medications  Send to Dr Trinh as she saw her last

## 2024-01-19 NOTE — TELEPHONE ENCOUNTER
----- Message from Albert Gusman MD sent at 1/19/2024  1:27 PM EST -----  Please check on her to see how pocket is

## 2024-01-22 ENCOUNTER — TELEPHONE (OUTPATIENT)
Dept: CARDIOLOGY CLINIC | Age: 69
End: 2024-01-22

## 2024-01-22 NOTE — TELEPHONE ENCOUNTER
Spoke with patient.  She states redness has decreased and soreness has decreased. Discuss to continue to monitor and call if no improvement or worsens.

## 2024-01-22 NOTE — TELEPHONE ENCOUNTER
Carolina Mayes, APRN - CNS Nurse Practitioner Signed 1/19/2024     Addend     Copy     Optival elevated since the beginning of November  Please call and ask HF questions and make sure she is taking all her medications  Send to Dr Trinh as she saw her last          Spoke to patient  no swelling, no sob, current weight 137 has been stable. Watching fluids, and salt  Taking medications as prescribed, no new medications. Getting more tired than she use too.

## 2024-01-23 ENCOUNTER — PROCEDURE VISIT (OUTPATIENT)
Dept: UROGYNECOLOGY | Age: 69
End: 2024-01-23
Payer: COMMERCIAL

## 2024-01-23 VITALS
HEART RATE: 86 BPM | RESPIRATION RATE: 18 BRPM | DIASTOLIC BLOOD PRESSURE: 68 MMHG | TEMPERATURE: 97.8 F | OXYGEN SATURATION: 98 % | SYSTOLIC BLOOD PRESSURE: 99 MMHG

## 2024-01-23 DIAGNOSIS — R35.0 URINARY FREQUENCY: ICD-10-CM

## 2024-01-23 DIAGNOSIS — R39.15 URINARY URGENCY: ICD-10-CM

## 2024-01-23 DIAGNOSIS — N32.81 OVERACTIVE BLADDER: Primary | ICD-10-CM

## 2024-01-23 LAB
BILIRUBIN, POC: NORMAL
BLOOD URINE, POC: NORMAL
CLARITY, POC: CLEAR
COLOR, POC: YELLOW
GLUCOSE URINE, POC: NORMAL
KETONES, POC: NORMAL
LEUKOCYTE EST, POC: NORMAL
NITRITE, POC: NORMAL
PH, POC: 6
PROTEIN, POC: NORMAL
SPECIFIC GRAVITY, POC: 1.01
UROBILINOGEN, POC: NORMAL

## 2024-01-23 PROCEDURE — 52287 CYSTOSCOPY CHEMODENERVATION: CPT | Performed by: OBSTETRICS & GYNECOLOGY

## 2024-01-23 PROCEDURE — 81002 URINALYSIS NONAUTO W/O SCOPE: CPT | Performed by: OBSTETRICS & GYNECOLOGY

## 2024-01-23 RX ORDER — LIDOCAINE HYDROCHLORIDE 20 MG/ML
50 INJECTION, SOLUTION INFILTRATION; PERINEURAL ONCE
Status: COMPLETED | OUTPATIENT
Start: 2024-01-23 | End: 2024-01-23

## 2024-01-23 RX ADMIN — LIDOCAINE HYDROCHLORIDE 50 ML: 20 INJECTION, SOLUTION INFILTRATION; PERINEURAL at 14:25

## 2024-01-23 NOTE — PROGRESS NOTES
2024       HPI:     Name: Tess Atkinson  YOB: 1955    CC: Patient with urinary urgency, frequency, overactive bladder.  HPI: Tess Atkinson is a 68 y.o. female who is here for intravesical botulinum toxin A injection.      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 apnea     uses cpap    Uterine fibroids affecting pregnancy      Past Surgical History:   Past Surgical History:   Procedure Laterality Date    CARDIAC DEFIBRILLATOR PLACEMENT       SECTION      3 c-sections    COLONOSCOPY  2015    polypectomy    COLONOSCOPY N/A 2019    COLONOSCOPY POLYPECTOMY SNARE/COLD ASCENDING X1 , TRANSVERSE X1, SIGMOID X1 performed by Dell Munguia MD at [facility] ENDOSCOPY    PACEMAKER INSERTION      PACEMAKER INSERTION      UPPER GASTROINTESTINAL ENDOSCOPY  2015    UPPER GASTROINTESTINAL ENDOSCOPY N/A 2019    EGD GASTRIC BIOPSY performed by Dell Munguia MD at [facility] ENDOSCOPY    UPPER GASTROINTESTINAL ENDOSCOPY N/A 2020    EGD BIOPSY performed by Dell Munguia MD at [facility] ENDOSCOPY     Current Medications:  Current Outpatient Medications   Medication Sig Dispense Refill    nitrofurantoin, macrocrystal-monohydrate, (MACROBID) 100 MG capsule Take 1 capsule by mouth 2 times daily for 7 days Start taking 3 days prior to procedure , then continue taking for the rest of the week 14 capsule 0    mirabegron (MYRBETRIQ) 50 MG TB24 Take 50 mg by mouth daily 30 tablet 1    hydrOXYzine pamoate (VISTARIL) 25 MG capsule TAKE 1 CAPSULE BY MOUTH DAILY AS NEEDED FOR ANXIETY 90 capsule 1    escitalopram (LEXAPRO) 10 MG tablet TAKE 1 TABLET BY MOUTH DAILY 30 tablet 1

## 2024-01-30 ENCOUNTER — TELEPHONE (OUTPATIENT)
Dept: CARDIOLOGY CLINIC | Age: 69
End: 2024-01-30

## 2024-01-30 DIAGNOSIS — E55.9 VITAMIN D DEFICIENCY: ICD-10-CM

## 2024-01-30 RX ORDER — MELATONIN
1000 DAILY
Qty: 90 TABLET | Refills: 1 | Status: SHIPPED | OUTPATIENT
Start: 2024-01-30

## 2024-01-30 NOTE — TELEPHONE ENCOUNTER
Spoke to patient she stated her pacemaker site is still bruised and red and may have been bleeding. She is asking if this is normal or should she be concerned? She is also asking if she can come in to have it looked at. Please call and discuss with patient. Thank You

## 2024-01-30 NOTE — TELEPHONE ENCOUNTER
Last OV: 10/23/2023  Next OV: Visit date not found    Next appointment due: 1/26/24    Last fill: 10/23/23  Refills:   1 refill   90 tablets

## 2024-01-30 NOTE — TELEPHONE ENCOUNTER
"MEDICAL NUTRITION THERAPY  Reason for consult: "per best practice"    Pt being followed by RD services and last seen 2/7/2020. Please see recommendations/reassesment for details. RD to continue following per protocol.     " Medication refill approved.

## 2024-01-31 ENCOUNTER — TELEPHONE (OUTPATIENT)
Dept: CARDIOLOGY CLINIC | Age: 69
End: 2024-01-31

## 2024-01-31 ENCOUNTER — NURSE ONLY (OUTPATIENT)
Dept: CARDIOLOGY CLINIC | Age: 69
End: 2024-01-31

## 2024-01-31 DIAGNOSIS — Z45.02 ICD (IMPLANTABLE CARDIOVERTER-DEFIBRILLATOR) BATTERY DEPLETION: ICD-10-CM

## 2024-01-31 DIAGNOSIS — I48.0 PAROXYSMAL ATRIAL FIBRILLATION (HCC): Chronic | ICD-10-CM

## 2024-01-31 DIAGNOSIS — I42.0 DILATED CARDIOMYOPATHY (HCC): ICD-10-CM

## 2024-01-31 DIAGNOSIS — Z95.810 AICD (AUTOMATIC CARDIOVERTER/DEFIBRILLATOR) PRESENT: Primary | ICD-10-CM

## 2024-01-31 NOTE — TELEPHONE ENCOUNTER
Patient was in for device check. Optivol still elevated. Discussed in detail with patient regarding HCTZ. She states she never started it cause she thinks it makes her dizzy. Discussed the importance of starting the medication for better management of her heart failure. She states she will start taking today and follow up with [facility] as scheduled. Device site looked free from S/S of infection and neck swelling improved from last check.    Notified [facility] of patient not starting HCTZ.

## 2024-02-06 ENCOUNTER — TELEPHONE (OUTPATIENT)
Dept: INTERNAL MEDICINE CLINIC | Age: 69
End: 2024-02-06

## 2024-02-06 NOTE — TELEPHONE ENCOUNTER
Pt calling wanting you to know she stopped taking the Atorvastatin ---it was making her legs weak and off balance----(although it looks like her heart Dr put her on that) and she wanted to know what you thought about her taking CQ10 ---please call the pt.   Thanks.

## 2024-02-06 NOTE — TELEPHONE ENCOUNTER
Okay to take CoQ10, I recommend taking it with the atorvastatin. This can help with the muscle aches and weakness.

## 2024-02-07 ENCOUNTER — OFFICE VISIT (OUTPATIENT)
Dept: UROGYNECOLOGY | Age: 69
End: 2024-02-07
Payer: COMMERCIAL

## 2024-02-07 VITALS
RESPIRATION RATE: 16 BRPM | HEART RATE: 78 BPM | SYSTOLIC BLOOD PRESSURE: 101 MMHG | DIASTOLIC BLOOD PRESSURE: 50 MMHG | OXYGEN SATURATION: 99 % | TEMPERATURE: 46.6 F

## 2024-02-07 DIAGNOSIS — R39.15 URINARY URGENCY: ICD-10-CM

## 2024-02-07 DIAGNOSIS — N39.0 ACUTE UTI: ICD-10-CM

## 2024-02-07 DIAGNOSIS — R35.0 URINARY FREQUENCY: Primary | ICD-10-CM

## 2024-02-07 LAB
BILIRUBIN, POC: NORMAL
BLOOD URINE, POC: NORMAL
CLARITY, POC: CLEAR
COLOR, POC: YELLOW
GLUCOSE URINE, POC: NORMAL
KETONES, POC: NORMAL
LEUKOCYTE EST, POC: NORMAL
NITRITE, POC: POSITIVE
PH, POC: 6.5
PROTEIN, POC: NORMAL
SPECIFIC GRAVITY, POC: 1.02
UROBILINOGEN, POC: NORMAL

## 2024-02-07 PROCEDURE — 3074F SYST BP LT 130 MM HG: CPT | Performed by: NURSE PRACTITIONER

## 2024-02-07 PROCEDURE — 51701 INSERT BLADDER CATHETER: CPT | Performed by: NURSE PRACTITIONER

## 2024-02-07 PROCEDURE — 1123F ACP DISCUSS/DSCN MKR DOCD: CPT | Performed by: NURSE PRACTITIONER

## 2024-02-07 PROCEDURE — G8417 CALC BMI ABV UP PARAM F/U: HCPCS | Performed by: NURSE PRACTITIONER

## 2024-02-07 PROCEDURE — 99213 OFFICE O/P EST LOW 20 MIN: CPT | Performed by: NURSE PRACTITIONER

## 2024-02-07 PROCEDURE — 3017F COLORECTAL CA SCREEN DOC REV: CPT | Performed by: NURSE PRACTITIONER

## 2024-02-07 PROCEDURE — 3078F DIAST BP <80 MM HG: CPT | Performed by: NURSE PRACTITIONER

## 2024-02-07 PROCEDURE — G8427 DOCREV CUR MEDS BY ELIG CLIN: HCPCS | Performed by: NURSE PRACTITIONER

## 2024-02-07 PROCEDURE — G8484 FLU IMMUNIZE NO ADMIN: HCPCS | Performed by: NURSE PRACTITIONER

## 2024-02-07 PROCEDURE — 1036F TOBACCO NON-USER: CPT | Performed by: NURSE PRACTITIONER

## 2024-02-07 PROCEDURE — 1090F PRES/ABSN URINE INCON ASSESS: CPT | Performed by: NURSE PRACTITIONER

## 2024-02-07 PROCEDURE — 81002 URINALYSIS NONAUTO W/O SCOPE: CPT | Performed by: NURSE PRACTITIONER

## 2024-02-07 PROCEDURE — G8400 PT W/DXA NO RESULTS DOC: HCPCS | Performed by: NURSE PRACTITIONER

## 2024-02-07 RX ORDER — NITROFURANTOIN 25; 75 MG/1; MG/1
100 CAPSULE ORAL 2 TIMES DAILY
Qty: 10 CAPSULE | Refills: 0 | Status: SHIPPED | OUTPATIENT
Start: 2024-02-07 | End: 2024-02-12

## 2024-02-07 NOTE — PROGRESS NOTES
Jardiance [Empagliflozin]      dizziness    Prednisone      Caused an allergic reaction. Swelling all over her body.     Current Medications:  Current Outpatient Medications   Medication Sig Dispense Refill    nitrofurantoin, macrocrystal-monohydrate, (MACROBID) 100 MG capsule Take 1 capsule by mouth 2 times daily for 5 days 10 capsule 0    vitamin D3 (CHOLECALCIFEROL) 25 MCG (1000 UT) TABS tablet TAKE 1 TABLET BY MOUTH DAILY 90 tablet 1    mirabegron (MYRBETRIQ) 50 MG TB24 Take 50 mg by mouth daily 30 tablet 1    hydrOXYzine pamoate (VISTARIL) 25 MG capsule TAKE 1 CAPSULE BY MOUTH DAILY AS NEEDED FOR ANXIETY 90 capsule 1    escitalopram (LEXAPRO) 10 MG tablet TAKE 1 TABLET BY MOUTH DAILY 30 tablet 1    hydroCHLOROthiazide (HYDRODIURIL) 25 MG tablet Take 1 tablet by mouth daily 90 tablet 3    sacubitril-valsartan (ENTRESTO) 24-26 MG per tablet TAKE ONE-HALF TABLET BY MOUTH EVERY MORNING AND 1 EVERY EVENING 180 tablet 3    tolterodine (DETROL LA) 4 MG extended release capsule TAKE 1 CAPSULE BY MOUTH DAILY 90 capsule 0    carvedilol (COREG) 3.125 MG tablet TAKE 1 TABLET BY MOUTH TWICE DAILY 180 tablet 1    Esomeprazole Magnesium (NEXIUM PO) Take by mouth      fluticasone (FLONASE) 50 MCG/ACT nasal spray 1 spray by Each Nostril route daily 16 g 0    atorvastatin (LIPITOR) 20 MG tablet Take 1 tablet by mouth daily 90 tablet 3    spironolactone (ALDACTONE) 25 MG tablet Take 1/2 tablet M,W,F only 36 tablet 1    digoxin (LANOXIN) 125 MCG tablet Take 1 tablet by mouth daily 90 tablet 1    ciprofloxacin (CIPRO) 250 MG tablet TAKE 1 TABLET BY MOUTH AFTER SEXUAL ACTIVITY 10 tablet 2    montelukast (SINGULAIR) 10 MG tablet Take 1 tablet by mouth daily 30 tablet 5    Magnesium 400 MG TABS Take by mouth daily (Patient taking differently: three times a week Take by mouth daily) 30 tablet 5    calcium carbonate (OSCAL) 500 MG TABS tablet Take 1 tablet by mouth daily (Patient taking differently: Take 1 tablet by mouth three times

## 2024-02-09 LAB
BACTERIA UR CULT: ABNORMAL
ORGANISM: ABNORMAL

## 2024-02-09 NOTE — RESULT ENCOUNTER NOTE
Spoke with patient over the phone. Informed her that her urine culture came back positive and she does have a UTI. Advised patient she needs to finish her already prescribed antibiotic, Macrobid. Patient is agreeable. Verbalized understanding and has no other concerns. Electronically signed by Analy Raymundo RN on 2/9/2024 at 4:19 PM

## 2024-02-11 PROCEDURE — 93297 REM INTERROG DEV EVAL ICPMS: CPT | Performed by: CLINICAL NURSE SPECIALIST

## 2024-02-19 ENCOUNTER — OFFICE VISIT (OUTPATIENT)
Dept: CARDIOLOGY CLINIC | Age: 69
End: 2024-02-19
Payer: COMMERCIAL

## 2024-02-19 ENCOUNTER — TELEPHONE (OUTPATIENT)
Dept: INTERNAL MEDICINE CLINIC | Age: 69
End: 2024-02-19

## 2024-02-19 VITALS
BODY MASS INDEX: 25.76 KG/M2 | SYSTOLIC BLOOD PRESSURE: 90 MMHG | HEART RATE: 71 BPM | DIASTOLIC BLOOD PRESSURE: 60 MMHG | HEIGHT: 62 IN | WEIGHT: 140 LBS | OXYGEN SATURATION: 98 %

## 2024-02-19 DIAGNOSIS — I48.0 PAF (PAROXYSMAL ATRIAL FIBRILLATION) (HCC): ICD-10-CM

## 2024-02-19 DIAGNOSIS — Z95.810 BIVENTRICULAR ICD (IMPLANTABLE CARDIOVERTER-DEFIBRILLATOR) IN PLACE: ICD-10-CM

## 2024-02-19 DIAGNOSIS — I50.22 CHRONIC SYSTOLIC HEART FAILURE (HCC): Primary | ICD-10-CM

## 2024-02-19 DIAGNOSIS — I42.8 NON-ISCHEMIC CARDIOMYOPATHY (HCC): ICD-10-CM

## 2024-02-19 DIAGNOSIS — E78.5 HYPERLIPIDEMIA, UNSPECIFIED HYPERLIPIDEMIA TYPE: ICD-10-CM

## 2024-02-19 PROCEDURE — 3078F DIAST BP <80 MM HG: CPT | Performed by: CLINICAL NURSE SPECIALIST

## 2024-02-19 PROCEDURE — G8400 PT W/DXA NO RESULTS DOC: HCPCS | Performed by: CLINICAL NURSE SPECIALIST

## 2024-02-19 PROCEDURE — 1090F PRES/ABSN URINE INCON ASSESS: CPT | Performed by: CLINICAL NURSE SPECIALIST

## 2024-02-19 PROCEDURE — G8484 FLU IMMUNIZE NO ADMIN: HCPCS | Performed by: CLINICAL NURSE SPECIALIST

## 2024-02-19 PROCEDURE — 1036F TOBACCO NON-USER: CPT | Performed by: CLINICAL NURSE SPECIALIST

## 2024-02-19 PROCEDURE — G8427 DOCREV CUR MEDS BY ELIG CLIN: HCPCS | Performed by: CLINICAL NURSE SPECIALIST

## 2024-02-19 PROCEDURE — 3074F SYST BP LT 130 MM HG: CPT | Performed by: CLINICAL NURSE SPECIALIST

## 2024-02-19 PROCEDURE — 3017F COLORECTAL CA SCREEN DOC REV: CPT | Performed by: CLINICAL NURSE SPECIALIST

## 2024-02-19 PROCEDURE — 99214 OFFICE O/P EST MOD 30 MIN: CPT | Performed by: CLINICAL NURSE SPECIALIST

## 2024-02-19 PROCEDURE — 1123F ACP DISCUSS/DSCN MKR DOCD: CPT | Performed by: CLINICAL NURSE SPECIALIST

## 2024-02-19 PROCEDURE — G8417 CALC BMI ABV UP PARAM F/U: HCPCS | Performed by: CLINICAL NURSE SPECIALIST

## 2024-02-19 NOTE — PATIENT INSTRUCTIONS
Blood work tomorrow after you fast for 12 hours  Continue all current medications  No HCTZ  RTO in 3 months

## 2024-02-19 NOTE — TELEPHONE ENCOUNTER
I do not see where the increase in medication was approved.  Script for 90 tabs (1 tab daily PRN) sent 1/2/24.

## 2024-02-19 NOTE — PROGRESS NOTES
minutes daily alternating with aerobic and isometric activities.   5. Patient counseled about and offered assistance for smoking cessation   6. No indication for cardiac rehab  7. Follow up in 1-2 weeks with cardiology CHF clinic.    Echo 10-19-18  - Left ventricle: The cavity size was moderately dilated. Wall    thickness was normal. Systolic function was severely reduced. The    estimated ejection fraction was in the range of 25% to 30%. There    is significant dyssynergy between the septal and lateral walls.    Mild hypokinesis of the inferoseptal myocardium. Severe    hypokinesis of the basal-midanterolateral myocardium. Moderate    hypokinesis of the apicallateral myocardium. Akinesis of the    apicalinferior myocardium. Doppler parameters are consistent with    abnormal left ventricular relaxation (grade 1 diastolic    dysfunction).  - Ventricular septum: Septal motion showed abnormal function and    dyssynergy.  - Aortic valve: Trivial regurgitation.  - Right ventricle: The cavity size was mildly dilated. Wall    thickness was normal. Systolic function was normal by objective    interpretation. TAPSE: 2.6cm.Tricuspid annular systolic velocity:    10cm/s. A device lead is seen extending into the RV cavity.  - Right atrium: The atrium was mildly to moderately dilated. Pacer    wire or catheter noted in right atrium.  - Tricuspid valve: Mild-moderate regurgitation.  - Pericardium, extracardiac: A trivial pericardial effusion was    identified.     Impressions:  Compared to the study from 2/3/17, LV systolic is  slightly more reduced.    Assessment:    1. Chronic systolic heart failure (HCC) on arni, BB, digoxin and aldosterone antagonist; no sglt2 due to dizziness   2. Non-ischemic cardiomyopathy (HCC)    3. Biventricular ICD (implantable cardioverter-defibrillator) in place    4. PAF (paroxysmal atrial fibrillation) (HCC) no anticoag, follows with Dr Gusman   5.    Hyperlipidemia     Plan:   Patient

## 2024-02-19 NOTE — TELEPHONE ENCOUNTER
Pt calling about the Hydroxyzine---you gave her script early January for a 90 day script---she is now out because she was taking 2 a day instead of 1 ---one the morning one at night---can you please refill this for her?  Please send to Tammy Toussaint---Thanks.

## 2024-02-20 ENCOUNTER — HOSPITAL ENCOUNTER (OUTPATIENT)
Age: 69
Discharge: HOME | End: 2024-02-20
Payer: COMMERCIAL

## 2024-02-20 DIAGNOSIS — E78.5 HYPERLIPIDEMIA, UNSPECIFIED HYPERLIPIDEMIA TYPE: ICD-10-CM

## 2024-02-20 DIAGNOSIS — I50.22 CHRONIC SYSTOLIC HEART FAILURE (HCC): ICD-10-CM

## 2024-02-20 LAB
ALBUMIN SERPL-MCNC: 4.1 G/DL (ref 3.4–5)
ALBUMIN/GLOB SERPL: 1.3 {RATIO} (ref 1.1–2.2)
ALP SERPL-CCNC: 67 U/L (ref 40–129)
ALT SERPL-CCNC: 19 U/L (ref 10–40)
ANION GAP SERPL CALCULATED.3IONS-SCNC: 11 MMOL/L (ref 3–16)
AST SERPL-CCNC: 20 U/L (ref 15–37)
BILIRUB SERPL-MCNC: 0.5 MG/DL (ref 0–1)
BUN SERPL-MCNC: 22 MG/DL (ref 7–20)
CALCIUM SERPL-MCNC: 9.3 MG/DL (ref 8.3–10.6)
CHLORIDE SERPL-SCNC: 101 MMOL/L (ref 99–110)
CHOLEST SERPL-MCNC: 243 MG/DL (ref 0–199)
CK SERPL-CCNC: 59 U/L (ref 26–192)
CO2 SERPL-SCNC: 26 MMOL/L (ref 21–32)
CREAT SERPL-MCNC: 0.9 MG/DL (ref 0.6–1.2)
DEPRECATED RDW RBC AUTO: 12.9 % (ref 12.4–15.4)
GFR SERPLBLD CREATININE-BSD FMLA CKD-EPI: >60 ML/MIN/{1.73_M2}
GLUCOSE SERPL-MCNC: 156 MG/DL (ref 70–99)
HCT VFR BLD AUTO: 42.3 % (ref 36–48)
HDLC SERPL-MCNC: 39 MG/DL (ref 40–60)
HGB BLD-MCNC: 14.2 G/DL (ref 12–16)
LDLC SERPL CALC-MCNC: 155 MG/DL
MCH RBC QN AUTO: 29.8 PG (ref 26–34)
MCHC RBC AUTO-ENTMCNC: 33.6 G/DL (ref 31–36)
MCV RBC AUTO: 88.5 FL (ref 80–100)
NT-PROBNP SERPL-MCNC: 197 PG/ML (ref 0–124)
PLATELET # BLD AUTO: 132 K/UL (ref 135–450)
PMV BLD AUTO: 10.3 FL (ref 5–10.5)
POTASSIUM SERPL-SCNC: 4.4 MMOL/L (ref 3.5–5.1)
PROT SERPL-MCNC: 7.2 G/DL (ref 6.4–8.2)
RBC # BLD AUTO: 4.78 M/UL (ref 4–5.2)
SODIUM SERPL-SCNC: 138 MMOL/L (ref 136–145)
TRIGL SERPL-MCNC: 246 MG/DL (ref 0–150)
VLDLC SERPL CALC-MCNC: 49 MG/DL
WBC # BLD AUTO: 6.1 K/UL (ref 4–11)

## 2024-02-20 PROCEDURE — 82550 ASSAY OF CK (CPK): CPT

## 2024-02-20 PROCEDURE — 83880 ASSAY OF NATRIURETIC PEPTIDE: CPT

## 2024-02-20 PROCEDURE — 80053 COMPREHEN METABOLIC PANEL: CPT

## 2024-02-20 PROCEDURE — 85027 COMPLETE CBC AUTOMATED: CPT

## 2024-02-20 PROCEDURE — 80061 LIPID PANEL: CPT

## 2024-02-20 PROCEDURE — 36415 COLL VENOUS BLD VENIPUNCTURE: CPT

## 2024-02-20 NOTE — TELEPHONE ENCOUNTER
She should be using medication as needed, if needing it twice daily then she needs an appt for her anxiety.

## 2024-02-21 ENCOUNTER — TELEPHONE (OUTPATIENT)
Dept: CARDIOLOGY CLINIC | Age: 69
End: 2024-02-21

## 2024-02-21 DIAGNOSIS — I50.22 CHRONIC SYSTOLIC HEART FAILURE (HCC): Primary | ICD-10-CM

## 2024-02-21 RX ORDER — ROSUVASTATIN CALCIUM 10 MG/1
10 TABLET, COATED ORAL DAILY
Qty: 30 TABLET | Refills: 2 | Status: SHIPPED | OUTPATIENT
Start: 2024-02-21 | End: 2024-02-21

## 2024-02-21 RX ORDER — ROSUVASTATIN CALCIUM 10 MG/1
10 TABLET, COATED ORAL DAILY
Qty: 30 TABLET | Refills: 0 | Status: SHIPPED | OUTPATIENT
Start: 2024-02-21

## 2024-02-21 RX ORDER — SPIRONOLACTONE 25 MG/1
TABLET ORAL
Qty: 36 TABLET | Refills: 1 | Status: SHIPPED | OUTPATIENT
Start: 2024-02-21

## 2024-02-21 RX ORDER — ROSUVASTATIN CALCIUM 10 MG/1
10 TABLET, COATED ORAL DAILY
Qty: 90 TABLET | OUTPATIENT
Start: 2024-02-21

## 2024-02-21 NOTE — TELEPHONE ENCOUNTER
----- Message from Carolina Mayes, SHAMEKA - CNS sent at 2/21/2024  8:54 AM EST -----  Her lipids are horrible and muscle test is normal  She has to go back on the lipitor or try crestor along with the CoQ 10  Her fluid level is up on the blood work and I want to increase the aldactone to 12.5 mg mon tues wed thurs and fri  Blood work in 2 weeks  thanks    Spoke to patient she wants to take crestor and coQ 10 can you send in the prescriptions.

## 2024-02-21 NOTE — TELEPHONE ENCOUNTER
Duplicate Refill request ROSUVASTATIN 10MG TABLET      rosuvastatin (CRESTOR) 10 MG tablet [[id number]]    Order Details  Dose: 10 mg Route: Oral Frequency: DAILY   Dispense Quantity: 30 tablet Refills: 0    Note to Pharmacy: **Patient requests 90 days supply**         Sig: TAKE 1 TABLET BY MOUTH DAILY         Start Date: 02/21/24 End Date: --   Written Date: 02/21/24 Expiration Date: 02/20/25   Original Order: rosuvastatin (CRESTOR) 10 MG tablet [[id number]]   Providers    Authorizing Provider: Carolina Mayes APRN - CNS NPI: [id number]   Ordering User: Carolina Mayes APRN - CNS

## 2024-02-22 ENCOUNTER — OFFICE VISIT (OUTPATIENT)
Dept: INTERNAL MEDICINE CLINIC | Age: 69
End: 2024-02-22
Payer: COMMERCIAL

## 2024-02-22 VITALS
BODY MASS INDEX: 25.88 KG/M2 | OXYGEN SATURATION: 98 % | WEIGHT: 140.6 LBS | HEART RATE: 82 BPM | HEIGHT: 62 IN | SYSTOLIC BLOOD PRESSURE: 110 MMHG | DIASTOLIC BLOOD PRESSURE: 64 MMHG

## 2024-02-22 DIAGNOSIS — H65.413 CHRONIC ALLERGIC OTITIS MEDIA OF BOTH EARS: ICD-10-CM

## 2024-02-22 DIAGNOSIS — F41.9 ANXIETY: Primary | Chronic | ICD-10-CM

## 2024-02-22 DIAGNOSIS — I48.0 PAROXYSMAL ATRIAL FIBRILLATION (HCC): Chronic | ICD-10-CM

## 2024-02-22 DIAGNOSIS — I42.0 DILATED CARDIOMYOPATHY (HCC): ICD-10-CM

## 2024-02-22 DIAGNOSIS — E78.00 HYPERCHOLESTEREMIA: ICD-10-CM

## 2024-02-22 DIAGNOSIS — I10 BENIGN ESSENTIAL HTN: Chronic | ICD-10-CM

## 2024-02-22 PROCEDURE — G8417 CALC BMI ABV UP PARAM F/U: HCPCS | Performed by: NURSE PRACTITIONER

## 2024-02-22 PROCEDURE — 3078F DIAST BP <80 MM HG: CPT | Performed by: NURSE PRACTITIONER

## 2024-02-22 PROCEDURE — G8427 DOCREV CUR MEDS BY ELIG CLIN: HCPCS | Performed by: NURSE PRACTITIONER

## 2024-02-22 PROCEDURE — G8484 FLU IMMUNIZE NO ADMIN: HCPCS | Performed by: NURSE PRACTITIONER

## 2024-02-22 PROCEDURE — G8400 PT W/DXA NO RESULTS DOC: HCPCS | Performed by: NURSE PRACTITIONER

## 2024-02-22 PROCEDURE — 1123F ACP DISCUSS/DSCN MKR DOCD: CPT | Performed by: NURSE PRACTITIONER

## 2024-02-22 PROCEDURE — 3017F COLORECTAL CA SCREEN DOC REV: CPT | Performed by: NURSE PRACTITIONER

## 2024-02-22 PROCEDURE — 3074F SYST BP LT 130 MM HG: CPT | Performed by: NURSE PRACTITIONER

## 2024-02-22 PROCEDURE — 1036F TOBACCO NON-USER: CPT | Performed by: NURSE PRACTITIONER

## 2024-02-22 PROCEDURE — 1090F PRES/ABSN URINE INCON ASSESS: CPT | Performed by: NURSE PRACTITIONER

## 2024-02-22 PROCEDURE — 99214 OFFICE O/P EST MOD 30 MIN: CPT | Performed by: NURSE PRACTITIONER

## 2024-02-22 RX ORDER — HYDROXYZINE PAMOATE 25 MG/1
25 CAPSULE ORAL DAILY PRN
Qty: 90 CAPSULE | Refills: 1 | Status: SHIPPED | OUTPATIENT
Start: 2024-02-22

## 2024-02-22 RX ORDER — VILAZODONE HYDROCHLORIDE 20 MG/1
20 TABLET ORAL DAILY
Qty: 30 TABLET | Refills: 1 | Status: SHIPPED | OUTPATIENT
Start: 2024-02-22

## 2024-02-22 RX ORDER — FLUTICASONE PROPIONATE 50 MCG
1 SPRAY, SUSPENSION (ML) NASAL DAILY
Qty: 16 G | Refills: 0 | Status: SHIPPED | OUTPATIENT
Start: 2024-02-22

## 2024-02-22 NOTE — ASSESSMENT & PLAN NOTE
Chronic, uncontrolled  Not taking lexapro as prescribed -stop.   Reviewed last psychiatry note, start viibryd daily   Use hydroxyzine as needed  Reviewed medication use, risk and s/e   Schedule follow up with Dr. Ortega - juliane for appt

## 2024-02-22 NOTE — ASSESSMENT & PLAN NOTE
Chronic, controlled. Continue medications, and seeing EP and cardiology as recommended and scheduled.

## 2024-02-22 NOTE — ASSESSMENT & PLAN NOTE
Chronic, controlled.  Continue seeing EP and cardiology as recommended and scheduled.  Continue carvedilol 3.125 mg, digoxin,  entresto and spironolactone as prescribed.

## 2024-02-22 NOTE — PROGRESS NOTES
TABS Take by mouth daily (Patient taking differently: three times a week Take by mouth daily) 30 tablet 5    calcium carbonate (OSCAL) 500 MG TABS tablet Take 1 tablet by mouth daily (Patient taking differently: Take 1 tablet by mouth three times a week) 30 tablet 5    vitamin E 400 UNIT capsule Take 1 capsule by mouth daily      Omega-3 Fatty Acids (FISH OIL) 1000 MG CAPS Take 3 capsules by mouth 2 times daily      ciprofloxacin (CIPRO) 250 MG tablet TAKE 1 TABLET BY MOUTH AFTER SEXUAL ACTIVITY (Patient not taking: Reported on 2/22/2024) 10 tablet 2     No current facility-administered medications for this visit.     Review of Systems  Negative other than HPI     Vitals:    02/22/24 0857   BP: 110/64   Site: Right Upper Arm   Position: Sitting   Cuff Size: Medium Adult   Pulse: 82   SpO2: 98%   Weight: 63.8 kg (140 lb 9.6 oz)   Height: 1.575 m (5' 2\")      Physical Exam  Constitutional:       General: She is not in acute distress.     Appearance: Normal appearance. She is not ill-appearing.   HENT:      Head: Normocephalic and atraumatic.      Right Ear: Ear canal normal. A middle ear effusion is present. Tympanic membrane is not perforated, erythematous, retracted or bulging.      Left Ear: Ear canal normal. A middle ear effusion is present. Tympanic membrane is not perforated, erythematous, retracted or bulging.   Cardiovascular:      Rate and Rhythm: Normal rate and regular rhythm.   Pulmonary:      Effort: Pulmonary effort is normal. No respiratory distress.      Breath sounds: Normal breath sounds.   Neurological:      Mental Status: She is alert and oriented to person, place, and time. Mental status is at baseline.   Psychiatric:         Mood and Affect: Mood normal.         Behavior: Behavior normal.         Assessment/Plan:  1. Anxiety  Assessment & Plan:  Chronic, uncontrolled  Not taking lexapro as prescribed -stop.   Reviewed last psychiatry note, start viibryd daily   Use hydroxyzine as needed  Reviewed

## 2024-02-22 NOTE — ASSESSMENT & PLAN NOTE
Chronic, uncontrolled  Reviewed supportive care in detail  Start flonase daily for better control

## 2024-02-29 ENCOUNTER — OFFICE VISIT (OUTPATIENT)
Dept: INTERNAL MEDICINE CLINIC | Age: 69
End: 2024-02-29
Payer: COMMERCIAL

## 2024-02-29 VITALS
HEART RATE: 65 BPM | BODY MASS INDEX: 25.72 KG/M2 | OXYGEN SATURATION: 99 % | SYSTOLIC BLOOD PRESSURE: 86 MMHG | DIASTOLIC BLOOD PRESSURE: 64 MMHG | HEIGHT: 62 IN

## 2024-02-29 DIAGNOSIS — M54.6 ACUTE RIGHT-SIDED THORACIC BACK PAIN: Primary | ICD-10-CM

## 2024-02-29 DIAGNOSIS — I10 BENIGN ESSENTIAL HTN: Chronic | ICD-10-CM

## 2024-02-29 DIAGNOSIS — Z95.0 PACEMAKER: ICD-10-CM

## 2024-02-29 LAB
BILIRUBIN, POC: NEGATIVE
BLOOD URINE, POC: NEGATIVE
CLARITY, POC: CLEAR
COLOR, POC: YELLOW
GLUCOSE URINE, POC: NEGATIVE
KETONES, POC: NEGATIVE
LEUKOCYTE EST, POC: NEGATIVE
NITRITE, POC: NEGATIVE
PH, POC: 6
PROTEIN, POC: NORMAL
SPECIFIC GRAVITY, POC: 1.01
UROBILINOGEN, POC: 0.2

## 2024-02-29 PROCEDURE — 81002 URINALYSIS NONAUTO W/O SCOPE: CPT | Performed by: NURSE PRACTITIONER

## 2024-02-29 PROCEDURE — 1123F ACP DISCUSS/DSCN MKR DOCD: CPT | Performed by: NURSE PRACTITIONER

## 2024-02-29 PROCEDURE — G8484 FLU IMMUNIZE NO ADMIN: HCPCS | Performed by: NURSE PRACTITIONER

## 2024-02-29 PROCEDURE — G8417 CALC BMI ABV UP PARAM F/U: HCPCS | Performed by: NURSE PRACTITIONER

## 2024-02-29 PROCEDURE — 3017F COLORECTAL CA SCREEN DOC REV: CPT | Performed by: NURSE PRACTITIONER

## 2024-02-29 PROCEDURE — G8400 PT W/DXA NO RESULTS DOC: HCPCS | Performed by: NURSE PRACTITIONER

## 2024-02-29 PROCEDURE — 3078F DIAST BP <80 MM HG: CPT | Performed by: NURSE PRACTITIONER

## 2024-02-29 PROCEDURE — 1036F TOBACCO NON-USER: CPT | Performed by: NURSE PRACTITIONER

## 2024-02-29 PROCEDURE — 1090F PRES/ABSN URINE INCON ASSESS: CPT | Performed by: NURSE PRACTITIONER

## 2024-02-29 PROCEDURE — 99214 OFFICE O/P EST MOD 30 MIN: CPT | Performed by: NURSE PRACTITIONER

## 2024-02-29 PROCEDURE — G8427 DOCREV CUR MEDS BY ELIG CLIN: HCPCS | Performed by: NURSE PRACTITIONER

## 2024-02-29 PROCEDURE — 3074F SYST BP LT 130 MM HG: CPT | Performed by: NURSE PRACTITIONER

## 2024-02-29 RX ORDER — TRAMADOL HYDROCHLORIDE 50 MG/1
50 TABLET ORAL EVERY 8 HOURS PRN
Qty: 9 TABLET | Refills: 0 | Status: SHIPPED | OUTPATIENT
Start: 2024-02-29 | End: 2024-03-03

## 2024-02-29 RX ORDER — TIZANIDINE 4 MG/1
4 TABLET ORAL 3 TIMES DAILY PRN
Qty: 30 TABLET | Refills: 0 | Status: SHIPPED | OUTPATIENT
Start: 2024-02-29

## 2024-02-29 ASSESSMENT — PATIENT HEALTH QUESTIONNAIRE - PHQ9
5. POOR APPETITE OR OVEREATING: 3
1. LITTLE INTEREST OR PLEASURE IN DOING THINGS: 1
SUM OF ALL RESPONSES TO PHQ QUESTIONS 1-9: 9
7. TROUBLE CONCENTRATING ON THINGS, SUCH AS READING THE NEWSPAPER OR WATCHING TELEVISION: 0
10. IF YOU CHECKED OFF ANY PROBLEMS, HOW DIFFICULT HAVE THESE PROBLEMS MADE IT FOR YOU TO DO YOUR WORK, TAKE CARE OF THINGS AT HOME, OR GET ALONG WITH OTHER PEOPLE: 0
SUM OF ALL RESPONSES TO PHQ QUESTIONS 1-9: 9
SUM OF ALL RESPONSES TO PHQ9 QUESTIONS 1 & 2: 2
3. TROUBLE FALLING OR STAYING ASLEEP: 1
2. FEELING DOWN, DEPRESSED OR HOPELESS: 1
4. FEELING TIRED OR HAVING LITTLE ENERGY: 3
9. THOUGHTS THAT YOU WOULD BE BETTER OFF DEAD, OR OF HURTING YOURSELF: 0
SUM OF ALL RESPONSES TO PHQ QUESTIONS 1-9: 9
8. MOVING OR SPEAKING SO SLOWLY THAT OTHER PEOPLE COULD HAVE NOTICED. OR THE OPPOSITE, BEING SO FIGETY OR RESTLESS THAT YOU HAVE BEEN MOVING AROUND A LOT MORE THAN USUAL: 0
SUM OF ALL RESPONSES TO PHQ QUESTIONS 1-9: 9
6. FEELING BAD ABOUT YOURSELF - OR THAT YOU ARE A FAILURE OR HAVE LET YOURSELF OR YOUR FAMILY DOWN: 0

## 2024-02-29 NOTE — PROGRESS NOTES
baseline.   Psychiatric:         Mood and Affect: Affect normal. Mood is anxious.         Behavior: Behavior normal.       Assessment/Plan:  1. Acute right-sided thoracic back pain  Assessment & Plan:  Acute, uncontrolled.   UA negative for blood or infection  Covering with muscle relaxer. Tramadol to use sparingly as needed.   Home therapy exercises provided  Work on hydration and stretches  Has concerns about NSAIDs  Encouraged pt to use lidocaine patches     Controlled Substance Monitoring:    Acute and Chronic Pain Monitoring:   RX Monitoring Periodic Controlled Substance Monitoring   2/29/2024   9:17 AM No signs of potential drug abuse or diversion identified.;Possible medication side effects, risk of tolerance/dependence & alternative treatments discussed.        Orders:  -     tiZANidine (ZANAFLEX) 4 MG tablet; Take 1 tablet by mouth 3 times daily as needed (muscle spasm), Disp-30 tablet, R-0Normal  -     POCT Urinalysis no Micro  -     traMADol (ULTRAM) 50 MG tablet; Take 1 tablet by mouth every 8 hours as needed for Pain for up to 3 days. Intended supply: 3 days. Take lowest dose possible to manage pain Max Daily Amount: 150 mg, Disp-9 tablet, R-0Normal  2. Pacemaker  Assessment & Plan:  Pacemaker site is CDI, no erythema or warmth  Reassurance provided   3. Benign essential HTN  Assessment & Plan:  Chronic, BP soft today, asymptomatic.  Continue current medications as prescribed. Work on hydration.      Discussed medications with patient, who voiced understanding of their use and indications. All questions answered.    Return if symptoms worsen or fail to improve, for keep follow up appt as scheduled .      Electronically signed by SHAMEKA Bassett CNP on 2/29/2024 at 10:16 AM

## 2024-02-29 NOTE — ASSESSMENT & PLAN NOTE
Chronic, BP soft today, asymptomatic.  Continue current medications as prescribed. Work on hydration.

## 2024-02-29 NOTE — ASSESSMENT & PLAN NOTE
Acute, uncontrolled.   UA negative for blood or infection  Covering with muscle relaxer. Tramadol to use sparingly as needed.   Home therapy exercises provided  Work on hydration and stretches  Has concerns about NSAIDs  Encouraged pt to use lidocaine patches     Controlled Substance Monitoring:    Acute and Chronic Pain Monitoring:   RX Monitoring Periodic Controlled Substance Monitoring   2/29/2024   9:17 AM No signs of potential drug abuse or diversion identified.;Possible medication side effects, risk of tolerance/dependence & alternative treatments discussed.

## 2024-03-27 ENCOUNTER — HOSPITAL ENCOUNTER (OUTPATIENT)
Age: 69
Discharge: HOME | End: 2024-03-27
Payer: COMMERCIAL

## 2024-03-27 DIAGNOSIS — I50.22 CHRONIC SYSTOLIC HEART FAILURE (HCC): ICD-10-CM

## 2024-03-27 LAB
ANION GAP SERPL CALCULATED.3IONS-SCNC: 14 MMOL/L (ref 3–16)
BUN SERPL-MCNC: 20 MG/DL (ref 7–20)
CALCIUM SERPL-MCNC: 10.1 MG/DL (ref 8.3–10.6)
CHLORIDE SERPL-SCNC: 99 MMOL/L (ref 99–110)
CO2 SERPL-SCNC: 26 MMOL/L (ref 21–32)
CREAT SERPL-MCNC: 0.8 MG/DL (ref 0.6–1.2)
GFR SERPLBLD CREATININE-BSD FMLA CKD-EPI: 79 ML/MIN/{1.73_M2}
GLUCOSE SERPL-MCNC: 164 MG/DL (ref 70–99)
NT-PROBNP SERPL-MCNC: 150 PG/ML (ref 0–124)
POTASSIUM SERPL-SCNC: 4.6 MMOL/L (ref 3.5–5.1)
SODIUM SERPL-SCNC: 139 MMOL/L (ref 136–145)

## 2024-03-27 PROCEDURE — 36415 COLL VENOUS BLD VENIPUNCTURE: CPT

## 2024-03-27 PROCEDURE — 80048 BASIC METABOLIC PNL TOTAL CA: CPT

## 2024-03-27 PROCEDURE — 83880 ASSAY OF NATRIURETIC PEPTIDE: CPT

## 2024-03-28 ENCOUNTER — TELEPHONE (OUTPATIENT)
Dept: CARDIOLOGY CLINIC | Age: 69
End: 2024-03-28

## 2024-03-28 RX ORDER — ROSUVASTATIN CALCIUM 10 MG/1
10 TABLET, COATED ORAL DAILY
Qty: 90 TABLET | Refills: 0 | Status: SHIPPED | OUTPATIENT
Start: 2024-03-28

## 2024-03-28 NOTE — TELEPHONE ENCOUNTER
Tammy calling requesting a 90 day supply for rosuvastatin (CRESTOR) 10 MG tablet as the pharmacy had only received 30, please call  and advise   Thank you

## 2024-04-30 ENCOUNTER — TELEPHONE (OUTPATIENT)
Dept: WOMENS IMAGING | Age: 69
End: 2024-04-30

## 2024-05-04 DIAGNOSIS — F41.9 ANXIETY: Chronic | ICD-10-CM

## 2024-05-06 RX ORDER — VILAZODONE HYDROCHLORIDE 20 MG/1
20 TABLET ORAL DAILY
Qty: 90 TABLET | Refills: 0 | Status: SHIPPED | OUTPATIENT
Start: 2024-05-06

## 2024-05-06 NOTE — TELEPHONE ENCOUNTER
Last OV: 2/29/2024  Next OV: 5/23/2024    Next appointment due:05/22/24    Last fill: 2/22/24  Refills:1

## 2024-05-09 RX ORDER — DIGOXIN 125 MCG
125 TABLET ORAL DAILY
Qty: 90 TABLET | Refills: 0 | Status: SHIPPED | OUTPATIENT
Start: 2024-05-09

## 2024-05-23 ENCOUNTER — OFFICE VISIT (OUTPATIENT)
Dept: INTERNAL MEDICINE CLINIC | Age: 69
End: 2024-05-23
Payer: COMMERCIAL

## 2024-05-23 VITALS
DIASTOLIC BLOOD PRESSURE: 56 MMHG | BODY MASS INDEX: 25.62 KG/M2 | WEIGHT: 139.2 LBS | HEIGHT: 62 IN | SYSTOLIC BLOOD PRESSURE: 96 MMHG | HEART RATE: 65 BPM

## 2024-05-23 DIAGNOSIS — I48.0 PAROXYSMAL ATRIAL FIBRILLATION (HCC): Chronic | ICD-10-CM

## 2024-05-23 DIAGNOSIS — I42.0 DILATED CARDIOMYOPATHY (HCC): ICD-10-CM

## 2024-05-23 DIAGNOSIS — I10 BENIGN ESSENTIAL HTN: Chronic | ICD-10-CM

## 2024-05-23 DIAGNOSIS — G89.4 CHRONIC PAIN SYNDROME: ICD-10-CM

## 2024-05-23 DIAGNOSIS — F41.9 ANXIETY: Primary | Chronic | ICD-10-CM

## 2024-05-23 DIAGNOSIS — G89.29 CHRONIC BILATERAL THORACIC BACK PAIN: ICD-10-CM

## 2024-05-23 DIAGNOSIS — F43.9 TRAUMA AND STRESSOR-RELATED DISORDER: ICD-10-CM

## 2024-05-23 DIAGNOSIS — R45.851 SUICIDAL IDEATION: ICD-10-CM

## 2024-05-23 DIAGNOSIS — M54.6 CHRONIC BILATERAL THORACIC BACK PAIN: ICD-10-CM

## 2024-05-23 DIAGNOSIS — G47.33 OBSTRUCTIVE SLEEP APNEA SYNDROME: Chronic | ICD-10-CM

## 2024-05-23 DIAGNOSIS — F33.1 MODERATE EPISODE OF RECURRENT MAJOR DEPRESSIVE DISORDER (HCC): ICD-10-CM

## 2024-05-23 DIAGNOSIS — I50.22 CHRONIC SYSTOLIC HEART FAILURE (HCC): Chronic | ICD-10-CM

## 2024-05-23 DIAGNOSIS — M79.7 FIBROMYALGIA SYNDROME: Chronic | ICD-10-CM

## 2024-05-23 PROCEDURE — G8400 PT W/DXA NO RESULTS DOC: HCPCS | Performed by: NURSE PRACTITIONER

## 2024-05-23 PROCEDURE — 1090F PRES/ABSN URINE INCON ASSESS: CPT | Performed by: NURSE PRACTITIONER

## 2024-05-23 PROCEDURE — G8427 DOCREV CUR MEDS BY ELIG CLIN: HCPCS | Performed by: NURSE PRACTITIONER

## 2024-05-23 PROCEDURE — 1123F ACP DISCUSS/DSCN MKR DOCD: CPT | Performed by: NURSE PRACTITIONER

## 2024-05-23 PROCEDURE — G8417 CALC BMI ABV UP PARAM F/U: HCPCS | Performed by: NURSE PRACTITIONER

## 2024-05-23 PROCEDURE — 1036F TOBACCO NON-USER: CPT | Performed by: NURSE PRACTITIONER

## 2024-05-23 PROCEDURE — 99214 OFFICE O/P EST MOD 30 MIN: CPT | Performed by: NURSE PRACTITIONER

## 2024-05-23 PROCEDURE — 3074F SYST BP LT 130 MM HG: CPT | Performed by: NURSE PRACTITIONER

## 2024-05-23 PROCEDURE — 3017F COLORECTAL CA SCREEN DOC REV: CPT | Performed by: NURSE PRACTITIONER

## 2024-05-23 PROCEDURE — 3078F DIAST BP <80 MM HG: CPT | Performed by: NURSE PRACTITIONER

## 2024-05-23 RX ORDER — HYDROXYZINE PAMOATE 25 MG/1
25 CAPSULE ORAL 2 TIMES DAILY PRN
Qty: 90 CAPSULE | Refills: 1 | Status: SHIPPED | OUTPATIENT
Start: 2024-05-23

## 2024-05-23 NOTE — ASSESSMENT & PLAN NOTE
Chronic, uncontrolled  Not taking viibryd as prescribed, she has not started.  Lengthy discussion with pt regarding anxiety, depression, medication use, risks vs benefits in detail. Strongly encouraged to pt take viibryd daily, agreeable. Okay to continue to use hydroxyzine as needed  Discussed scheduling a follow up with Dr. Ortega - juliane for appt

## 2024-05-23 NOTE — PATIENT INSTRUCTIONS
Start vilazodone (viibryd) 20 mg daily for anxiety and depression   Take every day   Use hydroxyzine as needed for anxiety

## 2024-05-23 NOTE — ASSESSMENT & PLAN NOTE
Chronic, controlled.  Continue working on healthy lifestyle modifications with diet and exercise.  Continue taking medications as prescribed and following with cardiology as recommended.

## 2024-05-23 NOTE — ASSESSMENT & PLAN NOTE
Chronic.  Last EF 30 to 35% on echo.  continue working on healthy lifestyle modifications with diet and exercise.  Continue taking medications as prescribed and following with cardiology as recommended.

## 2024-05-23 NOTE — ASSESSMENT & PLAN NOTE
Chronic, intermittently uncontrolled.  We discussed in detail.  Trial of biomed cream for intermittent joint pain.  She has previously seen pain management, Dr. Koenig at , she was given a referral last year but has not scheduled.

## 2024-05-23 NOTE — PROGRESS NOTES
5/23/24     Chief Complaint   Patient presents with    Follow-up     Depression    Other     Traveling to [place]      HPI    Here for follow up   Depression - she is not taking vilazodone, did not try medication. She is taking hydroxyzine as needed for anxiety, usually about twice a week. She does not want to take a depression pill every day. She did try her husbands clonazepam last night which helped her sleep. She denies any SI/ HI.     Going to [place] for 3 weeks, leaving on June 15th.  Using THC oil for chronic pain and fibromyalgia. She is asking for something to help with pain while out of the country, does not want to take marijuana oils with her.  She does not like tramadol due to increased anxiety. Not able to take nsaids due to HF.     Allergies   Allergen Reactions    Jardiance [Empagliflozin]      dizziness    Prednisone      Caused an allergic reaction. Swelling all over her body.       Current Outpatient Medications   Medication Sig Dispense Refill    hydrOXYzine pamoate (VISTARIL) 25 MG capsule Take 1 capsule by mouth 2 times daily as needed for Anxiety 90 capsule 1    digoxin (LANOXIN) 125 MCG tablet TAKE 1 TABLET BY MOUTH DAILY 90 tablet 0    vilazodone HCl (VIIBRYD) 20 MG TABS TAKE 1 TABLET BY MOUTH DAILY 90 tablet 0    rosuvastatin (CRESTOR) 10 MG tablet Take 1 tablet by mouth daily 90 tablet 0    fluticasone (FLONASE) 50 MCG/ACT nasal spray 1 spray by Each Nostril route daily 16 g 0    spironolactone (ALDACTONE) 25 MG tablet 12.5 mg mon through friday 36 tablet 1    vitamin D3 (CHOLECALCIFEROL) 25 MCG (1000 UT) TABS tablet TAKE 1 TABLET BY MOUTH DAILY 90 tablet 1    sacubitril-valsartan (ENTRESTO) 24-26 MG per tablet TAKE ONE-HALF TABLET BY MOUTH EVERY MORNING AND 1 EVERY EVENING 180 tablet 3    carvedilol (COREG) 3.125 MG tablet TAKE 1 TABLET BY MOUTH TWICE DAILY 180 tablet 1    montelukast (SINGULAIR) 10 MG tablet Take 1 tablet by mouth daily 30 tablet 5    Magnesium 400 MG TABS Take by mouth

## 2024-05-23 NOTE — ASSESSMENT & PLAN NOTE
Chronic, stable.  Intermittently uncontrolled.  Continue to work on home therapy exercises.  Use lidocaine patches as needed.

## 2024-05-27 ENCOUNTER — HOSPITAL ENCOUNTER (EMERGENCY)
Age: 69
Discharge: HOME | End: 2024-05-27
Attending: EMERGENCY MEDICINE
Payer: COMMERCIAL

## 2024-05-27 VITALS
HEART RATE: 80 BPM | DIASTOLIC BLOOD PRESSURE: 57 MMHG | TEMPERATURE: 98 F | OXYGEN SATURATION: 95 % | RESPIRATION RATE: 22 BRPM | SYSTOLIC BLOOD PRESSURE: 95 MMHG

## 2024-05-27 DIAGNOSIS — F41.9 ANXIETY: Primary | ICD-10-CM

## 2024-05-27 LAB
ALBUMIN SERPL-MCNC: 4.1 G/DL (ref 3.4–5)
ALBUMIN/GLOB SERPL: 1.2 {RATIO} (ref 1.1–2.2)
ALP SERPL-CCNC: 87 U/L (ref 40–129)
ALT SERPL-CCNC: 18 U/L (ref 10–40)
ANION GAP SERPL CALCULATED.3IONS-SCNC: 13 MMOL/L (ref 3–16)
AST SERPL-CCNC: 25 U/L (ref 15–37)
BASOPHILS # BLD: 0 K/UL (ref 0–0.2)
BASOPHILS NFR BLD: 0.7 %
BILIRUB SERPL-MCNC: 0.4 MG/DL (ref 0–1)
BUN SERPL-MCNC: 25 MG/DL (ref 7–20)
CALCIUM SERPL-MCNC: 9.7 MG/DL (ref 8.3–10.6)
CHLORIDE SERPL-SCNC: 100 MMOL/L (ref 99–110)
CO2 SERPL-SCNC: 23 MMOL/L (ref 21–32)
CREAT SERPL-MCNC: 0.9 MG/DL (ref 0.6–1.2)
DEPRECATED RDW RBC AUTO: 13.7 % (ref 12.4–15.4)
EKG ATRIAL RATE: 83 BPM
EKG DIAGNOSIS: NORMAL
EKG P AXIS: 96 DEGREES
EKG P-R INTERVAL: 128 MS
EKG Q-T INTERVAL: 448 MS
EKG QRS DURATION: 162 MS
EKG QTC CALCULATION (BAZETT): 526 MS
EKG R AXIS: 121 DEGREES
EKG T AXIS: 74 DEGREES
EKG VENTRICULAR RATE: 83 BPM
EOSINOPHIL # BLD: 0.2 K/UL (ref 0–0.6)
EOSINOPHIL NFR BLD: 2.4 %
GFR SERPLBLD CREATININE-BSD FMLA CKD-EPI: 69 ML/MIN/{1.73_M2}
GLUCOSE SERPL-MCNC: 248 MG/DL (ref 70–99)
HCT VFR BLD AUTO: 40.7 % (ref 36–48)
HGB BLD-MCNC: 14 G/DL (ref 12–16)
LYMPHOCYTES # BLD: 3.6 K/UL (ref 1–5.1)
LYMPHOCYTES NFR BLD: 50.2 %
MCH RBC QN AUTO: 30.9 PG (ref 26–34)
MCHC RBC AUTO-ENTMCNC: 34.5 G/DL (ref 31–36)
MCV RBC AUTO: 89.6 FL (ref 80–100)
MONOCYTES # BLD: 0.7 K/UL (ref 0–1.3)
MONOCYTES NFR BLD: 9.5 %
NEUTROPHILS # BLD: 2.7 K/UL (ref 1.7–7.7)
NEUTROPHILS NFR BLD: 37.2 %
PLATELET # BLD AUTO: 119 K/UL (ref 135–450)
PMV BLD AUTO: 9.4 FL (ref 5–10.5)
POTASSIUM SERPL-SCNC: 4.1 MMOL/L (ref 3.5–5.1)
PROT SERPL-MCNC: 7.4 G/DL (ref 6.4–8.2)
RBC # BLD AUTO: 4.54 M/UL (ref 4–5.2)
SODIUM SERPL-SCNC: 136 MMOL/L (ref 136–145)
WBC # BLD AUTO: 7.2 K/UL (ref 4–11)

## 2024-05-27 PROCEDURE — 99284 EMERGENCY DEPT VISIT MOD MDM: CPT

## 2024-05-27 PROCEDURE — 36415 COLL VENOUS BLD VENIPUNCTURE: CPT

## 2024-05-27 PROCEDURE — 85025 COMPLETE CBC W/AUTO DIFF WBC: CPT

## 2024-05-27 PROCEDURE — 2580000003 HC RX 258: Performed by: EMERGENCY MEDICINE

## 2024-05-27 PROCEDURE — 93010 ELECTROCARDIOGRAM REPORT: CPT | Performed by: INTERNAL MEDICINE

## 2024-05-27 PROCEDURE — 6360000002 HC RX W HCPCS: Performed by: EMERGENCY MEDICINE

## 2024-05-27 PROCEDURE — A4216 STERILE WATER/SALINE, 10 ML: HCPCS | Performed by: EMERGENCY MEDICINE

## 2024-05-27 PROCEDURE — 96374 THER/PROPH/DIAG INJ IV PUSH: CPT

## 2024-05-27 PROCEDURE — 80053 COMPREHEN METABOLIC PANEL: CPT

## 2024-05-27 PROCEDURE — 93005 ELECTROCARDIOGRAM TRACING: CPT | Performed by: EMERGENCY MEDICINE

## 2024-05-27 RX ADMIN — SODIUM CHLORIDE 1 MG: 9 INJECTION INTRAMUSCULAR; INTRAVENOUS; SUBCUTANEOUS at 01:42

## 2024-05-27 ASSESSMENT — LIFESTYLE VARIABLES
HOW MANY STANDARD DRINKS CONTAINING ALCOHOL DO YOU HAVE ON A TYPICAL DAY: PATIENT DOES NOT DRINK
HOW OFTEN DO YOU HAVE A DRINK CONTAINING ALCOHOL: NEVER

## 2024-05-27 NOTE — ED PROVIDER NOTES
and I have reviewed and agree with all pertinent clinical information including history, physical exam, and plan. I have also reviewed and agree with the medications, allergies and past medical history section for this patient.    Electronically signed by: Brian Mares Jr., DO, FACEP, [facility], 5/27/2024  3:40 AM       Brian Mares DO  05/27/24 0340

## 2024-05-30 ENCOUNTER — OFFICE VISIT (OUTPATIENT)
Dept: INTERNAL MEDICINE CLINIC | Age: 69
End: 2024-05-30
Payer: COMMERCIAL

## 2024-05-30 ENCOUNTER — TELEPHONE (OUTPATIENT)
Dept: INTERNAL MEDICINE CLINIC | Age: 69
End: 2024-05-30

## 2024-05-30 VITALS
WEIGHT: 138.4 LBS | SYSTOLIC BLOOD PRESSURE: 110 MMHG | DIASTOLIC BLOOD PRESSURE: 70 MMHG | OXYGEN SATURATION: 97 % | BODY MASS INDEX: 25.31 KG/M2 | TEMPERATURE: 97.7 F | HEART RATE: 87 BPM

## 2024-05-30 DIAGNOSIS — J06.9 URTI (ACUTE UPPER RESPIRATORY INFECTION): ICD-10-CM

## 2024-05-30 DIAGNOSIS — M54.6 ACUTE RIGHT-SIDED THORACIC BACK PAIN: ICD-10-CM

## 2024-05-30 DIAGNOSIS — H92.02 LEFT EAR PAIN: Primary | ICD-10-CM

## 2024-05-30 PROCEDURE — 1123F ACP DISCUSS/DSCN MKR DOCD: CPT | Performed by: INTERNAL MEDICINE

## 2024-05-30 PROCEDURE — 1036F TOBACCO NON-USER: CPT | Performed by: INTERNAL MEDICINE

## 2024-05-30 PROCEDURE — 3078F DIAST BP <80 MM HG: CPT | Performed by: INTERNAL MEDICINE

## 2024-05-30 PROCEDURE — 99213 OFFICE O/P EST LOW 20 MIN: CPT | Performed by: INTERNAL MEDICINE

## 2024-05-30 PROCEDURE — 3017F COLORECTAL CA SCREEN DOC REV: CPT | Performed by: INTERNAL MEDICINE

## 2024-05-30 PROCEDURE — 3074F SYST BP LT 130 MM HG: CPT | Performed by: INTERNAL MEDICINE

## 2024-05-30 PROCEDURE — G8400 PT W/DXA NO RESULTS DOC: HCPCS | Performed by: INTERNAL MEDICINE

## 2024-05-30 PROCEDURE — G8427 DOCREV CUR MEDS BY ELIG CLIN: HCPCS | Performed by: INTERNAL MEDICINE

## 2024-05-30 PROCEDURE — G8417 CALC BMI ABV UP PARAM F/U: HCPCS | Performed by: INTERNAL MEDICINE

## 2024-05-30 PROCEDURE — 1090F PRES/ABSN URINE INCON ASSESS: CPT | Performed by: INTERNAL MEDICINE

## 2024-05-30 RX ORDER — TIZANIDINE 4 MG/1
4 TABLET ORAL 3 TIMES DAILY PRN
Qty: 30 TABLET | Refills: 0 | Status: SHIPPED | OUTPATIENT
Start: 2024-05-30

## 2024-05-30 RX ORDER — HYDROCHLOROTHIAZIDE 25 MG/1
25 TABLET ORAL DAILY
COMMUNITY
Start: 2024-04-09

## 2024-05-30 RX ORDER — LORATADINE 10 MG/1
10 TABLET ORAL DAILY
Qty: 30 TABLET | Refills: 0 | Status: SHIPPED | OUTPATIENT
Start: 2024-05-30

## 2024-05-30 RX ORDER — PSEUDOEPHEDRINE HCL 30 MG
60 TABLET ORAL 3 TIMES DAILY PRN
Qty: 20 TABLET | Refills: 0 | Status: SHIPPED | OUTPATIENT
Start: 2024-05-30 | End: 2024-06-06

## 2024-05-30 ASSESSMENT — ENCOUNTER SYMPTOMS
RHINORRHEA: 0
SINUS PRESSURE: 1
DIARRHEA: 0
VOMITING: 0
COUGH: 1
SORE THROAT: 0
ABDOMINAL PAIN: 0

## 2024-05-30 NOTE — TELEPHONE ENCOUNTER
Patient saw Rebekah 5/23/24 and Dr Vela today.  She is traveling to [place] 6/15/24 which she discussed with Rebekah at appointment.  Patient is requesting anxiety medication for plane.  She states she was unable to get cream Rebekah prescribed due to cost of $120.  Patient is requesting a pill for pain since they will be walking a lot.  She uses [facility] on [address] in [place].

## 2024-05-30 NOTE — TELEPHONE ENCOUNTER
Recommend she take hydroxyzine for her anxiety, okay to take prior to flying (she already has Rx for this medication).     She can take tylenol for pain if she needs it when traveling if the cream is expensive. She cannot take nsaids due to her heart failure. She told me during her visit she had increased anxiety with tramadol. I will send over a short course of tizanidine to take with her and use if needed, she has been on this before and tolerated it.   Rebekah

## 2024-05-30 NOTE — PROGRESS NOTES
ASSESSMENT/PLAN:  1. Left ear pain  Assessment & Plan:    no evidence of otitis on exam, advised patient symptoms most likely secondary to residual fluid buildup from recent infection and her ongoing allergies.  Explained to patient no need for antibiotics at this point since most acute symptoms subsided however recommended she adds a daily antihistamine to her current regimen of montelukast and Flonase, advised can also try low-dose Sudafed for 2 to 3 days provided that she monitors her blood pressure and discontinue if blood pressure readings starts to go up, patient verbalized understanding and encouraged to call the office if any new or worsening symptoms  Orders:  -     loratadine (CLARITIN) 10 MG tablet; Take 1 tablet by mouth daily, Disp-30 tablet, R-0Normal  -     pseudoephedrine (SUDAFED) 30 MG tablet; Take 2 tablets by mouth 3 times daily as needed for Congestion, Disp-20 tablet, R-0Normal  2. URTI (acute upper respiratory infection)  -     loratadine (CLARITIN) 10 MG tablet; Take 1 tablet by mouth daily, Disp-30 tablet, R-0Normal      Return for as scheduled.     SUBJECTIVE  HPI:   Here for an acute visit complaining of pain and discomfort in the left ear, reports she had cough and sinus infection about 3 weeks ago, at that time had some chills and was coughing greenish stuff however this has subsided, now still has residual cough but mostly white sputum.  She is denying any fever.  She does have allergies for which she has been taking Flonase and Singulair    Ear Fullness   There is pain in the left ear. This is a new problem. The current episode started in the past 7 days. The problem occurs constantly. The problem has been waxing and waning. There has been no fever. The pain is mild. Associated symptoms include coughing. Pertinent negatives include no abdominal pain, diarrhea, ear discharge, headaches, hearing loss, neck pain, rash, rhinorrhea, sore throat or vomiting. She has tried nothing for the

## 2024-05-30 NOTE — ASSESSMENT & PLAN NOTE
no evidence of otitis on exam, advised patient symptoms most likely secondary to residual fluid buildup from recent infection and her ongoing allergies.  Explained to patient no need for antibiotics at this point since most acute symptoms subsided however recommended she adds a daily antihistamine to her current regimen of montelukast and Flonase, advised can also try low-dose Sudafed for 2 to 3 days provided that she monitors her blood pressure and discontinue if blood pressure readings starts to go up, patient verbalized understanding and encouraged to call the office if any new or worsening symptoms

## 2024-06-05 ENCOUNTER — OFFICE VISIT (OUTPATIENT)
Dept: CARDIOLOGY CLINIC | Age: 69
End: 2024-06-05
Payer: COMMERCIAL

## 2024-06-05 VITALS
OXYGEN SATURATION: 97 % | HEART RATE: 64 BPM | DIASTOLIC BLOOD PRESSURE: 60 MMHG | BODY MASS INDEX: 25.4 KG/M2 | SYSTOLIC BLOOD PRESSURE: 100 MMHG | HEIGHT: 62 IN | WEIGHT: 138 LBS

## 2024-06-05 DIAGNOSIS — I42.8 NON-ISCHEMIC CARDIOMYOPATHY (HCC): ICD-10-CM

## 2024-06-05 DIAGNOSIS — E78.5 HYPERLIPIDEMIA, UNSPECIFIED HYPERLIPIDEMIA TYPE: ICD-10-CM

## 2024-06-05 DIAGNOSIS — I50.22 CHRONIC SYSTOLIC HEART FAILURE (HCC): Primary | ICD-10-CM

## 2024-06-05 DIAGNOSIS — I48.0 PAF (PAROXYSMAL ATRIAL FIBRILLATION) (HCC): ICD-10-CM

## 2024-06-05 DIAGNOSIS — Z95.810 BIVENTRICULAR ICD (IMPLANTABLE CARDIOVERTER-DEFIBRILLATOR) IN PLACE: ICD-10-CM

## 2024-06-05 PROCEDURE — 3078F DIAST BP <80 MM HG: CPT | Performed by: CLINICAL NURSE SPECIALIST

## 2024-06-05 PROCEDURE — G8427 DOCREV CUR MEDS BY ELIG CLIN: HCPCS | Performed by: CLINICAL NURSE SPECIALIST

## 2024-06-05 PROCEDURE — 3074F SYST BP LT 130 MM HG: CPT | Performed by: CLINICAL NURSE SPECIALIST

## 2024-06-05 PROCEDURE — G8417 CALC BMI ABV UP PARAM F/U: HCPCS | Performed by: CLINICAL NURSE SPECIALIST

## 2024-06-05 PROCEDURE — 1036F TOBACCO NON-USER: CPT | Performed by: CLINICAL NURSE SPECIALIST

## 2024-06-05 PROCEDURE — 1090F PRES/ABSN URINE INCON ASSESS: CPT | Performed by: CLINICAL NURSE SPECIALIST

## 2024-06-05 PROCEDURE — 3017F COLORECTAL CA SCREEN DOC REV: CPT | Performed by: CLINICAL NURSE SPECIALIST

## 2024-06-05 PROCEDURE — 99214 OFFICE O/P EST MOD 30 MIN: CPT | Performed by: CLINICAL NURSE SPECIALIST

## 2024-06-05 PROCEDURE — 1123F ACP DISCUSS/DSCN MKR DOCD: CPT | Performed by: CLINICAL NURSE SPECIALIST

## 2024-06-05 PROCEDURE — G8400 PT W/DXA NO RESULTS DOC: HCPCS | Performed by: CLINICAL NURSE SPECIALIST

## 2024-06-05 RX ORDER — SPIRONOLACTONE 25 MG/1
TABLET ORAL
Qty: 36 TABLET | Refills: 1 | Status: SHIPPED | OUTPATIENT
Start: 2024-06-05

## 2024-06-05 RX ORDER — CARVEDILOL 3.12 MG/1
3.12 TABLET ORAL 2 TIMES DAILY
Qty: 180 TABLET | Refills: 1 | Status: SHIPPED | OUTPATIENT
Start: 2024-06-05

## 2024-06-05 RX ORDER — SACUBITRIL AND VALSARTAN 24; 26 MG/1; MG/1
TABLET, FILM COATED ORAL
Qty: 180 TABLET | Refills: 1 | Status: SHIPPED | OUTPATIENT
Start: 2024-06-05

## 2024-06-05 RX ORDER — DIGOXIN 125 MCG
125 TABLET ORAL DAILY
Qty: 90 TABLET | Refills: 1 | Status: SHIPPED | OUTPATIENT
Start: 2024-06-05

## 2024-06-05 RX ORDER — ROSUVASTATIN CALCIUM 10 MG/1
10 TABLET, COATED ORAL DAILY
Qty: 90 TABLET | Refills: 1 | Status: SHIPPED | OUTPATIENT
Start: 2024-06-05

## 2024-06-05 NOTE — PATIENT INSTRUCTIONS
Refills done   Continue all current medications  Check lipids (fast 12 hours prior)  RTO in 6-8 months

## 2024-06-05 NOTE — PROGRESS NOTES
[facility]  Progress Note    Primary Care Doctor:  Rebekah Carson, APRN - CNP    Chief Complaint   Patient presents with    3 Month Follow-Up    Congestive Heart Failure        History of Present Illness:  68 y.o. female with history of NICM, BiV ICD  and , AF (not on anticoagulation, follows with Dr Gusman), ESCOBAR on cpap, fibromyalgia, sHF on entresto since , followed at   - for chest pain, LHC done, EGD with H pylori neg and anxiety/depression (family issues) seen by psychiatry and started on klonipin/zoloft.  ICD generator change 2022    I had the pleasure of seeing Tess Atkinson in follow up for systolic heart failure.  She is ambulatory and with her .  Her optival is finally back to normal and has stopped the HCTZ.  She denies any chest pain, palpitations, lightheadedness, edema or lightheadedness.  She is going to [place] for 3 weeks and asking for something for jet lag.  She is taking all her other medications.  She has started taking Coq10 and back on her crestor.  No recheck in lipids        Past Medical History:   has a past medical history of Atrial fibrillation (HCC), Cardiomyopathy, nonischemic (HCC), CHF (congestive heart failure) (HCC), Fibromyalgia, GERD (gastroesophageal reflux disease), Heart disease, HSV-1 (herpes simplex virus 1) infection, Hyperlipidemia, Sleep apnea, and Uterine fibroids affecting pregnancy.  Surgical History:   has a past surgical history that includes  section; Pacemaker insertion (); Pacemaker insertion; Upper gastrointestinal endoscopy (2015); Colonoscopy (2015); Colonoscopy (N/A, 2019); Upper gastrointestinal endoscopy (N/A, 2019); Cardiac defibrillator placement; and Upper gastrointestinal endoscopy (N/A, 2020).   Social History:   reports that she quit smoking about 26 years ago. Her smoking use included cigarettes. She started smoking about 36 years ago. She has a 2.5 pack-year smoking

## 2024-07-30 ENCOUNTER — OFFICE VISIT (OUTPATIENT)
Dept: UROGYNECOLOGY | Age: 69
End: 2024-07-30
Payer: COMMERCIAL

## 2024-07-30 VITALS
OXYGEN SATURATION: 98 % | RESPIRATION RATE: 16 BRPM | TEMPERATURE: 97.9 F | SYSTOLIC BLOOD PRESSURE: 114 MMHG | HEART RATE: 77 BPM | DIASTOLIC BLOOD PRESSURE: 72 MMHG

## 2024-07-30 DIAGNOSIS — N39.41 URGE INCONTINENCE: Primary | ICD-10-CM

## 2024-07-30 DIAGNOSIS — R39.15 URINARY URGENCY: ICD-10-CM

## 2024-07-30 DIAGNOSIS — N32.81 OVERACTIVE BLADDER: ICD-10-CM

## 2024-07-30 DIAGNOSIS — R35.0 URINARY FREQUENCY: ICD-10-CM

## 2024-07-30 PROCEDURE — 0509F URINE INCON PLAN DOCD: CPT | Performed by: OBSTETRICS & GYNECOLOGY

## 2024-07-30 PROCEDURE — G8400 PT W/DXA NO RESULTS DOC: HCPCS | Performed by: OBSTETRICS & GYNECOLOGY

## 2024-07-30 PROCEDURE — 1036F TOBACCO NON-USER: CPT | Performed by: OBSTETRICS & GYNECOLOGY

## 2024-07-30 PROCEDURE — 99214 OFFICE O/P EST MOD 30 MIN: CPT | Performed by: OBSTETRICS & GYNECOLOGY

## 2024-07-30 PROCEDURE — 3078F DIAST BP <80 MM HG: CPT | Performed by: OBSTETRICS & GYNECOLOGY

## 2024-07-30 PROCEDURE — 1123F ACP DISCUSS/DSCN MKR DOCD: CPT | Performed by: OBSTETRICS & GYNECOLOGY

## 2024-07-30 PROCEDURE — 3017F COLORECTAL CA SCREEN DOC REV: CPT | Performed by: OBSTETRICS & GYNECOLOGY

## 2024-07-30 PROCEDURE — G8427 DOCREV CUR MEDS BY ELIG CLIN: HCPCS | Performed by: OBSTETRICS & GYNECOLOGY

## 2024-07-30 PROCEDURE — 3074F SYST BP LT 130 MM HG: CPT | Performed by: OBSTETRICS & GYNECOLOGY

## 2024-07-30 PROCEDURE — 1090F PRES/ABSN URINE INCON ASSESS: CPT | Performed by: OBSTETRICS & GYNECOLOGY

## 2024-07-30 PROCEDURE — G8420 CALC BMI NORM PARAMETERS: HCPCS | Performed by: OBSTETRICS & GYNECOLOGY

## 2024-07-30 RX ORDER — IBUPROFEN 600 MG/1
600 TABLET ORAL ONCE
Qty: 1 TABLET | Refills: 0 | Status: SHIPPED | OUTPATIENT
Start: 2024-07-30 | End: 2024-07-31 | Stop reason: ALTCHOICE

## 2024-07-30 RX ORDER — NITROFURANTOIN 25; 75 MG/1; MG/1
100 CAPSULE ORAL 2 TIMES DAILY
Qty: 14 CAPSULE | Refills: 0 | Status: SHIPPED | OUTPATIENT
Start: 2024-07-30 | End: 2024-08-06

## 2024-07-30 NOTE — PROGRESS NOTES
2024       HPI:     Name: Tess Atkinson  YOB: 1955    CC: Patient is a 68 y.o. presenting for evaluation of urge incontinence .   HPI: How long have you had this problem?  years  Please rate the severity of your problem: moderate  Anything make it better? Patient is here for Botox consent. She thinks the Botox has helped her urinary symptoms. She is pleased with her results.     Ob/Gyn History:    OB History    Para Term  AB Living   3 3 3         SAB IAB Ectopic Molar Multiple Live Births                    # Outcome Date GA Lbr Adams/2nd Weight Sex Delivery Anes PTL Lv   3 Term      CS-Classical      2 Term      CS-Classical      1 Term      CS-Classical        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 apnea     uses cpap    Uterine fibroids affecting pregnancy      Past Surgical History:   Past Surgical History:   Procedure Laterality Date    CARDIAC DEFIBRILLATOR PLACEMENT       SECTION      3 c-sections    COLONOSCOPY  2015    polypectomy    COLONOSCOPY N/A 2019    COLONOSCOPY POLYPECTOMY SNARE/COLD ASCENDING X1 , TRANSVERSE X1, SIGMOID X1 performed by Dell Munguia MD at [facility] ENDOSCOPY    PACEMAKER INSERTION      PACEMAKER INSERTION      UPPER GASTROINTESTINAL ENDOSCOPY  2015    UPPER GASTROINTESTINAL ENDOSCOPY N/A 2019    EGD GASTRIC BIOPSY performed by Dell Munguia MD at [facility] ENDOSCOPY    UPPER GASTROINTESTINAL ENDOSCOPY N/A 2020    EGD BIOPSY performed by Dell Munguia MD at [facility] ENDOSCOPY     Allergies:   Allergies   Allergen Reactions    Jardiance [Empagliflozin]      dizziness    Prednisone

## 2024-07-31 ENCOUNTER — OFFICE VISIT (OUTPATIENT)
Dept: INTERNAL MEDICINE CLINIC | Age: 69
End: 2024-07-31
Payer: COMMERCIAL

## 2024-07-31 VITALS
OXYGEN SATURATION: 99 % | WEIGHT: 132.2 LBS | HEIGHT: 62 IN | DIASTOLIC BLOOD PRESSURE: 70 MMHG | SYSTOLIC BLOOD PRESSURE: 110 MMHG | BODY MASS INDEX: 24.33 KG/M2 | HEART RATE: 62 BPM

## 2024-07-31 DIAGNOSIS — N89.8 VAGINAL ITCHING: Primary | ICD-10-CM

## 2024-07-31 DIAGNOSIS — R42 VERTIGO: ICD-10-CM

## 2024-07-31 DIAGNOSIS — I10 BENIGN ESSENTIAL HTN: Chronic | ICD-10-CM

## 2024-07-31 DIAGNOSIS — J30.2 SEASONAL ALLERGIES: ICD-10-CM

## 2024-07-31 PROBLEM — H92.02 LEFT EAR PAIN: Status: RESOLVED | Noted: 2024-05-30 | Resolved: 2024-07-31

## 2024-07-31 PROBLEM — H65.02 NON-RECURRENT ACUTE SEROUS OTITIS MEDIA OF LEFT EAR: Status: RESOLVED | Noted: 2023-10-23 | Resolved: 2024-07-31

## 2024-07-31 PROBLEM — J06.9 URTI (ACUTE UPPER RESPIRATORY INFECTION): Status: RESOLVED | Noted: 2024-05-30 | Resolved: 2024-07-31

## 2024-07-31 PROBLEM — H65.413 CHRONIC ALLERGIC OTITIS MEDIA OF BOTH EARS: Status: RESOLVED | Noted: 2024-02-22 | Resolved: 2024-07-31

## 2024-07-31 PROCEDURE — G8400 PT W/DXA NO RESULTS DOC: HCPCS | Performed by: NURSE PRACTITIONER

## 2024-07-31 PROCEDURE — 1123F ACP DISCUSS/DSCN MKR DOCD: CPT | Performed by: NURSE PRACTITIONER

## 2024-07-31 PROCEDURE — 3074F SYST BP LT 130 MM HG: CPT | Performed by: NURSE PRACTITIONER

## 2024-07-31 PROCEDURE — 3078F DIAST BP <80 MM HG: CPT | Performed by: NURSE PRACTITIONER

## 2024-07-31 PROCEDURE — 3017F COLORECTAL CA SCREEN DOC REV: CPT | Performed by: NURSE PRACTITIONER

## 2024-07-31 PROCEDURE — 1090F PRES/ABSN URINE INCON ASSESS: CPT | Performed by: NURSE PRACTITIONER

## 2024-07-31 PROCEDURE — 99214 OFFICE O/P EST MOD 30 MIN: CPT | Performed by: NURSE PRACTITIONER

## 2024-07-31 PROCEDURE — 1036F TOBACCO NON-USER: CPT | Performed by: NURSE PRACTITIONER

## 2024-07-31 PROCEDURE — G8420 CALC BMI NORM PARAMETERS: HCPCS | Performed by: NURSE PRACTITIONER

## 2024-07-31 PROCEDURE — G8427 DOCREV CUR MEDS BY ELIG CLIN: HCPCS | Performed by: NURSE PRACTITIONER

## 2024-07-31 RX ORDER — FLUCONAZOLE 150 MG/1
150 TABLET ORAL
Qty: 2 TABLET | Refills: 0 | Status: SHIPPED | OUTPATIENT
Start: 2024-07-31 | End: 2024-08-06

## 2024-07-31 SDOH — ECONOMIC STABILITY: FOOD INSECURITY: WITHIN THE PAST 12 MONTHS, THE FOOD YOU BOUGHT JUST DIDN'T LAST AND YOU DIDN'T HAVE MONEY TO GET MORE.: NEVER TRUE

## 2024-07-31 SDOH — ECONOMIC STABILITY: FOOD INSECURITY: WITHIN THE PAST 12 MONTHS, YOU WORRIED THAT YOUR FOOD WOULD RUN OUT BEFORE YOU GOT MONEY TO BUY MORE.: NEVER TRUE

## 2024-07-31 SDOH — ECONOMIC STABILITY: INCOME INSECURITY: HOW HARD IS IT FOR YOU TO PAY FOR THE VERY BASICS LIKE FOOD, HOUSING, MEDICAL CARE, AND HEATING?: NOT HARD AT ALL

## 2024-07-31 NOTE — ASSESSMENT & PLAN NOTE
Acute, uncontrolled.   Recently completed antibiotics for sinus infection from urgent care. She is currently on 2 different antibiotics for a UTI. She is not sure what antibiotics. Recommend taking a probiotic daily or eating yogurt daily -due to significant antibiotic use the past month. Likely yeast given antibiotics, treating with diflucan. Will verify with vaginal self swab - sent to lab.

## 2024-07-31 NOTE — PROGRESS NOTES
7/31/24     Chief Complaint   Patient presents with    Vaginitis     X7 days itchy    Urinary Tract Infection     X7 days     Dizziness     X1 month     Fatigue     X1 month      HPI    Here for follow up   Went to urgent care around July 9 /10 for sinusitis - treated with antibiotics for 10 days. Sinus infection has improved overall. Still having some intermittent vertigo. Used flonase a few times, not consistently.      About 1 week ago went to urgent care for UTI. Started on antibiotics, called back with culture results and added Cipro a few days ago. UTI symptoms are starting to improve.     Today she complains of yeast infection, itching, burning. Denies any fever, chills. Eating yogurt.     Allergies   Allergen Reactions    Jardiance [Empagliflozin]      dizziness    Prednisone      Caused an allergic reaction. Swelling all over her body.       Current Outpatient Medications   Medication Sig Dispense Refill    Pramoxine HCl (VAGISIL ANTI-ITCH MEDICATED EX) Apply topically      fluconazole (DIFLUCAN) 150 MG tablet Take 1 tablet by mouth every 72 hours for 6 days 2 tablet 0    nitrofurantoin, macrocrystal-monohydrate, (MACROBID) 100 MG capsule Take 1 capsule by mouth 2 times daily for 7 days Start taking 3 days prior to procedure, then continue for the rest of the week 14 capsule 0    carvedilol (COREG) 3.125 MG tablet Take 1 tablet by mouth 2 times daily 180 tablet 1    digoxin (LANOXIN) 125 MCG tablet Take 1 tablet by mouth daily 90 tablet 1    rosuvastatin (CRESTOR) 10 MG tablet Take 1 tablet by mouth daily 90 tablet 1    sacubitril-valsartan (ENTRESTO) 24-26 MG per tablet TAKE ONE-HALF TABLET BY MOUTH EVERY MORNING AND 1 EVERY EVENING 180 tablet 1    spironolactone (ALDACTONE) 25 MG tablet 12.5 mg mon wed and friday 36 tablet 1    tiZANidine (ZANAFLEX) 4 MG tablet Take 1 tablet by mouth 3 times daily as needed (muscle spasm) 30 tablet 0    hydrOXYzine pamoate (VISTARIL) 25 MG capsule Take 1 capsule by mouth

## 2024-07-31 NOTE — ASSESSMENT & PLAN NOTE
Chronic, intermittent. Worse when allergies are uncontrolled. Recommend using Claritin and flonase daily.  Use nasal rinses daily as needed. Okay to use Meclizine prn for dizziness.

## 2024-07-31 NOTE — ASSESSMENT & PLAN NOTE
Chronic, intermittently uncontrolled. Recommend using flonase and claritin daily for better control.  Recently completed antibiotics for sinusitis, improved. No signs of infection on exam.

## 2024-08-01 LAB
CANDIDA DNA VAG QL NAA+PROBE: ABNORMAL
G VAGINALIS DNA SPEC QL NAA+PROBE: ABNORMAL
T VAGINALIS DNA VAG QL NAA+PROBE: ABNORMAL

## 2024-08-01 RX ORDER — METRONIDAZOLE 500 MG/1
500 TABLET ORAL 2 TIMES DAILY
Qty: 14 TABLET | Refills: 0 | Status: SHIPPED | OUTPATIENT
Start: 2024-08-01 | End: 2024-08-08

## 2024-08-05 ENCOUNTER — TELEPHONE (OUTPATIENT)
Dept: INTERNAL MEDICINE CLINIC | Age: 69
End: 2024-08-05

## 2024-08-05 NOTE — TELEPHONE ENCOUNTER
Pt calling needs new script for her handicap placard---please let pt know when ready and they will . Thanks.

## 2024-08-07 PROCEDURE — 93297 REM INTERROG DEV EVAL ICPMS: CPT | Performed by: CLINICAL NURSE SPECIALIST

## 2024-08-07 NOTE — PROGRESS NOTES
[facility]   Electrophysiology Follow up   Date: 8/7/2024  I had the privilege of visiting Tess Atkinson in the office.     CC: PAF  HPI: Tess Atkinson is a 68 y.o. female  with a PMH of dilated cardiomyopathy s/p ICD Cardiac defibrillator placement (1990s);Laser lead extraction and removal and replacement of Biv ICD (2009) Gen change 7/2018, Atrial fibrillation, CHF (congestive heart failure) (10/2/2008), Dilated cardiomyopathy, Fibromyalgia, GERD, Hyperlipemia, hyperlipidemia (12/6/2010) and TB (1980s). She was previously followed by me at .     Generator change with pocket revision 9/16/2022.     She was seen for an urgent visit for shoulder and neck swelling. Blood cultures showed no growth and lab work was unremarkable. Echo 1/4/24 showed EF 20-25% and no evident signs of vegetation on the visualized pacemaker lead. CT of chest and neck showed no acute abnormalities.     Interval History:   Tess presents today for a follow up. Patient has no cardiac complaints. Device has moved slightly to the left. Patient states she is losing weight but is unsure as to why. She has not talked to her PCP about it yet. States she has felt unwell and tired since her recent trip to [place] when she came back with a bad cough and UTI. She went to a walk in clinic and received antibiotics.       Assessment and plan:     Unexplained weight loss   -Recommended evaluation by her PCP   -recent travel to [place] and feeling unwell since    Shoulder and neck swelling   -Blood cultures showed no growth and lab work was unremarkable.   -Echo 1/4/24 showed EF 20-25% and no evident signs of vegetation on the visualized pacemaker lead.  -CT of chest and neck showed no acute abnormalities.   Swelling is slightly better.    Cardiomyopathy/Chronic Systolic  CHF     - Echo 2/19/2021 LVEF 25-30%   - 10/2018 25% to 30%   - Echo 1/4/24 LVEF 20-25%   - continue Entresto, Coreg , Aldactone, digoxin    - dizziness with Farxiga and 
The patient is counseled to follow a low salt diet to assure blood pressure remains controlled for cardiovascular risk factor modification.   - The patient is counseled to avoid excess caffeine, and energy drinks as this may exacerbated ectopy and arrhythmia.   - The patient is counseled to get regular exercise 3-5 times per week to control cardiovascular risk factors.        All questions and concerns were addressed to the patient/family. Alternatives to my treatment were discussed. I have discussed the above stated plan and the patient verbalized understanding and agreed with the plan.              NOTE: This report was transcribed using voice recognition software. Every effort was made to ensure accuracy, however, inadvertent computerized transcription errors may be present.     Albert Gusman MD, RS, [facility]   Office: [phone]  Fax: [phone]

## 2024-08-08 ENCOUNTER — OFFICE VISIT (OUTPATIENT)
Dept: CARDIOLOGY CLINIC | Age: 69
End: 2024-08-08
Payer: COMMERCIAL

## 2024-08-08 ENCOUNTER — HOSPITAL ENCOUNTER (OUTPATIENT)
Dept: SLEEP CENTER | Age: 69
Discharge: HOME | End: 2024-08-08
Payer: COMMERCIAL

## 2024-08-08 ENCOUNTER — NURSE ONLY (OUTPATIENT)
Dept: CARDIOLOGY CLINIC | Age: 69
End: 2024-08-08

## 2024-08-08 VITALS
BODY MASS INDEX: 24.14 KG/M2 | OXYGEN SATURATION: 99 % | SYSTOLIC BLOOD PRESSURE: 110 MMHG | HEART RATE: 79 BPM | DIASTOLIC BLOOD PRESSURE: 68 MMHG | HEIGHT: 62 IN | WEIGHT: 131.2 LBS

## 2024-08-08 DIAGNOSIS — I48.0 PAF (PAROXYSMAL ATRIAL FIBRILLATION) (HCC): ICD-10-CM

## 2024-08-08 DIAGNOSIS — Z45.02 ICD (IMPLANTABLE CARDIOVERTER-DEFIBRILLATOR) BATTERY DEPLETION: Primary | ICD-10-CM

## 2024-08-08 DIAGNOSIS — I42.0 DILATED CARDIOMYOPATHY (HCC): ICD-10-CM

## 2024-08-08 DIAGNOSIS — I48.0 PAROXYSMAL ATRIAL FIBRILLATION (HCC): Chronic | ICD-10-CM

## 2024-08-08 DIAGNOSIS — G47.33 OBSTRUCTIVE SLEEP APNEA SYNDROME: ICD-10-CM

## 2024-08-08 DIAGNOSIS — Z95.810 BIVENTRICULAR ICD (IMPLANTABLE CARDIOVERTER-DEFIBRILLATOR) IN PLACE: ICD-10-CM

## 2024-08-08 DIAGNOSIS — I50.22 CHRONIC SYSTOLIC HEART FAILURE (HCC): Primary | ICD-10-CM

## 2024-08-08 DIAGNOSIS — R63.4 UNEXPLAINED WEIGHT LOSS: ICD-10-CM

## 2024-08-08 DIAGNOSIS — G47.33 OBSTRUCTIVE SLEEP APNEA SYNDROME: Chronic | ICD-10-CM

## 2024-08-08 DIAGNOSIS — I42.8 NON-ISCHEMIC CARDIOMYOPATHY (HCC): ICD-10-CM

## 2024-08-08 DIAGNOSIS — I10 BENIGN ESSENTIAL HTN: Chronic | ICD-10-CM

## 2024-08-08 PROCEDURE — 1090F PRES/ABSN URINE INCON ASSESS: CPT | Performed by: INTERNAL MEDICINE

## 2024-08-08 PROCEDURE — 1123F ACP DISCUSS/DSCN MKR DOCD: CPT | Performed by: INTERNAL MEDICINE

## 2024-08-08 PROCEDURE — G8400 PT W/DXA NO RESULTS DOC: HCPCS | Performed by: INTERNAL MEDICINE

## 2024-08-08 PROCEDURE — G8420 CALC BMI NORM PARAMETERS: HCPCS | Performed by: INTERNAL MEDICINE

## 2024-08-08 PROCEDURE — 93000 ELECTROCARDIOGRAM COMPLETE: CPT | Performed by: INTERNAL MEDICINE

## 2024-08-08 PROCEDURE — 99214 OFFICE O/P EST MOD 30 MIN: CPT | Performed by: INTERNAL MEDICINE

## 2024-08-08 PROCEDURE — 95811 POLYSOM 6/>YRS CPAP 4/> PARM: CPT

## 2024-08-08 PROCEDURE — 3078F DIAST BP <80 MM HG: CPT | Performed by: INTERNAL MEDICINE

## 2024-08-08 PROCEDURE — 1036F TOBACCO NON-USER: CPT | Performed by: INTERNAL MEDICINE

## 2024-08-08 PROCEDURE — G8427 DOCREV CUR MEDS BY ELIG CLIN: HCPCS | Performed by: INTERNAL MEDICINE

## 2024-08-08 PROCEDURE — 3017F COLORECTAL CA SCREEN DOC REV: CPT | Performed by: INTERNAL MEDICINE

## 2024-08-08 PROCEDURE — 3074F SYST BP LT 130 MM HG: CPT | Performed by: INTERNAL MEDICINE

## 2024-08-12 ENCOUNTER — TELEPHONE (OUTPATIENT)
Dept: INTERNAL MEDICINE CLINIC | Age: 69
End: 2024-08-12

## 2024-08-12 DIAGNOSIS — F41.9 ANXIETY: Chronic | ICD-10-CM

## 2024-08-12 DIAGNOSIS — N89.8 VAGINAL ITCHING: ICD-10-CM

## 2024-08-12 RX ORDER — FLUCONAZOLE 150 MG/1
150 TABLET ORAL
Qty: 2 TABLET | Refills: 0 | Status: SHIPPED | OUTPATIENT
Start: 2024-08-12 | End: 2024-08-18

## 2024-08-13 ENCOUNTER — TELEPHONE (OUTPATIENT)
Dept: PULMONOLOGY | Age: 69
End: 2024-08-13

## 2024-08-14 RX ORDER — VILAZODONE HYDROCHLORIDE 20 MG/1
20 TABLET ORAL DAILY
Qty: 90 TABLET | Refills: 1 | Status: SHIPPED | OUTPATIENT
Start: 2024-08-14

## 2024-08-15 ENCOUNTER — PROCEDURE VISIT (OUTPATIENT)
Dept: UROGYNECOLOGY | Age: 69
End: 2024-08-15

## 2024-08-15 VITALS
SYSTOLIC BLOOD PRESSURE: 106 MMHG | DIASTOLIC BLOOD PRESSURE: 63 MMHG | RESPIRATION RATE: 14 BRPM | OXYGEN SATURATION: 99 % | TEMPERATURE: 98.2 F | HEART RATE: 64 BPM

## 2024-08-15 DIAGNOSIS — N32.81 OVERACTIVE BLADDER: Primary | ICD-10-CM

## 2024-08-15 RX ORDER — NITROFURANTOIN 25; 75 MG/1; MG/1
100 CAPSULE ORAL 2 TIMES DAILY
Qty: 14 CAPSULE | Refills: 0 | Status: SHIPPED | OUTPATIENT
Start: 2024-08-15 | End: 2024-08-22

## 2024-08-15 NOTE — PROGRESS NOTES
8/15/2024       HPI:     Name: Tess Atkinson  YOB: 1955    CC: Patient with urinary urgency, frequency, overactive bladder.  HPI: Tess Atkinson is a 68 y.o. female who is here for intravesical botulinum toxin A injection.     Patient seemed confused on order for antibiotics - per patient stopped antibiotic 3 days prior to appt, was instructed to start 3 days before botox appt.     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 apnea     uses cpap    Uterine fibroids affecting pregnancy      Past Surgical History:   Past Surgical History:   Procedure Laterality Date    CARDIAC DEFIBRILLATOR PLACEMENT       SECTION      3 c-sections    COLONOSCOPY  2015    polypectomy    COLONOSCOPY N/A 2019    COLONOSCOPY POLYPECTOMY SNARE/COLD ASCENDING X1 , TRANSVERSE X1, SIGMOID X1 performed by Dell Munguia MD at [facility] ENDOSCOPY    PACEMAKER INSERTION      PACEMAKER INSERTION      UPPER GASTROINTESTINAL ENDOSCOPY  2015    UPPER GASTROINTESTINAL ENDOSCOPY N/A 2019    EGD GASTRIC BIOPSY performed by Dell Munguia MD at [facility] ENDOSCOPY    UPPER GASTROINTESTINAL ENDOSCOPY N/A 2020    EGD BIOPSY performed by Dell Munguia MD at [facility] ENDOSCOPY     Current Medications:  Current Outpatient Medications   Medication Sig Dispense Refill    nitrofurantoin, macrocrystal-monohydrate, (MACROBID) 100 MG capsule Take 1 capsule by mouth 2 times daily for 7 days Start taking 3 days prior to procedure, then continue for the rest of the week 14 capsule 0    vilazodone HCl (VIIBRYD) 20 MG TABS TAKE 1 TABLET BY MOUTH DAILY 90 tablet 1    fluconazole (DIFLUCAN) 150 MG

## 2024-08-22 ENCOUNTER — TELEPHONE (OUTPATIENT)
Dept: PULMONOLOGY | Age: 69
End: 2024-08-22

## 2024-08-22 NOTE — TELEPHONE ENCOUNTER
Spoke with pt to review titration study results.  Order to be sent to [facility]. Pt to schedule 4-6 week f/u.

## 2024-08-22 NOTE — PROGRESS NOTES
- CNP      NPI: [id number]       Order Signed Date: 24    Electronically signed by SHAMEKA Hill CNP on 2024 at 5:00 PM    Tess Freitas  1955  [address] [place] [address]  [phone] (home)   [phone] (mobile)      Insurance Info (confirm with patient if correct):  Payer/Plan Subscr  Sex Relation Sub. Ins. ID Effective Group Num   1. [facility]* ANALIA FREITAS 1968 Male Spouse [id number]                                   [address]

## 2024-08-26 ENCOUNTER — TELEPHONE (OUTPATIENT)
Dept: INTERNAL MEDICINE CLINIC | Age: 69
End: 2024-08-26

## 2024-08-26 DIAGNOSIS — N94.9 GYNECOLOGICAL COMPLAINT: Primary | ICD-10-CM

## 2024-08-26 NOTE — TELEPHONE ENCOUNTER
Pt  calling asking for recommendation for a gynecologist for pt---she used to go to the group at the [facility] but they left and went to [facility]---please call the  Thanks.

## 2024-09-13 RX ORDER — CARVEDILOL 3.12 MG/1
3.12 TABLET ORAL 2 TIMES DAILY
Qty: 180 TABLET | Refills: 0 | Status: SHIPPED | OUTPATIENT
Start: 2024-09-13

## 2024-09-14 DIAGNOSIS — N39.0 FREQUENT UTI: ICD-10-CM

## 2024-09-17 RX ORDER — CIPROFLOXACIN 250 MG/1
TABLET, FILM COATED ORAL
Qty: 10 TABLET | Refills: 2 | Status: SHIPPED | OUTPATIENT
Start: 2024-09-17

## 2024-09-23 ENCOUNTER — TELEPHONE (OUTPATIENT)
Dept: UROGYNECOLOGY | Age: 69
End: 2024-09-23

## 2024-09-23 ENCOUNTER — OFFICE VISIT (OUTPATIENT)
Dept: INTERNAL MEDICINE CLINIC | Age: 69
End: 2024-09-23
Payer: COMMERCIAL

## 2024-09-23 VITALS
HEART RATE: 82 BPM | SYSTOLIC BLOOD PRESSURE: 96 MMHG | BODY MASS INDEX: 23.48 KG/M2 | WEIGHT: 127.6 LBS | HEIGHT: 62 IN | OXYGEN SATURATION: 96 % | DIASTOLIC BLOOD PRESSURE: 72 MMHG

## 2024-09-23 DIAGNOSIS — R11.0 NAUSEA: ICD-10-CM

## 2024-09-23 DIAGNOSIS — I10 BENIGN ESSENTIAL HTN: Chronic | ICD-10-CM

## 2024-09-23 DIAGNOSIS — I42.0 DILATED CARDIOMYOPATHY (HCC): ICD-10-CM

## 2024-09-23 DIAGNOSIS — R63.4 UNEXPLAINED WEIGHT LOSS: Primary | ICD-10-CM

## 2024-09-23 DIAGNOSIS — R10.13 EPIGASTRIC DISCOMFORT: ICD-10-CM

## 2024-09-23 DIAGNOSIS — R63.4 UNEXPLAINED WEIGHT LOSS: ICD-10-CM

## 2024-09-23 DIAGNOSIS — E55.9 VITAMIN D DEFICIENCY: ICD-10-CM

## 2024-09-23 DIAGNOSIS — G47.33 OBSTRUCTIVE SLEEP APNEA SYNDROME: Chronic | ICD-10-CM

## 2024-09-23 DIAGNOSIS — I48.0 PAROXYSMAL ATRIAL FIBRILLATION (HCC): Chronic | ICD-10-CM

## 2024-09-23 LAB
25(OH)D3 SERPL-MCNC: 27.4 NG/ML
ALBUMIN SERPL-MCNC: 4.5 G/DL (ref 3.4–5)
ALBUMIN/GLOB SERPL: 1.9 {RATIO} (ref 1.1–2.2)
ALP SERPL-CCNC: 87 U/L (ref 40–129)
ALT SERPL-CCNC: 22 U/L (ref 10–40)
ANION GAP SERPL CALCULATED.3IONS-SCNC: 12 MMOL/L (ref 3–16)
AST SERPL-CCNC: 27 U/L (ref 15–37)
BILIRUB SERPL-MCNC: 0.8 MG/DL (ref 0–1)
BUN SERPL-MCNC: 18 MG/DL (ref 7–20)
CALCIUM SERPL-MCNC: 9.8 MG/DL (ref 8.3–10.6)
CHLORIDE SERPL-SCNC: 94 MMOL/L (ref 99–110)
CHOLEST SERPL-MCNC: 187 MG/DL (ref 0–199)
CO2 SERPL-SCNC: 27 MMOL/L (ref 21–32)
CREAT SERPL-MCNC: 0.8 MG/DL (ref 0.6–1.2)
DEPRECATED RDW RBC AUTO: 13.5 % (ref 12.4–15.4)
EST. AVERAGE GLUCOSE BLD GHB EST-MCNC: 337.9 MG/DL
FOLATE SERPL-MCNC: 11 NG/ML (ref 4.78–24.2)
GFR SERPLBLD CREATININE-BSD FMLA CKD-EPI: 79 ML/MIN/{1.73_M2}
GLUCOSE SERPL-MCNC: 465 MG/DL (ref 70–99)
HBA1C MFR BLD: 13.4 %
HCT VFR BLD AUTO: 46.9 % (ref 36–48)
HDLC SERPL-MCNC: 45 MG/DL (ref 40–60)
HGB BLD-MCNC: 14.8 G/DL (ref 12–16)
LDLC SERPL CALC-MCNC: 95 MG/DL
MCH RBC QN AUTO: 28.6 PG (ref 26–34)
MCHC RBC AUTO-ENTMCNC: 31.6 G/DL (ref 31–36)
MCV RBC AUTO: 90.6 FL (ref 80–100)
PLATELET # BLD AUTO: 130 K/UL (ref 135–450)
PMV BLD AUTO: 11.5 FL (ref 5–10.5)
POTASSIUM SERPL-SCNC: 4.8 MMOL/L (ref 3.5–5.1)
PROT SERPL-MCNC: 6.9 G/DL (ref 6.4–8.2)
RBC # BLD AUTO: 5.17 M/UL (ref 4–5.2)
SODIUM SERPL-SCNC: 133 MMOL/L (ref 136–145)
TRIGL SERPL-MCNC: 233 MG/DL (ref 0–150)
TSH SERPL DL<=0.005 MIU/L-ACNC: 1.53 UIU/ML (ref 0.27–4.2)
VIT B12 SERPL-MCNC: 1171 PG/ML (ref 211–911)
VLDLC SERPL CALC-MCNC: 47 MG/DL
WBC # BLD AUTO: 6.8 K/UL (ref 4–11)

## 2024-09-23 PROCEDURE — 1036F TOBACCO NON-USER: CPT | Performed by: NURSE PRACTITIONER

## 2024-09-23 PROCEDURE — G8427 DOCREV CUR MEDS BY ELIG CLIN: HCPCS | Performed by: NURSE PRACTITIONER

## 2024-09-23 PROCEDURE — 1090F PRES/ABSN URINE INCON ASSESS: CPT | Performed by: NURSE PRACTITIONER

## 2024-09-23 PROCEDURE — G8400 PT W/DXA NO RESULTS DOC: HCPCS | Performed by: NURSE PRACTITIONER

## 2024-09-23 PROCEDURE — G8420 CALC BMI NORM PARAMETERS: HCPCS | Performed by: NURSE PRACTITIONER

## 2024-09-23 PROCEDURE — 1123F ACP DISCUSS/DSCN MKR DOCD: CPT | Performed by: NURSE PRACTITIONER

## 2024-09-23 PROCEDURE — 99214 OFFICE O/P EST MOD 30 MIN: CPT | Performed by: NURSE PRACTITIONER

## 2024-09-23 PROCEDURE — 3078F DIAST BP <80 MM HG: CPT | Performed by: NURSE PRACTITIONER

## 2024-09-23 PROCEDURE — 3074F SYST BP LT 130 MM HG: CPT | Performed by: NURSE PRACTITIONER

## 2024-09-23 PROCEDURE — 3017F COLORECTAL CA SCREEN DOC REV: CPT | Performed by: NURSE PRACTITIONER

## 2024-09-23 RX ORDER — ONDANSETRON 4 MG/1
4 TABLET, ORALLY DISINTEGRATING ORAL 3 TIMES DAILY PRN
Qty: 21 TABLET | Refills: 0 | Status: SHIPPED | OUTPATIENT
Start: 2024-09-23

## 2024-09-23 RX ORDER — PANTOPRAZOLE SODIUM 40 MG/1
40 TABLET, DELAYED RELEASE ORAL
Qty: 14 TABLET | Refills: 0 | Status: SHIPPED | OUTPATIENT
Start: 2024-09-23

## 2024-09-24 DIAGNOSIS — E11.65 UNCONTROLLED DIABETES MELLITUS WITH HYPERGLYCEMIA, WITHOUT LONG-TERM CURRENT USE OF INSULIN (HCC): Primary | ICD-10-CM

## 2024-09-24 DIAGNOSIS — E11.65 UNCONTROLLED DIABETES MELLITUS WITH HYPERGLYCEMIA, WITHOUT LONG-TERM CURRENT USE OF INSULIN (HCC): ICD-10-CM

## 2024-09-24 RX ORDER — BLOOD-GLUCOSE SENSOR
1 EACH MISCELLANEOUS WEEKLY
Qty: 6 EACH | Refills: 1 | Status: SHIPPED | OUTPATIENT
Start: 2024-09-24

## 2024-09-24 RX ORDER — METFORMIN HCL 500 MG
500 TABLET, EXTENDED RELEASE 24 HR ORAL
Qty: 60 TABLET | Refills: 0 | Status: SHIPPED | OUTPATIENT
Start: 2024-09-24 | End: 2024-09-24

## 2024-09-24 RX ORDER — KETOROLAC TROMETHAMINE 30 MG/ML
1 INJECTION, SOLUTION INTRAMUSCULAR; INTRAVENOUS
Qty: 1 EACH | Refills: 0 | Status: SHIPPED | OUTPATIENT
Start: 2024-09-24

## 2024-09-24 RX ORDER — METFORMIN HCL 500 MG
500 TABLET, EXTENDED RELEASE 24 HR ORAL
Qty: 180 TABLET | Refills: 3 | Status: SHIPPED | OUTPATIENT
Start: 2024-09-24

## 2024-09-25 ENCOUNTER — TELEPHONE (OUTPATIENT)
Dept: INTERNAL MEDICINE CLINIC | Age: 69
End: 2024-09-25

## 2024-09-25 NOTE — TELEPHONE ENCOUNTER
Pt saw Rebekah yesterday and was prescribed Metformin (hasn't picked that up yet) and Freestyle Reddy for uncontrolled diabetes. She did get the CGM and this morning her fasting BS was 353 and she is very worried and was tearful. She was told that with the very high A1C of 13.4, 353 is probably in line with what her averages have been lately. She voiced understanding and will start the Metformin as soon as that is ready for . Reassurance given to pt that with the medication and changes in eating habits, her diabetes will ideally be controlled at some point. She just was worried and just wanted a message sent back to Rebekah as well. She does see Rebekah for a week follow up appt 10/1.

## 2024-09-26 ENCOUNTER — TELEPHONE (OUTPATIENT)
Dept: PULMONOLOGY | Age: 69
End: 2024-09-26

## 2024-09-26 ENCOUNTER — TELEPHONE (OUTPATIENT)
Dept: INTERNAL MEDICINE CLINIC | Age: 69
End: 2024-09-26

## 2024-09-26 RX ORDER — INSULIN GLARGINE 100 [IU]/ML
10 INJECTION, SOLUTION SUBCUTANEOUS NIGHTLY
Qty: 5 ADJUSTABLE DOSE PRE-FILLED PEN SYRINGE | Refills: 3 | Status: SHIPPED | OUTPATIENT
Start: 2024-09-26

## 2024-09-26 NOTE — TELEPHONE ENCOUNTER
Pt's  called stating that pt is having a hard time using her machine because she feels like the pressures are too high. Pt would like to see if we could look at her data to see if her pressures could be lowered.     Ph. [phone]

## 2024-09-26 NOTE — TELEPHONE ENCOUNTER
Please let them know I lowered the pressure on the machine. She may want to unplug the machine and plug it back in after an hour or so, but sometimes it can take up to 24 hours or so for changes to kick into the machine. Call back if she continues to have issues with pressure.     EPAP min 12

## 2024-09-30 NOTE — TELEPHONE ENCOUNTER
Pressure was lowered slightly per pt's request. It was sent to the machine remotely.     EPAP min 11

## 2024-09-30 NOTE — TELEPHONE ENCOUNTER
Pt advised. States it was better last night but feels like the pressure could be lowered just a little bit more if possible.

## 2024-10-02 ENCOUNTER — OFFICE VISIT (OUTPATIENT)
Dept: INTERNAL MEDICINE CLINIC | Age: 69
End: 2024-10-02

## 2024-10-02 VITALS
HEIGHT: 62 IN | BODY MASS INDEX: 23.08 KG/M2 | OXYGEN SATURATION: 98 % | HEART RATE: 85 BPM | SYSTOLIC BLOOD PRESSURE: 90 MMHG | WEIGHT: 125.4 LBS | DIASTOLIC BLOOD PRESSURE: 64 MMHG

## 2024-10-02 DIAGNOSIS — E11.65 UNCONTROLLED DIABETES MELLITUS WITH HYPERGLYCEMIA, WITHOUT LONG-TERM CURRENT USE OF INSULIN (HCC): Primary | ICD-10-CM

## 2024-10-02 DIAGNOSIS — G47.33 OBSTRUCTIVE SLEEP APNEA SYNDROME: Chronic | ICD-10-CM

## 2024-10-02 DIAGNOSIS — I10 BENIGN ESSENTIAL HTN: Chronic | ICD-10-CM

## 2024-10-02 DIAGNOSIS — I42.0 DILATED CARDIOMYOPATHY (HCC): ICD-10-CM

## 2024-10-02 DIAGNOSIS — R63.4 UNEXPLAINED WEIGHT LOSS: ICD-10-CM

## 2024-10-02 DIAGNOSIS — I50.22 CHRONIC SYSTOLIC HEART FAILURE (HCC): Chronic | ICD-10-CM

## 2024-10-02 LAB — MAMMOGRAPHY, EXTERNAL: NORMAL

## 2024-10-02 RX ORDER — METFORMIN HCL 500 MG
500 TABLET, EXTENDED RELEASE 24 HR ORAL
Qty: 180 TABLET | Refills: 0 | Status: SHIPPED | OUTPATIENT
Start: 2024-10-02

## 2024-10-02 RX ORDER — METFORMIN HCL 500 MG
1000 TABLET, EXTENDED RELEASE 24 HR ORAL
Qty: 360 TABLET | Refills: 0 | Status: SHIPPED | OUTPATIENT
Start: 2024-10-02 | End: 2024-10-02

## 2024-10-02 NOTE — ASSESSMENT & PLAN NOTE
Chronic. continue working on healthy lifestyle modifications with diet and exercise.  Continue taking medications as prescribed and following with cardiology as recommended. Will try jardiance again - call if s/e.

## 2024-10-02 NOTE — ASSESSMENT & PLAN NOTE
Secondary to uncontrolled DM. Working on better glucose control. Diet is improving. Complete PPI as prescribed. Appetite is improving. Schedule colonoscopy as previously discussed - she is due for colon cancer screening.

## 2024-10-02 NOTE — PROGRESS NOTES
10/2/24     Chief Complaint   Patient presents with    1 week follow up     DM, has not been using the insulin--only doing metformin      HPI    Here for 1 week follow up of DM   New diagnosis of diabetes   Glucose - mid to high 300s initially    called because glucose was still really high   Insulin was called to pharmacy, pt has not started.   Glucose is improving with metformin 500 mg BID  Has been under 250 the past 3 days   Averaging high 100s to low 200s  She denies an s/e of the medication, had diarrhea and nausea initially but improved.    She was prescribed jardiance a few years ago by cardiology, she had dizziness at the time but is willing to try it again.        Allergies   Allergen Reactions    Jardiance [Empagliflozin]      dizziness    Prednisone      Caused an allergic reaction. Swelling all over her body.       Current Outpatient Medications   Medication Sig Dispense Refill    metFORMIN (GLUCOPHAGE-XR) 500 MG extended release tablet Take 1 tablet by mouth 2 times daily (before meals) 180 tablet 0    empagliflozin (JARDIANCE) 10 MG tablet Take 1 tablet by mouth daily 90 tablet 1    Insulin Pen Needle 32G X 4 MM MISC 1 each by Does not apply route daily 100 each 3    Continuous Glucose Sensor (FREESTYLE MAGDALENA 3 SENSOR) MISC 1 each by Does not apply route once a week 6 each 1    Continuous Glucose  (FREESTYLE MAGDALENA 3 READER) [place] 1 each by Does not apply route 4 times daily (before meals and nightly) 1 each 0    pantoprazole (PROTONIX) 40 MG tablet Take 1 tablet by mouth every morning (before breakfast) 14 tablet 0    ondansetron (ZOFRAN-ODT) 4 MG disintegrating tablet Take 1 tablet by mouth 3 times daily as needed for Nausea or Vomiting 21 tablet 0    ciprofloxacin (CIPRO) 250 MG tablet TAKE 1 TABLET BY MOUTH AFTER SEXUAL ACTIVITY 10 tablet 2    carvedilol (COREG) 3.125 MG tablet TAKE 1 TABLET BY MOUTH TWICE DAILY 180 tablet 0    vilazodone HCl (VIIBRYD) 20 MG TABS TAKE 1 TABLET BY

## 2024-10-02 NOTE — ASSESSMENT & PLAN NOTE
chronic, stable. Continue using bipap and following with sleep medicine as recommended.

## 2024-10-02 NOTE — ASSESSMENT & PLAN NOTE
chronic, stable.  BP soft today, asymptomatic. Continue medications and follow up as recommended by cardiology. She is currently taking carvedilol 3.125 mg BID, digoxin 125 mcg daily, entresto 24-26 BID, spironolactone 25 mg daily.              No

## 2024-10-04 ENCOUNTER — OFFICE VISIT (OUTPATIENT)
Dept: ENDOCRINOLOGY | Age: 69
End: 2024-10-04
Payer: COMMERCIAL

## 2024-10-04 ENCOUNTER — CARE COORDINATION (OUTPATIENT)
Dept: CARE COORDINATION | Age: 69
End: 2024-10-04

## 2024-10-04 ENCOUNTER — TELEPHONE (OUTPATIENT)
Dept: INTERNAL MEDICINE CLINIC | Age: 69
End: 2024-10-04

## 2024-10-04 DIAGNOSIS — E11.65 UNCONTROLLED DIABETES MELLITUS WITH HYPERGLYCEMIA, WITHOUT LONG-TERM CURRENT USE OF INSULIN (HCC): Primary | ICD-10-CM

## 2024-10-04 PROCEDURE — 3046F HEMOGLOBIN A1C LEVEL >9.0%: CPT

## 2024-10-04 PROCEDURE — 97802 MEDICAL NUTRITION INDIV IN: CPT

## 2024-10-04 PROCEDURE — G8420 CALC BMI NORM PARAMETERS: HCPCS

## 2024-10-04 PROCEDURE — G8427 DOCREV CUR MEDS BY ELIG CLIN: HCPCS

## 2024-10-04 NOTE — TELEPHONE ENCOUNTER
Nuris calling from [facility], states they need a new referral for diabetes education signed by an MD, or DO. Please advise  [phone] ask for Nuris

## 2024-10-04 NOTE — PROGRESS NOTES
Medical Nutrition Therapy for Diabetes  [facility]    Tess Atkinson  October 4, 2024    Patient Care Team:  Rebekah Carson APRN - CNP as PCP - General (Certified Nurse Practitioner)  Rebekah Carson APRN - CNP as PCP - Empaneled Provider  Victor Hugo Balderrama MD as Consulting Physician (Sleep Medicine Family Practice)  Kehrt, Tiffanie R, APRN - CNP as Nurse Practitioner (Nurse Practitioner)    Reason for visit: 1. Uncontrolled diabetes mellitus with hyperglycemia, without long-term current use of insulin (HCC)     Initial     ASSESSMENT/PLAN:   NUTRITION DIAGNOSIS    #1 Problem: Altered Nutrition-Related Laboratory Values (NC-2.2)  Related to: Endocrine/Diabetes   As Evidenced by: Elevated Plasma glucose and/or HgbA1c levels         #2 Problem: Inconsistent Carbohydrate Intake (NI 5.8.4)  Related to: Varied meal timing / incorrect carbohydrate counting  As Evidenced by: fluctuation in blood glucose levels / food recall    #3 Problem: Knowledge and Beliefs-NB-3.1                       Food and nutrition deficits    NUTRITION INTERVENTION  Nutrition Prescription: 45 grams carbohydrate per meal with protein and non-starch vegetables  15 gram carbohydrate snacks     Diabetes Education/Counseling included:  Carbohydrate control-ADA plate method for pairing carbohydrate and non-carbohydrate foods  Impacts of fiber, fats, and proteins on blood glucose trending   Benefit of eating protein with each meal/snack reviewed  Label reading  Monitoring- CGM- Reddy 3   Medication Review  Benefits of adequate hydration, quality sleep and stress management  Explained HgbA1c ranges, reviewed normal/at risk for diabetes/and diabetes diagnosis ranges.    Handouts Provided:  [employer] Diabetes and Education Handouts- Shayla Diabetes  Planning Healthy Meals- [employer]  Sample Menu- [employer]  Low carb snack ideas list- [employer]    Interventions:  Control Carbohydrate Intake using Carb Counting  Increase intake

## 2024-10-04 NOTE — CARE COORDINATION
Ambulatory Care Coordination Note     10/4/2024 11:53 AM     Patient Current Location:  [place]     This patient was received as a referral from Provider.    ACM contacted the patient by telephone. Verified name and  with patient as identifiers. Provided introduction to self, and explanation of the ACM role.   Patient declined care management services at this time.          ACM: Gretchen Hurley RN     Challenges to be reviewed by the provider   Additional needs identified to be addressed with provider No  none               Method of communication with provider: none.    Has the patient been seen in the ED since your last call? no    Care Summary Note:     Pcp referral to CC; diabetes. ACM outreached patient; introduced self and role of care coordinator. Patient established with diabetic educator, Gretchen Kemp today and is scheduled to follow up with her in December. Patient states Gretchen provided her useful literature and feels her diabetic needs are supported at this time. Patient declined additional resources from [facility]. Encouraged patient to contact myself of [facility] if future needs change.     PCP/Specialist follow up:   Future Appointments         Provider Specialty Dept Phone    2024 1:40 PM Rebekah Carson, APRN - CNP Internal Medicine [phone]    2024 10:40 AM Michelle Lang APRN - CNP Sleep Medicine [phone]    2024 1:00 PM Carolina Mayes, APRN - CNS Cardiology [phone]    2024 2:00 PM Uche De La Torre MD Urogynecology [phone]    2024 10:00 AM Gretchen Kemp, RD Endocrinology [phone]            Follow Up:   No further Ambulatory Care Management follow-up scheduled at this time.  Patient  has Ambulatory Care Manager's contact information for any further questions, concerns or needs.

## 2024-10-28 ENCOUNTER — PATIENT MESSAGE (OUTPATIENT)
Dept: INTERNAL MEDICINE CLINIC | Age: 69
End: 2024-10-28

## 2024-11-04 ENCOUNTER — OFFICE VISIT (OUTPATIENT)
Dept: INTERNAL MEDICINE CLINIC | Age: 69
End: 2024-11-04
Payer: COMMERCIAL

## 2024-11-04 VITALS
SYSTOLIC BLOOD PRESSURE: 100 MMHG | HEIGHT: 62 IN | BODY MASS INDEX: 23.34 KG/M2 | OXYGEN SATURATION: 99 % | HEART RATE: 70 BPM | WEIGHT: 126.8 LBS | DIASTOLIC BLOOD PRESSURE: 72 MMHG

## 2024-11-04 DIAGNOSIS — E11.65 UNCONTROLLED DIABETES MELLITUS WITH HYPERGLYCEMIA, WITHOUT LONG-TERM CURRENT USE OF INSULIN (HCC): ICD-10-CM

## 2024-11-04 PROBLEM — R63.4 UNEXPLAINED WEIGHT LOSS: Status: RESOLVED | Noted: 2024-08-08 | Resolved: 2024-11-04

## 2024-11-04 LAB — HBA1C MFR BLD: 9.5 %

## 2024-11-04 PROCEDURE — 1036F TOBACCO NON-USER: CPT | Performed by: NURSE PRACTITIONER

## 2024-11-04 PROCEDURE — 3046F HEMOGLOBIN A1C LEVEL >9.0%: CPT | Performed by: NURSE PRACTITIONER

## 2024-11-04 PROCEDURE — 3074F SYST BP LT 130 MM HG: CPT | Performed by: NURSE PRACTITIONER

## 2024-11-04 PROCEDURE — 1090F PRES/ABSN URINE INCON ASSESS: CPT | Performed by: NURSE PRACTITIONER

## 2024-11-04 PROCEDURE — 3078F DIAST BP <80 MM HG: CPT | Performed by: NURSE PRACTITIONER

## 2024-11-04 PROCEDURE — G8427 DOCREV CUR MEDS BY ELIG CLIN: HCPCS | Performed by: NURSE PRACTITIONER

## 2024-11-04 PROCEDURE — G8484 FLU IMMUNIZE NO ADMIN: HCPCS | Performed by: NURSE PRACTITIONER

## 2024-11-04 PROCEDURE — 99213 OFFICE O/P EST LOW 20 MIN: CPT | Performed by: NURSE PRACTITIONER

## 2024-11-04 PROCEDURE — G8420 CALC BMI NORM PARAMETERS: HCPCS | Performed by: NURSE PRACTITIONER

## 2024-11-04 PROCEDURE — G8400 PT W/DXA NO RESULTS DOC: HCPCS | Performed by: NURSE PRACTITIONER

## 2024-11-04 PROCEDURE — 3017F COLORECTAL CA SCREEN DOC REV: CPT | Performed by: NURSE PRACTITIONER

## 2024-11-04 PROCEDURE — 2022F DILAT RTA XM EVC RTNOPTHY: CPT | Performed by: NURSE PRACTITIONER

## 2024-11-04 PROCEDURE — 1123F ACP DISCUSS/DSCN MKR DOCD: CPT | Performed by: NURSE PRACTITIONER

## 2024-11-04 PROCEDURE — 83036 HEMOGLOBIN GLYCOSYLATED A1C: CPT | Performed by: NURSE PRACTITIONER

## 2024-11-04 NOTE — PROGRESS NOTES
11/4/24     Chief Complaint   Patient presents with    Follow-up     1 month glucose       Other     Getting flu shot at [facility] with Covid shot      HPI    Home glucose is improving  CGM average glucose 129 the past 2 weeks   Running in low to mid 100s     Was having s/e with Metformin - diarrhea, nausea and vomiting   She has stopped metformin.   She is taking jardiance 20 mg daily since stopping metformin.   She is doing well on diet/ exercise.   She saw dietician and doing well with diet/ exercise.       Allergies   Allergen Reactions    Prednisone      Caused an allergic reaction. Swelling all over her body.    Metformin And Related Diarrhea and Nausea And Vomiting       Current Outpatient Medications   Medication Sig Dispense Refill    empagliflozin (JARDIANCE) 10 MG tablet Take 2 tablets by mouth daily 180 tablet 1    Insulin Pen Needle 32G X 4 MM MISC 1 each by Does not apply route daily 100 each 3    Continuous Glucose Sensor (FREESTYLE MAGDALENA 3 SENSOR) MISC 1 each by Does not apply route once a week 6 each 1    Continuous Glucose  (FREESTYLE MAGDALENA 3 READER) [place] 1 each by Does not apply route 4 times daily (before meals and nightly) 1 each 0    pantoprazole (PROTONIX) 40 MG tablet Take 1 tablet by mouth every morning (before breakfast) 14 tablet 0    ondansetron (ZOFRAN-ODT) 4 MG disintegrating tablet Take 1 tablet by mouth 3 times daily as needed for Nausea or Vomiting 21 tablet 0    ciprofloxacin (CIPRO) 250 MG tablet TAKE 1 TABLET BY MOUTH AFTER SEXUAL ACTIVITY 10 tablet 2    carvedilol (COREG) 3.125 MG tablet TAKE 1 TABLET BY MOUTH TWICE DAILY 180 tablet 0    vilazodone HCl (VIIBRYD) 20 MG TABS TAKE 1 TABLET BY MOUTH DAILY 90 tablet 1    Pramoxine HCl (VAGISIL ANTI-ITCH MEDICATED EX) Apply topically      digoxin (LANOXIN) 125 MCG tablet Take 1 tablet by mouth daily 90 tablet 1    rosuvastatin (CRESTOR) 10 MG tablet Take 1 tablet by mouth daily 90 tablet 1    sacubitril-valsartan (ENTRESTO)

## 2024-11-04 NOTE — ASSESSMENT & PLAN NOTE
Chronic, improving. POCT A1c 9.5 today, improving from 13.4 in Sept. Continue jardiance 20 mg daily, will increase to 25 mg daily if 2 tabs not covered by insurance. Schedule eye exam.     Orders:    empagliflozin (JARDIANCE) 10 MG tablet; Take 2 tablets by mouth daily    POCT glycosylated hemoglobin (Hb A1C)

## 2024-11-13 ENCOUNTER — TELEPHONE (OUTPATIENT)
Dept: INTERNAL MEDICINE CLINIC | Age: 69
End: 2024-11-13

## 2024-11-13 DIAGNOSIS — E11.65 UNCONTROLLED DIABETES MELLITUS WITH HYPERGLYCEMIA, WITHOUT LONG-TERM CURRENT USE OF INSULIN (HCC): ICD-10-CM

## 2024-11-13 NOTE — TELEPHONE ENCOUNTER
Submitted PA for empagliflozin (JARDIANCE) 10 MG tablet   Via  CM (Key: [id number]) STATUS: PENDING.    Follow up done daily; if no decision with in three days we will refax.  If another three days goes by with no decision will call the insurance for status.

## 2024-11-14 NOTE — TELEPHONE ENCOUNTER
They are not covering 2 tablets a day of the 10 mg tablets. We knew this was going to be an issue and discussed during her visit. They will cover 1 tablet a day. I have sent a new Rx to [facility] for Jardiance 25 mg tablets take 1 tablet daily.  Please let her know.

## 2024-11-14 NOTE — TELEPHONE ENCOUNTER
Patient informed. Patient states that she is worried and not sure what she is going to so since medication has been denied. Patient asked to Rebekah advise on this and see what more can be done. Advised patient  I would send this over to Rebekah.

## 2024-11-14 NOTE — TELEPHONE ENCOUNTER
The medication was DENIED; DENIAL letter is uploaded to MEDIA.    Generic Denial:  Other; please see Denial Letter.         Note :  Prior auth is not required for the requested drug and medication quantities above the benefit limit are excluded under the plan. For additional info on plan benefits, member can contact member services by calling the number on the back of their ID card.       If you want an APPEAL; please note in this encounter what new information you would like to APPEAL with.  Once complete route back to PA POOL.    If this requires a response please respond to the pool ( P MHCX PSC MEDICATION PRE-AUTH).      Thank you please advise patient.

## 2024-11-15 PROBLEM — E87.1 HYPONATREMIA: Status: RESOLVED | Noted: 2020-06-28 | Resolved: 2024-11-15

## 2024-11-15 NOTE — PROGRESS NOTES
[facility]   Electrophysiology  Hima Mejia, APRN-CNP  Attending EP: Dr. Gusman    Date: 11/18/2024  I had the privilege of visiting Tess Atkinson in the office.     Chief Complaint:   Chief Complaint   Patient presents with    Follow-up     No cardiac symptoms at this time.     History of Present Illness: History obtained from patient and medical record.    Tess Atkinson is 68 y.o. female with a past medical history of dilated cardiomyopathy s/p AICD (1990s) and hx of laser lead extraction with Biv placement (2009), AF, CHF, HTN, and HLD. S/p gen change (9/22)    -Interval history: Today, Tess Atkinson is being seen for EP follow up. She is having issues with her device. She states it bothers her with any activity. She lost a bunch of weight and noticed it moved from the normal spot. We had a long discussion about her device and the risks of device extraction.     Denies having chest pain, palpitations, shortness of breath, orthopnea/PND, cough, or dizziness at the time of this visit.    With regard to medication therapy the patient has been compliant with prescribed regimen. They have tolerated therapy to date.     Allergies:  Allergies   Allergen Reactions    Prednisone      Caused an allergic reaction. Swelling all over her body.    Metformin And Related Diarrhea and Nausea And Vomiting     Home Medications:  Prior to Visit Medications    Medication Sig Taking? Authorizing Provider   empagliflozin (JARDIANCE) 25 MG tablet Take 1 tablet by mouth daily Yes Rebekah Carson APRN - CNP   Insulin Pen Needle 32G X 4 MM MISC 1 each by Does not apply route daily Yes Rebekah Carson APRN - CNP   Continuous Glucose Sensor (FREESTYLE MAGDALENA 3 SENSOR) MISC 1 each by Does not apply route once a week Yes Rebekah Carson APRN - CNP   Continuous Glucose  (FREESTYLE MAGDALENA 3 READER) [place] 1 each by Does not apply route 4 times daily (before meals and nightly) Yes Rebekah Carson APRN - CNP

## 2024-11-18 ENCOUNTER — NURSE ONLY (OUTPATIENT)
Dept: CARDIOLOGY CLINIC | Age: 69
End: 2024-11-18

## 2024-11-18 ENCOUNTER — OFFICE VISIT (OUTPATIENT)
Dept: CARDIOLOGY CLINIC | Age: 69
End: 2024-11-18

## 2024-11-18 VITALS
HEIGHT: 62 IN | SYSTOLIC BLOOD PRESSURE: 128 MMHG | HEART RATE: 90 BPM | OXYGEN SATURATION: 98 % | WEIGHT: 127 LBS | DIASTOLIC BLOOD PRESSURE: 72 MMHG | BODY MASS INDEX: 23.37 KG/M2

## 2024-11-18 DIAGNOSIS — I50.22 CHRONIC SYSTOLIC HEART FAILURE (HCC): Chronic | ICD-10-CM

## 2024-11-18 DIAGNOSIS — I48.0 PAROXYSMAL ATRIAL FIBRILLATION (HCC): Primary | Chronic | ICD-10-CM

## 2024-11-18 DIAGNOSIS — I10 BENIGN ESSENTIAL HTN: Chronic | ICD-10-CM

## 2024-11-18 DIAGNOSIS — Z95.810 AICD (AUTOMATIC CARDIOVERTER/DEFIBRILLATOR) PRESENT: ICD-10-CM

## 2024-11-18 DIAGNOSIS — Z95.0 PACEMAKER: ICD-10-CM

## 2024-11-18 DIAGNOSIS — G47.33 OBSTRUCTIVE SLEEP APNEA SYNDROME: Chronic | ICD-10-CM

## 2024-11-18 DIAGNOSIS — Z45.02 ICD (IMPLANTABLE CARDIOVERTER-DEFIBRILLATOR) BATTERY DEPLETION: ICD-10-CM

## 2024-11-19 DIAGNOSIS — F41.9 ANXIETY: Chronic | ICD-10-CM

## 2024-11-19 RX ORDER — VILAZODONE HYDROCHLORIDE 20 MG/1
20 TABLET ORAL DAILY
Qty: 90 TABLET | Refills: 1 | Status: SHIPPED | OUTPATIENT
Start: 2024-11-19

## 2024-11-19 NOTE — PROGRESS NOTES
ANAND utilized to pull transmission from device for OV with NPSR today. NPSR reviewed. For more details see interrogation scanned under media tab.

## 2024-11-24 ENCOUNTER — PATIENT MESSAGE (OUTPATIENT)
Dept: CARDIOLOGY CLINIC | Age: 69
End: 2024-11-24

## 2024-11-25 NOTE — TELEPHONE ENCOUNTER
Pt called wanting to know if they can get an appt with [facility], ASAP, Pt stated they thing the device is out of the pocket. Since last visit pt has been bruising in the site of the advise, pt does not want to see any one but MXA .    Pls advise

## 2024-12-04 ENCOUNTER — PATIENT MESSAGE (OUTPATIENT)
Dept: CARDIOLOGY CLINIC | Age: 69
End: 2024-12-04

## 2024-12-04 ENCOUNTER — TELEPHONE (OUTPATIENT)
Dept: CARDIOLOGY CLINIC | Age: 69
End: 2024-12-04

## 2024-12-04 NOTE — TELEPHONE ENCOUNTER
Pt wanted an urgent appt due to pacemaker causing discomfort and states there is blood blister under skin. Offered 1st available  1/14/25 as 11/24/24 previous message suggested. Pt stated that was too far out. I told pt I could schedule January appointment and put a message in and see if anything could be done sooner and she hung up with out scheduling.

## 2024-12-04 NOTE — TELEPHONE ENCOUNTER
Please cancel device check Friday and have her seen tomorrow 215 is fine she can be seen after her appointment with [facility]

## 2024-12-05 ENCOUNTER — APPOINTMENT (OUTPATIENT)
Dept: GENERAL RADIOLOGY | Age: 69
DRG: 277 | End: 2024-12-05
Payer: COMMERCIAL

## 2024-12-05 ENCOUNTER — NURSE ONLY (OUTPATIENT)
Dept: CARDIOLOGY CLINIC | Age: 69
End: 2024-12-05

## 2024-12-05 ENCOUNTER — OFFICE VISIT (OUTPATIENT)
Dept: CARDIOLOGY CLINIC | Age: 69
End: 2024-12-05

## 2024-12-05 ENCOUNTER — HOSPITAL ENCOUNTER (INPATIENT)
Age: 69
LOS: 9 days | Discharge: HOME HEALTH | DRG: 277 | End: 2024-12-14
Attending: INTERNAL MEDICINE | Admitting: HOSPITALIST
Payer: COMMERCIAL

## 2024-12-05 VITALS
WEIGHT: 128 LBS | HEART RATE: 64 BPM | SYSTOLIC BLOOD PRESSURE: 88 MMHG | BODY MASS INDEX: 23.55 KG/M2 | DIASTOLIC BLOOD PRESSURE: 62 MMHG | HEIGHT: 62 IN | OXYGEN SATURATION: 97 %

## 2024-12-05 DIAGNOSIS — Z95.810 BIVENTRICULAR ICD (IMPLANTABLE CARDIOVERTER-DEFIBRILLATOR) IN PLACE: ICD-10-CM

## 2024-12-05 DIAGNOSIS — I48.0 PAROXYSMAL ATRIAL FIBRILLATION (HCC): ICD-10-CM

## 2024-12-05 DIAGNOSIS — I42.0 DILATED CARDIOMYOPATHY (HCC): ICD-10-CM

## 2024-12-05 DIAGNOSIS — T82.7XXA INFECTION INVOLVING IMPLANTABLE CARDIOVERTER-DEFIBRILLATOR (ICD), INITIAL ENCOUNTER (HCC): ICD-10-CM

## 2024-12-05 DIAGNOSIS — I42.8 NON-ISCHEMIC CARDIOMYOPATHY (HCC): ICD-10-CM

## 2024-12-05 DIAGNOSIS — R52 PAIN IN PACEMAKER POCKET: ICD-10-CM

## 2024-12-05 DIAGNOSIS — R78.81 BACTEREMIA: ICD-10-CM

## 2024-12-05 DIAGNOSIS — E78.5 HYPERLIPIDEMIA, UNSPECIFIED HYPERLIPIDEMIA TYPE: ICD-10-CM

## 2024-12-05 DIAGNOSIS — I50.22 CHRONIC SYSTOLIC HEART FAILURE (HCC): Primary | ICD-10-CM

## 2024-12-05 DIAGNOSIS — T82.7XXA: ICD-10-CM

## 2024-12-05 DIAGNOSIS — I44.2 COMPLETE HEART BLOCK (HCC): ICD-10-CM

## 2024-12-05 DIAGNOSIS — T82.7XXA INFECTION OF PACEMAKER POCKET, INITIAL ENCOUNTER (HCC): Primary | ICD-10-CM

## 2024-12-05 PROBLEM — I25.83 CORONARY ARTERY DISEASE DUE TO LIPID RICH PLAQUE: Status: ACTIVE | Noted: 2024-12-05

## 2024-12-05 PROBLEM — I50.20 SYSTOLIC CONGESTIVE HEART FAILURE (HCC): Status: ACTIVE | Noted: 2024-12-05

## 2024-12-05 PROBLEM — I25.10 CORONARY ARTERY DISEASE DUE TO LIPID RICH PLAQUE: Status: ACTIVE | Noted: 2024-12-05

## 2024-12-05 PROBLEM — E11.69 TYPE 2 DIABETES MELLITUS WITH OTHER SPECIFIED COMPLICATION (HCC): Status: ACTIVE | Noted: 2024-12-05

## 2024-12-05 LAB
ALBUMIN SERPL-MCNC: 4.2 G/DL (ref 3.4–5)
ALBUMIN/GLOB SERPL: 1.4 {RATIO} (ref 1.1–2.2)
ALP SERPL-CCNC: 58 U/L (ref 40–129)
ALT SERPL-CCNC: 13 U/L (ref 10–40)
ANION GAP SERPL CALCULATED.3IONS-SCNC: 13 MMOL/L (ref 3–16)
AST SERPL-CCNC: 20 U/L (ref 15–37)
BASOPHILS # BLD: 0.1 K/UL (ref 0–0.2)
BASOPHILS NFR BLD: 0.9 %
BILIRUB SERPL-MCNC: 0.5 MG/DL (ref 0–1)
BUN SERPL-MCNC: 26 MG/DL (ref 7–20)
CALCIUM SERPL-MCNC: 9.3 MG/DL (ref 8.3–10.6)
CHLORIDE SERPL-SCNC: 103 MMOL/L (ref 99–110)
CO2 SERPL-SCNC: 24 MMOL/L (ref 21–32)
CREAT SERPL-MCNC: 0.8 MG/DL (ref 0.6–1.2)
CRP SERPL-MCNC: <3 MG/L (ref 0–5.1)
DEPRECATED RDW RBC AUTO: 14.3 % (ref 12.4–15.4)
EOSINOPHIL # BLD: 0.1 K/UL (ref 0–0.6)
EOSINOPHIL NFR BLD: 0.9 %
ERYTHROCYTE [SEDIMENTATION RATE] IN BLOOD BY WESTERGREN METHOD: 20 MM/HR (ref 0–30)
GFR SERPLBLD CREATININE-BSD FMLA CKD-EPI: 79 ML/MIN/{1.73_M2}
GLUCOSE BLD-MCNC: 135 MG/DL (ref 70–99)
GLUCOSE BLD-MCNC: 137 MG/DL (ref 70–99)
GLUCOSE SERPL-MCNC: 112 MG/DL (ref 70–99)
HCT VFR BLD AUTO: 48.9 % (ref 36–48)
HGB BLD-MCNC: 16.2 G/DL (ref 12–16)
LACTATE BLDV-SCNC: 1.8 MMOL/L (ref 0.4–1.9)
LYMPHOCYTES # BLD: 2.3 K/UL (ref 1–5.1)
LYMPHOCYTES NFR BLD: 26.5 %
MCH RBC QN AUTO: 29.9 PG (ref 26–34)
MCHC RBC AUTO-ENTMCNC: 33.2 G/DL (ref 31–36)
MCV RBC AUTO: 90.2 FL (ref 80–100)
MONOCYTES # BLD: 0.5 K/UL (ref 0–1.3)
MONOCYTES NFR BLD: 5.7 %
NEUTROPHILS # BLD: 5.8 K/UL (ref 1.7–7.7)
NEUTROPHILS NFR BLD: 66 %
PERFORMED ON: ABNORMAL
PERFORMED ON: ABNORMAL
PLATELET # BLD AUTO: 159 K/UL (ref 135–450)
PMV BLD AUTO: 10.2 FL (ref 5–10.5)
POTASSIUM SERPL-SCNC: 4 MMOL/L (ref 3.5–5.1)
PROCALCITONIN SERPL IA-MCNC: 0.04 NG/ML (ref 0–0.15)
PROT SERPL-MCNC: 7.1 G/DL (ref 6.4–8.2)
RBC # BLD AUTO: 5.42 M/UL (ref 4–5.2)
REASON FOR REJECTION: NORMAL
REJECTED TEST: NORMAL
SODIUM SERPL-SCNC: 140 MMOL/L (ref 136–145)
WBC # BLD AUTO: 8.7 K/UL (ref 4–11)

## 2024-12-05 PROCEDURE — 85025 COMPLETE CBC W/AUTO DIFF WBC: CPT

## 2024-12-05 PROCEDURE — 6370000000 HC RX 637 (ALT 250 FOR IP): Performed by: HOSPITALIST

## 2024-12-05 PROCEDURE — 6360000002 HC RX W HCPCS: Performed by: NURSE PRACTITIONER

## 2024-12-05 PROCEDURE — 6360000002 HC RX W HCPCS: Performed by: HOSPITALIST

## 2024-12-05 PROCEDURE — 96375 TX/PRO/DX INJ NEW DRUG ADDON: CPT

## 2024-12-05 PROCEDURE — 6370000000 HC RX 637 (ALT 250 FOR IP): Performed by: NURSE PRACTITIONER

## 2024-12-05 PROCEDURE — 96365 THER/PROPH/DIAG IV INF INIT: CPT

## 2024-12-05 PROCEDURE — 80053 COMPREHEN METABOLIC PANEL: CPT

## 2024-12-05 PROCEDURE — 87075 CULTR BACTERIA EXCEPT BLOOD: CPT

## 2024-12-05 PROCEDURE — 83605 ASSAY OF LACTIC ACID: CPT

## 2024-12-05 PROCEDURE — 99223 1ST HOSP IP/OBS HIGH 75: CPT | Performed by: INTERNAL MEDICINE

## 2024-12-05 PROCEDURE — 87040 BLOOD CULTURE FOR BACTERIA: CPT

## 2024-12-05 PROCEDURE — 2580000003 HC RX 258: Performed by: HOSPITALIST

## 2024-12-05 PROCEDURE — 6360000002 HC RX W HCPCS: Performed by: INTERNAL MEDICINE

## 2024-12-05 PROCEDURE — 99285 EMERGENCY DEPT VISIT HI MDM: CPT

## 2024-12-05 PROCEDURE — 87070 CULTURE OTHR SPECIMN AEROBIC: CPT

## 2024-12-05 PROCEDURE — 93005 ELECTROCARDIOGRAM TRACING: CPT | Performed by: INTERNAL MEDICINE

## 2024-12-05 PROCEDURE — 6360000002 HC RX W HCPCS

## 2024-12-05 PROCEDURE — 36415 COLL VENOUS BLD VENIPUNCTURE: CPT

## 2024-12-05 PROCEDURE — 84145 PROCALCITONIN (PCT): CPT

## 2024-12-05 PROCEDURE — 85652 RBC SED RATE AUTOMATED: CPT

## 2024-12-05 PROCEDURE — 2580000003 HC RX 258: Performed by: INTERNAL MEDICINE

## 2024-12-05 PROCEDURE — 1200000000 HC SEMI PRIVATE

## 2024-12-05 PROCEDURE — 86140 C-REACTIVE PROTEIN: CPT

## 2024-12-05 PROCEDURE — 87205 SMEAR GRAM STAIN: CPT

## 2024-12-05 PROCEDURE — 71045 X-RAY EXAM CHEST 1 VIEW: CPT

## 2024-12-05 RX ORDER — VANCOMYCIN HCL IN 5 % DEXTROSE 1.25 G/25
20 PLASTIC BAG, INJECTION (ML) INTRAVENOUS ONCE
Status: COMPLETED | OUTPATIENT
Start: 2024-12-05 | End: 2024-12-05

## 2024-12-05 RX ORDER — ACETAMINOPHEN 650 MG/1
650 SUPPOSITORY RECTAL EVERY 6 HOURS PRN
Status: DISCONTINUED | OUTPATIENT
Start: 2024-12-05 | End: 2024-12-07

## 2024-12-05 RX ORDER — POTASSIUM CHLORIDE 7.45 MG/ML
10 INJECTION INTRAVENOUS PRN
Status: DISCONTINUED | OUTPATIENT
Start: 2024-12-05 | End: 2024-12-14 | Stop reason: HOSPADM

## 2024-12-05 RX ORDER — MAGNESIUM SULFATE IN WATER 40 MG/ML
2000 INJECTION, SOLUTION INTRAVENOUS PRN
Status: DISCONTINUED | OUTPATIENT
Start: 2024-12-05 | End: 2024-12-14 | Stop reason: HOSPADM

## 2024-12-05 RX ORDER — PROCHLORPERAZINE EDISYLATE 5 MG/ML
10 INJECTION INTRAMUSCULAR; INTRAVENOUS ONCE
Status: DISCONTINUED | OUTPATIENT
Start: 2024-12-05 | End: 2024-12-14 | Stop reason: HOSPADM

## 2024-12-05 RX ORDER — ONDANSETRON 2 MG/ML
INJECTION INTRAMUSCULAR; INTRAVENOUS
Status: COMPLETED
Start: 2024-12-05 | End: 2024-12-05

## 2024-12-05 RX ORDER — MONTELUKAST SODIUM 10 MG/1
10 TABLET ORAL NIGHTLY
Status: DISCONTINUED | OUTPATIENT
Start: 2024-12-05 | End: 2024-12-14 | Stop reason: HOSPADM

## 2024-12-05 RX ORDER — ACETAMINOPHEN 325 MG/1
650 TABLET ORAL EVERY 6 HOURS PRN
Status: DISCONTINUED | OUTPATIENT
Start: 2024-12-05 | End: 2024-12-07

## 2024-12-05 RX ORDER — POTASSIUM CHLORIDE 1500 MG/1
40 TABLET, EXTENDED RELEASE ORAL PRN
Status: DISCONTINUED | OUTPATIENT
Start: 2024-12-05 | End: 2024-12-14 | Stop reason: HOSPADM

## 2024-12-05 RX ORDER — ONDANSETRON 4 MG/1
4 TABLET, ORALLY DISINTEGRATING ORAL EVERY 8 HOURS PRN
Status: DISCONTINUED | OUTPATIENT
Start: 2024-12-05 | End: 2024-12-14 | Stop reason: HOSPADM

## 2024-12-05 RX ORDER — MORPHINE SULFATE 4 MG/ML
4 INJECTION, SOLUTION INTRAMUSCULAR; INTRAVENOUS ONCE
Status: COMPLETED | OUTPATIENT
Start: 2024-12-05 | End: 2024-12-05

## 2024-12-05 RX ORDER — HYDROXYZINE PAMOATE 25 MG/1
25 CAPSULE ORAL 2 TIMES DAILY PRN
Status: DISCONTINUED | OUTPATIENT
Start: 2024-12-05 | End: 2024-12-14 | Stop reason: HOSPADM

## 2024-12-05 RX ORDER — POLYETHYLENE GLYCOL 3350 17 G/17G
17 POWDER, FOR SOLUTION ORAL DAILY PRN
Status: DISCONTINUED | OUTPATIENT
Start: 2024-12-05 | End: 2024-12-14 | Stop reason: HOSPADM

## 2024-12-05 RX ORDER — CARVEDILOL 3.12 MG/1
3.12 TABLET ORAL 2 TIMES DAILY
Status: DISCONTINUED | OUTPATIENT
Start: 2024-12-05 | End: 2024-12-14 | Stop reason: HOSPADM

## 2024-12-05 RX ORDER — SODIUM CHLORIDE 9 MG/ML
INJECTION, SOLUTION INTRAVENOUS PRN
Status: DISCONTINUED | OUTPATIENT
Start: 2024-12-05 | End: 2024-12-14 | Stop reason: HOSPADM

## 2024-12-05 RX ORDER — ROSUVASTATIN CALCIUM 10 MG/1
10 TABLET, COATED ORAL NIGHTLY
Status: DISCONTINUED | OUTPATIENT
Start: 2024-12-05 | End: 2024-12-14 | Stop reason: HOSPADM

## 2024-12-05 RX ORDER — SODIUM CHLORIDE 0.9 % (FLUSH) 0.9 %
5-40 SYRINGE (ML) INJECTION PRN
Status: DISCONTINUED | OUTPATIENT
Start: 2024-12-05 | End: 2024-12-14 | Stop reason: HOSPADM

## 2024-12-05 RX ORDER — ONDANSETRON 2 MG/ML
4 INJECTION INTRAMUSCULAR; INTRAVENOUS EVERY 6 HOURS PRN
Status: DISCONTINUED | OUTPATIENT
Start: 2024-12-05 | End: 2024-12-14 | Stop reason: HOSPADM

## 2024-12-05 RX ORDER — ENOXAPARIN SODIUM 100 MG/ML
40 INJECTION SUBCUTANEOUS DAILY
Status: DISCONTINUED | OUTPATIENT
Start: 2024-12-05 | End: 2024-12-14 | Stop reason: HOSPADM

## 2024-12-05 RX ORDER — VILAZODONE HYDROCHLORIDE 20 MG/1
20 TABLET ORAL DAILY
Status: DISCONTINUED | OUTPATIENT
Start: 2024-12-05 | End: 2024-12-14 | Stop reason: HOSPADM

## 2024-12-05 RX ORDER — DIGOXIN 125 MCG
125 TABLET ORAL DAILY
Status: DISCONTINUED | OUTPATIENT
Start: 2024-12-06 | End: 2024-12-14 | Stop reason: HOSPADM

## 2024-12-05 RX ORDER — SODIUM CHLORIDE 0.9 % (FLUSH) 0.9 %
5-40 SYRINGE (ML) INJECTION EVERY 12 HOURS SCHEDULED
Status: DISCONTINUED | OUTPATIENT
Start: 2024-12-05 | End: 2024-12-14 | Stop reason: HOSPADM

## 2024-12-05 RX ORDER — SPIRONOLACTONE 25 MG/1
12.5 TABLET ORAL
Status: DISCONTINUED | OUTPATIENT
Start: 2024-12-06 | End: 2024-12-14 | Stop reason: HOSPADM

## 2024-12-05 RX ADMIN — ONDANSETRON 4 MG: 2 INJECTION, SOLUTION INTRAMUSCULAR; INTRAVENOUS at 17:43

## 2024-12-05 RX ADMIN — MORPHINE SULFATE 4 MG: 4 INJECTION, SOLUTION INTRAMUSCULAR; INTRAVENOUS at 15:24

## 2024-12-05 RX ADMIN — ONDANSETRON 4 MG: 2 INJECTION INTRAMUSCULAR; INTRAVENOUS at 17:43

## 2024-12-05 RX ADMIN — SODIUM CHLORIDE, PRESERVATIVE FREE 10 ML: 5 INJECTION INTRAVENOUS at 20:09

## 2024-12-05 RX ADMIN — CEFEPIME 2000 MG: 2 INJECTION, POWDER, FOR SOLUTION INTRAVENOUS at 15:31

## 2024-12-05 RX ADMIN — SACUBITRIL AND VALSARTAN 1 TABLET: 24; 26 TABLET, FILM COATED ORAL at 20:09

## 2024-12-05 RX ADMIN — VANCOMYCIN HYDROCHLORIDE 1250 MG: 1.25 INJECTION, SOLUTION INTRAVITREAL at 16:35

## 2024-12-05 RX ADMIN — ENOXAPARIN SODIUM 40 MG: 100 INJECTION SUBCUTANEOUS at 20:09

## 2024-12-05 RX ADMIN — TIZANIDINE 4 MG: 4 TABLET ORAL at 19:00

## 2024-12-05 RX ADMIN — CARVEDILOL 3.12 MG: 3.12 TABLET, FILM COATED ORAL at 20:09

## 2024-12-05 RX ADMIN — ROSUVASTATIN CALCIUM 10 MG: 10 TABLET, FILM COATED ORAL at 20:09

## 2024-12-05 ASSESSMENT — PAIN SCALES - GENERAL
PAINLEVEL_OUTOF10: 7
PAINLEVEL_OUTOF10: 8
PAINLEVEL_OUTOF10: 8
PAINLEVEL_OUTOF10: 7
PAINLEVEL_OUTOF10: 8

## 2024-12-05 ASSESSMENT — PAIN DESCRIPTION - ORIENTATION
ORIENTATION: LEFT

## 2024-12-05 ASSESSMENT — PAIN SCALES - WONG BAKER: WONGBAKER_NUMERICALRESPONSE: NO HURT

## 2024-12-05 ASSESSMENT — PAIN DESCRIPTION - DESCRIPTORS
DESCRIPTORS: ACHING
DESCRIPTORS: ACHING

## 2024-12-05 ASSESSMENT — PAIN DESCRIPTION - LOCATION
LOCATION: CHEST
LOCATION: NECK;CHEST
LOCATION: CHEST

## 2024-12-05 ASSESSMENT — PAIN - FUNCTIONAL ASSESSMENT: PAIN_FUNCTIONAL_ASSESSMENT: 0-10

## 2024-12-05 NOTE — CONSULTS
Infectious Diseases   Consult Note        Admission Date: 12/5/2024  Hospital Day: Hospital Day: 1   Attending: Saroj Matthew DO  Date of service: 12/5/24     Reason for admission: Pacemaker pocket infection    Chief complaint/ Reason for consult: Pacemaker pocket infection    Microbiology:        I have reviewed allavailable micro lab data and cultures    Results       Procedure Component Value Units Date/Time    Culture, Wound [[id number]]     Order Status: Sent Specimen: Skin     Culture, Anaerobic [[id number]]     Order Status: Sent Specimen: Skin     Culture, Blood 1 [[id number]]     Order Status: Sent Specimen: Blood     Culture, Blood 2 [[id number]]     Order Status: Sent Specimen: Blood              No results found for the last 90 days.         Antibiotics and immunizations:       Current antibiotics: All antibiotics and their doses were reviewed by me    Recent Abx Admin                     ceFEPIme (MAXIPIME) 2,000 mg in sodium chloride 0.9 % 100 mL IVPB (mini-bag) (mg) 2,000 mg New Bag 12/05/24 1531                      Immunization History: All immunization history was reviewed by me today.    Immunization History   Administered Date(s) Administered    COVID-19, PFIZER PURPLE top, DILUTE for use, (age 12 y+), 30mcg/0.3mL 03/12/2021, 04/05/2021, 12/30/2021    Influenza Virus Vaccine 10/06/2014, 10/13/2016, 10/05/2017    Influenza Whole 10/06/2014    Influenza, FLUAD, (age 65 y+), IM, Quadv, 0.5mL 10/28/2020, 11/03/2022    Influenza, FLUAD, (age 65 y+), IM, Trivalent PF, 0.5mL 09/24/2019    Influenza, FLUARIX, FLULAVAL, FLUZONE (age 6 mo+) and AFLURIA, (age 3 y+), Quadv PF, 0.5mL 10/13/2016, 10/05/2017    Pneumococcal, PCV-13, PREVNAR 13, (age 6w+), IM, 0.5mL 03/21/2019, 03/12/2021    Pneumococcal, PCV20, PREVNAR 20, (age 6w+), IM, 0.5mL 11/03/2022    Pneumococcal, PPSV23, PNEUMOVAX 23, (age 2y+), SC/IM, 0.5mL 11/02/2006, 10/08/2015, 10/08/2015       Known drug allergies:     All allergies were  Sebaceous cyst L72.3    Essential hypertension I10    ESCOBAR on CPAP G47.33    Hypercholesteremia E78.00    Biventricular ICD (implantable cardioverter-defibrillator) in place Z95.810    Chronic bilateral thoracic back pain M54.6, G89.29    Hyperkalemia E87.5    Dyspnea on exertion R06.09    Anxiety F41.9    GERD with esophagitis K21.00    Dysphagia R13.10    Suicidal ideation R45.851    Trauma and stressor-related disorder F43.9    Chronic systolic heart failure (HCC) I50.22    Vertigo R42    Seasonal allergies J30.2    ICD (implantable cardioverter-defibrillator) battery depletion Z45.02    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    Urinary incontinence R32    Chronic pain syndrome G89.4    Vitamin D deficiency E55.9    Moderate episode of recurrent major depressive disorder (HCC) F33.1    Vaginal itching N89.8    Uncontrolled diabetes mellitus with hyperglycemia, without long-term current use of insulin (HCC) E11.65    Type 2 diabetes mellitus with other specified complication (HCC) E11.69    Coronary artery disease due to lipid rich plaque I25.10, I25.83    Systolic congestive heart failure (HCC) I50.20    Infection of pacemaker pocket (HCC) T82.7XXA         Please note that this chart was generated using Dragon dictation software. Although every effort was made to ensure the accuracy of this automated transcription, some errors in transcription may have occurred inadvertently. If you may need any clarification, please do not hesitate to contact me through EPIC or at the phone number provided below with my electronic signature.  Any pictures or media included in this note were obtained after taking informed verbal consent from the patient and with their approval to include those in the patient's medical record.        Victor Hugo Barrett MD, MPH, FACP, FIDSA  12/5/2024, 3:52 PM  Central Office Phone: [phone]  Central Office Fax: [phone]    [facility]   [address], [address]

## 2024-12-05 NOTE — ED NOTES
Presents to the ED for opening to pacemaker site for approx one month; pacer placed several years ago, but pt reports recent weight loss rt opening of site.  No SS of infection, including fever, chills, redness; noted scant amount of purulent drainage to two small openings.  Monitors in place;  at bedside.      Patient oriented to room and ED throughput process.  Safety measures with ED bed locked in lowest position and call light in reach.  Patient educated on all orders, including any medications.  Patient educated on chief complaint/symptoms. Patient encouraged to ask questions regarding care, medications or treatment plan.  Patient aware of how to reach staff with questions/concerns.

## 2024-12-05 NOTE — ED PROVIDER NOTES
In addition to the advanced practice provider, I personally saw Tess Atkinson and performed a substantive portion of the visit including all aspects of the medical decision making.    Medical Decision Making  68-year-old female comes from the EP cardiologist office for an infected pacemaker pocket.  Symptoms have been going on for 3 weeks.  Patient states the pain has been steadily increasing.  Patient denies fever and chills.  Patient denies nausea and vomiting.  She does not have any abdominal pain she does have some chest pain but the pain is located over the infected pacemaker pocket.  Patient has a history of dilated cardiomyopathy.  Cardiology note from 18 November states that the device has migrated from his original location.  Chest x-ray did not show any acute findings.  EKG did not show any acute findings.  The white blood cell count was not found to be elevated.  Patient's hemoglobin is mildly elevated.  Hematocrit is also mildly elevated.  She is nontoxic and nonseptic in appearance.  I did contact infectious disease for consult and this was a request by Dr. Gusman.  Patient was given cefepime and vancomycin.  Patient is going to be admitted for further evaluation and treatment.  I agree with the assessment and plan.  Condition is guarded.        EKG  The Ekg interpreted by me in the absence of a cardiologist shows.  AV dual paced rhythm  with a rate of 60  Axis is   Appropriate for a paced rhytm  QTc is  normal  Intervals and Durations are unremarkable.      No specific ST-T wave changes appreciated.  No evidence of acute ischemia.   No significant change from prior EKG dated 1/18/2024    SEP-1  Is this patient to be included in the SEP-1 Core Measure due to severe sepsis or septic shock?   No    Screenings     Blanca Coma Scale  Eye Opening: Spontaneous  Best Verbal Response: Oriented  Best Motor Response: Obeys commands  [place] Coma Scale Score: 15             Patient Referrals:  No follow-up  provider specified.    Discharge Medications:  New Prescriptions    No medications on file       FINAL IMPRESSION  1. Infection of pacemaker pocket, initial encounter ([facility])        Blood pressure (!) 141/68, pulse 66, temperature 98 °F (36.7 °C), temperature source Oral, resp. rate 22, height 1.575 m (5' 2\"), weight 58.1 kg (128 lb), SpO2 98%.     I appreciate the [facility]'s collaboration on this case.    For further details of Tess Atkinson's emergency department encounter, please see documentation by advanced practice provider, Rosemary Segovia.        Saroj Matthew,   12/05/24 [id number]

## 2024-12-05 NOTE — PATIENT INSTRUCTIONS
I took a picture of her ICD, sent it to Dr Gusman who called me  Patient sent to device clinic  Device clinic and Dr Gusman agree that patient needs to be admitted the hospital

## 2024-12-05 NOTE — PROGRESS NOTES
Pharmacy Home Medication Reconciliation Note    A medication reconciliation has been completed for Tess Atkinson 1955    [place] [facility] [id number]  Information provided by: patient    The patient's home medication list is as follows:  No current facility-administered medications on file prior to encounter.     Current Outpatient Medications on File Prior to Encounter   Medication Sig Dispense Refill    vilazodone HCl (VIIBRYD) 20 MG TABS TAKE 1 TABLET BY MOUTH DAILY 90 tablet 1    empagliflozin (JARDIANCE) 25 MG tablet Take 1 tablet by mouth daily 90 tablet 1    ciprofloxacin (CIPRO) 250 MG tablet TAKE 1 TABLET BY MOUTH AFTER SEXUAL ACTIVITY (Patient taking differently: Take 1 tablet by mouth See Admin Instructions TAKE 1 TABLET BY MOUTH AFTER SEXUAL ACTIVITY) 10 tablet 2    carvedilol (COREG) 3.125 MG tablet TAKE 1 TABLET BY MOUTH TWICE DAILY 180 tablet 0    digoxin (LANOXIN) 125 MCG tablet Take 1 tablet by mouth daily 90 tablet 1    rosuvastatin (CRESTOR) 10 MG tablet Take 1 tablet by mouth daily (Patient taking differently: Take 1 tablet by mouth nightly) 90 tablet 1    sacubitril-valsartan (ENTRESTO) 24-26 MG per tablet TAKE ONE-HALF TABLET BY MOUTH EVERY MORNING AND 1 EVERY EVENING (Patient taking differently: Take 0.5-1 tablets by mouth See Admin Instructions Take one half tablet by mouth every morning and 1 whole tablet every evening.) 180 tablet 1    spironolactone (ALDACTONE) 25 MG tablet 12.5 mg mon wed and friday (Patient taking differently: Take 0.5 tablets by mouth Every Monday, Wednesday, and Friday 12.5 mg mon wed and friday) 36 tablet 1    tiZANidine (ZANAFLEX) 4 MG tablet Take 1 tablet by mouth 3 times daily as needed (muscle spasm) 30 tablet 0    hydrOXYzine pamoate (VISTARIL) 25 MG capsule Take 1 capsule by mouth 2 times daily as needed for Anxiety 90 capsule 1    vitamin D3 (CHOLECALCIFEROL) 25 MCG (1000 UT) TABS tablet TAKE 1 TABLET BY MOUTH DAILY 90 tablet 1

## 2024-12-05 NOTE — ED PROVIDER NOTES
[facility] EMERGENCY DEPARTMENT  EMERGENCY DEPARTMENT ENCOUNTER        Pt Name: Tess Atkinson  MRN: [id number]  Birthdate 1955  Date of evaluation: 2024  Provider: SHAMEKA Roldan CNP  PCP: Rebekah Carson APRN - CNP  Note Started: 2:44 PM EST 24       I have seen and evaluated this patient with my supervising physician Saroj Matthew DO.      CHIEF COMPLAINT       Chief Complaint   Patient presents with    Wound Check     Pt to ED with CC of infection to cardiac implant site.  Pt verbalizes pain.  States that her sx have been ongoing x3 weeks.       HISTORY OF PRESENT ILLNESS: 1 or more Elements     History from : Patient, Family , and Caregiver    Limitations to history : None    Tess Atkinson is a 68 y.o. female who presents to the emergency department for admission for infection, concern for evidence of pocket infection.  Admit and have ID consult, IV antibiotics, blood cultures.  She will need device extraction and reimplantation on opposite side.    Reports pain and color change with swelling worsening over past 3 weeks.    Nursing Notes were all reviewed and agreed with or any disagreements were addressed in the HPI.    REVIEW OF SYSTEMS :      Review of Systems   Constitutional:  Positive for fatigue. Negative for activity change, chills and fever.   Respiratory:  Negative for chest tightness and shortness of breath.    Cardiovascular:  Negative for chest pain.   Gastrointestinal:  Negative for abdominal pain, diarrhea, nausea and vomiting.   Genitourinary:  Negative for dysuria.   Skin:  Positive for color change and wound.   All other systems reviewed and are negative.      Positives and Pertinent negatives as per HPI.     SURGICAL HISTORY     Past Surgical History:   Procedure Laterality Date    CARDIAC DEFIBRILLATOR PLACEMENT       SECTION      3 c-sections    COLONOSCOPY  2015    polypectomy    COLONOSCOPY N/A 2019     Disp-30 tablet, R-0Normal      hydrOXYzine pamoate (VISTARIL) 25 MG capsule Take 1 capsule by mouth 2 times daily as needed for Anxiety, Disp-90 capsule, R-1Normal      vitamin D3 (CHOLECALCIFEROL) 25 MCG (1000 UT) TABS tablet TAKE 1 TABLET BY MOUTH DAILY, Disp-90 tablet, R-1Normal      montelukast (SINGULAIR) 10 MG tablet Take 1 tablet by mouth daily, Disp-30 tablet, R-5Normal      Magnesium 400 MG TABS Take by mouth daily, Disp-30 tablet,R-5Normal      calcium carbonate (OSCAL) 500 MG TABS tablet Take 1 tablet by mouth daily, Disp-30 tablet,R-5Normal      vitamin E 400 UNIT capsule Take 1 capsule by mouth dailyHistorical Med      Omega-3 Fatty Acids (FISH OIL) 1000 MG CAPS Take 2 capsules by mouth 2 times dailyHistorical Med      Continuous Glucose  (FREESTYLE MAGDALENA 3 READER) [place] 1 each by Does not apply route 4 times daily (before meals and nightly), Disp-1 each, R-0Normal      Continuous Glucose Sensor (FREESTYLE MAGDALENA 3 SENSOR) MISC 1 each by Does not apply route once a week, Disp-6 each, R-1Normal             ALLERGIES     Prednisone and Metformin and related    FAMILYHISTORY       Family History   Problem Relation Age of Onset    Diabetes Mother     Kidney Disease Mother         reanl failure        SOCIAL HISTORY       Social History     Tobacco Use    Smoking status: Former     Current packs/day: 0.00     Average packs/day: 0.3 packs/day for 10.0 years (2.5 ttl pk-yrs)     Types: Cigarettes     Start date: 1988     Quit date: 1998     Years since quittin.7    Smokeless tobacco: Never    Tobacco comments:     smoked 1 cig a day,   Vaping Use    Vaping status: Never Used   Substance Use Topics    Alcohol use: Yes     Comment: occasionally    Drug use: Never     Frequency: 2.0 times per week       SCREENINGS   NIH Stroke Scale  NIH Stroke Scale Assessed: No    Blanca Coma Scale  Eye Opening: Spontaneous  Best Verbal Response: Oriented  Best Motor Response: Obeys commands  Blanca Coma

## 2024-12-05 NOTE — PROGRESS NOTES
[facility]  Progress Note    Primary Care Doctor:  Rebekah Carson, APRN - CNP    Chief Complaint   Patient presents with    Atrial Fibrillation     Pain around pacemaker    Hypertension    Cardiomyopathy    6 Month Follow-Up        History of Present Illness:  68 y.o. female with history of NICM, BiV ICD  and , AF (not on anticoagulation, follows with Dr Gusman), ESCOBAR on cpap, fibromyalgia, sHF on entresto since , followed at   - for chest pain, LHC done, EGD with H pylori neg and anxiety/depression (family issues) seen by psychiatry and started on klonipin/zoloft.  ICD generator change 2022    I had the pleasure of seeing Tess Atkinson in follow up for systolic heart failure.  She is ambulatory and with her .  She has not been feeling good prior and since returning from [place].  In sept, she was diagnosed with diabetes with A1c of 13.4 and down to 9.5.  She was started on jardiance 25 mg.  Her weight at home is 128 which is about 15 pounds down.  Her ICD shows normal impedence but it is very tender to the touch and has 2 small sores on the left lateral side.  Up by her neck, it is very tender and soft tissue swelling.      Past Medical History:   has a past medical history of Atrial fibrillation (HCC), Cardiomyopathy, nonischemic (HCC), CHF (congestive heart failure) (HCC), Fibromyalgia, GERD (gastroesophageal reflux disease), Heart disease, HSV-1 (herpes simplex virus 1) infection, Hyperlipidemia, Sleep apnea, and Uterine fibroids affecting pregnancy.  Surgical History:   has a past surgical history that includes  section; Pacemaker insertion (); Pacemaker insertion; Upper gastrointestinal endoscopy (2015); Colonoscopy (2015); Colonoscopy (N/A, 2019); Upper gastrointestinal endoscopy (N/A, 2019); Cardiac defibrillator placement; and Upper gastrointestinal endoscopy (N/A, 2020).   Social History:   reports that she quit smoking about

## 2024-12-05 NOTE — PROGRESS NOTES
Patient has evidence of pocket infection.  Will admit and have ID consult, IV antibiotic, blood cultures.  She will need device and these extraction and reimplant on the opposite side.

## 2024-12-05 NOTE — H&P
HOSPITALISTS HISTORY AND PHYSICAL    12/5/2024 4:48 PM    Patient Information:  TESS FREITAS is a 68 y.o. female [id number]  PCP:  Rebekah Carson APRN - CNP (Tel: [phone] )    Chief complaint:    Chief Complaint   Patient presents with    Wound Check     Pt to ED with CC of infection to cardiac implant site.  Pt verbalizes pain.  States that her sx have been ongoing x3 weeks.        History of Present Illness:  Tess Freitas is a 68 y.o. female who presented with presents to ER with complaints of infected pacemaker site.  Patient presents from cardiology office.  Patient had pacemaker placed about few weeks back over the past 3 weeks patient has increased pain at the site.  No reported fevers chills patient noted some pain in the abdomen today was at the cardiology office they were concerned that patient may have infection around the site.    REVIEW OF SYSTEMS:   Constitutional: Negative for fever,chills or night sweats  ENT: Negative for rhinorrhea, epistaxis, hoarseness, sore throat.  Respiratory: Negative for shortness of breath,wheezing  Cardiovascular: Negative for chest pain, palpitations   Gastrointestinal: Negative for nausea, vomiting, diarrhea  Genitourinary: Negative for polyuria, dysuria   Hematologic/Lymphatic: Negative for bleeding tendency, easy bruising  Musculoskeletal: Negative for myalgias and arthralgias  Neurologic: Negative for confusion,dysarthria.  Skin: Negative for itching,rash, good capillary refill.   Psychiatric: Negative for depression,anxiety, agitation.  Endocrine: Negative for polydipsia,polyuria,heat /cold intolerance.    Past Medical History:   has a past medical history of Atrial fibrillation (HCC), Cardiomyopathy, nonischemic (HCC), CHF (congestive heart failure) (HCC), Fibromyalgia, GERD (gastroesophageal reflux  Diabetes in her mother; Kidney Disease in her mother. ,     Physical Exam:  BP (!) 105/58   Pulse 62   Temp 98 °F (36.7 °C) (Oral)   Resp 11   Ht 1.575 m (5' 2\")   Wt 58.1 kg (128 lb)   SpO2 94%   BMI 23.41 kg/m²     General appearance:  Appears comfortable. Well nourished  Eyes: Sclera clear, pupils equal  ENT: Moist mucus membranes, no thrush. Trachea midline.  Cardiovascular: Regular rhythm, normal S1, S2. No murmur, gallop, rub. No edema in lower extremities  Respiratory: Clear to auscultation bilaterally, no wheeze, good inspiratory effort  Gastrointestinal: Abdomen soft, non-tender, not distended, normal bowel sounds  Musculoskeletal: No cyanosis in digits, neck supple  ICD site with tender to touch.  With 2 small areas with sore full  Neurology: Cranial nerves grossly intact. Alert and oriented in time, place and person. No speech or motor deficits  Psychiatry: Appropriate affect. Not agitated  Skin: Warm, dry, normal turgor, no rash    Labs:  CBC:   Lab Results   Component Value Date/Time    WBC 8.7 12/05/2024 02:15 PM    RBC 5.42 12/05/2024 02:15 PM    HGB 16.2 12/05/2024 02:15 PM    HCT 48.9 12/05/2024 02:15 PM    MCV 90.2 12/05/2024 02:15 PM    MCH 29.9 12/05/2024 02:15 PM    MCHC 33.2 12/05/2024 02:15 PM    RDW 14.3 12/05/2024 02:15 PM     12/05/2024 02:15 PM    MPV 10.2 12/05/2024 02:15 PM     BMP:    Lab Results   Component Value Date/Time     12/05/2024 03:18 PM    K 4.0 12/05/2024 03:18 PM    K 4.1 05/27/2024 01:37 AM     12/05/2024 03:18 PM    CO2 24 12/05/2024 03:18 PM    BUN 26 12/05/2024 03:18 PM    CREATININE 0.8 12/05/2024 03:18 PM    CALCIUM 9.3 12/05/2024 03:18 PM    GFRAA >60 09/22/2022 03:44 PM    GFRAA >60 05/30/2013 09:20 AM    LABGLOM 79 12/05/2024 03:18 PM    LABGLOM 79 03/27/2024 12:32 PM    GLUCOSE 112 12/05/2024 03:18 PM    GLUCOSE 80 06/07/2012 03:22 PM       Chest Xray:   EKG:    I visualized CXR images and EKG strips     Discussed  with ER attending ID

## 2024-12-06 PROBLEM — T82.7XXA: Status: ACTIVE | Noted: 2024-12-05

## 2024-12-06 LAB
ANION GAP SERPL CALCULATED.3IONS-SCNC: 10 MMOL/L (ref 3–16)
BASOPHILS # BLD: 0 K/UL (ref 0–0.2)
BASOPHILS NFR BLD: 0.7 %
BUN SERPL-MCNC: 25 MG/DL (ref 7–20)
CALCIUM SERPL-MCNC: 9.1 MG/DL (ref 8.3–10.6)
CHLORIDE SERPL-SCNC: 101 MMOL/L (ref 99–110)
CO2 SERPL-SCNC: 25 MMOL/L (ref 21–32)
CREAT SERPL-MCNC: 0.8 MG/DL (ref 0.6–1.2)
DEPRECATED RDW RBC AUTO: 14.3 % (ref 12.4–15.4)
EKG ATRIAL RATE: 60 BPM
EKG DIAGNOSIS: NORMAL
EKG P AXIS: 81 DEGREES
EKG P-R INTERVAL: 128 MS
EKG Q-T INTERVAL: 482 MS
EKG QRS DURATION: 162 MS
EKG QTC CALCULATION (BAZETT): 482 MS
EKG R AXIS: 134 DEGREES
EKG T AXIS: 85 DEGREES
EKG VENTRICULAR RATE: 60 BPM
EOSINOPHIL # BLD: 0.1 K/UL (ref 0–0.6)
EOSINOPHIL NFR BLD: 1.2 %
GFR SERPLBLD CREATININE-BSD FMLA CKD-EPI: 79 ML/MIN/{1.73_M2}
GLUCOSE BLD-MCNC: 110 MG/DL (ref 70–99)
GLUCOSE BLD-MCNC: 113 MG/DL (ref 70–99)
GLUCOSE BLD-MCNC: 117 MG/DL (ref 70–99)
GLUCOSE BLD-MCNC: 118 MG/DL (ref 70–99)
GLUCOSE BLD-MCNC: 120 MG/DL (ref 70–99)
GLUCOSE SERPL-MCNC: 104 MG/DL (ref 70–99)
HCT VFR BLD AUTO: 42.3 % (ref 36–48)
HGB BLD-MCNC: 14 G/DL (ref 12–16)
LYMPHOCYTES # BLD: 2.3 K/UL (ref 1–5.1)
LYMPHOCYTES NFR BLD: 35 %
MCH RBC QN AUTO: 29.9 PG (ref 26–34)
MCHC RBC AUTO-ENTMCNC: 33 G/DL (ref 31–36)
MCV RBC AUTO: 90.6 FL (ref 80–100)
MONOCYTES # BLD: 0.5 K/UL (ref 0–1.3)
MONOCYTES NFR BLD: 7.4 %
NEUTROPHILS # BLD: 3.7 K/UL (ref 1.7–7.7)
NEUTROPHILS NFR BLD: 55.7 %
PERFORMED ON: ABNORMAL
PLATELET # BLD AUTO: 125 K/UL (ref 135–450)
PMV BLD AUTO: 10.6 FL (ref 5–10.5)
POTASSIUM SERPL-SCNC: 3.9 MMOL/L (ref 3.5–5.1)
RBC # BLD AUTO: 4.67 M/UL (ref 4–5.2)
SODIUM SERPL-SCNC: 136 MMOL/L (ref 136–145)
VANCOMYCIN SERPL-MCNC: 8.9 UG/ML
WBC # BLD AUTO: 6.7 K/UL (ref 4–11)

## 2024-12-06 PROCEDURE — 6370000000 HC RX 637 (ALT 250 FOR IP): Performed by: NURSE PRACTITIONER

## 2024-12-06 PROCEDURE — 2580000003 HC RX 258: Performed by: HOSPITALIST

## 2024-12-06 PROCEDURE — 99233 SBSQ HOSP IP/OBS HIGH 50: CPT | Performed by: INTERNAL MEDICINE

## 2024-12-06 PROCEDURE — 99222 1ST HOSP IP/OBS MODERATE 55: CPT

## 2024-12-06 PROCEDURE — 93010 ELECTROCARDIOGRAM REPORT: CPT | Performed by: INTERNAL MEDICINE

## 2024-12-06 PROCEDURE — 86900 BLOOD TYPING SEROLOGIC ABO: CPT

## 2024-12-06 PROCEDURE — 6360000002 HC RX W HCPCS: Performed by: INTERNAL MEDICINE

## 2024-12-06 PROCEDURE — 1200000000 HC SEMI PRIVATE

## 2024-12-06 PROCEDURE — 85025 COMPLETE CBC W/AUTO DIFF WBC: CPT

## 2024-12-06 PROCEDURE — 86850 RBC ANTIBODY SCREEN: CPT

## 2024-12-06 PROCEDURE — 86901 BLOOD TYPING SEROLOGIC RH(D): CPT

## 2024-12-06 PROCEDURE — 6370000000 HC RX 637 (ALT 250 FOR IP): Performed by: INTERNAL MEDICINE

## 2024-12-06 PROCEDURE — 6370000000 HC RX 637 (ALT 250 FOR IP): Performed by: HOSPITALIST

## 2024-12-06 PROCEDURE — 2580000003 HC RX 258: Performed by: INTERNAL MEDICINE

## 2024-12-06 PROCEDURE — 6360000002 HC RX W HCPCS: Performed by: HOSPITALIST

## 2024-12-06 PROCEDURE — 36415 COLL VENOUS BLD VENIPUNCTURE: CPT

## 2024-12-06 PROCEDURE — 2580000003 HC RX 258

## 2024-12-06 PROCEDURE — 80048 BASIC METABOLIC PNL TOTAL CA: CPT

## 2024-12-06 PROCEDURE — 80202 ASSAY OF VANCOMYCIN: CPT

## 2024-12-06 RX ORDER — SODIUM CHLORIDE 0.9 % (FLUSH) 0.9 %
5-40 SYRINGE (ML) INJECTION EVERY 12 HOURS SCHEDULED
Status: DISCONTINUED | OUTPATIENT
Start: 2024-12-06 | End: 2024-12-09 | Stop reason: HOSPADM

## 2024-12-06 RX ORDER — TRAMADOL HYDROCHLORIDE 50 MG/1
50 TABLET ORAL ONCE
Status: COMPLETED | OUTPATIENT
Start: 2024-12-06 | End: 2024-12-06

## 2024-12-06 RX ORDER — SODIUM CHLORIDE 9 MG/ML
INJECTION, SOLUTION INTRAVENOUS PRN
Status: DISCONTINUED | OUTPATIENT
Start: 2024-12-06 | End: 2024-12-09 | Stop reason: HOSPADM

## 2024-12-06 RX ORDER — SODIUM CHLORIDE 9 MG/ML
INJECTION, SOLUTION INTRAVENOUS PRN
Status: DISCONTINUED | OUTPATIENT
Start: 2024-12-06 | End: 2024-12-14 | Stop reason: HOSPADM

## 2024-12-06 RX ORDER — SODIUM CHLORIDE 0.9 % (FLUSH) 0.9 %
5-40 SYRINGE (ML) INJECTION PRN
Status: DISCONTINUED | OUTPATIENT
Start: 2024-12-06 | End: 2024-12-09 | Stop reason: HOSPADM

## 2024-12-06 RX ORDER — DEXTROSE MONOHYDRATE 100 MG/ML
INJECTION, SOLUTION INTRAVENOUS CONTINUOUS PRN
Status: DISCONTINUED | OUTPATIENT
Start: 2024-12-06 | End: 2024-12-14 | Stop reason: HOSPADM

## 2024-12-06 RX ORDER — GLUCAGON 1 MG/ML
1 KIT INJECTION PRN
Status: DISCONTINUED | OUTPATIENT
Start: 2024-12-06 | End: 2024-12-14 | Stop reason: HOSPADM

## 2024-12-06 RX ADMIN — CARVEDILOL 3.12 MG: 3.12 TABLET, FILM COATED ORAL at 09:46

## 2024-12-06 RX ADMIN — ROSUVASTATIN CALCIUM 10 MG: 10 TABLET, FILM COATED ORAL at 20:43

## 2024-12-06 RX ADMIN — SODIUM CHLORIDE, PRESERVATIVE FREE 10 ML: 5 INJECTION INTRAVENOUS at 20:43

## 2024-12-06 RX ADMIN — SODIUM CHLORIDE 1250 MG: 9 INJECTION, SOLUTION INTRAVENOUS at 17:19

## 2024-12-06 RX ADMIN — MONTELUKAST SODIUM 10 MG: 10 TABLET, FILM COATED ORAL at 20:39

## 2024-12-06 RX ADMIN — TIZANIDINE 4 MG: 4 TABLET ORAL at 20:47

## 2024-12-06 RX ADMIN — SPIRONOLACTONE 12.5 MG: 25 TABLET ORAL at 09:53

## 2024-12-06 RX ADMIN — VILAZODONE HYDROCHLORIDE 20 MG: 20 TABLET, FILM COATED ORAL at 11:25

## 2024-12-06 RX ADMIN — DIGOXIN 125 MCG: 0.12 TABLET ORAL at 09:46

## 2024-12-06 RX ADMIN — TRAMADOL HYDROCHLORIDE 50 MG: 50 TABLET ORAL at 20:39

## 2024-12-06 RX ADMIN — SACUBITRIL AND VALSARTAN 0.5 TABLET: 24; 26 TABLET, FILM COATED ORAL at 09:46

## 2024-12-06 RX ADMIN — CEFEPIME 2000 MG: 2 INJECTION, POWDER, FOR SOLUTION INTRAVENOUS at 12:57

## 2024-12-06 RX ADMIN — ENOXAPARIN SODIUM 40 MG: 100 INJECTION SUBCUTANEOUS at 09:45

## 2024-12-06 RX ADMIN — SODIUM CHLORIDE, PRESERVATIVE FREE 10 ML: 5 INJECTION INTRAVENOUS at 09:46

## 2024-12-06 RX ADMIN — CARVEDILOL 3.12 MG: 3.12 TABLET, FILM COATED ORAL at 20:40

## 2024-12-06 RX ADMIN — EMPAGLIFLOZIN 10 MG: 10 TABLET, FILM COATED ORAL at 09:46

## 2024-12-06 RX ADMIN — TIZANIDINE 4 MG: 4 TABLET ORAL at 11:34

## 2024-12-06 RX ADMIN — SACUBITRIL AND VALSARTAN 1 TABLET: 24; 26 TABLET, FILM COATED ORAL at 20:48

## 2024-12-06 ASSESSMENT — PAIN SCALES - GENERAL
PAINLEVEL_OUTOF10: 8
PAINLEVEL_OUTOF10: 8
PAINLEVEL_OUTOF10: 0
PAINLEVEL_OUTOF10: 4
PAINLEVEL_OUTOF10: 5
PAINLEVEL_OUTOF10: 6

## 2024-12-06 ASSESSMENT — PAIN SCALES - WONG BAKER
WONGBAKER_NUMERICALRESPONSE: NO HURT
WONGBAKER_NUMERICALRESPONSE: NO HURT

## 2024-12-06 ASSESSMENT — PAIN DESCRIPTION - ORIENTATION
ORIENTATION: LEFT
ORIENTATION: LEFT

## 2024-12-06 ASSESSMENT — PAIN DESCRIPTION - LOCATION
LOCATION: SHOULDER;NECK
LOCATION: SHOULDER
LOCATION: CHEST

## 2024-12-06 ASSESSMENT — PAIN DESCRIPTION - DESCRIPTORS
DESCRIPTORS: ACHING;DISCOMFORT
DESCRIPTORS: ACHING;TIGHTNESS
DESCRIPTORS: ACHING;TIGHTNESS

## 2024-12-06 NOTE — PROGRESS NOTES
Clinical Pharmacy Note: Pharmacy to Dose Vancomycin    Tess Atkinson is a 68 y.o. female started on Vancomycin for pacemaker pocket infection; consult received from Dr. Barrett to manage therapy. Also receiving the following antibiotics: cefepime.    Goal AUC: 400-600 mg/L*hr    Assessment/Plan:  A 1250 mg loading dose was given on 12/5 at 1635  Initiate vancomycin 1250 mg IV every 24 hours. Bayesian modeling predicts an AUC of 471 mg/L*hr and a trough of 11 mcg/mL at steady state concentration.  A vancomycin random level has been ordered for 12/9 at 0600  Changes in regimen will be determined based on culture results, renal function, and clinical response.  Pharmacy will continue to monitor and adjust regimen as necessary.    Allergies:  Prednisone and Metformin and related     Recent Labs     12/05/24  1518 12/06/24  0453   CREATININE 0.8 0.8       Recent Labs     12/05/24  1415 12/06/24  0453   WBC 8.7 6.7       Ht Readings from Last 1 Encounters:   12/05/24 1.575 m (5' 2\")        Wt Readings from Last 1 Encounters:   12/06/24 58.3 kg (128 lb 8 oz)         Estimated Creatinine Clearance: 52 mL/min (based on SCr of 0.8 mg/dL).      Thank you for the consult,    Arya Krause, PharmD   [id number]

## 2024-12-06 NOTE — PROGRESS NOTES
type 1-2 antibiology IGG    Hyperlipidemia     Sleep apnea     uses cpap    Type 2 diabetes mellitus with other specified complication (HCC) 2024    Uterine fibroids affecting pregnancy        Past Surgical History: All past surgical history was reviewed today.    Past Surgical History:   Procedure Laterality Date    CARDIAC DEFIBRILLATOR PLACEMENT       SECTION      3 c-sections    COLONOSCOPY  2015    polypectomy    COLONOSCOPY N/A 2019    COLONOSCOPY POLYPECTOMY SNARE/COLD ASCENDING X1 , TRANSVERSE X1, SIGMOID X1 performed by Dell Munguia MD at [facility] ENDOSCOPY    PACEMAKER INSERTION      PACEMAKER INSERTION      UPPER GASTROINTESTINAL ENDOSCOPY  2015    UPPER GASTROINTESTINAL ENDOSCOPY N/A 2019    EGD GASTRIC BIOPSY performed by Dell Munguia MD at [facility] ENDOSCOPY    UPPER GASTROINTESTINAL ENDOSCOPY N/A 2020    EGD BIOPSY performed by Dell Munguia MD at [facility] ENDOSCOPY       Family History: All family history was reviewed today.        Problem Relation Age of Onset    Diabetes Mother     Kidney Disease Mother         reanl failure       Objective:       PHYSICAL EXAM:      Vitals:   Vitals:    24 0441 24 0444 24 0604 24 0830   BP: (!) 90/58   107/73   Pulse: 60 63 60 63   Resp: 16   16   Temp: 97.6 °F (36.4 °C)   97.8 °F (36.6 °C)   TempSrc: Oral      SpO2: 100%   99%   Weight:  58.3 kg (128 lb 8 oz)     Height:           Physical Exam  Vitals and nursing note reviewed.   Constitutional:       Appearance: She is well-developed. She is not diaphoretic.      Comments: The patient was seen earlier today.   HENT:      Head: Normocephalic and atraumatic.      Right Ear: External ear normal. There is no impacted cerumen.      Left Ear: External ear normal. There is no impacted cerumen.      Nose: Nose normal.      Mouth/Throat:      Mouth: Mucous membranes are moist.      Pharynx: Oropharynx is clear. No oropharyngeal exudate.   Eyes:  3.9    101   CO2 24 25   BUN 26* 25*   CREATININE 0.8 0.8   CALCIUM 9.3 9.1   GLUCOSE 112* 104*        Hepatic Function Panel:   Lab Results   Component Value Date/Time    ALKPHOS 58 12/05/2024 03:18 PM    ALT 13 12/05/2024 03:18 PM    AST 20 12/05/2024 03:18 PM    BILITOT 0.5 12/05/2024 03:18 PM    BILIDIR <0.2 06/29/2020 05:42 AM    IBILI see below 06/29/2020 05:42 AM       CPK:   Lab Results   Component Value Date    CKTOTAL 59 02/20/2024     ESR:   Lab Results   Component Value Date    SEDRATE 20 12/05/2024     CRP:   Lab Results   Component Value Date    CRP <3.0 12/05/2024           Imaging:    All pertinent images and reports for the current visit were reviewed by me during this visit.  I reviewed the chest x-ray/CT scan/MRI images today and independently interpreted the findings and results today.    XR CHEST PORTABLE   Final Result   1. No acute cardiopulmonary process.   2. Cardiomegaly.             Medications: All current and past medications were reviewed.     carvedilol  3.125 mg Oral BID    digoxin  125 mcg Oral Daily    montelukast  10 mg Oral Nightly    rosuvastatin  10 mg Oral Nightly    spironolactone  12.5 mg Oral Once per day on Monday Wednesday Friday    vilazodone HCl  20 mg Oral Daily    sodium chloride flush  5-40 mL IntraVENous 2 times per day    enoxaparin  40 mg SubCUTAneous Daily    empagliflozin  10 mg Oral Daily    sacubitril-valsartan  1 tablet Oral QPM    And    sacubitril-valsartan  0.5 tablet Oral Daily    prochlorperazine  10 mg IntraVENous Once        dextrose      sodium chloride         dextrose bolus **OR** dextrose bolus, glucagon (rDNA), dextrose, hydrOXYzine pamoate, tiZANidine, sodium chloride flush, sodium chloride, potassium chloride **OR** potassium alternative oral replacement **OR** potassium chloride, magnesium sulfate, ondansetron **OR** ondansetron, polyethylene glycol, acetaminophen **OR** acetaminophen      Problem list:       Patient Active Problem List

## 2024-12-06 NOTE — CONSULTS
[facility]   Electrophysiology   Date: 12/6/2024  Reason for Consultation: Pocket infection of ICD   Consult Requesting Physician: Sidney Toledo MD     Chief Complaint   Patient presents with    Wound Check     Pt to ED with CC of infection to cardiac implant site.  Pt verbalizes pain.  States that her sx have been ongoing x3 weeks.     CC:  Pain around ICD pocket   HPI: Tess Atkinson is a 68 y.o. female  with past medical history significant of dilated cardiomyopathy s/p ICD (1990s);Laser lead extraction and removal and replacement of Biv ICD (2009) Gen change 7/2018, Atrial fibrillation, CHF,  Fibromyalgia, GERD, Hyperlipemia, hyperlipidemia (12/6/2010) and TB (1980s).     Generator change with pocket revision 9/16/2022.      Patient was seen by Dr. Gusman yesterday in office for complaints of pain and swelling around ICD pocket. She was seen in office 11/18 for mild pain/swelling to pocket. Plan was to watch area and see if improved improved or worsened. She was directly admitted to hospital yesterday for IV antibiotics over the weekend. Planing for lead/device extraction with reimplantation on opposite side Monday. She is doing fair today. C/o pain to area. She would like to avoid pain medication if possible, she does not like the way it makes her feel. Discussed plan for Monday and IV antibiotics over the weekend. She understands and is agreeable to plan. All questions were addressed. No c/o chest pain, SOB or palpitations during my exam.     Assessment:     Cardiomyopathy/Chronic Systolic  CHF    - Echo 2/19/2021 LVEF 25-30%  -10/2018 25% to 30%  - Echo 1/4/24 LVEF 20-25%  - Dizziness with Farxiga and Jardiance   - Follows with heart failure  -S/p Biv ICD gen change 7/2018, 09/16/2022  with pocket revision   - Seen by NPSR 11/18 with c/o pain around her ICD site. Lead revision/extraction was discussed as a potential need in the future if pain persist  -Seen again by Dr Gusman

## 2024-12-06 NOTE — PROGRESS NOTES
NUTRITION    Nutrition screening referral was triggered based on results obtained during nursing admission assessment for Unintentional Weight Loss, which pt reported to be d/t new dx diabetes. Pt has decreased A1C from 13.4 in September to 9.5 now. Pt has been on Jardiance. Pt has had weight loss, which is expected with Jardiance, but weight loss has not been significant. PO intake %. Nutrition assessment is not indicated at this time.  Patient will be seen per nutrition standards of care.     Kristan Ramsey, MS, RD, LD

## 2024-12-06 NOTE — PROGRESS NOTES
Hospitalist Progress Note      PCP: Rebekah Carson APRN - CNP    Date of Admission: 12/5/2024    LOS: 1    Chief Complaint:   Chief Complaint   Patient presents with    Wound Check     Pt to ED with CC of infection to cardiac implant site.  Pt verbalizes pain.  States that her sx have been ongoing x3 weeks.       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  - Continue antibiotics  - Seen EP with plans to change pacemaker site on Monday  - Monitor fever curve, WBC  - ID and cardiology following with recommendation      Active Problems:    Systolic congestive heart failure with Dilated cardiomyopathy and Biventricular ICD (implantable cardioverter-defibrillator) in place: Stable on Coreg, spironolactone, empagliflozin, Entresto    PAF (paroxysmal atrial fibrillation) (HCC): Stable on digoxin Coreg    Essential hypertension: Stable on Aldactone, Coreg, Entresto    Coronary artery disease due to lipid rich plaque: On Coreg and Entresto    ESCOBAR on  Rales/Wheezes/Rhonchi.  Cardiovascular: S1/S2 without murmurs, rubs or gallops. RRR  Abdomen: Soft, non-tender, non-distended, bowel sounds present.  Musculoskeletal: No clubbing, cyanosis or edema bilaterally.    Skin: Skin color, texture, turgor normal.  No rashes or lesions.  Neurologic:  Cranial nerves: II-XII intact, KIMO, No focal sensory/motor deficits  Psychiatric: Alert and oriented, thought content appropriate  Capillary Refill: Brisk,< 3 seconds   Peripheral Pulses: +2 palpable, equal bilaterally       Intake/Output Summary (Last 24 hours) at 12/6/2024 1227  Last data filed at 12/6/2024 1138  Gross per 24 hour   Intake 480 ml   Output 550 ml   Net -70 ml       Labs:   Recent Labs     12/05/24  1415 12/06/24  0453   WBC 8.7 6.7   HGB 16.2* 14.0   HCT 48.9* 42.3    125*      Recent Labs     12/05/24  1518 12/06/24  0453    136   K 4.0 3.9    101   CO2 24 25   BUN 26* 25*   CREATININE 0.8 0.8   CALCIUM 9.3 9.1   AST 20  --    ALT 13  --    BILITOT 0.5  --    ALKPHOS 58  --      No results for input(s): \"CKTOTAL\", \"TROPONINI\" in the last 72 hours.    Urinalysis:    Lab Results   Component Value Date/Time    NITRU neg 08/11/2023 12:00 AM    WBCUA 1 04/14/2016 10:43 AM    BACTERIA 4+ 08/25/2011 01:52 PM    RBCUA neg 08/11/2023 12:00 AM    RBCUA 2 04/14/2016 10:43 AM    BLOODU negative 02/29/2024 09:15 AM    BLOODU Negative 05/06/2016 10:04 AM    SPECGRAV 1.015 02/29/2024 09:15 AM    GLUCOSEU negative 02/29/2024 09:15 AM    GLUCOSEU Negative 05/06/2016 10:04 AM    GLUCOSEU NEGATIVE 08/25/2011 01:52 PM       Radiology:  XR CHEST PORTABLE   Final Result   1. No acute cardiopulmonary process.   2. Cardiomegaly.                 Sidney Toledo MD      Please excuse brevity and/or typos. This report was transcribed using voice recognition software. Every effort was made to ensure accuracy, however, inadvertent computerized transcription errors may be present.

## 2024-12-06 NOTE — CARE COORDINATION
others, and if so, who? Yes (spouse Lawson if needed)  Plans to Return to Present Housing: Yes  Other Identified Issues/Barriers to RETURNING to current housing: none   Potential Assistance needed at discharge: Outpatient IV, Home Care, Other (Comment) (TBD)            Potential DME:    Patient expects to discharge to: House  Plan for transportation at discharge: Family    Financial    Payor: [employer] / Plan: [employer] - CHOICE PLUS / Product Type: *No Product type* /     Does insurance require precert for SNF: Yes    Potential assistance Purchasing Medications: No  Meds-to-Beds request:        [id number] [place], [place] - [address] [phone] - F [phone] [address]  [facility] [address]  Phone: [phone] Fax: [phone]      Notes:    Factors facilitating achievement of predicted outcomes: Family support, Cooperative, and Pleasant    Barriers to discharge: none identified at this time.     Additional Case Management Notes:     Pt from home with spouse. Independent.    Pt will be having a Pacemaker exchange on Monday.     ID following and pt on iv ABX'S at this time.    CM called and left  for Libby with [employer] [phone] to check IV benefits in case needed.    CM will follow for plan if IV abx's needed at discharge will need Home care.     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 Freedom of choice list with basic dialogue that supports the patient's individualized plan of care/goals and shares the quality data associated with the providers was provided to: Patient (CM following for plan)   Patient Representative  Name:       The Patient and/or Patient Representative Agree with the Discharge Plan? Yes    Mary Laguerre RN, BSN  [phone]

## 2024-12-07 LAB
ANION GAP SERPL CALCULATED.3IONS-SCNC: 12 MMOL/L (ref 3–16)
BASOPHILS # BLD: 0 K/UL (ref 0–0.2)
BASOPHILS NFR BLD: 0.6 %
BUN SERPL-MCNC: 21 MG/DL (ref 7–20)
CALCIUM SERPL-MCNC: 9.2 MG/DL (ref 8.3–10.6)
CHLORIDE SERPL-SCNC: 101 MMOL/L (ref 99–110)
CO2 SERPL-SCNC: 23 MMOL/L (ref 21–32)
CREAT SERPL-MCNC: 0.7 MG/DL (ref 0.6–1.2)
DEPRECATED RDW RBC AUTO: 14 % (ref 12.4–15.4)
EOSINOPHIL # BLD: 0.1 K/UL (ref 0–0.6)
EOSINOPHIL NFR BLD: 0.8 %
GFR SERPLBLD CREATININE-BSD FMLA CKD-EPI: >90 ML/MIN/{1.73_M2}
GLUCOSE BLD-MCNC: 116 MG/DL (ref 70–99)
GLUCOSE BLD-MCNC: 123 MG/DL (ref 70–99)
GLUCOSE BLD-MCNC: 137 MG/DL (ref 70–99)
GLUCOSE SERPL-MCNC: 117 MG/DL (ref 70–99)
HCT VFR BLD AUTO: 42.8 % (ref 36–48)
HGB BLD-MCNC: 14.3 G/DL (ref 12–16)
LYMPHOCYTES # BLD: 1.5 K/UL (ref 1–5.1)
LYMPHOCYTES NFR BLD: 19 %
MCH RBC QN AUTO: 30.1 PG (ref 26–34)
MCHC RBC AUTO-ENTMCNC: 33.3 G/DL (ref 31–36)
MCV RBC AUTO: 90.3 FL (ref 80–100)
MONOCYTES # BLD: 0.7 K/UL (ref 0–1.3)
MONOCYTES NFR BLD: 9.5 %
NEUTROPHILS # BLD: 5.5 K/UL (ref 1.7–7.7)
NEUTROPHILS NFR BLD: 70.1 %
PERFORMED ON: ABNORMAL
PLATELET # BLD AUTO: 124 K/UL (ref 135–450)
PMV BLD AUTO: 9.7 FL (ref 5–10.5)
POTASSIUM SERPL-SCNC: 3.9 MMOL/L (ref 3.5–5.1)
RBC # BLD AUTO: 4.74 M/UL (ref 4–5.2)
SODIUM SERPL-SCNC: 136 MMOL/L (ref 136–145)
WBC # BLD AUTO: 7.8 K/UL (ref 4–11)

## 2024-12-07 PROCEDURE — 1200000000 HC SEMI PRIVATE

## 2024-12-07 PROCEDURE — 2580000003 HC RX 258: Performed by: INTERNAL MEDICINE

## 2024-12-07 PROCEDURE — 6360000002 HC RX W HCPCS: Performed by: INTERNAL MEDICINE

## 2024-12-07 PROCEDURE — 36415 COLL VENOUS BLD VENIPUNCTURE: CPT

## 2024-12-07 PROCEDURE — 2580000003 HC RX 258: Performed by: HOSPITALIST

## 2024-12-07 PROCEDURE — 80048 BASIC METABOLIC PNL TOTAL CA: CPT

## 2024-12-07 PROCEDURE — 6370000000 HC RX 637 (ALT 250 FOR IP): Performed by: HOSPITALIST

## 2024-12-07 PROCEDURE — 99233 SBSQ HOSP IP/OBS HIGH 50: CPT | Performed by: INTERNAL MEDICINE

## 2024-12-07 PROCEDURE — 85025 COMPLETE CBC W/AUTO DIFF WBC: CPT

## 2024-12-07 PROCEDURE — 6360000002 HC RX W HCPCS: Performed by: HOSPITALIST

## 2024-12-07 PROCEDURE — 2580000003 HC RX 258

## 2024-12-07 RX ORDER — TRAMADOL HYDROCHLORIDE 50 MG/1
50 TABLET ORAL EVERY 4 HOURS PRN
Status: DISCONTINUED | OUTPATIENT
Start: 2024-12-07 | End: 2024-12-07

## 2024-12-07 RX ORDER — KETOROLAC TROMETHAMINE 15 MG/ML
15 INJECTION, SOLUTION INTRAMUSCULAR; INTRAVENOUS EVERY 6 HOURS PRN
Status: DISPENSED | OUTPATIENT
Start: 2024-12-07 | End: 2024-12-12

## 2024-12-07 RX ORDER — ACETAMINOPHEN 500 MG
1000 TABLET ORAL EVERY 6 HOURS PRN
Status: DISCONTINUED | OUTPATIENT
Start: 2024-12-07 | End: 2024-12-14 | Stop reason: HOSPADM

## 2024-12-07 RX ADMIN — SODIUM CHLORIDE, PRESERVATIVE FREE 10 ML: 5 INJECTION INTRAVENOUS at 08:55

## 2024-12-07 RX ADMIN — KETOROLAC TROMETHAMINE 15 MG: 15 INJECTION, SOLUTION INTRAMUSCULAR; INTRAVENOUS at 20:17

## 2024-12-07 RX ADMIN — ENOXAPARIN SODIUM 40 MG: 100 INJECTION SUBCUTANEOUS at 08:45

## 2024-12-07 RX ADMIN — CEFEPIME 2000 MG: 2 INJECTION, POWDER, FOR SOLUTION INTRAVENOUS at 11:56

## 2024-12-07 RX ADMIN — DIGOXIN 125 MCG: 0.12 TABLET ORAL at 08:46

## 2024-12-07 RX ADMIN — SODIUM CHLORIDE 1250 MG: 9 INJECTION, SOLUTION INTRAVENOUS at 17:09

## 2024-12-07 RX ADMIN — SODIUM CHLORIDE, PRESERVATIVE FREE 10 ML: 5 INJECTION INTRAVENOUS at 20:18

## 2024-12-07 RX ADMIN — EMPAGLIFLOZIN 10 MG: 10 TABLET, FILM COATED ORAL at 08:46

## 2024-12-07 RX ADMIN — CEFEPIME 2000 MG: 2 INJECTION, POWDER, FOR SOLUTION INTRAVENOUS at 00:02

## 2024-12-07 RX ADMIN — VILAZODONE HYDROCHLORIDE 20 MG: 20 TABLET, FILM COATED ORAL at 08:46

## 2024-12-07 RX ADMIN — KETOROLAC TROMETHAMINE 15 MG: 15 INJECTION, SOLUTION INTRAMUSCULAR; INTRAVENOUS at 11:50

## 2024-12-07 RX ADMIN — MONTELUKAST SODIUM 10 MG: 10 TABLET, FILM COATED ORAL at 20:17

## 2024-12-07 RX ADMIN — ROSUVASTATIN CALCIUM 10 MG: 10 TABLET, FILM COATED ORAL at 20:17

## 2024-12-07 ASSESSMENT — PAIN SCALES - GENERAL
PAINLEVEL_OUTOF10: 0
PAINLEVEL_OUTOF10: 0
PAINLEVEL_OUTOF10: 4
PAINLEVEL_OUTOF10: 8
PAINLEVEL_OUTOF10: 2
PAINLEVEL_OUTOF10: 0
PAINLEVEL_OUTOF10: 4
PAINLEVEL_OUTOF10: 0
PAINLEVEL_OUTOF10: 0

## 2024-12-07 ASSESSMENT — PAIN DESCRIPTION - DESCRIPTORS: DESCRIPTORS: ACHING

## 2024-12-07 ASSESSMENT — PAIN DESCRIPTION - LOCATION
LOCATION: NECK
LOCATION: NECK;SHOULDER

## 2024-12-07 ASSESSMENT — PAIN DESCRIPTION - ORIENTATION
ORIENTATION: LEFT
ORIENTATION: LEFT

## 2024-12-07 NOTE — PLAN OF CARE
Problem: Chronic Conditions and Co-morbidities  Goal: Patient's chronic conditions and co-morbidity symptoms are monitored and maintained or improved  Outcome: Progressing  Flowsheets (Taken 12/6/2024 2000)  Care Plan - Patient's Chronic Conditions and Co-Morbidity Symptoms are Monitored and Maintained or Improved:   Monitor and assess patient's chronic conditions and comorbid symptoms for stability, deterioration, or improvement   Collaborate with multidisciplinary team to address chronic and comorbid conditions and prevent exacerbation or deterioration     Problem: Discharge Planning  Goal: Discharge to home or other facility with appropriate resources  Outcome: Progressing  Flowsheets (Taken 12/6/2024 2000)  Discharge to home or other facility with appropriate resources:   Identify barriers to discharge with patient and caregiver   Arrange for needed discharge resources and transportation as appropriate     Problem: Pain  Goal: Verbalizes/displays adequate comfort level or baseline comfort level  Outcome: Progressing     Problem: Safety - Adult  Goal: Free from fall injury  Outcome: Progressing

## 2024-12-07 NOTE — PROGRESS NOTES
[facility]   Electrophysiology Progress Note     Admit Date: 2024     Reason for follow up: Pocket infection    HPI and Interval History:   Patient seen and examined. Clinical notes reviewed.   Telemetry reviewed. No new complaint today.   No major events overnight.   Denies having chest pain, shortness of breath, dyspnea on exertion, Orthopnea, PND at the time of this visit.  Pain is better at the site of pocket.  Review of System:  All other systems reviewed and are negative except for that noted above. Pertinent negatives are:     General: negative for fever, chills   Ophthalmic ROS: negative for - eye pain or loss of vision  ENT ROS: negative for - headaches, sore throat   Respiratory: negative for - cough, sputum  Cardiovascular: Reviewed in HPI  Gastrointestinal: negative for - abdominal pain, diarrhea, N/V  Hematology: negative for - bleeding, blood clots, bruising or jaundice  Genito-Urinary:  negative for - Dysuria or incontinence  Musculoskeletal: negative for - Joint swelling, muscle pain  Neurological: negative for - confusion, dizziness, headaches   Psychiatric: No anxiety, no depression.  Dermatological: negative for - rash      Physical Examination:  Vitals:    24 0844   BP: (!) 93/51   Pulse: 69   Resp:    Temp:    SpO2: 99%      In: 1458 [P.O.:1340; I.V.:20]  Out: 2525    Wt Readings from Last 3 Encounters:   24 58.1 kg (128 lb)   24 58.1 kg (128 lb)   24 57.6 kg (127 lb)     Temp  Av.1 °F (36.7 °C)  Min: 97.9 °F (36.6 °C)  Max: 98.2 °F (36.8 °C)  Pulse  Av.5  Min: 60  Max: 81  BP  Min: 93/51  Max: 106/67  SpO2  Av.2 %  Min: 98 %  Max: 99 %    Intake/Output Summary (Last 24 hours) at 2024 1051  Last data filed at 2024 0855  Gross per 24 hour   Intake 977.98 ml   Output 2525 ml   Net -1547.02 ml       Telemetry: Dual-chamber/BiV paced  Constitutional: Oriented. No distress.   Head: Normocephalic and atraumatic.   Mouth/Throat:

## 2024-12-07 NOTE — PROGRESS NOTES
Hospitalist Progress Note      PCP: Rebekah Carson, APRN - CNP    Date of Admission: 12/5/2024    LOS: 2    Chief Complaint:   Chief Complaint   Patient presents with    Wound Check     Pt to ED with CC of infection to cardiac implant site.  Pt verbalizes pain.  States that her sx have been ongoing x3 weeks.       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  Tenderness at pacemaker site.  Remains afebrile with no leukocytosis.  Blood cultures negative to date  - Continue antibiotics  - ID and cardiology following with recommendations with plan for pacemaker reinsertion and site change on Monday if blood cultures remain negative and and if okay with ID  -Pain management      Active Problems:    Systolic congestive heart failure with Dilated cardiomyopathy and Biventricular ICD (implantable cardioverter-defibrillator) in place: Stable on Coreg, spironolactone, empagliflozin, Entresto    PAF (paroxysmal atrial fibrillation) (HCC): Stable on digoxin Coreg

## 2024-12-08 ENCOUNTER — ANESTHESIA EVENT (OUTPATIENT)
Age: 69
End: 2024-12-08
Payer: COMMERCIAL

## 2024-12-08 LAB
ABO + RH BLD: NORMAL
ANION GAP SERPL CALCULATED.3IONS-SCNC: 11 MMOL/L (ref 3–16)
BACTERIA SPEC AEROBE CULT: NORMAL
BASOPHILS # BLD: 0 K/UL (ref 0–0.2)
BASOPHILS NFR BLD: 0.9 %
BLD GP AB SCN SERPL QL: NORMAL
BUN SERPL-MCNC: 31 MG/DL (ref 7–20)
CALCIUM SERPL-MCNC: 9.3 MG/DL (ref 8.3–10.6)
CHLORIDE SERPL-SCNC: 102 MMOL/L (ref 99–110)
CO2 SERPL-SCNC: 22 MMOL/L (ref 21–32)
CREAT SERPL-MCNC: 0.8 MG/DL (ref 0.6–1.2)
DEPRECATED RDW RBC AUTO: 13.8 % (ref 12.4–15.4)
EOSINOPHIL # BLD: 0.1 K/UL (ref 0–0.6)
EOSINOPHIL NFR BLD: 1.8 %
GFR SERPLBLD CREATININE-BSD FMLA CKD-EPI: 79 ML/MIN/{1.73_M2}
GLUCOSE BLD-MCNC: 105 MG/DL (ref 70–99)
GLUCOSE BLD-MCNC: 129 MG/DL (ref 70–99)
GLUCOSE BLD-MCNC: 137 MG/DL (ref 70–99)
GLUCOSE SERPL-MCNC: 119 MG/DL (ref 70–99)
GRAM STN SPEC: NORMAL
HCT VFR BLD AUTO: 42.7 % (ref 36–48)
HGB BLD-MCNC: 14.3 G/DL (ref 12–16)
LYMPHOCYTES # BLD: 1.5 K/UL (ref 1–5.1)
LYMPHOCYTES NFR BLD: 31.4 %
MCH RBC QN AUTO: 30.2 PG (ref 26–34)
MCHC RBC AUTO-ENTMCNC: 33.4 G/DL (ref 31–36)
MCV RBC AUTO: 90.4 FL (ref 80–100)
MONOCYTES # BLD: 0.5 K/UL (ref 0–1.3)
MONOCYTES NFR BLD: 11.5 %
NEUTROPHILS # BLD: 2.5 K/UL (ref 1.7–7.7)
NEUTROPHILS NFR BLD: 54.4 %
PERFORMED ON: ABNORMAL
PLATELET # BLD AUTO: 110 K/UL (ref 135–450)
PMV BLD AUTO: 9.8 FL (ref 5–10.5)
POTASSIUM SERPL-SCNC: 4.4 MMOL/L (ref 3.5–5.1)
RBC # BLD AUTO: 4.72 M/UL (ref 4–5.2)
SODIUM SERPL-SCNC: 135 MMOL/L (ref 136–145)
WBC # BLD AUTO: 4.7 K/UL (ref 4–11)

## 2024-12-08 PROCEDURE — 86900 BLOOD TYPING SEROLOGIC ABO: CPT

## 2024-12-08 PROCEDURE — 6370000000 HC RX 637 (ALT 250 FOR IP): Performed by: HOSPITALIST

## 2024-12-08 PROCEDURE — 1200000000 HC SEMI PRIVATE

## 2024-12-08 PROCEDURE — 36415 COLL VENOUS BLD VENIPUNCTURE: CPT

## 2024-12-08 PROCEDURE — 86850 RBC ANTIBODY SCREEN: CPT

## 2024-12-08 PROCEDURE — 86901 BLOOD TYPING SEROLOGIC RH(D): CPT

## 2024-12-08 PROCEDURE — 2580000003 HC RX 258

## 2024-12-08 PROCEDURE — 2580000003 HC RX 258: Performed by: INTERNAL MEDICINE

## 2024-12-08 PROCEDURE — 6370000000 HC RX 637 (ALT 250 FOR IP): Performed by: NURSE PRACTITIONER

## 2024-12-08 PROCEDURE — 6360000002 HC RX W HCPCS: Performed by: HOSPITALIST

## 2024-12-08 PROCEDURE — 80048 BASIC METABOLIC PNL TOTAL CA: CPT

## 2024-12-08 PROCEDURE — 6360000002 HC RX W HCPCS: Performed by: INTERNAL MEDICINE

## 2024-12-08 PROCEDURE — 85025 COMPLETE CBC W/AUTO DIFF WBC: CPT

## 2024-12-08 PROCEDURE — 99024 POSTOP FOLLOW-UP VISIT: CPT | Performed by: INTERNAL MEDICINE

## 2024-12-08 PROCEDURE — 86923 COMPATIBILITY TEST ELECTRIC: CPT

## 2024-12-08 PROCEDURE — 2580000003 HC RX 258: Performed by: HOSPITALIST

## 2024-12-08 RX ADMIN — MONTELUKAST SODIUM 10 MG: 10 TABLET, FILM COATED ORAL at 20:38

## 2024-12-08 RX ADMIN — SACUBITRIL AND VALSARTAN 1 TABLET: 24; 26 TABLET, FILM COATED ORAL at 20:37

## 2024-12-08 RX ADMIN — DIGOXIN 125 MCG: 0.12 TABLET ORAL at 09:21

## 2024-12-08 RX ADMIN — CARVEDILOL 3.12 MG: 3.12 TABLET, FILM COATED ORAL at 09:21

## 2024-12-08 RX ADMIN — SODIUM CHLORIDE, PRESERVATIVE FREE 10 ML: 5 INJECTION INTRAVENOUS at 09:21

## 2024-12-08 RX ADMIN — SODIUM CHLORIDE, PRESERVATIVE FREE 10 ML: 5 INJECTION INTRAVENOUS at 20:38

## 2024-12-08 RX ADMIN — EMPAGLIFLOZIN 10 MG: 10 TABLET, FILM COATED ORAL at 09:22

## 2024-12-08 RX ADMIN — SACUBITRIL AND VALSARTAN 0.5 TABLET: 24; 26 TABLET, FILM COATED ORAL at 09:21

## 2024-12-08 RX ADMIN — VILAZODONE HYDROCHLORIDE 20 MG: 20 TABLET, FILM COATED ORAL at 09:21

## 2024-12-08 RX ADMIN — SODIUM CHLORIDE 1250 MG: 9 INJECTION, SOLUTION INTRAVENOUS at 17:33

## 2024-12-08 RX ADMIN — SODIUM CHLORIDE, PRESERVATIVE FREE 10 ML: 5 INJECTION INTRAVENOUS at 09:26

## 2024-12-08 RX ADMIN — CEFEPIME 2000 MG: 2 INJECTION, POWDER, FOR SOLUTION INTRAVENOUS at 12:08

## 2024-12-08 RX ADMIN — KETOROLAC TROMETHAMINE 15 MG: 15 INJECTION, SOLUTION INTRAMUSCULAR; INTRAVENOUS at 20:37

## 2024-12-08 RX ADMIN — CEFEPIME 2000 MG: 2 INJECTION, POWDER, FOR SOLUTION INTRAVENOUS at 00:35

## 2024-12-08 RX ADMIN — ROSUVASTATIN CALCIUM 10 MG: 10 TABLET, FILM COATED ORAL at 20:38

## 2024-12-08 RX ADMIN — ENOXAPARIN SODIUM 40 MG: 100 INJECTION SUBCUTANEOUS at 09:20

## 2024-12-08 RX ADMIN — CARVEDILOL 3.12 MG: 3.12 TABLET, FILM COATED ORAL at 20:37

## 2024-12-08 ASSESSMENT — PAIN DESCRIPTION - LOCATION: LOCATION: NECK;SHOULDER

## 2024-12-08 ASSESSMENT — PAIN SCALES - GENERAL
PAINLEVEL_OUTOF10: 0
PAINLEVEL_OUTOF10: 0
PAINLEVEL_OUTOF10: 2
PAINLEVEL_OUTOF10: 0

## 2024-12-08 ASSESSMENT — PAIN DESCRIPTION - DESCRIPTORS: DESCRIPTORS: ACHING

## 2024-12-08 ASSESSMENT — PAIN DESCRIPTION - ORIENTATION: ORIENTATION: LEFT

## 2024-12-08 NOTE — DISCHARGE INSTR - COC
Continuity of Care Form    Patient Name: Tess Atkinson   :  1955  MRN:  [id number]    Admit date:  2024  Discharge date:  2024    Code Status Order: Full Code   Advance Directives:   Advance Care Flowsheet Documentation             Admitting Physician:  Reddy Balderrama MD  PCP: Rebekah Carson, APRN - CNP    Discharging Nurse: DARRIAN ANDERSON  Discharging Hospital Unit/Room#: L7J-5654/5917-01  Discharging Unit Phone Number: [phone]    Emergency Contact:   Extended Emergency Contact Information  Primary Emergency Contact: Lawson Atkinson  Address: [address] [place] [address]  Home Phone: [phone]  Mobile Phone: [phone]  Relation: Spouse  Secondary Emergency Contact: Radha No  Home Phone: [phone]  Mobile Phone: [phone]  Relation: Child    Past Surgical History:  Past Surgical History:   Procedure Laterality Date    CARDIAC DEFIBRILLATOR PLACEMENT       SECTION      3 c-sections    COLONOSCOPY  2015    polypectomy    COLONOSCOPY N/A 2019    COLONOSCOPY POLYPECTOMY SNARE/COLD ASCENDING X1 , TRANSVERSE X1, SIGMOID X1 performed by Dell Munguia MD at [facility] ENDOSCOPY    PACEMAKER INSERTION      PACEMAKER INSERTION      UPPER GASTROINTESTINAL ENDOSCOPY  2015    UPPER GASTROINTESTINAL ENDOSCOPY N/A 2019    EGD GASTRIC BIOPSY performed by Dell Munguia MD at [facility] ENDOSCOPY    UPPER GASTROINTESTINAL ENDOSCOPY N/A 2020    EGD BIOPSY performed by Dell Munguia MD at [facility] ENDOSCOPY       Immunization History:   Immunization History   Administered Date(s) Administered    COVID-19, PFIZER PURPLE top, DILUTE for use, (age 12 y+), 30mcg/0.3mL 2021, 2021, 2021    Influenza Virus Vaccine 10/06/2014, 10/13/2016, 10/05/2017    Influenza Whole 10/06/2014    Influenza, FLUAD, (age 65 y+), IM, Quadv, 0.5mL 10/28/2020, 2022    Influenza, FLUAD, (age 65 y+), IM,  with Video (Video Swallowing Test): not done    Treatments at the Time of Hospital Discharge:   Respiratory Treatments: N/A  Oxygen Therapy:  is not on home oxygen therapy.  Ventilator:    - No ventilator support    Rehab Therapies: NA  Weight Bearing Status/Restrictions: No weight bearing restrictions  Other Medical Equipment (for information only, NOT a DME order):  NA  Other Treatments: NA    Patient's personal belongings (please select all that are sent with patient):  None    RN SIGNATURE:  Electronically signed by ARLENE CEDILLO RN on 12/14/24 at 12:18 PM EST    CASE MANAGEMENT/SOCIAL WORK SECTION    Inpatient Status Date: 12/5/2024    Readmission Risk Assessment Score:  Readmission Risk              Risk of Unplanned Readmission:  14           Discharging to Facility/ [facility]  [address] [place] [address] [address],   [place] [address]  Phone: [phone]  Fax: [phone]     Dialysis Facility (if applicable)   Name:  Address:  Dialysis Schedule:  Phone:  Fax:    / signature: Mary Laguerre RN, BSN  [phone]     PHYSICIAN SECTION    Prognosis: Good    Condition at Discharge: Stable    Rehab Potential (if transferring to Rehab): Good    Recommended Labs or Other Treatments After Discharge:     Physician Certification: I certify the above information and transfer of Tess Atkinson  is necessary for the continuing treatment of the diagnosis listed and that she requires Home Care for greater 30 days.     Update Admission H&P: No change in H&P    PHYSICIAN SIGNATURE:  Electronically signed by Reddy Balderrama MD on 12/14/24 at 7:10 AM EST

## 2024-12-08 NOTE — ANESTHESIA PRE PROCEDURE
Diarrhea and Nausea And Vomiting       Problem List:    Patient Active Problem List   Diagnosis Code    Dilated cardiomyopathy ([facility]) I42.0    Pacemaker Z95.0    History of pericarditis Z86.79    Fibromyalgia M79.7    PAF (paroxysmal atrial fibrillation) ([facility]) I48.0    Sebaceous cyst L72.3    Essential hypertension I10    ESCOBAR on CPAP G47.33    Hypercholesteremia E78.00    Biventricular ICD (implantable cardioverter-defibrillator) in place Z95.810    Chronic bilateral thoracic back pain M54.6, G89.29    Hyperkalemia E87.5    Dyspnea on exertion R06.09    Anxiety F41.9    GERD with esophagitis K21.00    Dysphagia R13.10    Suicidal ideation R45.851    Trauma and stressor-related disorder F43.9    Chronic systolic heart failure ([facility]) I50.22    Vertigo R42    Seasonal allergies J30.2    ICD (implantable cardioverter-defibrillator) battery depletion Z45.02    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    Urinary incontinence R32    Chronic pain syndrome G89.4    Vitamin D deficiency E55.9    Moderate episode of recurrent major depressive disorder ([facility]) F33.1    Vaginal itching N89.8    Uncontrolled diabetes mellitus with hyperglycemia, without long-term current use of insulin ([facility]) E11.65    Type 2 diabetes mellitus with other specified complication ([facility]) E11.69    Coronary artery disease due to lipid rich plaque I25.10, I25.83    Systolic congestive heart failure ([facility]) I50.20    Infection of pacemaker pocket ([facility]) T82.7XXA    Infection of pacemaker pulse generator site ([facility]) T82.7XXA    Infection of biventricular implantable cardioverter-defibrillator (ICD) ([facility]) T82.7XXA       Past Medical History:        Diagnosis Date    Atrial fibrillation ([facility])     Cardiomyopathy, nonischemic ([facility]) 1997    CHF (congestive heart failure) ([facility])     Fibromyalgia     GERD (gastroesophageal reflux disease)     Heart disease     HSV-1 (herpes simplex virus 1) 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

## 2024-12-08 NOTE — PLAN OF CARE
Problem: Chronic Conditions and Co-morbidities  Goal: Patient's chronic conditions and co-morbidity symptoms are monitored and maintained or improved  12/8/2024 0800 by Margot Lawrence RN  Outcome: Progressing  12/7/2024 2242 by Rosaura Lock RN  Outcome: Progressing  Flowsheets (Taken 12/7/2024 2000)  Care Plan - Patient's Chronic Conditions and Co-Morbidity Symptoms are Monitored and Maintained or Improved:   Monitor and assess patient's chronic conditions and comorbid symptoms for stability, deterioration, or improvement   Collaborate with multidisciplinary team to address chronic and comorbid conditions and prevent exacerbation or deterioration  12/7/2024 1901 by Margot Lawrence RN  Outcome: Progressing     Problem: Discharge Planning  Goal: Discharge to home or other facility with appropriate resources  12/8/2024 0800 by Margot Lawrence RN  Outcome: Progressing  12/7/2024 2242 by Rosaura Lock RN  Outcome: Progressing  Flowsheets (Taken 12/7/2024 2000)  Discharge to home or other facility with appropriate resources:   Identify barriers to discharge with patient and caregiver   Arrange for needed discharge resources and transportation as appropriate  12/7/2024 1901 by Margot Lawrence RN  Outcome: Progressing     Problem: Pain  Goal: Verbalizes/displays adequate comfort level or baseline comfort level  12/8/2024 0800 by Margot Lawrence RN  Outcome: Progressing  12/7/2024 2242 by Rosaura Lock RN  Outcome: Progressing  12/7/2024 1901 by Margot Lawrence RN  Outcome: Progressing     Problem: Safety - Adult  Goal: Free from fall injury  12/8/2024 0800 by Margot Lawrence RN  Outcome: Progressing  12/7/2024 2242 by Rosaura Lock RN  Outcome: Progressing  12/7/2024 1901 by Margot Lawrence RN  Outcome: Progressing     Problem: Neurosensory - Adult  Goal: Achieves stable or improved neurological status  12/8/2024

## 2024-12-08 NOTE — PLAN OF CARE
Problem: Chronic Conditions and Co-morbidities  Goal: Patient's chronic conditions and co-morbidity symptoms are monitored and maintained or improved  12/7/2024 2242 by Rosaura Lock RN  Outcome: Progressing  Flowsheets (Taken 12/7/2024 2000)  Care Plan - Patient's Chronic Conditions and Co-Morbidity Symptoms are Monitored and Maintained or Improved:   Monitor and assess patient's chronic conditions and comorbid symptoms for stability, deterioration, or improvement   Collaborate with multidisciplinary team to address chronic and comorbid conditions and prevent exacerbation or deterioration  12/7/2024 1901 by Margot Lawrence RN  Outcome: Progressing     Problem: Discharge Planning  Goal: Discharge to home or other facility with appropriate resources  12/7/2024 2242 by Rosaura Lock RN  Outcome: Progressing  Flowsheets (Taken 12/7/2024 2000)  Discharge to home or other facility with appropriate resources:   Identify barriers to discharge with patient and caregiver   Arrange for needed discharge resources and transportation as appropriate  12/7/2024 1901 by Margot Lawrence RN  Outcome: Progressing     Problem: Pain  Goal: Verbalizes/displays adequate comfort level or baseline comfort level  12/7/2024 2242 by Rosaura Lock RN  Outcome: Progressing  12/7/2024 1901 by Margot Lawrence RN  Outcome: Progressing     Problem: Neurosensory - Adult  Goal: Achieves stable or improved neurological status  Outcome: Progressing  Flowsheets (Taken 12/7/2024 2000)  Achieves stable or improved neurological status:   Assess for and report changes in neurological status   Initiate measures to prevent increased intracranial pressure     Problem: Respiratory - Adult  Goal: Achieves optimal ventilation and oxygenation  Outcome: Progressing  Flowsheets (Taken 12/7/2024 2000)  Achieves optimal ventilation and oxygenation:   Assess for changes in respiratory status   Assess for changes in

## 2024-12-08 NOTE — PROGRESS NOTES
05/10/2023    Seasonal allergies 03/24/2022    Vertigo 12/28/2020    Chronic systolic heart failure (HCC) 10/19/2020    Trauma and stressor-related disorder     Chronic bilateral thoracic back pain 06/28/2020    Hyperkalemia 06/28/2020    Dyspnea on exertion 06/28/2020    Anxiety 06/28/2020    GERD with esophagitis 06/28/2020    Dysphagia 06/28/2020    Suicidal ideation 06/28/2020    Biventricular ICD (implantable cardioverter-defibrillator) in place 04/16/2020    Hypercholesteremia 05/08/2018    ESCOBAR on CPAP 08/15/2017    Essential hypertension 04/19/2016    Sebaceous cyst 05/23/2014    PAF (paroxysmal atrial fibrillation) ([facility]) 01/31/2014    History of pericarditis 07/13/2012    Fibromyalgia 07/13/2012    Dilated cardiomyopathy (HCC) 11/23/2011    Pacemaker 11/23/2011      Active Hospital Problems    Diagnosis Date Noted    Type 2 diabetes mellitus with other specified complication ([facility]) [E11.69] 12/05/2024    Coronary artery disease due to lipid rich plaque [I25.10, I25.83] 12/05/2024    Systolic congestive heart failure (HCC) [I50.20] 12/05/2024    Infection of pacemaker pocket ([facility]) [T82.7XXA] 12/05/2024    Infection of pacemaker pulse generator site ([facility]) [T82.7XXA] 12/05/2024    Biventricular ICD (implantable cardioverter-defibrillator) in place [Z95.810] 04/16/2020    ESCOBAR on CPAP [G47.33] 08/15/2017    Essential hypertension [I10] 04/19/2016    PAF (paroxysmal atrial fibrillation) ([facility]) [I48.0] 01/31/2014    Fibromyalgia [M79.7] 07/13/2012    Dilated cardiomyopathy ([facility]) [I42.0] 11/23/2011       Assessment:   ICD pocket infection  Cardiomyopathy and congestive heart failure  Uncontrolled diabetes  Hypertension  Complete heart block, device dependent  ESCOBAR  PAF      Plan:     Plan is for lead extraction on Monday.  Blood cultures have been negative so far.  Continue IV antibiotic.  Pain is better even compared to yesterday.  However there is evidence of pocket infection as skin adherence to the device and  blister obvious pain and tenderness and redness.  Patient is dependent.  We will decide about same date reimplant versus waiting 1 or 2 days to reimplant the device.    I independently reviewed and interpreted ECG, telemetry, cardiac labs, other relevant labs,  echo   CXR device check interrogation,   . Unless otherwise reflected in the note, my interpretation is in agreement with the report  and use them for decision making whether mentioned or not.  All previous relevant notes including notes and data from other hospitals and prior records were reviewed.  Level of complexity of visit and medical decision making:   High    Risk of Complications/Morbidity:  High due to old leads, female with low body mass index, severe cardiomyopathy, age with high risk of adverse outcome during the procedure    NOTE: This report was transcribed using voice recognition software. Every effort was made to ensure accuracy, however, inadvertent computerized transcription errors may be present.

## 2024-12-08 NOTE — PROGRESS NOTES
spironolactone, empagliflozin, Entresto    PAF (paroxysmal atrial fibrillation) (HCC): Stable on digoxin Coreg    Essential hypertension: Stable on Aldactone, Coreg, Entresto    Coronary artery disease due to lipid rich plaque: On Coreg and Entresto    ESCOBAR on CPAP      Medications:  Reviewed  Infusion Medications    dextrose      sodium chloride      sodium chloride      sodium chloride       Scheduled Medications    cefepime  2,000 mg IntraVENous Q12H    sodium chloride flush  5-40 mL IntraVENous 2 times per day    vancomycin  1,250 mg IntraVENous Q24H    [START ON 12/9/2024] ceFAZolin (ANCEF) IV  2,000 mg IntraVENous On Call to OR    carvedilol  3.125 mg Oral BID    digoxin  125 mcg Oral Daily    montelukast  10 mg Oral Nightly    rosuvastatin  10 mg Oral Nightly    spironolactone  12.5 mg Oral Once per day on Monday Wednesday Friday    vilazodone HCl  20 mg Oral Daily    sodium chloride flush  5-40 mL IntraVENous 2 times per day    enoxaparin  40 mg SubCUTAneous Daily    empagliflozin  10 mg Oral Daily    sacubitril-valsartan  1 tablet Oral QPM    And    sacubitril-valsartan  0.5 tablet Oral Daily    prochlorperazine  10 mg IntraVENous Once     PRN Meds: acetaminophen, ketorolac, dextrose bolus **OR** dextrose bolus, glucagon (rDNA), dextrose, sodium chloride flush, sodium chloride, sodium chloride, hydrOXYzine pamoate, tiZANidine, sodium chloride flush, sodium chloride, potassium chloride **OR** potassium alternative oral replacement **OR** potassium chloride, magnesium sulfate, ondansetron **OR** ondansetron, polyethylene glycol      DVT Prophylaxis: Subcut enoxaparin  Diet: ADULT DIET; Regular; 3 carb choices (45 gm/meal); Low Sodium (2 gm)  Diet NPO Exceptions are: Sips of Water with Meds  Code Status: Full Code    Dispo: Anticipate discharge once pacemaker replaced    ____________________________________________________________________________    Subjective:   Overnight Events:   Uneventful overnight  No new  08/11/2023 12:00 AM    RBCUA 2 04/14/2016 10:43 AM    BLOODU negative 02/29/2024 09:15 AM    BLOODU Negative 05/06/2016 10:04 AM    SPECGRAV 1.015 02/29/2024 09:15 AM    GLUCOSEU negative 02/29/2024 09:15 AM    GLUCOSEU Negative 05/06/2016 10:04 AM    GLUCOSEU NEGATIVE 08/25/2011 01:52 PM       Radiology:  XR CHEST PORTABLE   Final Result   1. No acute cardiopulmonary process.   2. Cardiomegaly.                 Sidney Toledo MD      Please excuse brevity and/or typos. This report was transcribed using voice recognition software. Every effort was made to ensure accuracy, however, inadvertent computerized transcription errors may be present.

## 2024-12-09 ENCOUNTER — ANESTHESIA (OUTPATIENT)
Age: 69
End: 2024-12-09
Payer: COMMERCIAL

## 2024-12-09 ENCOUNTER — APPOINTMENT (OUTPATIENT)
Age: 69
DRG: 277 | End: 2024-12-09
Attending: INTERNAL MEDICINE
Payer: COMMERCIAL

## 2024-12-09 ENCOUNTER — APPOINTMENT (OUTPATIENT)
Dept: GENERAL RADIOLOGY | Age: 69
DRG: 277 | End: 2024-12-09
Payer: COMMERCIAL

## 2024-12-09 LAB
ALBUMIN SERPL-MCNC: 3.8 G/DL (ref 3.4–5)
ALBUMIN/GLOB SERPL: 1.3 {RATIO} (ref 1.1–2.2)
ALP SERPL-CCNC: 77 U/L (ref 40–129)
ALT SERPL-CCNC: 25 U/L (ref 10–40)
ANION GAP SERPL CALCULATED.3IONS-SCNC: 13 MMOL/L (ref 3–16)
AST SERPL-CCNC: 37 U/L (ref 15–37)
BACTERIA BLD CULT ORG #2: NORMAL
BACTERIA BLD CULT: NORMAL
BILIRUB SERPL-MCNC: 0.6 MG/DL (ref 0–1)
BUN SERPL-MCNC: 32 MG/DL (ref 7–20)
CALCIUM SERPL-MCNC: 9.5 MG/DL (ref 8.3–10.6)
CHLORIDE SERPL-SCNC: 103 MMOL/L (ref 99–110)
CO2 SERPL-SCNC: 21 MMOL/L (ref 21–32)
CREAT SERPL-MCNC: 0.7 MG/DL (ref 0.6–1.2)
DEPRECATED RDW RBC AUTO: 14.1 % (ref 12.4–15.4)
GFR SERPLBLD CREATININE-BSD FMLA CKD-EPI: >90 ML/MIN/{1.73_M2}
GLUCOSE BLD-MCNC: 101 MG/DL (ref 70–99)
GLUCOSE BLD-MCNC: 130 MG/DL (ref 70–99)
GLUCOSE SERPL-MCNC: 119 MG/DL (ref 70–99)
HCT VFR BLD AUTO: 41.6 % (ref 36–48)
HGB BLD-MCNC: 14 G/DL (ref 12–16)
MAGNESIUM SERPL-MCNC: 2.13 MG/DL (ref 1.8–2.4)
MCH RBC QN AUTO: 30.6 PG (ref 26–34)
MCHC RBC AUTO-ENTMCNC: 33.8 G/DL (ref 31–36)
MCV RBC AUTO: 90.5 FL (ref 80–100)
PERFORMED ON: ABNORMAL
PERFORMED ON: ABNORMAL
PLATELET # BLD AUTO: 122 K/UL (ref 135–450)
PMV BLD AUTO: 9.7 FL (ref 5–10.5)
POTASSIUM SERPL-SCNC: 4.1 MMOL/L (ref 3.5–5.1)
PROT SERPL-MCNC: 6.8 G/DL (ref 6.4–8.2)
RBC # BLD AUTO: 4.59 M/UL (ref 4–5.2)
SODIUM SERPL-SCNC: 137 MMOL/L (ref 136–145)
VANCOMYCIN SERPL-MCNC: 15.4 UG/ML
WBC # BLD AUTO: 4.5 K/UL (ref 4–11)

## 2024-12-09 PROCEDURE — P9045 ALBUMIN (HUMAN), 5%, 250 ML: HCPCS | Performed by: ANESTHESIOLOGY

## 2024-12-09 PROCEDURE — 80053 COMPREHEN METABOLIC PANEL: CPT

## 2024-12-09 PROCEDURE — 6360000002 HC RX W HCPCS: Performed by: REGISTERED NURSE

## 2024-12-09 PROCEDURE — 5A1223Z PERFORMANCE OF CARDIAC PACING, CONTINUOUS: ICD-10-PCS | Performed by: INTERNAL MEDICINE

## 2024-12-09 PROCEDURE — 2709999900 HC NON-CHARGEABLE SUPPLY: Performed by: INTERNAL MEDICINE

## 2024-12-09 PROCEDURE — 33241 REMOVE PULSE GENERATOR: CPT | Performed by: INTERNAL MEDICINE

## 2024-12-09 PROCEDURE — C1898 LEAD, PMKR, OTHER THAN TRANS: HCPCS | Performed by: INTERNAL MEDICINE

## 2024-12-09 PROCEDURE — C1889 IMPLANT/INSERT DEVICE, NOC: HCPCS | Performed by: INTERNAL MEDICINE

## 2024-12-09 PROCEDURE — 87186 SC STD MICRODIL/AGAR DIL: CPT

## 2024-12-09 PROCEDURE — 6370000000 HC RX 637 (ALT 250 FOR IP): Performed by: HOSPITALIST

## 2024-12-09 PROCEDURE — 06HY33Z INSERTION OF INFUSION DEVICE INTO LOWER VEIN, PERCUTANEOUS APPROACH: ICD-10-PCS | Performed by: INTERNAL MEDICINE

## 2024-12-09 PROCEDURE — 83735 ASSAY OF MAGNESIUM: CPT

## 2024-12-09 PROCEDURE — 6360000002 HC RX W HCPCS: Performed by: INTERNAL MEDICINE

## 2024-12-09 PROCEDURE — 87070 CULTURE OTHR SPECIMN AEROBIC: CPT

## 2024-12-09 PROCEDURE — 3700000001 HC ADD 15 MINUTES (ANESTHESIA): Performed by: INTERNAL MEDICINE

## 2024-12-09 PROCEDURE — 2580000003 HC RX 258: Performed by: INTERNAL MEDICINE

## 2024-12-09 PROCEDURE — 7100000001 HC PACU RECOVERY - ADDTL 15 MIN: Performed by: INTERNAL MEDICINE

## 2024-12-09 PROCEDURE — 99233 SBSQ HOSP IP/OBS HIGH 50: CPT | Performed by: INTERNAL MEDICINE

## 2024-12-09 PROCEDURE — 87205 SMEAR GRAM STAIN: CPT

## 2024-12-09 PROCEDURE — 87077 CULTURE AEROBIC IDENTIFY: CPT

## 2024-12-09 PROCEDURE — 36415 COLL VENOUS BLD VENIPUNCTURE: CPT

## 2024-12-09 PROCEDURE — C1892 INTRO/SHEATH,FIXED,PEEL-AWAY: HCPCS | Performed by: INTERNAL MEDICINE

## 2024-12-09 PROCEDURE — C1773 RET DEV, INSERTABLE: HCPCS | Performed by: INTERNAL MEDICINE

## 2024-12-09 PROCEDURE — 87075 CULTR BACTERIA EXCEPT BLOOD: CPT

## 2024-12-09 PROCEDURE — 80202 ASSAY OF VANCOMYCIN: CPT

## 2024-12-09 PROCEDURE — 04HY32Z INSERTION OF MONITORING DEVICE INTO LOWER ARTERY, PERCUTANEOUS APPROACH: ICD-10-PCS | Performed by: INTERNAL MEDICINE

## 2024-12-09 PROCEDURE — 33244 REMOVE ELCTRD TRANSVENOUSLY: CPT | Performed by: INTERNAL MEDICINE

## 2024-12-09 PROCEDURE — 6360000002 HC RX W HCPCS: Performed by: ANESTHESIOLOGY

## 2024-12-09 PROCEDURE — 2720000010 HC SURG SUPPLY STERILE: Performed by: INTERNAL MEDICINE

## 2024-12-09 PROCEDURE — 2500000003 HC RX 250 WO HCPCS: Performed by: REGISTERED NURSE

## 2024-12-09 PROCEDURE — 33999 UNLISTED PX CARDIAC SURGERY: CPT | Performed by: INTERNAL MEDICINE

## 2024-12-09 PROCEDURE — 7100000000 HC PACU RECOVERY - FIRST 15 MIN: Performed by: INTERNAL MEDICINE

## 2024-12-09 PROCEDURE — 2580000003 HC RX 258: Performed by: HOSPITALIST

## 2024-12-09 PROCEDURE — C2628 CATHETER, OCCLUSION: HCPCS | Performed by: INTERNAL MEDICINE

## 2024-12-09 PROCEDURE — C1769 GUIDE WIRE: HCPCS | Performed by: INTERNAL MEDICINE

## 2024-12-09 PROCEDURE — 02PA3MZ REMOVAL OF CARDIAC LEAD FROM HEART, PERCUTANEOUS APPROACH: ICD-10-PCS | Performed by: INTERNAL MEDICINE

## 2024-12-09 PROCEDURE — C1894 INTRO/SHEATH, NON-LASER: HCPCS | Performed by: INTERNAL MEDICINE

## 2024-12-09 PROCEDURE — 33216 INSERT 1 ELECTRODE PM-DEFIB: CPT | Performed by: INTERNAL MEDICINE

## 2024-12-09 PROCEDURE — 85027 COMPLETE CBC AUTOMATED: CPT

## 2024-12-09 PROCEDURE — 36556 INSERT NON-TUNNEL CV CATH: CPT | Performed by: INTERNAL MEDICINE

## 2024-12-09 PROCEDURE — 71045 X-RAY EXAM CHEST 1 VIEW: CPT

## 2024-12-09 PROCEDURE — 0JPT3PZ REMOVAL OF CARDIAC RHYTHM RELATED DEVICE FROM TRUNK SUBCUTANEOUS TISSUE AND FASCIA, PERCUTANEOUS APPROACH: ICD-10-PCS | Performed by: INTERNAL MEDICINE

## 2024-12-09 PROCEDURE — 2000000000 HC ICU R&B

## 2024-12-09 PROCEDURE — 3700000000 HC ANESTHESIA ATTENDED CARE: Performed by: INTERNAL MEDICINE

## 2024-12-09 PROCEDURE — 36620 INSERTION CATHETER ARTERY: CPT | Performed by: INTERNAL MEDICINE

## 2024-12-09 PROCEDURE — 2580000003 HC RX 258: Performed by: REGISTERED NURSE

## 2024-12-09 DEVICE — ENVELOPE CMRM6133 ABSORB LRG MR
Type: IMPLANTABLE DEVICE | Status: FUNCTIONAL
Brand: TYRX™

## 2024-12-09 DEVICE — LEAD PACE BPLR 6 FRX45 CM STR TIP IS-1 CAPSUR FIX NOVUS: Type: IMPLANTABLE DEVICE | Status: FUNCTIONAL

## 2024-12-09 RX ORDER — SUCCINYLCHOLINE/SOD CL,ISO/PF 200MG/10ML
SYRINGE (ML) INTRAVENOUS
Status: DISCONTINUED | OUTPATIENT
Start: 2024-12-09 | End: 2024-12-09 | Stop reason: SDUPTHER

## 2024-12-09 RX ORDER — SODIUM CHLORIDE 9 MG/ML
INJECTION, SOLUTION INTRAVENOUS PRN
Status: DISCONTINUED | OUTPATIENT
Start: 2024-12-09 | End: 2024-12-14 | Stop reason: HOSPADM

## 2024-12-09 RX ORDER — SODIUM CHLORIDE 0.9 % (FLUSH) 0.9 %
5-40 SYRINGE (ML) INJECTION PRN
Status: DISCONTINUED | OUTPATIENT
Start: 2024-12-09 | End: 2024-12-09 | Stop reason: HOSPADM

## 2024-12-09 RX ORDER — SODIUM CHLORIDE 9 MG/ML
INJECTION, SOLUTION INTRAVENOUS
Status: DISCONTINUED | OUTPATIENT
Start: 2024-12-09 | End: 2024-12-09 | Stop reason: SDUPTHER

## 2024-12-09 RX ORDER — IOPAMIDOL 755 MG/ML
INJECTION, SOLUTION INTRAVASCULAR PRN
Status: DISCONTINUED | OUTPATIENT
Start: 2024-12-09 | End: 2024-12-09 | Stop reason: HOSPADM

## 2024-12-09 RX ORDER — SODIUM CHLORIDE 0.9 % (FLUSH) 0.9 %
5-40 SYRINGE (ML) INJECTION PRN
Status: DISCONTINUED | OUTPATIENT
Start: 2024-12-09 | End: 2024-12-14 | Stop reason: HOSPADM

## 2024-12-09 RX ORDER — DIPHENHYDRAMINE HYDROCHLORIDE 50 MG/ML
INJECTION INTRAMUSCULAR; INTRAVENOUS
Status: DISCONTINUED | OUTPATIENT
Start: 2024-12-09 | End: 2024-12-09 | Stop reason: SDUPTHER

## 2024-12-09 RX ORDER — LIDOCAINE HYDROCHLORIDE 20 MG/ML
INJECTION, SOLUTION INFILTRATION; PERINEURAL
Status: DISCONTINUED | OUTPATIENT
Start: 2024-12-09 | End: 2024-12-09 | Stop reason: SDUPTHER

## 2024-12-09 RX ORDER — DIPHENHYDRAMINE HYDROCHLORIDE 50 MG/ML
12.5 INJECTION INTRAMUSCULAR; INTRAVENOUS
Status: DISCONTINUED | OUTPATIENT
Start: 2024-12-09 | End: 2024-12-09 | Stop reason: HOSPADM

## 2024-12-09 RX ORDER — FENTANYL CITRATE 50 UG/ML
INJECTION, SOLUTION INTRAMUSCULAR; INTRAVENOUS
Status: DISCONTINUED | OUTPATIENT
Start: 2024-12-09 | End: 2024-12-09 | Stop reason: SDUPTHER

## 2024-12-09 RX ORDER — PROPOFOL 10 MG/ML
INJECTION, EMULSION INTRAVENOUS
Status: DISCONTINUED | OUTPATIENT
Start: 2024-12-09 | End: 2024-12-09 | Stop reason: SDUPTHER

## 2024-12-09 RX ORDER — SODIUM CHLORIDE 0.9 % (FLUSH) 0.9 %
5-40 SYRINGE (ML) INJECTION EVERY 12 HOURS SCHEDULED
Status: DISCONTINUED | OUTPATIENT
Start: 2024-12-09 | End: 2024-12-09 | Stop reason: HOSPADM

## 2024-12-09 RX ORDER — NALOXONE HYDROCHLORIDE 0.4 MG/ML
INJECTION, SOLUTION INTRAMUSCULAR; INTRAVENOUS; SUBCUTANEOUS PRN
Status: DISCONTINUED | OUTPATIENT
Start: 2024-12-09 | End: 2024-12-09 | Stop reason: HOSPADM

## 2024-12-09 RX ORDER — HYDROMORPHONE HYDROCHLORIDE 2 MG/ML
0.25 INJECTION, SOLUTION INTRAMUSCULAR; INTRAVENOUS; SUBCUTANEOUS EVERY 5 MIN PRN
Status: DISCONTINUED | OUTPATIENT
Start: 2024-12-09 | End: 2024-12-09 | Stop reason: HOSPADM

## 2024-12-09 RX ORDER — ROCURONIUM BROMIDE 10 MG/ML
INJECTION, SOLUTION INTRAVENOUS
Status: DISCONTINUED | OUTPATIENT
Start: 2024-12-09 | End: 2024-12-09 | Stop reason: SDUPTHER

## 2024-12-09 RX ORDER — BUPIVACAINE HYDROCHLORIDE 5 MG/ML
INJECTION, SOLUTION EPIDURAL; INTRACAUDAL PRN
Status: DISCONTINUED | OUTPATIENT
Start: 2024-12-09 | End: 2024-12-09 | Stop reason: HOSPADM

## 2024-12-09 RX ORDER — VANCOMYCIN HCL IN 5 % DEXTROSE 1.25 G/25
1250 PLASTIC BAG, INJECTION (ML) INTRAVENOUS EVERY 24 HOURS
Status: DISCONTINUED | OUTPATIENT
Start: 2024-12-09 | End: 2024-12-13

## 2024-12-09 RX ORDER — ALBUMIN HUMAN 50 G/1000ML
SOLUTION INTRAVENOUS
Status: DISCONTINUED | OUTPATIENT
Start: 2024-12-09 | End: 2024-12-09 | Stop reason: SDUPTHER

## 2024-12-09 RX ORDER — HYDROMORPHONE HYDROCHLORIDE 2 MG/ML
0.5 INJECTION, SOLUTION INTRAMUSCULAR; INTRAVENOUS; SUBCUTANEOUS EVERY 5 MIN PRN
Status: DISCONTINUED | OUTPATIENT
Start: 2024-12-09 | End: 2024-12-09 | Stop reason: HOSPADM

## 2024-12-09 RX ORDER — SODIUM CHLORIDE 0.9 % (FLUSH) 0.9 %
5-40 SYRINGE (ML) INJECTION EVERY 12 HOURS SCHEDULED
Status: DISCONTINUED | OUTPATIENT
Start: 2024-12-09 | End: 2024-12-14 | Stop reason: HOSPADM

## 2024-12-09 RX ORDER — ONDANSETRON 2 MG/ML
INJECTION INTRAMUSCULAR; INTRAVENOUS
Status: DISCONTINUED | OUTPATIENT
Start: 2024-12-09 | End: 2024-12-09 | Stop reason: SDUPTHER

## 2024-12-09 RX ORDER — PROCHLORPERAZINE EDISYLATE 5 MG/ML
5 INJECTION INTRAMUSCULAR; INTRAVENOUS ONCE
Status: COMPLETED | OUTPATIENT
Start: 2024-12-09 | End: 2024-12-09

## 2024-12-09 RX ORDER — PHENYLEPHRINE HCL IN 0.9% NACL 1 MG/10 ML
SYRINGE (ML) INTRAVENOUS
Status: DISCONTINUED | OUTPATIENT
Start: 2024-12-09 | End: 2024-12-09 | Stop reason: SDUPTHER

## 2024-12-09 RX ORDER — ONDANSETRON 2 MG/ML
4 INJECTION INTRAMUSCULAR; INTRAVENOUS
Status: DISCONTINUED | OUTPATIENT
Start: 2024-12-09 | End: 2024-12-09 | Stop reason: HOSPADM

## 2024-12-09 RX ORDER — SODIUM CHLORIDE 9 MG/ML
INJECTION, SOLUTION INTRAVENOUS PRN
Status: DISCONTINUED | OUTPATIENT
Start: 2024-12-09 | End: 2024-12-09 | Stop reason: HOSPADM

## 2024-12-09 RX ADMIN — ROCURONIUM BROMIDE 10 MG: 10 INJECTION, SOLUTION INTRAVENOUS at 10:00

## 2024-12-09 RX ADMIN — ROCURONIUM BROMIDE 10 MG: 10 INJECTION, SOLUTION INTRAVENOUS at 08:36

## 2024-12-09 RX ADMIN — ROCURONIUM BROMIDE 10 MG: 10 INJECTION, SOLUTION INTRAVENOUS at 11:13

## 2024-12-09 RX ADMIN — ONDANSETRON 4 MG: 2 INJECTION INTRAMUSCULAR; INTRAVENOUS at 11:31

## 2024-12-09 RX ADMIN — SUGAMMADEX 200 MG: 100 INJECTION, SOLUTION INTRAVENOUS at 11:37

## 2024-12-09 RX ADMIN — ALBUMIN (HUMAN) 12.5 G: 12.5 INJECTION, SOLUTION INTRAVENOUS at 11:58

## 2024-12-09 RX ADMIN — Medication 300 MCG: at 10:25

## 2024-12-09 RX ADMIN — PROPOFOL 150 MG: 10 INJECTION, EMULSION INTRAVENOUS at 07:47

## 2024-12-09 RX ADMIN — Medication 100 MCG: at 11:50

## 2024-12-09 RX ADMIN — PROCHLORPERAZINE EDISYLATE 5 MG: 5 INJECTION INTRAMUSCULAR; INTRAVENOUS at 13:01

## 2024-12-09 RX ADMIN — SODIUM CHLORIDE, PRESERVATIVE FREE 10 ML: 5 INJECTION INTRAVENOUS at 21:02

## 2024-12-09 RX ADMIN — DIPHENHYDRAMINE HYDROCHLORIDE 25 MG: 50 INJECTION, SOLUTION INTRAMUSCULAR; INTRAVENOUS at 11:59

## 2024-12-09 RX ADMIN — SODIUM CHLORIDE: 9 INJECTION, SOLUTION INTRAVENOUS at 09:20

## 2024-12-09 RX ADMIN — Medication 120 MG: at 07:47

## 2024-12-09 RX ADMIN — MONTELUKAST SODIUM 10 MG: 10 TABLET, FILM COATED ORAL at 21:01

## 2024-12-09 RX ADMIN — CEFEPIME 2000 MG: 2 INJECTION, POWDER, FOR SOLUTION INTRAVENOUS at 14:15

## 2024-12-09 RX ADMIN — Medication 10 MG: at 11:15

## 2024-12-09 RX ADMIN — ROCURONIUM BROMIDE 10 MG: 10 INJECTION, SOLUTION INTRAVENOUS at 07:47

## 2024-12-09 RX ADMIN — Medication 100 MCG: at 07:47

## 2024-12-09 RX ADMIN — ROCURONIUM BROMIDE 20 MG: 10 INJECTION, SOLUTION INTRAVENOUS at 09:27

## 2024-12-09 RX ADMIN — PHENYLEPHRINE HYDROCHLORIDE 30 MCG/MIN: 10 INJECTION INTRAVENOUS at 07:50

## 2024-12-09 RX ADMIN — ROCURONIUM BROMIDE 20 MG: 10 INJECTION, SOLUTION INTRAVENOUS at 08:51

## 2024-12-09 RX ADMIN — CEFEPIME 2000 MG: 2 INJECTION, POWDER, FOR SOLUTION INTRAVENOUS at 23:41

## 2024-12-09 RX ADMIN — ROCURONIUM BROMIDE 30 MG: 10 INJECTION, SOLUTION INTRAVENOUS at 10:33

## 2024-12-09 RX ADMIN — ROCURONIUM BROMIDE 30 MG: 10 INJECTION, SOLUTION INTRAVENOUS at 07:50

## 2024-12-09 RX ADMIN — Medication 10 MG: at 09:41

## 2024-12-09 RX ADMIN — ROSUVASTATIN CALCIUM 10 MG: 10 TABLET, FILM COATED ORAL at 21:01

## 2024-12-09 RX ADMIN — CEFEPIME 2000 MG: 2 INJECTION, POWDER, FOR SOLUTION INTRAVENOUS at 00:02

## 2024-12-09 RX ADMIN — FENTANYL CITRATE 25 MCG: 50 INJECTION, SOLUTION INTRAMUSCULAR; INTRAVENOUS at 11:15

## 2024-12-09 RX ADMIN — FENTANYL CITRATE 25 MCG: 50 INJECTION, SOLUTION INTRAMUSCULAR; INTRAVENOUS at 07:45

## 2024-12-09 RX ADMIN — SODIUM CHLORIDE: 9 INJECTION, SOLUTION INTRAVENOUS at 07:33

## 2024-12-09 RX ADMIN — VANCOMYCIN HYDROCHLORIDE 1250 MG: 1.25 INJECTION, SOLUTION INTRAVITREAL at 07:45

## 2024-12-09 RX ADMIN — Medication 100 MCG: at 11:51

## 2024-12-09 RX ADMIN — Medication 100 MCG: at 11:27

## 2024-12-09 RX ADMIN — LIDOCAINE HYDROCHLORIDE 60 MG: 20 INJECTION, SOLUTION INFILTRATION; PERINEURAL at 07:47

## 2024-12-09 RX ADMIN — DIPHENHYDRAMINE HYDROCHLORIDE 25 MG: 50 INJECTION, SOLUTION INTRAMUSCULAR; INTRAVENOUS at 12:05

## 2024-12-09 RX ADMIN — Medication 30 MG: at 08:45

## 2024-12-09 ASSESSMENT — ENCOUNTER SYMPTOMS
ABDOMINAL PAIN: 0
BACK PAIN: 0
SORE THROAT: 0
WHEEZING: 0
SINUS PAIN: 0
COUGH: 0
CONSTIPATION: 0
SHORTNESS OF BREATH: 0
EYE REDNESS: 0
NAUSEA: 0
SINUS PRESSURE: 0
RHINORRHEA: 0
EYE DISCHARGE: 0
DIARRHEA: 0

## 2024-12-09 ASSESSMENT — PAIN DESCRIPTION - ORIENTATION: ORIENTATION: LEFT

## 2024-12-09 ASSESSMENT — PAIN DESCRIPTION - DESCRIPTORS: DESCRIPTORS: ACHING

## 2024-12-09 ASSESSMENT — PAIN SCALES - GENERAL
PAINLEVEL_OUTOF10: 0
PAINLEVEL_OUTOF10: 0
PAINLEVEL_OUTOF10: 6

## 2024-12-09 ASSESSMENT — PAIN DESCRIPTION - LOCATION: LOCATION: NECK

## 2024-12-09 ASSESSMENT — PAIN DESCRIPTION - ONSET: ONSET: ON-GOING

## 2024-12-09 ASSESSMENT — PAIN DESCRIPTION - PAIN TYPE: TYPE: ACUTE PAIN

## 2024-12-09 ASSESSMENT — PAIN - FUNCTIONAL ASSESSMENT: PAIN_FUNCTIONAL_ASSESSMENT: ACTIVITIES ARE NOT PREVENTED

## 2024-12-09 ASSESSMENT — PAIN DESCRIPTION - FREQUENCY: FREQUENCY: CONTINUOUS

## 2024-12-09 NOTE — PROGRESS NOTES
Infectious Diseases   Progress Note      Admission Date: 12/5/2024  Hospital Day: Hospital Day: 5   Attending: Reddy Balderrama MD  Date of service: 12/9/2024     Chief complaint/ Reason for consult:     Pacemaker pocket site infection  Chronic systolic congestive heart failure  Status post ICD generator change in September 2022  BiV AICD in place   Left ventricular lead in place since 2005  Paroxysmal atrial fibrillation    Microbiology:      I have reviewed allavailable micro lab data and cultures    Results       Procedure Component Value Units Date/Time    Culture, Wound [[id number]]     Order Status: No result Specimen: Chest     Culture, Anaerobic [[id number]]     Order Status: No result Specimen: Chest     Culture, Anaerobic and Aerobic [[id number]] Collected: 12/09/24 0908    Order Status: Completed Specimen: Chest Updated: 12/09/24 1122     Gram Stain Result 1+ Gram positive cocci  3+ WBC's (Polymorphonuclear)      Narrative:      ORDER#: [id number]                          ORDERED BY: ESTRELLITA CAMARGO  SOURCE: Chest L Pacemaker Pocket           COLLECTED:  12/09/24 09:08  ANTIBIOTICS AT [facility].:                      RECEIVED :  12/09/24 11:20    Culture, Wound [[id number]] Collected: 12/05/24 1543    Order Status: Completed Specimen: Skin Updated: 12/08/24 0817     WOUND/ABSCESS No growth 60 to 72 hours     Gram Stain Result No WBCs or organisms seen    Narrative:      ORDER#: [id number]                          ORDERED BY: LANETTE HERNANDEZ  SOURCE: Skin Left chest                    COLLECTED:  12/05/24 15:43  ANTIBIOTICS AT [facility].:                      RECEIVED :  12/05/24 17:42    Culture, Anaerobic [[id number]] Collected: 12/05/24 1543    Order Status: Completed Specimen: Skin Updated: 12/07/24 0700     Anaerobic Culture --     Anaerobic culture further report to follow  No anaerobes isolated so far, Further report to follow      Narrative:      ORDER#: [id number]                          ORDERED BY: MARY  LANETTE  SOURCE: Skin Left chest                    COLLECTED:  12/05/24 15:43  ANTIBIOTICS AT [facility].:                      RECEIVED :  12/05/24 16:06    Culture, Blood 2 [[id number]] Collected: 12/05/24 1443    Order Status: Completed Specimen: Blood Updated: 12/09/24 1515     Culture, Blood 2 No Growth after 4 days of incubation.    Narrative:      ORDER#: [id number]                          ORDERED BY: BRICE MAO  SOURCE: Blood r hand                       COLLECTED:  12/05/24 14:43  ANTIBIOTICS AT [facility].:                      RECEIVED :  12/05/24 20:48  If child <=2 yrs old please draw pediatric bottle.~Blood Culture #2    Culture, Blood 1 [[id number]] Collected: 12/05/24 1415    Order Status: Completed Specimen: Blood Updated: 12/09/24 1515     Blood Culture, Routine No Growth after 4 days of incubation.    Narrative:      ORDER#: [id number]                          ORDERED BY: BRICE MAO  SOURCE: Blood R AC                         COLLECTED:  12/05/24 14:15  ANTIBIOTICS AT [facility].:                      RECEIVED :  12/05/24 20:48  If child <=2 yrs old please draw pediatric bottle.~Blood Culture 1             No results found for the last 90 days.         Antibiotics and immunizations:       Current antibiotics: All antibiotics and their doses were reviewed by me    Recent Abx Admin                     ceFEPIme (MAXIPIME) 2,000 mg in sodium chloride 0.9 % 100 mL IVPB (mini-bag) (mg) 2,000 mg New Bag 12/09/24 1415     2,000 mg New Bag  0002    vancomycin (VANCOCIN) 1250 mg in D5W 250 mL IVPB (mg) 1,250 mg Given 12/09/24 0745                      Immunization History: All immunization history was reviewed by me today.    Immunization History   Administered Date(s) Administered    COVID-19, PFIZER PURPLE top, DILUTE for use, (age 12 y+), 30mcg/0.3mL 03/12/2021, 04/05/2021, 12/30/2021    Influenza Virus Vaccine 10/06/2014, 10/13/2016, 10/05/2017    Influenza Whole 10/06/2014    Influenza, FLUAD, (age 65 y+), IM,

## 2024-12-09 NOTE — PROGRESS NOTES
Hospitalist Progress Note      PCP: Rebekah Carson APRN - CNP    Date of Admission: 12/5/2024    LOS: 4    Chief Complaint:   Chief Complaint   Patient presents with    Wound Check     Pt to ED with CC of infection to cardiac implant site.  Pt verbalizes pain.  States that her sx have been ongoing x3 weeks.       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  Doing well postprocedure.  Status post pacemaker/ICD extraction this morning  Remains afebrile with no leukocytosis.    Blood cultures NGTD.  -Continue IV antibiotics  - Follow-up intraoperative cultures  - Continue pain management  - ID and cardiology following with recommendation    Active Problems:    Systolic congestive heart failure with Dilated cardiomyopathy and Biventricular ICD (implantable cardioverter-defibrillator) in place: Stable on Coreg, spironolactone, empagliflozin, Entresto    PAF (paroxysmal atrial fibrillation) ([facility]): Stable on digoxin Coreg

## 2024-12-09 NOTE — PROGRESS NOTES
Patient's family updated in the waiting room Patient resting comfortably. VSS. Right groin site intact. No bleeding or swelling. Holding for a CVU room.

## 2024-12-09 NOTE — ANESTHESIA POSTPROCEDURE EVALUATION
Department of Anesthesiology  Postprocedure Note    Patient: Tess Atkinson  MRN: [id number]  YOB: 1955  Date of evaluation: 12/9/2024    Procedure Summary       Date: 12/09/24 Room / Location: [facility] EP LAB 5 / [facility] CARDIAC CATH LAB    Anesthesia Start: 0733 Anesthesia Stop: 1207    Procedures:       Laser lead extraction      Insert ICD multi Diagnosis:       Infection of biventricular implantable cardioverter-defibrillator (ICD) (HCC)      Dilated cardiomyopathy (HCC)      (Infection of biventricular implantable cardioverter-defibrillator (ICD) (HCC) [T82.7XXA])      (Dilated cardiomyopathy (HCC) [I42.0])    Providers: Albert Gusman MD Responsible Provider: Lang Fritz MD    Anesthesia Type: general ASA Status: 4            Anesthesia Type: No value filed.    Morgan Phase I:      Morgan Phase II:      Anesthesia Post Evaluation    Patient location during evaluation: PACU  Patient participation: complete - patient participated  Level of consciousness: awake and alert  Pain score: 2  Airway patency: patent  Nausea & Vomiting: no vomiting  Cardiovascular status: blood pressure returned to baseline  Respiratory status: acceptable  Hydration status: euvolemic  Multimodal analgesia pain management approach  Pain management: adequate    No notable events documented.

## 2024-12-09 NOTE — PROGRESS NOTES
Clinical Pharmacy Note: Pharmacy to Dose Vancomycin    Vancomycin Day: 5  Indication: pacemaker pocket infection  Current Dose: 1250 mg every 24 hours   Dosing Method: Bayesian Modeling    Random: 15.4    Recent Labs     12/08/24  0539 12/09/24  0509   BUN 31* 32*       Recent Labs     12/08/24  0539 12/09/24  0509   CREATININE 0.8 0.7       Recent Labs     12/08/24  0539 12/09/24  0509   WBC 4.7 4.5         Intake/Output Summary (Last 24 hours) at 12/9/2024 1010  Last data filed at 12/9/2024 0947  Gross per 24 hour   Intake 2522.41 ml   Output 1950 ml   Net 572.41 ml         Ht Readings from Last 1 Encounters:   12/05/24 1.575 m (5' 2\")        Wt Readings from Last 1 Encounters:   12/09/24 58.1 kg (128 lb)         Body mass index is 23.41 kg/m².    Estimated Creatinine Clearance: 59 mL/min (based on SCr of 0.7 mg/dL).      Assessment/Plan:  Vancomycin level is therapeutic.  Continue current vancomycin regimen of 1250 mg every 24 hours.   Bayesian Modeling predicts an AUC of 466 mg/L*hr and trough of 10.5 mg/L.  A vancomycin random level has been ordered on 12/13 at 060 for follow-up.  Changes in regimen will be determined based on culture results, renal function, and clinical response.  Pharmacy will continue to monitor and adjust regimen as necessary.    Thank you for the consult,    Arya Krause, PharmD   [id number]

## 2024-12-09 NOTE — CARE COORDINATION
Pt had pacemaker exchanged.    CM called Libby with [employer] [phone] and left  inquiring on IV benefits and if she has run them. VM left on Friday and no message received yet.     Mary Laguerre RN, BSN  [phone]     Updated at 2:55 PM    VM received from Steven with [employer] [phone] who is covering for Libby stating cost of IV Cefepime and Vancomycin pt is on would be $44/day per med.    CM will continue to follow for discharge plan and needs.     Mary Laguerre RN, BSN  [phone]

## 2024-12-09 NOTE — PROGRESS NOTES
Patient to PACU from Cath lab. Drowsy. VSS. Temp pacer to right IJ. Dry dressing to left chest incision s/p pacer removal. Right groin site c/d/I.     Anesthesia called Dr. Gusman to bedside, concern from diaphragmatic pacing. Dr. Gusman to bedside, order for chest xray to check lead placement.

## 2024-12-09 NOTE — PLAN OF CARE
Problem: Chronic Conditions and Co-morbidities  Goal: Patient's chronic conditions and co-morbidity symptoms are monitored and maintained or improved  12/8/2024 2145 by Rosaura Lock RN  Outcome: Progressing  Flowsheets (Taken 12/8/2024 2000)  Care Plan - Patient's Chronic Conditions and Co-Morbidity Symptoms are Monitored and Maintained or Improved:   Monitor and assess patient's chronic conditions and comorbid symptoms for stability, deterioration, or improvement   Collaborate with multidisciplinary team to address chronic and comorbid conditions and prevent exacerbation or deterioration  12/8/2024 0800 by Margot Lawrence RN  Outcome: Progressing     Problem: Discharge Planning  Goal: Discharge to home or other facility with appropriate resources  12/8/2024 2145 by Rosaura Lock RN  Outcome: Progressing  Flowsheets (Taken 12/8/2024 2000)  Discharge to home or other facility with appropriate resources:   Identify barriers to discharge with patient and caregiver   Arrange for needed discharge resources and transportation as appropriate  12/8/2024 0800 by Margot Lawrence RN  Outcome: Progressing     Problem: Pain  Goal: Verbalizes/displays adequate comfort level or baseline comfort level  12/8/2024 2145 by Rosaura Lock RN  Outcome: Progressing  Flowsheets (Taken 12/8/2024 2037)  Verbalizes/displays adequate comfort level or baseline comfort level:   Encourage patient to monitor pain and request assistance   Assess pain using appropriate pain scale  12/8/2024 0800 by Margot Lawrence RN  Outcome: Progressing     Problem: Safety - Adult  Goal: Free from fall injury  12/8/2024 2145 by Rosaura Lock RN  Outcome: Progressing  12/8/2024 0800 by Margot Lawrence RN  Outcome: Progressing     Problem: Neurosensory - Adult  Goal: Achieves stable or improved neurological status  12/8/2024 2145 by Rosaura Lock RN  Outcome: Progressing  Flowsheets (Taken  needed  12/8/2024 0800 by Margot Lawrence RN  Outcome: Progressing     Problem: Gastrointestinal - Adult  Goal: Minimal or absence of nausea and vomiting  12/8/2024 2145 by Rosaura Lock RN  Outcome: Progressing  Flowsheets (Taken 12/8/2024 2000)  Minimal or absence of nausea and vomiting:   Administer IV fluids as ordered to ensure adequate hydration   Maintain NPO status until nausea and vomiting are resolved  12/8/2024 0800 by Margot Lawrence RN  Outcome: Progressing  Goal: Maintains or returns to baseline bowel function  12/8/2024 2145 by Rosaura Lock RN  Outcome: Progressing  Flowsheets (Taken 12/8/2024 2000)  Maintains or returns to baseline bowel function:   Assess bowel function   Encourage oral fluids to ensure adequate hydration  12/8/2024 0800 by Margot Lawrence RN  Outcome: Progressing  Goal: Maintains adequate nutritional intake  12/8/2024 2145 by Rosaura Lock RN  Outcome: Progressing  Flowsheets (Taken 12/8/2024 2000)  Maintains adequate nutritional intake:   Monitor percentage of each meal consumed   Identify factors contributing to decreased intake, treat as appropriate  12/8/2024 0800 by Margot Lawrence RN  Outcome: Progressing

## 2024-12-09 NOTE — H&P
far.  Continue IV antibiotic.  Pain is better even compared to yesterday.  However there is evidence of pocket infection as skin adherence to the device and blister obvious pain and tenderness and redness.  Patient is dependent.  We will decide about same date reimplant versus waiting 1 or 2 days to reimplant the device.    Risks, benefits and alternative of lead extraction were discussed with patient.  The risks including, but not limited to, major life-threatening complication of 1% (SVC tear, vascular complications, hemothorax and cardiac tamponade) requiring open surgical repair discussed in detail. Other risks including pneumothorax, lead dislodgement, etc discussed.   Educational material about lead extraction, risks, benefits and alternatives were given to patient. Patient was encouraged to review information, and call back with any questions.    Patient verbalized understanding and would like to proceed.   Patient risk factors include female, cardiomyopathy        I independently reviewed and interpreted ECG, telemetry, cardiac labs, other relevant labs,  echo   CXR device check interrogation,   . Unless otherwise reflected in the note, my interpretation is in agreement with the report  and use them for decision making whether mentioned or not.  All previous relevant notes including notes and data from other hospitals and prior records were reviewed.  Level of complexity of visit and medical decision making:   High    Risk of Complications/Morbidity:  High due to old leads, female with low body mass index, severe cardiomyopathy, age with high risk of adverse outcome during the procedure    NOTE: This report was transcribed using voice recognition software. Every effort was made to ensure accuracy, however, inadvertent computerized transcription errors may be present.

## 2024-12-09 NOTE — PROGRESS NOTES
Pt arrived to CVU 2901 from PACU. Personal belongings brought to room by 5c nurse. Pt resting quietly,  at bedside.

## 2024-12-09 NOTE — ANESTHESIA PROCEDURE NOTES
Arterial Line:    An arterial line was placed using surface landmarks, in the OR for the following indication(s): continuous blood pressure monitoring and blood sampling needed.    A 20 gauge (size), 1 and 3/8 inch (length), Angiocath (type) catheter was placed, Seldinger technique not used, into the left radial artery, secured by tape and Tegaderm.  Anesthesia type: Local    Events:  patient tolerated procedure well with no complications and EBL < 5mL.12/9/2024 8:07 AM12/9/2024 8:13 AM  Anesthesiologist: Lang Fritz MD  Performed: Anesthesiologist   Preanesthetic Checklist  Completed: patient identified, IV checked, site marked, risks and benefits discussed, surgical/procedural consents, equipment checked, pre-op evaluation, timeout performed, anesthesia consent given, oxygen available and monitors applied/VS acknowledged

## 2024-12-10 ENCOUNTER — TELEPHONE (OUTPATIENT)
Dept: UROGYNECOLOGY | Age: 69
End: 2024-12-10

## 2024-12-10 PROBLEM — I44.2 COMPLETE HEART BLOCK (HCC): Status: ACTIVE | Noted: 2024-12-10

## 2024-12-10 LAB
ANION GAP SERPL CALCULATED.3IONS-SCNC: 16 MMOL/L (ref 3–16)
BACTERIA SPEC ANAEROBE CULT: NORMAL
BUN SERPL-MCNC: 22 MG/DL (ref 7–20)
CALCIUM SERPL-MCNC: 8.9 MG/DL (ref 8.3–10.6)
CHLORIDE SERPL-SCNC: 104 MMOL/L (ref 99–110)
CO2 SERPL-SCNC: 19 MMOL/L (ref 21–32)
CREAT SERPL-MCNC: 0.7 MG/DL (ref 0.6–1.2)
DEPRECATED RDW RBC AUTO: 14.6 % (ref 12.4–15.4)
ECHO BSA: 1.59 M2
GFR SERPLBLD CREATININE-BSD FMLA CKD-EPI: >90 ML/MIN/{1.73_M2}
GLUCOSE BLD-MCNC: 113 MG/DL (ref 70–99)
GLUCOSE BLD-MCNC: 116 MG/DL (ref 70–99)
GLUCOSE BLD-MCNC: 133 MG/DL (ref 70–99)
GLUCOSE BLD-MCNC: 226 MG/DL (ref 70–99)
GLUCOSE SERPL-MCNC: 137 MG/DL (ref 70–99)
HCT VFR BLD AUTO: 37.4 % (ref 36–48)
HGB BLD-MCNC: 12.4 G/DL (ref 12–16)
MCH RBC QN AUTO: 29.9 PG (ref 26–34)
MCHC RBC AUTO-ENTMCNC: 33.2 G/DL (ref 31–36)
MCV RBC AUTO: 90.1 FL (ref 80–100)
PERFORMED ON: ABNORMAL
PLATELET # BLD AUTO: 127 K/UL (ref 135–450)
PMV BLD AUTO: 10 FL (ref 5–10.5)
POTASSIUM SERPL-SCNC: 4.1 MMOL/L (ref 3.5–5.1)
RBC # BLD AUTO: 4.15 M/UL (ref 4–5.2)
SODIUM SERPL-SCNC: 139 MMOL/L (ref 136–145)
WBC # BLD AUTO: 8.7 K/UL (ref 4–11)

## 2024-12-10 PROCEDURE — 6360000002 HC RX W HCPCS: Performed by: HOSPITALIST

## 2024-12-10 PROCEDURE — 99232 SBSQ HOSP IP/OBS MODERATE 35: CPT

## 2024-12-10 PROCEDURE — 80048 BASIC METABOLIC PNL TOTAL CA: CPT

## 2024-12-10 PROCEDURE — 2580000003 HC RX 258: Performed by: INTERNAL MEDICINE

## 2024-12-10 PROCEDURE — 85027 COMPLETE CBC AUTOMATED: CPT

## 2024-12-10 PROCEDURE — 2580000003 HC RX 258: Performed by: HOSPITALIST

## 2024-12-10 PROCEDURE — 99233 SBSQ HOSP IP/OBS HIGH 50: CPT | Performed by: INTERNAL MEDICINE

## 2024-12-10 PROCEDURE — 6370000000 HC RX 637 (ALT 250 FOR IP): Performed by: HOSPITALIST

## 2024-12-10 PROCEDURE — 6370000000 HC RX 637 (ALT 250 FOR IP): Performed by: NURSE PRACTITIONER

## 2024-12-10 PROCEDURE — 87641 MR-STAPH DNA AMP PROBE: CPT

## 2024-12-10 PROCEDURE — 6360000002 HC RX W HCPCS: Performed by: INTERNAL MEDICINE

## 2024-12-10 PROCEDURE — 2000000000 HC ICU R&B

## 2024-12-10 RX ADMIN — SODIUM CHLORIDE, PRESERVATIVE FREE 10 ML: 5 INJECTION INTRAVENOUS at 20:02

## 2024-12-10 RX ADMIN — ROSUVASTATIN CALCIUM 10 MG: 10 TABLET, FILM COATED ORAL at 20:02

## 2024-12-10 RX ADMIN — EMPAGLIFLOZIN 10 MG: 10 TABLET, FILM COATED ORAL at 08:34

## 2024-12-10 RX ADMIN — SODIUM CHLORIDE, PRESERVATIVE FREE 10 ML: 5 INJECTION INTRAVENOUS at 08:35

## 2024-12-10 RX ADMIN — SACUBITRIL AND VALSARTAN 0.5 TABLET: 24; 26 TABLET, FILM COATED ORAL at 08:33

## 2024-12-10 RX ADMIN — CARVEDILOL 3.12 MG: 3.12 TABLET, FILM COATED ORAL at 08:33

## 2024-12-10 RX ADMIN — ENOXAPARIN SODIUM 40 MG: 100 INJECTION SUBCUTANEOUS at 08:34

## 2024-12-10 RX ADMIN — DIGOXIN 125 MCG: 0.12 TABLET ORAL at 08:33

## 2024-12-10 RX ADMIN — SODIUM CHLORIDE, PRESERVATIVE FREE 10 ML: 5 INJECTION INTRAVENOUS at 08:34

## 2024-12-10 RX ADMIN — VILAZODONE HYDROCHLORIDE 20 MG: 20 TABLET, FILM COATED ORAL at 08:34

## 2024-12-10 RX ADMIN — CEFEPIME 2000 MG: 2 INJECTION, POWDER, FOR SOLUTION INTRAVENOUS at 11:14

## 2024-12-10 RX ADMIN — MONTELUKAST SODIUM 10 MG: 10 TABLET, FILM COATED ORAL at 20:01

## 2024-12-10 RX ADMIN — VANCOMYCIN HYDROCHLORIDE 1250 MG: 1.25 INJECTION, SOLUTION INTRAVITREAL at 06:36

## 2024-12-10 ASSESSMENT — PAIN SCALES - GENERAL
PAINLEVEL_OUTOF10: 0
PAINLEVEL_OUTOF10: 0
PAINLEVEL_OUTOF10: 4
PAINLEVEL_OUTOF10: 0

## 2024-12-10 ASSESSMENT — ENCOUNTER SYMPTOMS
COUGH: 0
SINUS PAIN: 0
SORE THROAT: 0
EYE REDNESS: 0
EYE DISCHARGE: 0
SINUS PRESSURE: 0
DIARRHEA: 0
RHINORRHEA: 0
NAUSEA: 0
CONSTIPATION: 0
WHEEZING: 0
SHORTNESS OF BREATH: 0
BACK PAIN: 0
ABDOMINAL PAIN: 0

## 2024-12-10 NOTE — PROGRESS NOTES
V2.0    [facility] Progress Note      Name:  Tess Atkinson /Age/Sex: 1955  (68 y.o. female)   MRN & CSN:  [id number] & [id number] Encounter Date/Time: 12/10/2024 11:21 AM EST   Location:  [facility]2901/290- PCP: Rebekah Carson, APRN - CNP     Attending:Reddy Balderrama MD       Hospital Day: 6    Assessment and Recommendations   Tess Atkinson is a 68 y.o. 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  Patient is status post lead extraction.  Within this is a temporary pacemaker.  Continue postop care per EP cardiology.  Await on culture currently remains on IV antibiotics ID following.  Monitor renal function closely.  Possible need for IV antibiotics on discharge.    Chronic CHF.  Appears compensated  Paroxysmal A-fib  Stable on Coreg and digoxin.  Resume anticoagulation once okay with EP cardiology.    CAD.  Hypertension  Obstructive sleep apnea      Diet ADULT DIET; Regular; 3 carb choices (45 gm/meal)   DVT Prophylaxis    Code Status Full Code   Disposition          Personally reviewed Lab Studies and Imaging         Subjective:     Chief Complaint:     Tess Atkinson is a 68 y.o. female who presents with       Review of Systems:      Pertinent positives and negatives discussed in HPI    Objective:     Intake/Output Summary (Last 24 hours) at 12/10/2024 1121  Last data filed at 12/10/2024 0641  Gross per 24 hour   Intake 821.85 ml   Output 1575 ml   Net -753.15 ml      Vitals:   Vitals:    12/10/24 0346 12/10/24 0749 12/10/24 0833 12/10/24 1111   BP: 110/60 108/68 108/68 (!) 93/56   Pulse: 70 70 72 70   Resp: 18      Temp: 97.2 °F (36.2 °C) 97.4 °F (36.3 °C)  97.3 °F (36.3 °C)   TempSrc: Temporal Temporal  Temporal   SpO2: 99% 95%  98%   Weight: 57.5 kg (126 lb 12.2 oz)      Height:             Physical Exam:      General: NAD  Eyes: EOMI  ENT: neck supple  Cardiovascular: Regular rate.  Respiratory: Clear to

## 2024-12-10 NOTE — CARE COORDINATION
Patient had pacemaker removed due to infection.  ASHLEE drain to old site.  No new orders at this time. KELVIN GALEANA, ALENAN, RN, CWOCN  Inpatient  Wound/Ostomy Care  [phone]

## 2024-12-10 NOTE — PLAN OF CARE
Problem: Chronic Conditions and Co-morbidities  Goal: Patient's chronic conditions and co-morbidity symptoms are monitored and maintained or improved  12/10/2024 1152 by Vanesa Palmer RN  Outcome: Progressing  12/10/2024 1152 by Vanesa Palmer RN  Outcome: Progressing     Problem: Discharge Planning  Goal: Discharge to home or other facility with appropriate resources  Outcome: Progressing     Problem: Pain  Goal: Verbalizes/displays adequate comfort level or baseline comfort level  Outcome: Progressing     Problem: Safety - Adult  Goal: Free from fall injury  Outcome: Progressing     Problem: Neurosensory - Adult  Goal: Achieves stable or improved neurological status  Outcome: Progressing     Problem: Respiratory - Adult  Goal: Achieves optimal ventilation and oxygenation  Outcome: Progressing     Problem: Cardiovascular - Adult  Goal: Maintains optimal cardiac output and hemodynamic stability  Outcome: Progressing  Goal: Absence of cardiac dysrhythmias or at baseline  Outcome: Progressing     Problem: Skin/Tissue Integrity - Adult  Goal: Skin integrity remains intact  Outcome: Progressing  Goal: Incisions, wounds, or drain sites healing without S/S of infection  Outcome: Progressing  Goal: Oral mucous membranes remain intact  Outcome: Progressing     Problem: Musculoskeletal - Adult  Goal: Return mobility to safest level of function  Outcome: Progressing  Goal: Maintain proper alignment of affected body part  Outcome: Progressing  Goal: Return ADL status to a safe level of function  Outcome: Progressing     Problem: Gastrointestinal - Adult  Goal: Minimal or absence of nausea and vomiting  Outcome: Progressing  Goal: Maintains or returns to baseline bowel function  Outcome: Progressing  Goal: Maintains adequate nutritional intake  Outcome: Progressing     Problem: Genitourinary - Adult  Goal: Absence of urinary retention  Outcome: Progressing     Problem: Infection - Adult  Goal: Absence of infection at

## 2024-12-10 NOTE — PROGRESS NOTES
Per verbal order Hold bp meds at this time per , pt experiencing hypotension- 80's/50's care ongoing

## 2024-12-10 NOTE — PROGRESS NOTES
[facility], [facility] [place]   Electrophysiology   Date: 12/10/2024  Reason for Follow up: Device pocket infection   Chief Complaint   Patient presents with    Wound Check     Pt to ED with CC of infection to cardiac implant site.  Pt verbalizes pain.  States that her sx have been ongoing x3 weeks.       HPI: Tess Atkinson is a 68 y.o. female with h/o atrial fibrillation, NICM (s/p [id number], lead extraction and BiV implantation 2009, gen change 2018, gen change with pocket revision 2022), CHF, HLD, DM, ESCOBAR, HTN.     Pt admitted for device pocket infection. Received IV antibiotics and had lead extraction performed yesterday.    Interval History:    Pt seen at bedside for follow up. Clinical notes and telemetry reviewed. Pt is having some soreness at device pocket but feeling good otherwise. Having some dizziness when up moving. Pt denies CP, SOB, palpitations, edema, PND/orthopnea.      Assessment/Plan:     Device pocket infection  - S/p lead extraction yesterday  - Drain in place from device pocket and dressing in place  - External pacemaker in place with dressing, pacemaker dependent d/t complete heart block  - ID following  - Receiving IV antibiotics  - Blood cultures with no growth after 4 days, WBC within normal limits  - Wound culture with no growth  - Plan for possible R sided device placement on Friday    HFrEF  - Appears compensated  - EF 20-25% per last echo 1/2024  - Continue OMT as tolerated  - Monitor I/O's and daily weights    Paroxysmal atrial fibrillation  - In V paced rhythm  - No known recurrence  - Echo with normal size  - [id number] score of 4 (CHF, HTN, age 1, female), has not been on anticoagulation  - Rate control with Coreg and digoxin    Hypotension  - BP dropped overnight  - Hold HF meds as needed      Relevant available labs, and cardiovascular diagnostics, documents reviewed.   Tele:   V paced Hr 70    Recent Labs     12/08/24  0539 12/09/24  0509 12/10/24  0400   * 137 139  OIL) 1000 MG CAPS Take 2 capsules by mouth 2 times daily   Yes Provider, MD Zuly   Continuous Glucose Sensor (FREESTYLE MAGDALENA 3 SENSOR) MISC 1 each by Does not apply route once a week 24   Rebekah Carson APRN - CNP   Continuous Glucose  (FREESTYLE MAGDALENA 3 READER) [place] 1 each by Does not apply route 4 times daily (before meals and nightly) 24   Rebekah Carson APRN - CNP   loratadine (CLARITIN) 10 MG tablet Take 1 tablet by mouth daily  Patient not taking: Reported on 2024   Heydi Vela MD       Physical Examination:  Vitals:    12/10/24 1111   BP: (!) 93/56   Pulse: 70   Resp:    Temp: 97.3 °F (36.3 °C)   SpO2: 98%      In: 1071.9 [I.V.:600]  Out: 2095    Wt Readings from Last 3 Encounters:   12/10/24 57.5 kg (126 lb 12.2 oz)   24 58.1 kg (128 lb)   24 57.6 kg (127 lb)     Temp  Av.4 °F (36.3 °C)  Min: 97 °F (36.1 °C)  Max: 98 °F (36.7 °C)  Pulse  Av.3  Min: 70  Max: 72  BP  Min: 72/41  Max: 154/119  SpO2  Av.9 %  Min: 94 %  Max: 100 %    Intake/Output Summary (Last 24 hours) at 12/10/2024 1142  Last data filed at 12/10/2024 1122  Gross per 24 hour   Intake 821.85 ml   Output 1875 ml   Net -1053.15 ml       Constitutional: Oriented. No distress.   Cardiovascular: Normal rate, regular rhythm, S1&S2.    Pulmonary/Chest: Bilateral respiratory sounds. No rhonchi.    Abdominal: Soft. No tenderness   Musculoskeletal: No edema    Neurological: Alert and oriented. Follows command    Active Hospital Problems    Diagnosis Date Noted    Complete heart block (HCC) [I44.2] 12/10/2024    Type 2 diabetes mellitus with other specified complication (HCC) [E11.69] 2024    Coronary artery disease due to lipid rich plaque [I25.10, I25.83] 2024    Systolic congestive heart failure (HCC) [I50.20] 2024    Infection of pacemaker pocket (HCC) [T82.7XXA] 2024    Infection of pacemaker pulse generator site (HCC) [T82.7XXA] 2024

## 2024-12-10 NOTE — TELEPHONE ENCOUNTER
Left voicemail for patient to call us back to discuss pre medications for upcoming Botox procedure. Electronically signed by Analy Raymundo RN on 12/10/2024 at 10:15 AM

## 2024-12-10 NOTE — PROCEDURES
PROCEDURE NOTE  Date: 12/10/2024   Name: Tess Atkinson  YOB: 1955    Procedures      [facility]     Electrophysiology Procedure Note       Date of Procedure: 12/10/2024  Patient's Name: Tess Atkinson  YOB: 1955   Medical Record Number: [id number]  Procedure Performed by: Albert Gusman MD, David Cates MD    Procedures performed:   Laser lead extraction of the right ventricular defibrillator lead   Lead extraction of the right atrial pacing lead  Lead extraction of left ventricular pacing lead  Removal of biventricular ICD      Insertion of a temproary pacemaker   Insertion of right femoral venous central line   Insertion of right femoral arterial line   Removal of temporary pacing wire      Electronic analysis of lead and device   Ultrasound for venous and arterial access  Insertion of Bridge balloon    Insertion of permanent the screen pacing lead with external pacemaker unit for temporary pacing    Indication of the procedure:   Tess Atkinson is 68 y.o. female   with severe cardiomyopathy, congestive heart failure, biventricular ICD, complete heart block, status post BiV ICD, pocket infection    Details of procedure:   The patient was brought to the electrophysiology laboratory in stable condition. The patient was in a fasting and non-sedated state. The risks, benefits and alternatives of the lead extraction were discussed with patient and his family members. The risks including, but not limited to, the risks of bleeding, infection, radiation exposure, injury to vascular, cardiac and surrounding structures (including pneumothorax), stroke, cardiac perforation, tamponade, need for emergent open heart surgery, myocardial infarction and death were discussed in detail. The patient opted to proceed with the procedure. A timeout protocol was completed to identify the patient and the procedure being performed. Patient underwent general anesthesia. Cardiothoracic surgery was

## 2024-12-10 NOTE — PROGRESS NOTES
apnea, Type 2 diabetes mellitus with other specified complication (HCC) (12/5/2024), and Uterine fibroids affecting pregnancy. with following problems:    Pacemaker pocket site infection-covered with broad-spectrum antibiotics  Status post AICD extraction surgery on 12/9/2024  Chronic systolic congestive heart failure  Status post ICD generator change in September 2022  Thrombocytopenia-ongoing  BiV AICD in place   Left ventricular lead in place since 2005  Paroxysmal atrial fibrillation-rate controlled  Fibromyalgia  Obstructive sleep apnea on CPAP  Coronary artery disease  Fibromyalgia  Essential hypertension  Type 2 diabetes mellitus         Discussion:      The patient is afebrile.  The patient is on IV vancomycin and IV cefepime.    The patient has undergone AICD extraction yesterday.  Device was sent for culture.  It is in process.    Gram stain showed 1+ gram-positive cocci and 3+ WBCs.    The patient has serum creatinine of 0.7.    White cell count is 8700 with hemoglobin of 12.4 and platelet count of 127,000.    I reviewed the imaging portable chest x-ray that was done yesterday and I independently interpreted the x-ray findings.  No pneumothorax      Plan:     Diagnostic Workup:    Follow-up on pacemaker pocket site surgical culture  We will order nasal MRSA probe  Continue to follow  fever curve, WBC count and blood cultures.  Continue to monitor blood counts, liver and renal function.    Antimicrobials:    Continue IV vancomycin for gram-positive coverage including empiric MRSA coverage  Continue IV cefepime 2 g every 12 hour for empiric broad-spectrum gram-negative coverage  Check Vancomycin trough before the 5th dose.   Target vancomycin trough level of 15-20  mg/L or vancomycin area under curve (AUC) of 400-600 mg*h/L by Bayesian modeling method.  If dosing vancomycin based on trough levels, keep vancomycin trough below 20 at all times.  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    Past Surgical History:   Procedure Laterality Date    CARDIAC DEFIBRILLATOR PLACEMENT       SECTION      3 c-sections    COLONOSCOPY  2015    polypectomy    COLONOSCOPY N/A 2019    COLONOSCOPY POLYPECTOMY SNARE/COLD ASCENDING X1 , TRANSVERSE X1, SIGMOID X1 performed by Dell Munguia MD at [facility] ENDOSCOPY    PACEMAKER INSERTION      PACEMAKER INSERTION      UPPER GASTROINTESTINAL ENDOSCOPY  2015    UPPER GASTROINTESTINAL ENDOSCOPY N/A 2019    EGD GASTRIC BIOPSY performed by Dell Munguia MD at [facility] ENDOSCOPY    UPPER GASTROINTESTINAL ENDOSCOPY N/A 2020    EGD BIOPSY performed by Dell Munguia MD at [facility] ENDOSCOPY       Family History: All family history was reviewed today.        Problem Relation Age of Onset    Diabetes Mother     Kidney Disease Mother         reanl failure       Objective:       PHYSICAL EXAM:      Vitals:   Vitals:    12/10/24 0900 12/10/24 1000 12/10/24 1100 12/10/24 1111   BP:    (!) 93/56   Pulse: 70 70 70 70   Resp:       Temp:    97.3 °F (36.3 °C)   TempSrc:    Temporal   SpO2:    98%   Weight:       Height:           Physical Exam  Vitals and nursing note reviewed.   Constitutional:       Appearance: She is well-developed. She is not diaphoretic.      Comments: The patient was seen earlier today.   HENT:      Head: Normocephalic and atraumatic.      Right Ear: External ear normal. There is no impacted cerumen.      Left Ear: External ear normal. There is no impacted cerumen.      Nose: Nose normal.      Mouth/Throat:      Mouth: Mucous membranes are moist.      Pharynx: Oropharynx is clear. No oropharyngeal exudate.   Eyes:      General: No

## 2024-12-10 NOTE — FLOWSHEET NOTE
12/09/24 2340 12/09/24 2342 12/09/24 2344   Vitals   Pulse 70 70 70   Heart Rate Source Monitor  --   --    Respirations 21 21 23   BP (!) 83/48 (!) 72/41 (!) 79/54   MAP (Calculated) 60 51 62   MAP (mmHg) (!) 60 (!) 53 (!) 61      12/09/24 2357   Vitals   Pulse 72   Heart Rate Source  --    Respirations 19   BP 91/71   MAP (Calculated) 78   MAP (mmHg) 80     Messaged Dr. Ibrahim @ 2149 regarding BP. Dr. Ibrahim to unit to review pt case. Recheck BP improved as above. After reviewing heart rhythm, past medical history, most recent echo results, and current improved BP, no fluid bolus ordered at this time. Will continue to monitor.

## 2024-12-10 NOTE — CARE COORDINATION
Ilene [facility] rep [phone] and CM met with patient. Possible IV abx at home discussed. Ilene explained infusion, process and cost of abx at this time would be $23/day for Cefepime and pt and spouse are fine with the cost.    No home care preference. Teresita with [facility]  [phone] is able to accept patient.     If home with IV abx's pt will need PICC placed, ID final recs and [facility].    CM will continue to follow for discharge plan.      Mary Laguerre, RN, BSN  [phone]

## 2024-12-11 LAB
ANION GAP SERPL CALCULATED.3IONS-SCNC: 13 MMOL/L (ref 3–16)
BUN SERPL-MCNC: 20 MG/DL (ref 7–20)
CALCIUM SERPL-MCNC: 9.1 MG/DL (ref 8.3–10.6)
CHLORIDE SERPL-SCNC: 103 MMOL/L (ref 99–110)
CO2 SERPL-SCNC: 21 MMOL/L (ref 21–32)
CREAT SERPL-MCNC: 0.7 MG/DL (ref 0.6–1.2)
DEPRECATED RDW RBC AUTO: 14.1 % (ref 12.4–15.4)
GFR SERPLBLD CREATININE-BSD FMLA CKD-EPI: >90 ML/MIN/{1.73_M2}
GLUCOSE BLD-MCNC: 112 MG/DL (ref 70–99)
GLUCOSE BLD-MCNC: 119 MG/DL (ref 70–99)
GLUCOSE BLD-MCNC: 127 MG/DL (ref 70–99)
GLUCOSE BLD-MCNC: 152 MG/DL (ref 70–99)
GLUCOSE SERPL-MCNC: 124 MG/DL (ref 70–99)
HCT VFR BLD AUTO: 36.5 % (ref 36–48)
HGB BLD-MCNC: 12.3 G/DL (ref 12–16)
MCH RBC QN AUTO: 30.9 PG (ref 26–34)
MCHC RBC AUTO-ENTMCNC: 33.8 G/DL (ref 31–36)
MCV RBC AUTO: 91.6 FL (ref 80–100)
MRSA DNA SPEC QL NAA+PROBE: NORMAL
PERFORMED ON: ABNORMAL
PLATELET # BLD AUTO: 108 K/UL (ref 135–450)
PMV BLD AUTO: 9.5 FL (ref 5–10.5)
POTASSIUM SERPL-SCNC: 3.8 MMOL/L (ref 3.5–5.1)
RBC # BLD AUTO: 3.98 M/UL (ref 4–5.2)
SODIUM SERPL-SCNC: 137 MMOL/L (ref 136–145)
WBC # BLD AUTO: 9 K/UL (ref 4–11)

## 2024-12-11 PROCEDURE — 85027 COMPLETE CBC AUTOMATED: CPT

## 2024-12-11 PROCEDURE — 2000000000 HC ICU R&B

## 2024-12-11 PROCEDURE — 6360000002 HC RX W HCPCS: Performed by: INTERNAL MEDICINE

## 2024-12-11 PROCEDURE — 6370000000 HC RX 637 (ALT 250 FOR IP): Performed by: HOSPITALIST

## 2024-12-11 PROCEDURE — 99232 SBSQ HOSP IP/OBS MODERATE 35: CPT

## 2024-12-11 PROCEDURE — 2580000003 HC RX 258: Performed by: INTERNAL MEDICINE

## 2024-12-11 PROCEDURE — 6370000000 HC RX 637 (ALT 250 FOR IP): Performed by: NURSE PRACTITIONER

## 2024-12-11 PROCEDURE — 6360000002 HC RX W HCPCS: Performed by: HOSPITALIST

## 2024-12-11 PROCEDURE — 94760 N-INVAS EAR/PLS OXIMETRY 1: CPT

## 2024-12-11 PROCEDURE — 80048 BASIC METABOLIC PNL TOTAL CA: CPT

## 2024-12-11 RX ORDER — SODIUM CHLORIDE 0.9 % (FLUSH) 0.9 %
5-40 SYRINGE (ML) INJECTION EVERY 12 HOURS SCHEDULED
Status: CANCELLED | OUTPATIENT
Start: 2024-12-11

## 2024-12-11 RX ORDER — SODIUM CHLORIDE 9 MG/ML
INJECTION, SOLUTION INTRAVENOUS PRN
Status: CANCELLED | OUTPATIENT
Start: 2024-12-11

## 2024-12-11 RX ORDER — SODIUM CHLORIDE 0.9 % (FLUSH) 0.9 %
5-40 SYRINGE (ML) INJECTION PRN
Status: CANCELLED | OUTPATIENT
Start: 2024-12-11

## 2024-12-11 RX ADMIN — SODIUM CHLORIDE, PRESERVATIVE FREE 10 ML: 5 INJECTION INTRAVENOUS at 08:50

## 2024-12-11 RX ADMIN — CEFEPIME 2000 MG: 2 INJECTION, POWDER, FOR SOLUTION INTRAVENOUS at 00:19

## 2024-12-11 RX ADMIN — SACUBITRIL AND VALSARTAN 0.5 TABLET: 24; 26 TABLET, FILM COATED ORAL at 08:44

## 2024-12-11 RX ADMIN — ROSUVASTATIN CALCIUM 10 MG: 10 TABLET, FILM COATED ORAL at 20:10

## 2024-12-11 RX ADMIN — EMPAGLIFLOZIN 10 MG: 10 TABLET, FILM COATED ORAL at 08:46

## 2024-12-11 RX ADMIN — VANCOMYCIN HYDROCHLORIDE 1250 MG: 1.25 INJECTION, SOLUTION INTRAVITREAL at 06:37

## 2024-12-11 RX ADMIN — SODIUM CHLORIDE, PRESERVATIVE FREE 10 ML: 5 INJECTION INTRAVENOUS at 20:10

## 2024-12-11 RX ADMIN — ENOXAPARIN SODIUM 40 MG: 100 INJECTION SUBCUTANEOUS at 08:47

## 2024-12-11 RX ADMIN — CEFEPIME 2000 MG: 2 INJECTION, POWDER, FOR SOLUTION INTRAVENOUS at 12:11

## 2024-12-11 RX ADMIN — TIZANIDINE 4 MG: 4 TABLET ORAL at 12:51

## 2024-12-11 RX ADMIN — VILAZODONE HYDROCHLORIDE 20 MG: 20 TABLET, FILM COATED ORAL at 08:46

## 2024-12-11 RX ADMIN — MONTELUKAST SODIUM 10 MG: 10 TABLET, FILM COATED ORAL at 20:10

## 2024-12-11 RX ADMIN — CARVEDILOL 3.12 MG: 3.12 TABLET, FILM COATED ORAL at 08:46

## 2024-12-11 RX ADMIN — DIGOXIN 125 MCG: 0.12 TABLET ORAL at 08:46

## 2024-12-11 ASSESSMENT — ENCOUNTER SYMPTOMS
EYE DISCHARGE: 0
WHEEZING: 0
SINUS PAIN: 0
NAUSEA: 0
SINUS PRESSURE: 0
SORE THROAT: 0
SHORTNESS OF BREATH: 0
DIARRHEA: 0
RHINORRHEA: 0
COUGH: 0
CONSTIPATION: 0
ABDOMINAL PAIN: 0
BACK PAIN: 0
EYE REDNESS: 0

## 2024-12-11 ASSESSMENT — PAIN DESCRIPTION - DESCRIPTORS: DESCRIPTORS: THROBBING

## 2024-12-11 ASSESSMENT — PAIN DESCRIPTION - LOCATION: LOCATION: CHEST

## 2024-12-11 ASSESSMENT — PAIN DESCRIPTION - ORIENTATION: ORIENTATION: LEFT

## 2024-12-11 NOTE — CARE COORDINATION
Chart reviewed for discharge planning.    Per notes possible R sided device placement Friday. Pt had pacemaker lead extraction already done. Pt remains on IV abx's.    CM has [facility] and [facility] hc set up in case services needed at discharge.     IF IV abx's at discharge pt will still need PICC placed , final ID recs and those called into [facility] pharmacist.    CM will continue to follow for pt progress and discharge plan.    Mary Laguerre RN, BSN  [phone]

## 2024-12-11 NOTE — PROGRESS NOTES
V2.0    [facility] Progress Note      Name:  Tess Atkinson /Age/Sex: 1955  (68 y.o. female)   MRN & CSN:  [id number] & [id number] Encounter Date/Time: 2024 11:21 AM EST   Location:  [facility]/290 PCP: Rebekah Carson APRN - CNP     Attending:Reddy Balderrama MD       Hospital Day: 7    Assessment and Recommendations   Tess Atkinson is a 68 y.o. 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  Patient is status post lead extraction.  Within this is a temporary pacemaker.  Continue postop care per EP cardiology.  Await on culture currently remains on IV antibiotics ID following.  Monitor renal function closely.  Possible need for IV antibiotics on discharge.  Plan for possible R sided device placement on Friday     Chronic CHF.  Appears compensated  Paroxysmal A-fib  Stable on Coreg and digoxin.  Resume anticoagulation once okay with EP cardiology.    CAD.  Hypertension  Obstructive sleep apnea      Diet ADULT DIET; Regular; 3 carb choices (45 gm/meal)  Diet NPO Exceptions are: Sips of Water with Meds   DVT Prophylaxis    Code Status Full Code   Disposition          Personally reviewed Lab Studies and Imaging         Subjective:     Chief Complaint:     Tess Atkinson is a 68 y.o. female who presents with       Review of Systems:      Pertinent positives and negatives discussed in HPI    Objective:     Intake/Output Summary (Last 24 hours) at 2024 1119  Last data filed at 2024 0851  Gross per 24 hour   Intake 1329.56 ml   Output 2050 ml   Net -720.44 ml      Vitals:   Vitals:    24 0015 24 0400 24 0829 24 0844   BP: (!) 96/58 112/68  (!) 101/59   Pulse: 70 71  70   Resp: 16 16 16 16   Temp: 97.4 °F (36.3 °C) 97.3 °F (36.3 °C)  97.5 °F (36.4 °C)   TempSrc: Temporal Temporal  Temporal   SpO2: 94% 94% 99% 97%   Weight:  57.8 kg (127 lb 6.4 oz)     Height:             Physical Exam:      General:  02/29/2024 09:15 AM    PHUR 6.5 05/06/2016 10:04 AM    WBCUA 1 04/14/2016 10:43 AM    RBCUA neg 08/11/2023 12:00 AM    RBCUA 2 04/14/2016 10:43 AM    BACTERIA 4+ 08/25/2011 01:52 PM    CLARITYU clear 02/29/2024 09:15 AM    CLARITYU Clear 05/06/2016 10:04 AM    SPECGRAV 1.015 02/29/2024 09:15 AM    LEUKOCYTESUR negative 02/29/2024 09:15 AM    LEUKOCYTESUR Negative 05/06/2016 10:04 AM    UROBILINOGEN 0.2 05/06/2016 10:04 AM    BILIRUBINUR negative 02/29/2024 09:15 AM    BILIRUBINUR NEGATIVE 08/25/2011 01:52 PM    BLOODU negative 02/29/2024 09:15 AM    BLOODU Negative 05/06/2016 10:04 AM    GLUCOSEU negative 02/29/2024 09:15 AM    GLUCOSEU Negative 05/06/2016 10:04 AM    GLUCOSEU NEGATIVE 08/25/2011 01:52 PM    KETUA negative 02/29/2024 09:15 AM    KETUA Negative 05/06/2016 10:04 AM     Urine Cultures:   Lab Results   Component Value Date/Time    LABURIN >10,000 CFU/ml 02/07/2024 02:44 PM     Blood Cultures:   Lab Results   Component Value Date/Time    BC No Growth after 4 days of incubation. 12/05/2024 02:15 PM     Lab Results   Component Value Date/Time    BLOODCULT2 No Growth after 4 days of incubation. 12/05/2024 02:43 PM     Organism:   Lab Results   Component Value Date/Time    ORG Escherichia coli 02/07/2024 02:44 PM         Electronically signed by Reddy Balderrama MD on 12/11/2024 at 11:19 AM

## 2024-12-11 NOTE — PROGRESS NOTES
Infectious Diseases   Progress Note      Admission Date: 12/5/2024  Hospital Day: Hospital Day: 7   Attending: Reddy Balderrama MD  Date of service: 12/11/2024     Chief complaint/ Reason for consult:     Pacemaker pocket site infection  Status post AICD extraction surgery on 12/9/2024  Chronic systolic congestive heart failure  Status post ICD generator change in September 2022  Thrombocytopenia  BiV AICD in place   Left ventricular lead in place since 2005  Paroxysmal atrial fibrillation    Microbiology:      I have reviewed allavailable micro lab data and cultures    Results       Procedure Component Value Units Date/Time    MRSA DNA Probe, Nasal [[id number]] Collected: 12/10/24 1713    Order Status: Completed Specimen: Nares Updated: 12/11/24 1422     MRSA SCREEN RT-PCR --     Negative  MRSA DNA not detected.  Normal Range: Not detected      Narrative:      ORDER#: [id number]                          ORDERED BY: RADHA ACUÑA  SOURCE: Nares                              COLLECTED:  12/10/24 17:13  ANTIBIOTICS AT [facility].:                      RECEIVED :  12/11/24 00:21    Culture, Wound [[id number]]     Order Status: No result Specimen: Chest     Culture, Anaerobic [[id number]]     Order Status: No result Specimen: Chest     Culture, Anaerobic and Aerobic [[id number]] Collected: 12/09/24 0908    Order Status: Completed Specimen: Chest Updated: 12/11/24 1101     Gram Stain Result 1+ Gram positive cocci  3+ WBC's (Polymorphonuclear)       WOUND/ABSCESS --     No growth to date  No growth 36 to 48 hours       Anaerobic Culture --     Anaerobic culture further report to follow  No anaerobes isolated so far, Further report to follow      Narrative:      ORDER#: [id number]                          ORDERED BY: ESTRELLITA CAMARGO  SOURCE: Chest L Pacemaker Pocket           COLLECTED:  12/09/24 09:08  ANTIBIOTICS AT [facility].:                      RECEIVED :  12/09/24 11:20    Culture, Wound [[id number]] Collected: 12/05/24

## 2024-12-11 NOTE — NURSE NAVIGATOR
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 Gusman to rule out vegetation on the   pacemaker lead.   Definity was used to assist with endocardial border delineation.   Normal left ventricular wall thickness. Moderately dilated left ventricular   cavity size. Severely diminished left ventricular systolic function with an   estimated ejection fraction of 20-25%. Severe global hypokinesis with   regional variations. No evidence of apical thrombus with Definity.   Grade I diastolic dysfunction with normal LV filling pressures.   The right ventricle is borderline in size with mildly diminished systolic   function. Hypokinesis of the mid-distal right ventricular free wall. RV FAC=   31.0%.   Mild mitral regurgitation.   Mild to moderate tricuspid regurgitation with an estimated RVSP of 22 mmHg.   Pacemaker / ICD lead is visualized in the right heart.   No evident signs of vegetation on the visualized pacemaker lead.   Consider LISETH to evaluate for endocarditis if clinically indicated         ACE/ARB/ARNi: Entresto 24-26mg, daily  BB: carvedilol 3.125mg, BID  Aldosterone Antagonist: spironolactone 12.5mg, M/W/F  SGLT2: Jardiance 10mg, daily    History of sleep apnea: Yes    Equipment used at home: CPAP, yes  [place] Screen ordered: No    DM History: Yes  Consult for DM educator: Yes      Last Hospital Admission: 9/16/2022  Code Status:

## 2024-12-11 NOTE — DISCHARGE INSTRUCTIONS
Guidelines for Heart Failure home management:    1. Continue to monitor weight first thing each morning. You should weigh yourself after using the bathroom and before you eat breakfast.    2. Report to your doctor any significant weight change. Remember that weight change of 2-3 lbs. in 1 day or 5 lbs in a week is \"significant\" and likely represents changes in \"fluid\" status.  If you are experiencing any swelling in your feet, ankles or abdomen, or shortness of breath, call your doctor.     3. You should restrict all sodium intake to 3000 milligrams (3 grams) a day. Depending on your status, you may also be asked to restrict fluid intake to no more that 64 oz/2 Liters a day. If uncertain, ask the nurse or physician.     4. Regular aerobic exercise is encouraged 30 minutes a day (walking, bike, swimming, etc.). For specific exercise recommendations, ask your physician.     5. Report to your doctor any change in symptoms (chest pain, worsening shortness of breath, increased dizziness or passing out, increased palpitations or ICD shock, trouble catching breath while lying down, increased edema or abdominal bloating). Remember that even \"minor\" changes in symptoms may be important. Also report any changes in medications including \"over the counter\" medications.     6. DO NOT take NSAID's for pain (i.e, Advil, Aleve, Motrin, ibuprofen, and many more) since these may cause serious problems in those with a history of CHF. If uncertain about the medication, call your doctor.    7. If you have new significant or ongoing diarrhea or vomiting, please call your doctor for further instructions. Taking a diuretic (water pill) with these symptoms can worsen dehydration.     8. If you have any questions or concerns you can always call the CHF  Resource Line( [phone].  It is available Monday thru Friday 8 am- 5 pm. Please leave a message and your call will be returned shortly.     Extra Heart Failure  sites:    https://Adviesmanager.nl.Catacel/publication/?e=092439  --- this is American Heart Association interactive Healthier Living with Heart Failure guidebook. Please click hyperlink or copy / paste link into search bar. Use your mouse to scroll through the pages. Lots of information about weight monitoring, diet tips, activity, meds, etc    HF [place] hayden -- this is a free smart phone hayden available for iPhone and Android download. Use your phone to track sodium / fluid intake, zone tool symptom tracking, weights, medications, etc. Click on this hyperlink HF [place] Hayden for QR code for easy download.    DASH (Dietary Approach to Stop Hypertension) diet -- https://www.nhlbi.nih.gov/education/dash-eating-plan -- this diet is a flexible eating plan that promotes heart healthy eating style. Click on hyperlink or copy / paste link into search bar. Lots of low sodium recipes and tips.    https://www.[employer]/recipes -- more free recipes     Wound care: left chest wall surgical site.   Surgical site left chest wall. Cleanse with VASHE or dial soap. Dry, apply calcium alginate dressing (Maxorb Ag) over incision area. Cover with foam dressing or gauze and abd pad. Change daily and as needed. Follow-up with surgeon.   If suggested by your surgeon, please consider going to wound center.     [facility] -- [facility]  [address]  [place], [place] [address]  Tel: [phone]  Hours:   Monday- Thursday 8am-4pm,   Friday_ 8am to 1pm  Closed Saturday and Sunday

## 2024-12-11 NOTE — PROGRESS NOTES
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) [T82.7XXA] 12/05/2024    Biventricular ICD (implantable cardioverter-defibrillator) in place [Z95.810] 04/16/2020    ESCOBAR on CPAP [G47.33] 08/15/2017    Essential hypertension [I10] 04/19/2016    PAF (paroxysmal atrial fibrillation) ([facility]) [I48.0] 01/31/2014    Fibromyalgia [M79.7] 07/13/2012    Dilated cardiomyopathy ([facility]) [I42.0] 11/23/2011       Scheduled Meds:   vancomycin HCl in Dextrose  1,250 mg IntraVENous Q24H    sodium chloride flush  5-40 mL IntraVENous 2 times per day    cefepime  2,000 mg IntraVENous Q12H    carvedilol  3.125 mg Oral BID    digoxin  125 mcg Oral Daily    montelukast  10 mg Oral Nightly    rosuvastatin  10 mg Oral Nightly    spironolactone  12.5 mg Oral Once per day on Monday Wednesday Friday    vilazodone HCl  20 mg Oral Daily    sodium chloride flush  5-40 mL IntraVENous 2 times per day    enoxaparin  40 mg SubCUTAneous Daily    empagliflozin  10 mg Oral Daily    sacubitril-valsartan  1 tablet Oral QPM    And    sacubitril-valsartan  0.5 tablet Oral Daily    prochlorperazine  10 mg IntraVENous Once     Continuous Infusions:   sodium chloride      dextrose      sodium chloride      sodium chloride       PRN Meds:.benzocaine-menthol, sodium chloride flush, sodium chloride, acetaminophen, ketorolac, dextrose bolus **OR** dextrose bolus, glucagon (rDNA), dextrose, sodium chloride, hydrOXYzine pamoate, tiZANidine, sodium chloride flush, sodium chloride, potassium chloride **OR** potassium alternative oral replacement **OR** potassium chloride, magnesium sulfate, ondansetron **OR** ondansetron, polyethylene glycol   Allergies   Allergen Reactions    Prednisone      Caused an allergic reaction. Swelling all over her body.    Metformin And Related Diarrhea and Nausea And Vomiting       Vamshi Hendrix PA-C  [facility]  [phone]    All questions and concerns were addressed to the patient/family.

## 2024-12-12 ENCOUNTER — TELEPHONE (OUTPATIENT)
Dept: CARDIOLOGY CLINIC | Age: 69
End: 2024-12-12

## 2024-12-12 LAB
ALBUMIN SERPL-MCNC: 3.8 G/DL (ref 3.4–5)
ALP SERPL-CCNC: 88 U/L (ref 40–129)
ALT SERPL-CCNC: 26 U/L (ref 10–40)
ANION GAP SERPL CALCULATED.3IONS-SCNC: 13 MMOL/L (ref 3–16)
AST SERPL-CCNC: 32 U/L (ref 15–37)
BILIRUB DIRECT SERPL-MCNC: 0.2 MG/DL (ref 0–0.3)
BILIRUB INDIRECT SERPL-MCNC: 0.4 MG/DL (ref 0–1)
BILIRUB SERPL-MCNC: 0.6 MG/DL (ref 0–1)
BLOOD BANK DISPENSE STATUS: NORMAL
BLOOD BANK PRODUCT CODE: NORMAL
BPU ID: NORMAL
BUN SERPL-MCNC: 21 MG/DL (ref 7–20)
CALCIUM SERPL-MCNC: 9.2 MG/DL (ref 8.3–10.6)
CHLORIDE SERPL-SCNC: 105 MMOL/L (ref 99–110)
CO2 SERPL-SCNC: 22 MMOL/L (ref 21–32)
CREAT SERPL-MCNC: 0.7 MG/DL (ref 0.6–1.2)
DESCRIPTION BLOOD BANK: NORMAL
GFR SERPLBLD CREATININE-BSD FMLA CKD-EPI: >90 ML/MIN/{1.73_M2}
GLUCOSE BLD-MCNC: 108 MG/DL (ref 70–99)
GLUCOSE BLD-MCNC: 122 MG/DL (ref 70–99)
GLUCOSE BLD-MCNC: 148 MG/DL (ref 70–99)
GLUCOSE SERPL-MCNC: 114 MG/DL (ref 70–99)
PERFORMED ON: ABNORMAL
POTASSIUM SERPL-SCNC: 4.4 MMOL/L (ref 3.5–5.1)
PROT SERPL-MCNC: 6.8 G/DL (ref 6.4–8.2)
SODIUM SERPL-SCNC: 140 MMOL/L (ref 136–145)

## 2024-12-12 PROCEDURE — 6360000002 HC RX W HCPCS: Performed by: INTERNAL MEDICINE

## 2024-12-12 PROCEDURE — 6360000002 HC RX W HCPCS: Performed by: HOSPITALIST

## 2024-12-12 PROCEDURE — 2580000003 HC RX 258

## 2024-12-12 PROCEDURE — 36415 COLL VENOUS BLD VENIPUNCTURE: CPT

## 2024-12-12 PROCEDURE — 94760 N-INVAS EAR/PLS OXIMETRY 1: CPT

## 2024-12-12 PROCEDURE — 80076 HEPATIC FUNCTION PANEL: CPT

## 2024-12-12 PROCEDURE — 99232 SBSQ HOSP IP/OBS MODERATE 35: CPT

## 2024-12-12 PROCEDURE — 99232 SBSQ HOSP IP/OBS MODERATE 35: CPT | Performed by: INTERNAL MEDICINE

## 2024-12-12 PROCEDURE — 6370000000 HC RX 637 (ALT 250 FOR IP): Performed by: HOSPITALIST

## 2024-12-12 PROCEDURE — 6370000000 HC RX 637 (ALT 250 FOR IP): Performed by: NURSE PRACTITIONER

## 2024-12-12 PROCEDURE — 6370000000 HC RX 637 (ALT 250 FOR IP): Performed by: INTERNAL MEDICINE

## 2024-12-12 PROCEDURE — 2580000003 HC RX 258: Performed by: INTERNAL MEDICINE

## 2024-12-12 PROCEDURE — 2000000000 HC ICU R&B

## 2024-12-12 PROCEDURE — 80048 BASIC METABOLIC PNL TOTAL CA: CPT

## 2024-12-12 RX ORDER — SODIUM CHLORIDE 9 MG/ML
INJECTION, SOLUTION INTRAVENOUS PRN
Status: DISCONTINUED | OUTPATIENT
Start: 2024-12-12 | End: 2024-12-13 | Stop reason: HOSPADM

## 2024-12-12 RX ORDER — SODIUM CHLORIDE 0.9 % (FLUSH) 0.9 %
5-40 SYRINGE (ML) INJECTION PRN
Status: DISCONTINUED | OUTPATIENT
Start: 2024-12-12 | End: 2024-12-13 | Stop reason: HOSPADM

## 2024-12-12 RX ORDER — SODIUM CHLORIDE 0.9 % (FLUSH) 0.9 %
5-40 SYRINGE (ML) INJECTION EVERY 12 HOURS SCHEDULED
Status: DISCONTINUED | OUTPATIENT
Start: 2024-12-12 | End: 2024-12-13 | Stop reason: HOSPADM

## 2024-12-12 RX ORDER — DOXYCYCLINE HYCLATE 100 MG
100 TABLET ORAL EVERY 12 HOURS SCHEDULED
Status: DISCONTINUED | OUTPATIENT
Start: 2024-12-12 | End: 2024-12-14 | Stop reason: HOSPADM

## 2024-12-12 RX ADMIN — CEFEPIME 2000 MG: 2 INJECTION, POWDER, FOR SOLUTION INTRAVENOUS at 12:14

## 2024-12-12 RX ADMIN — CEFEPIME 2000 MG: 2 INJECTION, POWDER, FOR SOLUTION INTRAVENOUS at 00:12

## 2024-12-12 RX ADMIN — EMPAGLIFLOZIN 10 MG: 10 TABLET, FILM COATED ORAL at 09:13

## 2024-12-12 RX ADMIN — ACETAMINOPHEN 1000 MG: 500 TABLET ORAL at 09:13

## 2024-12-12 RX ADMIN — SODIUM CHLORIDE, PRESERVATIVE FREE 10 ML: 5 INJECTION INTRAVENOUS at 21:00

## 2024-12-12 RX ADMIN — ACETAMINOPHEN 1000 MG: 500 TABLET ORAL at 19:57

## 2024-12-12 RX ADMIN — ENOXAPARIN SODIUM 40 MG: 100 INJECTION SUBCUTANEOUS at 09:15

## 2024-12-12 RX ADMIN — DOXYCYCLINE HYCLATE 100 MG: 100 TABLET, COATED ORAL at 19:58

## 2024-12-12 RX ADMIN — CARVEDILOL 3.12 MG: 3.12 TABLET, FILM COATED ORAL at 09:13

## 2024-12-12 RX ADMIN — SACUBITRIL AND VALSARTAN 0.5 TABLET: 24; 26 TABLET, FILM COATED ORAL at 09:13

## 2024-12-12 RX ADMIN — DIGOXIN 125 MCG: 0.12 TABLET ORAL at 09:13

## 2024-12-12 RX ADMIN — VILAZODONE HYDROCHLORIDE 20 MG: 20 TABLET, FILM COATED ORAL at 09:23

## 2024-12-12 RX ADMIN — VANCOMYCIN HYDROCHLORIDE 1250 MG: 1.25 INJECTION, SOLUTION INTRAVITREAL at 06:37

## 2024-12-12 RX ADMIN — ROSUVASTATIN CALCIUM 10 MG: 10 TABLET, FILM COATED ORAL at 19:58

## 2024-12-12 ASSESSMENT — ENCOUNTER SYMPTOMS
EYE DISCHARGE: 0
SINUS PAIN: 0
COUGH: 0
ABDOMINAL PAIN: 0
BACK PAIN: 0
SORE THROAT: 0
CONSTIPATION: 0
DIARRHEA: 0
WHEEZING: 0
NAUSEA: 0
SHORTNESS OF BREATH: 0
SINUS PRESSURE: 0
EYE REDNESS: 0
RHINORRHEA: 0

## 2024-12-12 ASSESSMENT — PAIN DESCRIPTION - LOCATION
LOCATION: ARM;CHEST
LOCATION: HEAD

## 2024-12-12 ASSESSMENT — PAIN SCALES - GENERAL
PAINLEVEL_OUTOF10: 3
PAINLEVEL_OUTOF10: 2
PAINLEVEL_OUTOF10: 5

## 2024-12-12 ASSESSMENT — PAIN DESCRIPTION - ORIENTATION: ORIENTATION: LEFT

## 2024-12-12 ASSESSMENT — PAIN DESCRIPTION - DESCRIPTORS
DESCRIPTORS: ACHING
DESCRIPTORS: DISCOMFORT;ACHING

## 2024-12-12 NOTE — PROGRESS NOTES
RECEIVED :  12/09/24 11:20    Culture, Wound [[id number]] Collected: 12/05/24 1543    Order Status: Completed Specimen: Skin Updated: 12/08/24 0817     WOUND/ABSCESS No growth 60 to 72 hours     Gram Stain Result No WBCs or organisms seen    Narrative:      ORDER#: [id number]                          ORDERED BY: LANETTE HERNANDEZ  SOURCE: Skin Left chest                    COLLECTED:  12/05/24 15:43  ANTIBIOTICS AT [facility].:                      RECEIVED :  12/05/24 17:42    Culture, Anaerobic [[id number]] Collected: 12/05/24 1543    Order Status: Completed Specimen: Skin Updated: 12/10/24 1102     Anaerobic Culture No anaerobes isolated    Narrative:      ORDER#: [id number]                          ORDERED BY: LANETTE HERNANDEZ  SOURCE: Skin Left chest                    COLLECTED:  12/05/24 15:43  ANTIBIOTICS AT [facility].:                      RECEIVED :  12/05/24 16:06    Culture, Blood 2 [[id number]] Collected: 12/05/24 1443    Order Status: Completed Specimen: Blood Updated: 12/09/24 1515     Culture, Blood 2 No Growth after 4 days of incubation.    Narrative:      ORDER#: [id number]                          ORDERED BY: BRICE MAO  SOURCE: Blood r hand                       COLLECTED:  12/05/24 14:43  ANTIBIOTICS AT [facility].:                      RECEIVED :  12/05/24 20:48  If child <=2 yrs old please draw pediatric bottle.~Blood Culture #2    Culture, Blood 1 [[id number]] Collected: 12/05/24 1415    Order Status: Completed Specimen: Blood Updated: 12/09/24 1515     Blood Culture, Routine No Growth after 4 days of incubation.    Narrative:      ORDER#: [id number]                          ORDERED BY: BRICE MAO  SOURCE: Blood R AC                         COLLECTED:  12/05/24 14:15  ANTIBIOTICS AT [facility].:                      RECEIVED :  12/05/24 20:48  If child <=2 yrs old please draw pediatric bottle.~Blood Culture 1             No results found for the last 90 days.         Antibiotics and immunizations:       Current  renal function.    Antimicrobials:    I will stop IV cefepime today  The patient has thrombocytopenia.  Cannot give her linezolid  Order p.o. doxycycline 100 mg every 12 hours for gram-positive coverage  I will leave the patient on IV vancomycin for now  If the patient continues to do well and the cultures remain negative, can plan to stop IV vancomycin at discharge  Electrophysiology note reviewed.  Plan for right-sided BiV placement tomorrow noted  We will follow up on the culture results and clinical progress and will make further recommendations accordingly.  Continue close vitals monitoring.  Maintain good glycemic control.  Fall precautions.  Aspiration precautions.  Continue to watch for new fever or diarrhea.  DVT prophylaxis.  I reviewed the notes from other physicians and consultants involved in the patient's treatment teams in  detail today  Discussed all above with patient and RN.      Drug Monitoring:    Continue monitoring for antibiotic related nephrotoxicity, hepatotoxicity and other toxicities as follows: CBC, CMP   Continue to watch for following: new or worsening fever, new hypotension, hives, lip swelling and redness or purulence at vascular access sites.     I/v access Management:    Continue to monitor i.v access sites for erythema, induration, discharge or tenderness.   As always, continue efforts to minimize tubes/lines/drains as clinically appropriate to reduce chances of line associated infections.    Patient education and counseling:      The patient was educated in detail about the side-effects of various antibiotics and things to watch for like new rashes, lip swelling, severe reaction, worsening diarrhea, break through fever etc.  Discussed patient's condition and what to expect. All of the patient's questions were addressed in a satisfactory manner and patient verbalized understanding all instructions.      Level of complexity of visit and medical decision making: High         Thank

## 2024-12-12 NOTE — CARE COORDINATION
MD at [facility] ENDOSCOPY       FAMILY HISTORY    Family History   Problem Relation Age of Onset    Diabetes Mother     Kidney Disease Mother         reanl failure       SOCIAL HISTORY    Social History     Tobacco Use    Smoking status: Former     Current packs/day: 0.00     Average packs/day: 0.3 packs/day for 10.0 years (2.5 ttl pk-yrs)     Types: Cigarettes     Start date: 1988     Quit date: 1998     Years since quittin.7    Smokeless tobacco: Never    Tobacco comments:     smoked 1 cig a day,   Vaping Use    Vaping status: Never Used   Substance Use Topics    Alcohol use: Yes     Comment: occasionally    Drug use: Never     Frequency: 2.0 times per week       ALLERGIES    Allergies   Allergen Reactions    Prednisone      Caused an allergic reaction. Swelling all over her body.    Metformin And Related Diarrhea and Nausea And Vomiting       MEDICATIONS    No current facility-administered medications on file prior to encounter.     Current Outpatient Medications on File Prior to Encounter   Medication Sig Dispense Refill    vilazodone HCl (VIIBRYD) 20 MG TABS TAKE 1 TABLET BY MOUTH DAILY 90 tablet 1    empagliflozin (JARDIANCE) 25 MG tablet Take 1 tablet by mouth daily 90 tablet 1    ciprofloxacin (CIPRO) 250 MG tablet TAKE 1 TABLET BY MOUTH AFTER SEXUAL ACTIVITY (Patient taking differently: Take 1 tablet by mouth See Admin Instructions TAKE 1 TABLET BY MOUTH AFTER SEXUAL ACTIVITY) 10 tablet 2    carvedilol (COREG) 3.125 MG tablet TAKE 1 TABLET BY MOUTH TWICE DAILY 180 tablet 0    digoxin (LANOXIN) 125 MCG tablet Take 1 tablet by mouth daily 90 tablet 1    rosuvastatin (CRESTOR) 10 MG tablet Take 1 tablet by mouth daily (Patient taking differently: Take 1 tablet by mouth nightly) 90 tablet 1    sacubitril-valsartan (ENTRESTO) 24-26 MG per tablet TAKE ONE-HALF TABLET BY MOUTH EVERY MORNING AND 1 EVERY EVENING (Patient taking differently: Take 0.5-1 tablets by mouth See Admin Instructions Take one half  12/11/2024 04:15 AM     H/H:    Lab Results   Component Value Date/Time    HGB 12.3 12/11/2024 04:15 AM    HCT 36.5 12/11/2024 04:15 AM     PTT:    Lab Results   Component Value Date/Time    APTT 29.4 09/07/2018 09:28 AM   [APTT}  PT/INR:    Lab Results   Component Value Date/Time    PROTIME 12.1 07/09/2018 11:05 AM    PROTIME 13.4 04/18/2011 10:10 AM    INR 1.06 07/09/2018 11:05 AM     HgBA1c:    Lab Results   Component Value Date/Time    LABA1C 9.5 11/04/2024 01:31 PM       Assessment   Willy Risk Score: Willy Scale Score: 20    Patient Active Problem List   Diagnosis Code    Dilated cardiomyopathy (HCC) I42.0    Pacemaker Z95.0    History of pericarditis Z86.79    Fibromyalgia M79.7    PAF (paroxysmal atrial fibrillation) ([facility]) I48.0    Sebaceous cyst L72.3    Essential hypertension I10    ESCOBAR on CPAP G47.33    Hypercholesteremia E78.00    Biventricular ICD (implantable cardioverter-defibrillator) in place Z95.810    Chronic bilateral thoracic back pain M54.6, G89.29    Hyperkalemia E87.5    Dyspnea on exertion R06.09    Anxiety F41.9    GERD with esophagitis K21.00    Dysphagia R13.10    Suicidal ideation R45.851    Trauma and stressor-related disorder F43.9    Chronic systolic heart failure (HCC) I50.22    Vertigo R42    Seasonal allergies J30.2    ICD (implantable cardioverter-defibrillator) battery depletion Z45.02    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    Urinary incontinence R32    Chronic pain syndrome G89.4    Vitamin D deficiency E55.9    Moderate episode of recurrent major depressive disorder ([facility]) F33.1    Vaginal itching N89.8    Uncontrolled diabetes mellitus with hyperglycemia, without long-term current use of insulin ([facility]) E11.65    Type 2 diabetes mellitus with other specified complication ([facility]) E11.69    Coronary artery disease due to lipid rich plaque I25.10, I25.83    Systolic congestive heart failure (HCC) I50.20    Infection of pacemaker pocket ([facility]) T82.7XXA    Infection

## 2024-12-12 NOTE — PROGRESS NOTES
V2.0    [facility] [place]      Name:  Tess Atkinson /Age/Sex: 1955  (68 y.o. female)   MRN & CSN:  [id number] & [id number] Encounter Date/Time: 2024 11:21 AM EST   Location:  [id number] PCP: Rebekah Carson, SHAMEKA - CNP     Attending:Reddy Balderrama MD       Hospital Day: 8    Assessment and Recommendations   Tess Atkinson is a 68 y.o. 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  Patient is status post lead extraction.  Within this is a temporary pacemaker.  Continue postop care per EP cardiology.  Await on culture currently remains on IV antibiotics ID following.  Monitor renal function closely.  Possible need for IV antibiotics on discharge.  Plan for possible R sided device placement on Friday     Chronic CHF.  Appears compensated  Paroxysmal A-fib  Stable on Coreg and digoxin.  Resume anticoagulation once okay with EP cardiology.    CAD.  Hypertension  Obstructive sleep apnea      Diet Diet NPO Exceptions are: Sips of Water with Meds  ADULT DIET; Regular; 3 carb choices (45 gm/meal); No Added Salt (3-4 gm)   DVT Prophylaxis    Code Status Full Code   Disposition          Personally reviewed Lab Studies and Imaging         Subjective:     Chief Complaint:     Tess Atkinson is a 68 y.o. female who presents with       Review of Systems:      Pertinent positives and negatives discussed in HPI    Objective:     Intake/Output Summary (Last 24 hours) at 2024 1201  Last data filed at 2024 0924  Gross per 24 hour   Intake 777.61 ml   Output 2045 ml   Net -1267.39 ml      Vitals:   Vitals:    24 0520 24 0910 24 1003 24 1039   BP: 95/77 104/76     Pulse: 70 70     Resp: 16 16 16    Temp: 97.4 °F (36.3 °C) 97.2 °F (36.2 °C)     TempSrc: Temporal Temporal     SpO2: 98% 100% 97%    Weight: 57.4 kg (126 lb 8.7 oz)      Height:    1.575 m (5' 2\")         Physical Exam:      General: NAD  Eyes:  right or left. No definite pneumothorax seen. Drain and clips project over the left side of the chest at the site prior pacemaker pack     Tip of temporary pacer projects over the heart.  No pneumothorax noted     XR CHEST PORTABLE    Result Date: 12/5/2024  EXAMINATION: ONE XRAY VIEW OF THE CHEST 12/5/2024 2:28 pm COMPARISON: 09/22/2022 HISTORY: ORDERING SYSTEM PROVIDED HISTORY: SOB TECHNOLOGIST PROVIDED HISTORY: Reason for exam:->SOB FINDINGS: Lungs appear clear.  There is no CHF, pulmonary infiltrates or pneumothorax. There is cardiomegaly.  Pacer leads are in stable and good position.  Bony structures are unremarkable.  Visualized upper abdomen appears normal.  There is no change from prior examination.     1. No acute cardiopulmonary process. 2. Cardiomegaly.       CBC:   Recent Labs     12/10/24  0400 12/11/24  0415   WBC 8.7 9.0   HGB 12.4 12.3   * 108*     BMP:    Recent Labs     12/10/24  0400 12/11/24  0415 12/12/24  0515    137 140   K 4.1 3.8 4.4    103 105   CO2 19* 21 22   BUN 22* 20 21*   CREATININE 0.7 0.7 0.7   GLUCOSE 137* 124* 114*     Hepatic:   No results for input(s): \"AST\", \"ALT\", \"BILITOT\", \"ALKPHOS\" in the last 72 hours.    Invalid input(s): \"ALB\"    Lipids:   Lab Results   Component Value Date/Time    CHOL 187 09/23/2024 10:47 AM    HDL 45 09/23/2024 10:47 AM    HDL 46 11/04/2010 10:43 AM    TRIG 233 09/23/2024 10:47 AM     Hemoglobin A1C:   Lab Results   Component Value Date/Time    LABA1C 9.5 11/04/2024 01:31 PM     TSH:   Lab Results   Component Value Date/Time    TSH 1.24 02/14/2022 01:41 PM     Troponin: No results found for: \"TROPONINT\"  Lactic Acid: No results for input(s): \"LACTA\" in the last 72 hours.  BNP: No results for input(s): \"PROBNP\" in the last 72 hours.  UA:  Lab Results   Component Value Date/Time    NITRU neg 08/11/2023 12:00 AM    COLORU yellow 02/29/2024 09:15 AM    COLORU YELLOW 05/06/2016 10:04 AM    PHUR 6.0 02/29/2024 09:15 AM    PHUR 6.5

## 2024-12-12 NOTE — PROGRESS NOTES
[facility], [facility] [place]   Electrophysiology   Date: 12/12/2024  Reason for Follow up: Device pocket infection   Chief Complaint   Patient presents with    Wound Check     Pt to ED with CC of infection to cardiac implant site.  Pt verbalizes pain.  States that her sx have been ongoing x3 weeks.       HPI: Tess Atkinson is a 68 y.o. female with h/o atrial fibrillation, NICM (s/p [id number], lead extraction and BiV implantation 2009, gen change 2018, gen change with pocket revision 2022), CHF, HLD, DM, ESCOBAR, HTN.     Pt admitted for device pocket infection. Received IV antibiotics and had lead extraction performed 12/9.    Had some issues with low BP yesterday requiring some meds to be held.    Interval History:    Pt seen at bedside for follow up. Clinical notes and telemetry reviewed. Pt denies CP, SOB, palpitations, edema, PND/orthopnea. States she sometimes notices her heart beating hard but does not feel it consistently.      Assessment/Plan:     Device pocket infection  - S/p lead extraction 12/9  - Dressing changed yesterday, wound care consult placed for management of device pocket, drain removed today  - External pacemaker in place  - IV antibiotics per ID, considering transition to PO tomorrow  - No signs of bacteremia thus far  - Pocket culture 12/9 with +1 gram positive cocci  - R sided BiV placement tomorrow    HFrEF  - Appears compensated  - EF 20-25% per last echo 1/2024  - Continue OMT as tolerated, continues to have BP drops    Paroxysmal atrial fibrillation  - In V paced rhythm  - [id number] score of 4 (CHF, HTN, age 1, female), has not been on anticoagulation  - Rate control with Coreg and digoxin    Hypotension  - BP still borderline  - Spironolactone held      Relevant available labs, and cardiovascular diagnostics, documents reviewed.   Tele:   V paced HR 70    Recent Labs     12/10/24  0400 12/11/24  0415 12/12/24  0515    137 140   K 4.1 3.8 4.4    103 105   CO2 19* 21 22  Biventricular ICD (implantable cardioverter-defibrillator) in place [Z95.810] 04/16/2020    ESCOBAR on CPAP [G47.33] 08/15/2017    Essential hypertension [I10] 04/19/2016    PAF (paroxysmal atrial fibrillation) (HCC) [I48.0] 01/31/2014    Fibromyalgia [M79.7] 07/13/2012    Dilated cardiomyopathy (HCC) [I42.0] 11/23/2011       Scheduled Meds:   vancomycin HCl in Dextrose  1,250 mg IntraVENous Q24H    sodium chloride flush  5-40 mL IntraVENous 2 times per day    cefepime  2,000 mg IntraVENous Q12H    carvedilol  3.125 mg Oral BID    digoxin  125 mcg Oral Daily    montelukast  10 mg Oral Nightly    rosuvastatin  10 mg Oral Nightly    spironolactone  12.5 mg Oral Once per day on Monday Wednesday Friday    vilazodone HCl  20 mg Oral Daily    sodium chloride flush  5-40 mL IntraVENous 2 times per day    enoxaparin  40 mg SubCUTAneous Daily    empagliflozin  10 mg Oral Daily    sacubitril-valsartan  1 tablet Oral QPM    And    sacubitril-valsartan  0.5 tablet Oral Daily    prochlorperazine  10 mg IntraVENous Once     Continuous Infusions:   sodium chloride      dextrose      sodium chloride      sodium chloride       PRN Meds:.benzocaine-menthol, sodium chloride flush, sodium chloride, acetaminophen, ketorolac, dextrose bolus **OR** dextrose bolus, glucagon (rDNA), dextrose, sodium chloride, hydrOXYzine pamoate, tiZANidine, sodium chloride flush, sodium chloride, potassium chloride **OR** potassium alternative oral replacement **OR** potassium chloride, magnesium sulfate, ondansetron **OR** ondansetron, polyethylene glycol   Allergies   Allergen Reactions    Prednisone      Caused an allergic reaction. Swelling all over her body.    Metformin And Related Diarrhea and Nausea And Vomiting       Vamshi Hendrix PA-C  [facility]  [phone]    All questions and concerns were addressed to the patient/family. Alternatives to my treatment were discussed. I have discussed the above stated plan and the patient

## 2024-12-12 NOTE — PROGRESS NOTES
Nutrition Note    RECOMMENDATIONS  PO Diet: Continue current diet  ONS: Pt denied need    ASSESSMENT   Pt triggered for LOS assessment. On 3 carb, ALEX diet with documented meal intakes of >50%. Upon visiting, pt reported po intake somewhat decreased here because she is eating differently than she does at home but spouse bringing in some food from home for pt which improves po intake; no issues with po intake pta. Wt hx in EMR indicates 10 lb (7.2%) wt loss since 5/30, which is not considered significant. Recent dx of diabetes and started on jardiance, also with hx of CHF. Pt denied need for ONS. RD will monitor for adequate po intake.     Malnutrition Status: No malnutrition    NUTRITION DIAGNOSIS   No nutrition diagnosis at this time    Goals: PO intake 50% or greater, prior to discharge     NUTRITION RELATED FINDINGS  Objective: Labs reviewed. POCG 122. LBM 12/8.  Wounds: Surgical Incision (infected pacemaker)    CURRENT NUTRITION THERAPIES  Diet NPO Exceptions are: Sips of Water with Meds  ADULT DIET; Regular; 3 carb choices (45 gm/meal); No Added Salt (3-4 gm)       PO Intake: 51-75%, %   PO Supplement Intake:None Ordered    ANTHROPOMETRICS  Current Height: 157.5 cm (5' 2\")  Current Weight - Scale: 57.4 kg (126 lb 8.7 oz)    Admission weight: 58.1 kg (128 lb 1.4 oz)  Ideal Body Weight (IBW): 110 lbs  (50 kg)        BMI: 23.1    COMPARATIVE STANDARDS  Total Energy Requirements (kcals/day): 0251-8542     Protein (g):  70-87       Fluid (mL/day):  1500    EDUCATION  Education/Counseling declined (Declined CHF diet education as familiar with guidelines)     The patient will be monitored per nutrition standards of care. Consult dietitian if additional nutrition interventions are needed prior to RD reassessment.     Kristen Raymond, MS, RD, LD    Contact: [phone]

## 2024-12-13 ENCOUNTER — ANESTHESIA EVENT (OUTPATIENT)
Age: 69
End: 2024-12-13
Payer: COMMERCIAL

## 2024-12-13 ENCOUNTER — APPOINTMENT (OUTPATIENT)
Dept: GENERAL RADIOLOGY | Age: 69
DRG: 277 | End: 2024-12-13
Payer: COMMERCIAL

## 2024-12-13 ENCOUNTER — ANESTHESIA (OUTPATIENT)
Age: 69
End: 2024-12-13
Payer: COMMERCIAL

## 2024-12-13 PROBLEM — Z71.89 ENCOUNTER FOR MEDICATION COUNSELING: Status: ACTIVE | Noted: 2024-12-13

## 2024-12-13 PROBLEM — Z71.89 DIABETES EDUCATION, ENCOUNTER FOR: Status: ACTIVE | Noted: 2024-12-13

## 2024-12-13 LAB
ANION GAP SERPL CALCULATED.3IONS-SCNC: 11 MMOL/L (ref 3–16)
BASOPHILS # BLD: 0.1 K/UL (ref 0–0.2)
BASOPHILS NFR BLD: 0.8 %
BUN SERPL-MCNC: 27 MG/DL (ref 7–20)
CALCIUM SERPL-MCNC: 8.9 MG/DL (ref 8.3–10.6)
CHLORIDE SERPL-SCNC: 103 MMOL/L (ref 99–110)
CO2 SERPL-SCNC: 22 MMOL/L (ref 21–32)
CREAT SERPL-MCNC: 0.7 MG/DL (ref 0.6–1.2)
DEPRECATED RDW RBC AUTO: 14 % (ref 12.4–15.4)
ECHO BSA: 1.59 M2
EOSINOPHIL # BLD: 0.2 K/UL (ref 0–0.6)
EOSINOPHIL NFR BLD: 2.5 %
GFR SERPLBLD CREATININE-BSD FMLA CKD-EPI: >90 ML/MIN/{1.73_M2}
GLUCOSE BLD-MCNC: 125 MG/DL (ref 70–99)
GLUCOSE SERPL-MCNC: 111 MG/DL (ref 70–99)
HCT VFR BLD AUTO: 34.9 % (ref 36–48)
HGB BLD-MCNC: 11.7 G/DL (ref 12–16)
LYMPHOCYTES # BLD: 3 K/UL (ref 1–5.1)
LYMPHOCYTES NFR BLD: 43.5 %
MCH RBC QN AUTO: 30.5 PG (ref 26–34)
MCHC RBC AUTO-ENTMCNC: 33.4 G/DL (ref 31–36)
MCV RBC AUTO: 91.1 FL (ref 80–100)
MONOCYTES # BLD: 0.6 K/UL (ref 0–1.3)
MONOCYTES NFR BLD: 8.5 %
NEUTROPHILS # BLD: 3.1 K/UL (ref 1.7–7.7)
NEUTROPHILS NFR BLD: 44.7 %
PERFORMED ON: ABNORMAL
PLATELET # BLD AUTO: 122 K/UL (ref 135–450)
PMV BLD AUTO: 9.9 FL (ref 5–10.5)
POTASSIUM SERPL-SCNC: 3.8 MMOL/L (ref 3.5–5.1)
RBC # BLD AUTO: 3.83 M/UL (ref 4–5.2)
SODIUM SERPL-SCNC: 136 MMOL/L (ref 136–145)
VANCOMYCIN SERPL-MCNC: 7.4 UG/ML
WBC # BLD AUTO: 6.9 K/UL (ref 4–11)

## 2024-12-13 PROCEDURE — 33225 L VENTRIC PACING LEAD ADD-ON: CPT | Performed by: INTERNAL MEDICINE

## 2024-12-13 PROCEDURE — 6360000002 HC RX W HCPCS: Performed by: STUDENT IN AN ORGANIZED HEALTH CARE EDUCATION/TRAINING PROGRAM

## 2024-12-13 PROCEDURE — 2709999900 HC NON-CHARGEABLE SUPPLY: Performed by: INTERNAL MEDICINE

## 2024-12-13 PROCEDURE — 3700000001 HC ADD 15 MINUTES (ANESTHESIA): Performed by: INTERNAL MEDICINE

## 2024-12-13 PROCEDURE — 6360000002 HC RX W HCPCS: Performed by: NURSE ANESTHETIST, CERTIFIED REGISTERED

## 2024-12-13 PROCEDURE — 80202 ASSAY OF VANCOMYCIN: CPT

## 2024-12-13 PROCEDURE — 6370000000 HC RX 637 (ALT 250 FOR IP): Performed by: HOSPITALIST

## 2024-12-13 PROCEDURE — 85025 COMPLETE CBC W/AUTO DIFF WBC: CPT

## 2024-12-13 PROCEDURE — 6360000002 HC RX W HCPCS: Performed by: INTERNAL MEDICINE

## 2024-12-13 PROCEDURE — C1887 CATHETER, GUIDING: HCPCS | Performed by: INTERNAL MEDICINE

## 2024-12-13 PROCEDURE — 3700000000 HC ANESTHESIA ATTENDED CARE: Performed by: INTERNAL MEDICINE

## 2024-12-13 PROCEDURE — 6370000000 HC RX 637 (ALT 250 FOR IP): Performed by: INTERNAL MEDICINE

## 2024-12-13 PROCEDURE — 33249 INSJ/RPLCMT DEFIB W/LEAD(S): CPT | Performed by: INTERNAL MEDICINE

## 2024-12-13 PROCEDURE — 02HK3KZ INSERTION OF DEFIBRILLATOR LEAD INTO RIGHT VENTRICLE, PERCUTANEOUS APPROACH: ICD-10-PCS | Performed by: INTERNAL MEDICINE

## 2024-12-13 PROCEDURE — 2580000003 HC RX 258

## 2024-12-13 PROCEDURE — C1769 GUIDE WIRE: HCPCS | Performed by: INTERNAL MEDICINE

## 2024-12-13 PROCEDURE — 71045 X-RAY EXAM CHEST 1 VIEW: CPT

## 2024-12-13 PROCEDURE — 6360000004 HC RX CONTRAST MEDICATION: Performed by: INTERNAL MEDICINE

## 2024-12-13 PROCEDURE — 02H63KZ INSERTION OF DEFIBRILLATOR LEAD INTO RIGHT ATRIUM, PERCUTANEOUS APPROACH: ICD-10-PCS | Performed by: INTERNAL MEDICINE

## 2024-12-13 PROCEDURE — 02HL3KZ INSERTION OF DEFIBRILLATOR LEAD INTO LEFT VENTRICLE, PERCUTANEOUS APPROACH: ICD-10-PCS | Performed by: INTERNAL MEDICINE

## 2024-12-13 PROCEDURE — 0JH609Z INSERTION OF CARDIAC RESYNCHRONIZATION DEFIBRILLATOR PULSE GENERATOR INTO CHEST SUBCUTANEOUS TISSUE AND FASCIA, OPEN APPROACH: ICD-10-PCS | Performed by: INTERNAL MEDICINE

## 2024-12-13 PROCEDURE — C1889 IMPLANT/INSERT DEVICE, NOC: HCPCS | Performed by: INTERNAL MEDICINE

## 2024-12-13 PROCEDURE — C1900 LEAD, CORONARY VENOUS: HCPCS | Performed by: INTERNAL MEDICINE

## 2024-12-13 PROCEDURE — 2500000003 HC RX 250 WO HCPCS: Performed by: NURSE ANESTHETIST, CERTIFIED REGISTERED

## 2024-12-13 PROCEDURE — 80048 BASIC METABOLIC PNL TOTAL CA: CPT

## 2024-12-13 PROCEDURE — C1892 INTRO/SHEATH,FIXED,PEEL-AWAY: HCPCS | Performed by: INTERNAL MEDICINE

## 2024-12-13 PROCEDURE — C1882 AICD, OTHER THAN SING/DUAL: HCPCS | Performed by: INTERNAL MEDICINE

## 2024-12-13 PROCEDURE — 99232 SBSQ HOSP IP/OBS MODERATE 35: CPT | Performed by: INTERNAL MEDICINE

## 2024-12-13 PROCEDURE — 2000000000 HC ICU R&B

## 2024-12-13 PROCEDURE — 2580000003 HC RX 258: Performed by: NURSE ANESTHETIST, CERTIFIED REGISTERED

## 2024-12-13 PROCEDURE — C1777 LEAD, AICD, ENDO SINGLE COIL: HCPCS | Performed by: INTERNAL MEDICINE

## 2024-12-13 PROCEDURE — 6360000002 HC RX W HCPCS: Performed by: HOSPITALIST

## 2024-12-13 PROCEDURE — 2580000003 HC RX 258: Performed by: INTERNAL MEDICINE

## 2024-12-13 PROCEDURE — C1898 LEAD, PMKR, OTHER THAN TRANS: HCPCS | Performed by: INTERNAL MEDICINE

## 2024-12-13 DEVICE — LEAD 6935M55 QUATTRO SECURE S MRI US
Type: IMPLANTABLE DEVICE | Status: FUNCTIONAL
Brand: SPRINT QUATTRO SECURE S MRI™ SURESCAN™

## 2024-12-13 DEVICE — CRTD DTPA2QQ COBALT XT HF QUAD MRI DF4
Type: IMPLANTABLE DEVICE | Status: FUNCTIONAL
Brand: COBALT™ XT HF QUAD CRT-D MRI SURESCAN™

## 2024-12-13 DEVICE — LEAD 429888 MRI CANT US
Type: IMPLANTABLE DEVICE | Status: FUNCTIONAL
Brand: ATTAIN PERFORMA™ MRI SURESCAN™

## 2024-12-13 RX ORDER — PROPOFOL 10 MG/ML
INJECTION, EMULSION INTRAVENOUS
Status: DISCONTINUED | OUTPATIENT
Start: 2024-12-13 | End: 2024-12-13 | Stop reason: SDUPTHER

## 2024-12-13 RX ORDER — LIDOCAINE HYDROCHLORIDE 20 MG/ML
INJECTION, SOLUTION EPIDURAL; INFILTRATION; INTRACAUDAL; PERINEURAL
Status: DISCONTINUED | OUTPATIENT
Start: 2024-12-13 | End: 2024-12-13 | Stop reason: SDUPTHER

## 2024-12-13 RX ORDER — OXYCODONE HYDROCHLORIDE 5 MG/1
10 TABLET ORAL EVERY 4 HOURS PRN
Status: DISCONTINUED | OUTPATIENT
Start: 2024-12-13 | End: 2024-12-14 | Stop reason: HOSPADM

## 2024-12-13 RX ORDER — VANCOMYCIN HYDROCHLORIDE 1.5 G/300ML
1500 INJECTION, SOLUTION INTRAVITREAL EVERY 24 HOURS
Status: DISCONTINUED | OUTPATIENT
Start: 2024-12-14 | End: 2024-12-14 | Stop reason: HOSPADM

## 2024-12-13 RX ORDER — OXYCODONE HYDROCHLORIDE 5 MG/1
5 TABLET ORAL EVERY 4 HOURS PRN
Status: DISCONTINUED | OUTPATIENT
Start: 2024-12-13 | End: 2024-12-14 | Stop reason: HOSPADM

## 2024-12-13 RX ORDER — VECURONIUM BROMIDE 1 MG/ML
INJECTION, POWDER, LYOPHILIZED, FOR SOLUTION INTRAVENOUS
Status: DISCONTINUED | OUTPATIENT
Start: 2024-12-13 | End: 2024-12-13 | Stop reason: SDUPTHER

## 2024-12-13 RX ORDER — SODIUM CHLORIDE 9 MG/ML
INJECTION, SOLUTION INTRAVENOUS PRN
Status: DISCONTINUED | OUTPATIENT
Start: 2024-12-13 | End: 2024-12-14 | Stop reason: HOSPADM

## 2024-12-13 RX ORDER — DOXYCYCLINE HYCLATE 100 MG
100 TABLET ORAL EVERY 12 HOURS SCHEDULED
Qty: 28 TABLET | Refills: 0 | Status: SHIPPED | OUTPATIENT
Start: 2024-12-13 | End: 2024-12-27

## 2024-12-13 RX ORDER — SODIUM CHLORIDE 0.9 % (FLUSH) 0.9 %
5-40 SYRINGE (ML) INJECTION PRN
Status: DISCONTINUED | OUTPATIENT
Start: 2024-12-13 | End: 2024-12-14 | Stop reason: HOSPADM

## 2024-12-13 RX ORDER — KETOROLAC TROMETHAMINE 15 MG/ML
15 INJECTION, SOLUTION INTRAMUSCULAR; INTRAVENOUS ONCE
Status: COMPLETED | OUTPATIENT
Start: 2024-12-13 | End: 2024-12-13

## 2024-12-13 RX ORDER — SODIUM CHLORIDE 0.9 % (FLUSH) 0.9 %
5-40 SYRINGE (ML) INJECTION EVERY 12 HOURS SCHEDULED
Status: DISCONTINUED | OUTPATIENT
Start: 2024-12-13 | End: 2024-12-14 | Stop reason: HOSPADM

## 2024-12-13 RX ORDER — FENTANYL CITRATE 50 UG/ML
INJECTION, SOLUTION INTRAMUSCULAR; INTRAVENOUS
Status: DISCONTINUED | OUTPATIENT
Start: 2024-12-13 | End: 2024-12-13 | Stop reason: SDUPTHER

## 2024-12-13 RX ORDER — PROCHLORPERAZINE EDISYLATE 5 MG/ML
10 INJECTION INTRAMUSCULAR; INTRAVENOUS EVERY 6 HOURS PRN
Status: DISCONTINUED | OUTPATIENT
Start: 2024-12-13 | End: 2024-12-14 | Stop reason: HOSPADM

## 2024-12-13 RX ORDER — IOPAMIDOL 755 MG/ML
INJECTION, SOLUTION INTRAVASCULAR PRN
Status: DISCONTINUED | OUTPATIENT
Start: 2024-12-13 | End: 2024-12-13 | Stop reason: HOSPADM

## 2024-12-13 RX ADMIN — OXYCODONE 5 MG: 5 TABLET ORAL at 14:53

## 2024-12-13 RX ADMIN — PROCHLORPERAZINE EDISYLATE 10 MG: 5 INJECTION INTRAMUSCULAR; INTRAVENOUS at 11:56

## 2024-12-13 RX ADMIN — VANCOMYCIN HYDROCHLORIDE 1250 MG: 1.25 INJECTION, SOLUTION INTRAVITREAL at 07:07

## 2024-12-13 RX ADMIN — DOXYCYCLINE HYCLATE 100 MG: 100 TABLET, COATED ORAL at 20:46

## 2024-12-13 RX ADMIN — KETOROLAC TROMETHAMINE 15 MG: 15 INJECTION, SOLUTION INTRAMUSCULAR; INTRAVENOUS at 18:36

## 2024-12-13 RX ADMIN — ROSUVASTATIN CALCIUM 10 MG: 10 TABLET, FILM COATED ORAL at 20:46

## 2024-12-13 RX ADMIN — PHENYLEPHRINE HYDROCHLORIDE 100 MCG/MIN: 10 INJECTION INTRAVENOUS at 08:04

## 2024-12-13 RX ADMIN — OXYCODONE 10 MG: 5 TABLET ORAL at 20:45

## 2024-12-13 RX ADMIN — SODIUM CHLORIDE: 9 INJECTION, SOLUTION INTRAVENOUS at 07:05

## 2024-12-13 RX ADMIN — FENTANYL CITRATE 25 MCG: 50 INJECTION, SOLUTION INTRAMUSCULAR; INTRAVENOUS at 10:34

## 2024-12-13 RX ADMIN — VECURONIUM BROMIDE 4 MG: 1 INJECTION, POWDER, LYOPHILIZED, FOR SOLUTION INTRAVENOUS at 08:05

## 2024-12-13 RX ADMIN — VECURONIUM BROMIDE 2 MG: 1 INJECTION, POWDER, LYOPHILIZED, FOR SOLUTION INTRAVENOUS at 08:56

## 2024-12-13 RX ADMIN — ACETAMINOPHEN 1000 MG: 500 TABLET ORAL at 20:45

## 2024-12-13 RX ADMIN — SODIUM CHLORIDE: 9 INJECTION, SOLUTION INTRAVENOUS at 07:01

## 2024-12-13 RX ADMIN — SODIUM CHLORIDE, PRESERVATIVE FREE 10 ML: 5 INJECTION INTRAVENOUS at 20:49

## 2024-12-13 RX ADMIN — SUGAMMADEX 200 MG: 100 INJECTION, SOLUTION INTRAVENOUS at 09:56

## 2024-12-13 RX ADMIN — HYDROXYZINE PAMOATE 25 MG: 25 CAPSULE ORAL at 00:47

## 2024-12-13 RX ADMIN — LIDOCAINE HYDROCHLORIDE 80 MG: 20 INJECTION, SOLUTION EPIDURAL; INFILTRATION; INTRACAUDAL; PERINEURAL at 08:04

## 2024-12-13 RX ADMIN — FENTANYL CITRATE 25 MCG: 50 INJECTION, SOLUTION INTRAMUSCULAR; INTRAVENOUS at 10:27

## 2024-12-13 RX ADMIN — PROPOFOL 50 MG: 10 INJECTION, EMULSION INTRAVENOUS at 08:05

## 2024-12-13 RX ADMIN — VECURONIUM BROMIDE 2 MG: 1 INJECTION, POWDER, LYOPHILIZED, FOR SOLUTION INTRAVENOUS at 08:19

## 2024-12-13 RX ADMIN — FENTANYL CITRATE 50 MCG: 50 INJECTION, SOLUTION INTRAMUSCULAR; INTRAVENOUS at 09:56

## 2024-12-13 ASSESSMENT — PAIN DESCRIPTION - ORIENTATION: ORIENTATION: RIGHT

## 2024-12-13 ASSESSMENT — ENCOUNTER SYMPTOMS
DIARRHEA: 0
WHEEZING: 0
RHINORRHEA: 0
SORE THROAT: 0
CONSTIPATION: 0
BACK PAIN: 0
SHORTNESS OF BREATH: 0
NAUSEA: 0
SINUS PRESSURE: 0
SINUS PAIN: 0
EYE DISCHARGE: 0
ABDOMINAL PAIN: 0
EYE REDNESS: 0
SHORTNESS OF BREATH: 1
COUGH: 0

## 2024-12-13 ASSESSMENT — PAIN SCALES - GENERAL
PAINLEVEL_OUTOF10: 10
PAINLEVEL_OUTOF10: 6
PAINLEVEL_OUTOF10: 2
PAINLEVEL_OUTOF10: 0
PAINLEVEL_OUTOF10: 4
PAINLEVEL_OUTOF10: 10

## 2024-12-13 ASSESSMENT — PAIN DESCRIPTION - LOCATION
LOCATION: CHEST
LOCATION: ARM;CHEST

## 2024-12-13 ASSESSMENT — PAIN DESCRIPTION - DESCRIPTORS: DESCRIPTORS: ACHING;SHARP;DISCOMFORT

## 2024-12-13 NOTE — PROGRESS NOTES
[facility], [facility] [place]   Electrophysiology   Date: 12/13/2024  Reason for Follow up: Device pocket infection   Chief Complaint   Patient presents with    Wound Check     Pt to ED with CC of infection to cardiac implant site.  Pt verbalizes pain.  States that her sx have been ongoing x3 weeks.       HPI: Tess Atkinson is a 68 y.o. female with h/o atrial fibrillation, NICM (s/p [id number], lead extraction and BiV implantation 2009, gen change 2018, gen change with pocket revision 2022), CHF, HLD, DM, ESCOBAR, HTN.     Pt admitted for device pocket infection. Received IV antibiotics and had lead extraction performed 12/9.    Has had some issues with low BP requiring some meds to be held.    Interval History:    Pt seen at bedside for follow up. Clinical notes and telemetry reviewed. Pt had R sided device placed this morning without complications. Wound care saw pt yesterday.      Assessment/Plan:     Device pocket infection  - S/p lead extraction 12/9  - Wound care changed dressing yesterday, instructions in for wound care going forward in note, recommending daily dressing changes  - To be discharged on PO doxycycline per ID, IV vanc until discharge  - Pocket culture 12/9 with +1 gram positive cocci  - S/p R sided BiV insertion this AM  - CXR post-implantation without pneumothorax    HFrEF  - Appears compensated  - EF 20-25% per last echo 1/2024  - Continue OMT as tolerated    Paroxysmal atrial fibrillation  - In V paced rhythm  - [id number] score of 4 (CHF, HTN, age 1, female), has not been on anticoagulation  - Rate control with Coreg and digoxin    Hypotension  - BP better today  - Continue HF therapy as tolerated      As long as interrogation looks good and no evidence of complications from device pocket, pt should be okay to be discharged tomorrow from EP standpoint. Device clinic follow up scheduled with EP office follow up in 3 months.      Relevant available labs, and cardiovascular diagnostics,     sodium chloride       PRN Meds:.sodium chloride flush, sodium chloride, oxyCODONE **OR** oxyCODONE, prochlorperazine, benzocaine-menthol, sodium chloride flush, sodium chloride, acetaminophen, dextrose bolus **OR** dextrose bolus, glucagon (rDNA), dextrose, sodium chloride, hydrOXYzine pamoate, tiZANidine, sodium chloride flush, sodium chloride, potassium chloride **OR** potassium alternative oral replacement **OR** potassium chloride, magnesium sulfate, ondansetron **OR** ondansetron, polyethylene glycol   Allergies   Allergen Reactions    Prednisone      Caused an allergic reaction. Swelling all over her body.    Metformin And Related Diarrhea and Nausea And Vomiting       Vamshi Hendrix PA-C  [facility]  [phone]    All questions and concerns were addressed to the patient/family. Alternatives to my treatment were discussed. I have discussed the above stated plan and the patient verbalized understanding and agreed with the plan.

## 2024-12-13 NOTE — PROGRESS NOTES
V2.0    [facility] [place]      Name:  Tess Atkinson /Age/Sex: 1955  (68 y.o. female)   MRN & CSN:  [id number] & [id number] Encounter Date/Time: 2024 11:21 AM EST   Location:  [facility] PCP: Rebekah Carson, SHAMEKA - CNP     Attending:Reddy Balderrama MD       Hospital Day: 9    Assessment and Recommendations   Tess Atkinson is a 68 y.o. 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  Patient is status post lead extraction.  Within this is a temporary pacemaker.  Continue postop care per EP cardiology.  Await on culture currently remains on IV antibiotics ID following.  Monitor renal function closely..   R sided device placement today.  Will follow-up postop.  Baseline ID note patient will not need IV antibiotics on discharge.    Chronic CHF.  Appears compensated  Paroxysmal A-fib  Stable on Coreg and digoxin.  Resume anticoagulation once okay with EP cardiology.    CAD.  Hypertension  Obstructive sleep apnea      Diet ADULT DIET; Regular; 4 carb choices (60 gm/meal); Low Sodium (2 gm)   DVT Prophylaxis    Code Status Full Code   Disposition Hopefully tomorrow if all good         Personally reviewed Lab Studies and Imaging         Subjective:     Chief Complaint:     Tess Atkinson is a 68 y.o. female who presents with       Review of Systems:      Pertinent positives and negatives discussed in HPI    Objective:     Intake/Output Summary (Last 24 hours) at 2024 1210  Last data filed at 2024 1054  Gross per 24 hour   Intake 104.7 ml   Output 1635 ml   Net -1530.3 ml      Vitals:   Vitals:    24 0711 24 1115 24 1130 24 1145   BP:  108/68 110/79 113/64   Pulse:  63 73 64   Resp:  16     Temp:  97.5 °F (36.4 °C)     TempSrc:  Temporal     SpO2:       Weight: 58.4 kg (128 lb 12 oz)      Height:             Physical Exam:      General: NAD  Eyes: EOMI  ENT: neck supple  Cardiovascular: Regular  timeout protocol was completed to identify the patient and the procedure being performed. Patient underwent general anesthesia. Cardiothoracic surgery was informed.      Device was interrogated, and programmed to VVI 40.  Device was interrogated, tachy-therapies turned off, and programmed to VVI 40.  3 access sheath into right femoral vein was achieved.   An amplantz wire was advanced from the  right  12F femoral sheath to the right subclavian vein for potential deployment of balloon in SVC if needed.  Balloon was deployed and measured and then retracted into IVC  Since the patient was pacemaker dependant, a temporary pacing wire was inserted into the right ventricle under fluoroscopy and was connected to a temporary pacemaker.  LISETH probe was inserted and images were obtained.  There was small pericardial effusion at baseline which was unchanged at the end of the procedure.   A 4F right femoral arterial line was placed inside the right femoral artery for hemodynamic monitoring during the procedure.  After injection of 2% lidocaine in the left pectoral area, an incision was made over the old scar and extended to to the pocket using electrocautery and blunt dissection. The old pulse generator was explanted. Leads and device were released from the scar tissues inside the pocket using electrocautery.  Culture from inside the pocket was sent.  We did extensive debridement of all the infected tissue.   A stylet was advanced into both right ventricular and right atrial lead and we retracted the screws.     The atrial and ventricular lead was cut and a locked using locking stylet. Suture tie was made around it.  A locking stylet was advanced into LV lead and locked and also ties were tied around it. Lead extraction was done using sub-c tight rail, and standard tight rail we alternated between the leads to release him from severe scarring calcified tissue.  This took lots of effort and we had to change the sheath twice to

## 2024-12-13 NOTE — PROGRESS NOTES
Infectious Diseases   Progress Note      Admission Date: 12/5/2024  Hospital Day: Hospital Day: 9   Attending: Reddy Balderrama MD  Date of service: 12/13/2024     Chief complaint/ Reason for consult:     Pacemaker pocket site infection  Status post AICD extraction surgery on 12/9/2024  Chronic systolic congestive heart failure  Status post ICD generator change in September 2022  Thrombocytopenia  BiV AICD in place   Left ventricular lead in place since 2005  Paroxysmal atrial fibrillation    Microbiology:      I have reviewed allavailable micro lab data and cultures    Results       Procedure Component Value Units Date/Time    MRSA DNA Probe, Nasal [[id number]] Collected: 12/10/24 1713    Order Status: Completed Specimen: Nares Updated: 12/11/24 1422     MRSA SCREEN RT-PCR --     Negative  MRSA DNA not detected.  Normal Range: Not detected      Narrative:      ORDER#: [id number]                          ORDERED BY: RADHA ACUÑA  SOURCE: Nares                              COLLECTED:  12/10/24 17:13  ANTIBIOTICS AT [facility].:                      RECEIVED :  12/11/24 00:21    Culture, Wound [[id number]]     Order Status: No result Specimen: Chest     Culture, Anaerobic [[id number]]     Order Status: No result Specimen: Chest     Culture, Anaerobic and Aerobic [[id number]] Collected: 12/09/24 0908    Order Status: Completed Specimen: Chest Updated: 12/12/24 1527     Gram Stain Result 1+ Gram positive cocci  3+ WBC's (Polymorphonuclear)       WOUND/ABSCESS --     No growth to date  No growth 36 to 48 hours  No growth on primary media  Ruling out growth in broth  Further report to follow       Anaerobic Culture --     Anaerobic culture further report to follow  No anaerobes isolated so far, Further report to follow      Narrative:      ORDER#: [id number]                          ORDERED BY: ESTRELLITA CAMARGO  SOURCE: Chest L Pacemaker Pocket           COLLECTED:  12/09/24 09:08  ANTIBIOTICS AT [facility].:                        First Dose COVID-19, PFIZER PURPLE top, DILUTE for use, (age 12 y+), 30mcg/0.3mL  03/12/2021   Second Dose COVID-19, PFIZER PURPLE top, DILUTE for use, (age 12 y+), 30mcg/0.3mL   04/05/2021       Last COVID Lab No results found for: \"SARS-COV-2\"         Assessment:     The patient is a 68 y.o. old female who  has a past medical history of Atrial fibrillation ([facility]), Cardiomyopathy, nonischemic (HCC) (1997), CHF (congestive heart failure) ([facility]), Fibromyalgia, GERD (gastroesophageal reflux disease), Heart disease, HSV-1 (herpes simplex virus 1) infection, Hyperlipidemia, Sleep apnea, Type 2 diabetes mellitus with other specified complication ([facility]) (12/5/2024), and Uterine fibroids affecting pregnancy. with following problems:    Pacemaker pocket site infection-currently on IV vancomycin and oral doxycycline  Status post AICD extraction surgery on 12/9/2024  Chronic systolic congestive heart failure-this is ongoing  Status post ICD generator change in September 2022  Thrombocytopenia-it is improving slowly  BiV AICD in place   Left ventricular lead in place since 2005  Paroxysmal atrial fibrillation -heart rate is controlled  Fibromyalgia  Obstructive sleep apnea on CPAP  Coronary artery disease-stable  Fibromyalgia  Essential hypertension  Type 2 diabetes mellitus-maintain good glycemic control         Discussion:      She is afebrile.  She remains on IV vancomycin.  I switched her IV cefepime to oral doxycycline yesterday.  She is tolerating it well.    AICD site wound culture from 12/9/2024 is still negative.  Nasal MRSA probe was negative.  Gram stain showed gram-positive cocci      The patient has a serum creatinine 0.7 today.    White cell count is 6900 with hemoglobin of 11.7 and platelet count of 122,000.    Plan:     Diagnostic Workup:      Continue to follow  fever curve, WBC count and blood cultures.  Continue to monitor blood counts, liver and renal function.    Antimicrobials:    I will continue IV

## 2024-12-13 NOTE — PROGRESS NOTES
Clinical Pharmacy Note: Pharmacy to Dose Vancomycin    Vancomycin Day: 9  Indication: pacemaker pocket infection   Current Dose: 1250 mg every 24 hours   Dosing Method: Bayesian Modeling    Random: 7.4    Recent Labs     12/12/24  0515 12/13/24  0501   BUN 21* 27*       Recent Labs     12/12/24  0515 12/13/24  0501   CREATININE 0.7 0.7       Recent Labs     12/11/24  0415 12/13/24  0501   WBC 9.0 6.9         Intake/Output Summary (Last 24 hours) at 12/13/2024 0718  Last data filed at 12/12/2024 2100  Gross per 24 hour   Intake 380.12 ml   Output 1375 ml   Net -994.88 ml         Ht Readings from Last 1 Encounters:   12/12/24 1.575 m (5' 2\")        Wt Readings from Last 1 Encounters:   12/13/24 58.4 kg (128 lb 12 oz)         Body mass index is 23.55 kg/m².    Estimated Creatinine Clearance: 59 mL/min (based on SCr of 0.7 mg/dL).      Assessment/Plan:  Vancomycin level is currently therapeutic. If we continue the current regimen of 1250 mg every 24 hours, Bayesian Modeling predicts an AUC of 407 mg/L*hr and trough of 8.2 mg/L  Given that the AUC is predicted to fall at the lower end of the range, will increase vancomycin regimen to 1500 mg every 24 hours.   Bayesian Modeling predicts an AUC of 484 mg/L*hr and trough of 9.8 mg/L.  A vancomycin random level has been ordered on 12/17 at 0600 for follow-up.  Changes in regimen will be determined based on culture results, renal function, and clinical response.  Pharmacy will continue to monitor and adjust regimen as necessary.    Thank you for the consult,    Arya Krause, PharmD   [id number]

## 2024-12-13 NOTE — PROGRESS NOTES
Patient brought over to CVU from cath lab and attached to CVU monitoring. Report reviewed with cath lab RN. Right chest pacemaker site noted to be WNL. Occlusive dressing dry and intact. Pedal pulses easily palpated. Primary RN Hafsa.

## 2024-12-13 NOTE — PROCEDURES
PROCEDURE NOTE  Date: 12/13/2024   Name: Tess Atkinson  YOB: 1955    Procedures    [facility]     Electrophysiology Procedure Note       Date of Procedure: 12/13/2024  Patient's Name: Tess Atkinson  YOB: 1955   Medical Record Number: [id number]  Procedure Performed by: Albert Gusman MD    Procedures performed:       Insertion of MRI compatible right ventricular defibrillator lead under fluoroscopy.  Insertion of MRI compatible right atrial lead under fluoroscopy  Insertion of MRI compatible left ventricular lead under fluoroscopy  Implantation of a MRI compatible Biv-AICD.   Removal of temporary screw-in lead  Electronic analysis of lead and device.  General anesthesia      Blood loss: 10 cc  Complications: none   ASA 2  Mallampati 3  Indication of the procedure:    Tess Atkinson is a 68 y.o. female with non-ischemic cardiomyopathy, chronic systolic heart failure, NYHA class III, EF of 25%. On GDMT    Details of procedure:     The patient was brought to the electrophysiology laboratory in stable condition. The patient was in a fasting and non-sedated state. The risks, benefits and alternatives of the procedure were discussed with the patient. The risks including, but not limited to, the risks of vascular injury, bleeding, infection, device malfunction, lead dislodgement, radiation exposure, injury to cardiac and surrounding structures (including pneumothorax), stroke, myocardial infarction and death were discussed in detail. The patient opted to proceed with the device implantation. Written informed consent was signed and placed in the chart.  Prophylactic antibiotic was given.       General anesthesia was administered          Access was achieved with ultrasound.        The patient was prepped and draped in a sterile fashion. A timeout protocol was completed to identify the patient and the procedure being performed. Pre-sedation evaluation and airway assessment  pulse generator (Cardiac resynchronization therapy(CRT)  ) which was then placed into the cleaned subpectoral pocket. The pulse generator was sutured inside the pocket. The pocket was then closed first with 2-0 Vicryl for the muscle layer in interrupted fashion and then in three separate subcutaneous layers using 3-0  Vicryl and subcuticular layer using 4-0 Vicryl. .  Medical glue was applied to the incision Steristrips was used on the skin. The skin was covered with pressure dressing.     Despite being a right-sided implant, DFT was not performed.  Patient has had no episodes of ventricular tachycardia or fibrillation requiring treatment previously.  It was thought to be high risk especially given the recent infection and therefore we deferred it for future.    Then the sutures were removed from the screw in the temporary lead in the neck and device was unscrewed, the lead was retracted and then removed.  Hemostasis was achieved with manual pressure.  All sponge and needle counts were reported as correct at the end of the procedure. The patient tolerated the procedure well and there were no complications. Post-sedation evaluation was completed.  Patient was transported to the holding area in stable condition.    DEVICE and LEADS information:          Plan:   The patient will  have usual post-implant care, including chest x-ray,   and interrogation of the device.

## 2024-12-13 NOTE — PROGRESS NOTES
[facility]  Diabetes Education   Progress Note       NAME:  Tess Atkinson  MEDICAL RECORD NUMBER:  [id number]  AGE: 68 y.o.   GENDER: female  : 1955  TODAY'S DATE:  2024    Subjective   Reason for Diabetes Education Evaluation and Assessment: A1C 9.5    Visit Type: evaluation      Tess Atkinson is a 68 y.o. female referred by:  [] Physician  [x] Nursing  [] Chart Review   [] Other:     Visited with pt and spouse for DM education. Pt states she was recently diagnosed with DM in 2024. Currently takes Jardiance 25 mg daily.    PAST MEDICAL HISTORY        Diagnosis Date    Atrial fibrillation (HCC)     Cardiomyopathy, nonischemic (HCC)     CHF (congestive heart failure) (HCC)     Fibromyalgia     GERD (gastroesophageal reflux disease)     Heart disease     HSV-1 (herpes simplex virus 1) 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 apnea     uses cpap    Type 2 diabetes mellitus with other specified complication (HCC) 2024    Uterine fibroids affecting pregnancy        PAST SURGICAL HISTORY    Past Surgical History:   Procedure Laterality Date    CARDIAC DEFIBRILLATOR PLACEMENT       SECTION      3 c-sections    COLONOSCOPY  2015    polypectomy    COLONOSCOPY N/A 2019    COLONOSCOPY POLYPECTOMY SNARE/COLD ASCENDING X1 , TRANSVERSE X1, SIGMOID X1 performed by Dell Munguia MD at [facility] ENDOSCOPY    EP DEVICE PROCEDURE N/A 2024    Laser lead extraction performed by Albert Gusman MD at [facility] CARDIAC CATH LAB    EP DEVICE PROCEDURE N/A 2024    Insert ICD multi performed by Albert Gusman MD at [facility] CARDIAC CATH LAB    PACEMAKER INSERTION      PACEMAKER INSERTION      UPPER GASTROINTESTINAL ENDOSCOPY  2015    UPPER GASTROINTESTINAL ENDOSCOPY N/A 2019    EGD GASTRIC BIOPSY performed by Dell Munguia MD at [facility] ENDOSCOPY    UPPER GASTROINTESTINAL ENDOSCOPY N/A 2020    EGD BIOPSY  Essential hypertension I10    ESCOBAR on CPAP G47.33    Hypercholesteremia E78.00    Biventricular ICD (implantable cardioverter-defibrillator) in place Z95.810    Chronic bilateral thoracic back pain M54.6, G89.29    Hyperkalemia E87.5    Dyspnea on exertion R06.09    Anxiety F41.9    GERD with esophagitis K21.00    Dysphagia R13.10    Suicidal ideation R45.851    Trauma and stressor-related disorder F43.9    Chronic systolic heart failure (HCC) I50.22    Vertigo R42    Seasonal allergies J30.2    ICD (implantable cardioverter-defibrillator) battery depletion Z45.02    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    Urinary incontinence R32    Chronic pain syndrome G89.4    Vitamin D deficiency E55.9    Moderate episode of recurrent major depressive disorder (HCC) F33.1    Vaginal itching N89.8    Uncontrolled diabetes mellitus with hyperglycemia, without long-term current use of insulin (HCC) E11.65    Type 2 diabetes mellitus with other specified complication ([facility]) E11.69    Coronary artery disease due to lipid rich plaque I25.10, I25.83    Systolic congestive heart failure (HCC) I50.20    Infection of pacemaker pocket (HCC) T82.7XXA    Infection of pacemaker pulse generator site (HCC) T82.7XXA    Infection of biventricular implantable cardioverter-defibrillator (ICD) (HCC) T82.7XXA    Complete heart block (HCC) I44.2    Diabetes education, encounter for Z71.89    Encounter for medication counseling Z71.89        BP (!) 104/58   Pulse 60   Temp 97.5 °F (36.4 °C) (Temporal)   Resp 16   Ht 1.575 m (5' 2\")   Wt 58.4 kg (128 lb 12 oz)   SpO2 96%   BMI 23.55 kg/m²     HgBA1c:    Lab Results   Component Value Date/Time    LABA1C 9.5 11/04/2024 01:31 PM       Recent Labs     12/12/24  0927 12/12/24  1209 12/12/24  1859 12/13/24  0053   POCGLU 122* 148* 108* 125*       BUN/Creatinine:    Lab Results   Component Value Date/Time    BUN 27 12/13/2024 05:01 AM    CREATININE 0.7 12/13/2024 05:01 AM       Assessment

## 2024-12-13 NOTE — PROGRESS NOTES
oxycodone Q6H for breakthrough pain. Instructed her to use ice to site for 20 minute increments to help reduce pain   - ABX per ID    2. Paroxysmal Atrial Fibrillation  - Currently in NSR  - Continue coreg 3.125 mg BID, digoxin 125 mcg QD  - [id number] score:high; [id number] Vasc score and anticoagulation discussed. High risk for stroke and thromboembolism. Anticoagulation is recommended. Risk of bleeding was discussed.  ~ Will need anti-coagulation if recurrence noted on device     3. Chronic systolic heart failure (NYHA Class II)  - Appears compensated               ~ EF 25-30% per echo  - Continue with coreg 3.125 mg BID, entresto 24-26 mg (1/2 tab) BID, digoxin 125 mcg QD, spironolactone 12.5 mg (MWF)  - Aggressive medical therapy with risk factor modification  - Discussed with patient importance of monitoring weight, low sodium diet and fluid restriction              - Followed by CHF team     4. HTN  - Controlled: Goal <130/80  - Continue current medications  - Discussed importance of low sodium diet, weight control and exercise     5. ESCOBAR  - Stable: Uses CPAP  - Encourage to use CPAP to prevent long term effects of untreated ESCOBAR    Ok for d/c. Follow up as scheduled    All pertinent information and plan of care discussed with the EP physician. Case discussed with hospitalist provider    All questions and concerns were addressed to the patient. Alternatives to my treatment were discussed. I have discussed the above stated plan with patient and the nurse. The patient verbalized understanding and agreed with the plan.    Thank you for allowing to us to participate in the care of Tess Mejia, SHAMEKA-CNP  [facility]   Office: [phone]

## 2024-12-13 NOTE — ANESTHESIA PRE PROCEDURE
Allergen Reactions    Prednisone      Caused an allergic reaction. Swelling all over her body.    Metformin And Related Diarrhea and Nausea And Vomiting       Problem List:    Patient Active Problem List   Diagnosis Code    Dilated cardiomyopathy ([facility]) I42.0    Pacemaker Z95.0    History of pericarditis Z86.79    Fibromyalgia M79.7    PAF (paroxysmal atrial fibrillation) ([facility]) I48.0    Sebaceous cyst L72.3    Essential hypertension I10    ESCOBAR on CPAP G47.33    Hypercholesteremia E78.00    Biventricular ICD (implantable cardioverter-defibrillator) in place Z95.810    Chronic bilateral thoracic back pain M54.6, G89.29    Hyperkalemia E87.5    Dyspnea on exertion R06.09    Anxiety F41.9    GERD with esophagitis K21.00    Dysphagia R13.10    Suicidal ideation R45.851    Trauma and stressor-related disorder F43.9    Chronic systolic heart failure ([facility]) I50.22    Vertigo R42    Seasonal allergies J30.2    ICD (implantable cardioverter-defibrillator) battery depletion Z45.02    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    Urinary incontinence R32    Chronic pain syndrome G89.4    Vitamin D deficiency E55.9    Moderate episode of recurrent major depressive disorder ([facility]) F33.1    Vaginal itching N89.8    Uncontrolled diabetes mellitus with hyperglycemia, without long-term current use of insulin ([facility]) E11.65    Type 2 diabetes mellitus with other specified complication ([facility]) E11.69    Coronary artery disease due to lipid rich plaque I25.10, I25.83    Systolic congestive heart failure ([facility]) I50.20    Infection of pacemaker pocket ([facility]) T82.7XXA    Infection of pacemaker pulse generator site ([facility]) T82.7XXA    Infection of biventricular implantable cardioverter-defibrillator (ICD) ([facility]) T82.7XXA    Complete heart block ([facility]) I44.2    Diabetes education, encounter for Z71.89    Encounter for medication counseling Z71.89       Past Medical History:        Diagnosis Date    Atrial fibrillation ([facility])

## 2024-12-13 NOTE — H&P
H&P Update    I have reviewed the history and physical and examined the patient and updated with relevant changes.     Consent: I have discussed with the patient and/or the patient representative the indication, alternatives, and the possible risks and/or complications of the planned procedure and the anesthesia methods. The patient and/or patient representative appear to understand and agree to proceed.    Vitals:    12/13/24 0400   BP:    Pulse: 70   Resp:    Temp:    SpO2:      Prior to Admission medications    Medication Sig Start Date End Date Taking? Authorizing Provider   vilazodone HCl (VIIBRYD) 20 MG TABS TAKE 1 TABLET BY MOUTH DAILY 11/19/24  Yes Rebekah Carosn APRN - CNP   empagliflozin (JARDIANCE) 25 MG tablet Take 1 tablet by mouth daily 11/14/24  Yes Rebekah Carson APRN - CNP   ciprofloxacin (CIPRO) 250 MG tablet TAKE 1 TABLET BY MOUTH AFTER SEXUAL ACTIVITY  Patient taking differently: Take 1 tablet by mouth See Admin Instructions TAKE 1 TABLET BY MOUTH AFTER SEXUAL ACTIVITY 9/17/24  Yes Rebekah Carson APRN - CNP   carvedilol (COREG) 3.125 MG tablet TAKE 1 TABLET BY MOUTH TWICE DAILY 9/13/24  Yes Jessica Hudson APRN - CNP   digoxin (LANOXIN) 125 MCG tablet Take 1 tablet by mouth daily 6/5/24  Yes Carolina Mayes APRN - CNS   rosuvastatin (CRESTOR) 10 MG tablet Take 1 tablet by mouth daily  Patient taking differently: Take 1 tablet by mouth nightly 6/5/24  Yes Carolina Mayes APRN - CNS   sacubitril-valsartan (ENTRESTO) 24-26 MG per tablet TAKE ONE-HALF TABLET BY MOUTH EVERY MORNING AND 1 EVERY EVENING  Patient taking differently: Take 0.5-1 tablets by mouth See Admin Instructions Take one half tablet by mouth every morning and 1 whole tablet every evening. 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 apnea     uses cpap    Type 2 diabetes mellitus with other specified complication (HCC) 2024    Uterine fibroids affecting pregnancy      Past Surgical History:   Procedure Laterality Date    CARDIAC DEFIBRILLATOR PLACEMENT       SECTION      3 c-sections    COLONOSCOPY  2015    polypectomy    COLONOSCOPY N/A 2019    COLONOSCOPY POLYPECTOMY SNARE/COLD ASCENDING X1 , TRANSVERSE X1, SIGMOID X1 performed by Dell Munguia MD at [facility] ENDOSCOPY    EP DEVICE PROCEDURE N/A 2024    Laser lead extraction performed by Albert Gusman MD at [facility] CARDIAC CATH LAB    EP DEVICE PROCEDURE N/A 2024    Insert ICD multi performed by Albert Gusman MD at [facility] CARDIAC CATH LAB    PACEMAKER INSERTION      PACEMAKER INSERTION      UPPER GASTROINTESTINAL ENDOSCOPY  2015    UPPER GASTROINTESTINAL ENDOSCOPY N/A 2019    EGD GASTRIC BIOPSY performed by Dell Munguia MD at [facility] ENDOSCOPY    UPPER GASTROINTESTINAL ENDOSCOPY N/A 2020    EGD BIOPSY performed by Dell Munguia MD at [facility] ENDOSCOPY     Allergies   Allergen Reactions    Prednisone      Caused an allergic reaction. Swelling all over her body.    Metformin And Related Diarrhea and Nausea And Vomiting       Pre-Sedation Documentation and Exam:   I have personally completed a history, physical exam & review of systems for this patient (see notes).    Mallampati Airway Assessment:  Class  II      Prior History of Anesthesia Complications:   None    ASA Classification:        Class 3 - A patient with severe systemic disease that limits activity but is not incapacitating    Sedation/ Anesthesia Plan:   GA         Patient is an appropriate candidate for plan of anesthesia  Yes    Electronically signed by Albert Gusman MD on 2024 at 7:53 AM

## 2024-12-13 NOTE — ANESTHESIA POSTPROCEDURE EVALUATION
Department of Anesthesiology  Postprocedure Note    Patient: eTss Atkinson  MRN: [id number]  YOB: 1955  Date of evaluation: 12/13/2024    Procedure Summary       Date: 12/13/24 Room / Location: [facility] EP LAB 3 / [facility] CARDIAC CATH LAB    Anesthesia Start: 0750 Anesthesia Stop:     Procedure: Insert ICD multi (Right) Diagnosis:       Complete heart block (HCC)      (Complete heart block (HCC) [I44.2])    Providers: Albert Gusman MD Responsible Provider: Lang Fritz MD    Anesthesia Type: general ASA Status: 4            Anesthesia Type: No value filed.    Morgan Phase I: Morgan Score: 9    Morgan Phase II:      Anesthesia Post Evaluation    Patient location during evaluation: PACU  Patient participation: complete - patient participated  Level of consciousness: awake and alert  Pain score: 2  Airway patency: patent  Nausea & Vomiting: no vomiting  Cardiovascular status: blood pressure returned to baseline  Respiratory status: acceptable  Hydration status: euvolemic  Multimodal analgesia pain management approach  Pain management: adequate    No notable events documented.

## 2024-12-14 ENCOUNTER — NURSE ONLY (OUTPATIENT)
Dept: CARDIOLOGY CLINIC | Age: 69
End: 2024-12-14
Payer: COMMERCIAL

## 2024-12-14 VITALS
BODY MASS INDEX: 23.41 KG/M2 | OXYGEN SATURATION: 100 % | TEMPERATURE: 97.2 F | HEIGHT: 62 IN | DIASTOLIC BLOOD PRESSURE: 60 MMHG | SYSTOLIC BLOOD PRESSURE: 103 MMHG | HEART RATE: 80 BPM | WEIGHT: 127.21 LBS | RESPIRATION RATE: 16 BRPM

## 2024-12-14 DIAGNOSIS — I44.2 COMPLETE HEART BLOCK (HCC): ICD-10-CM

## 2024-12-14 DIAGNOSIS — I48.0 PAF (PAROXYSMAL ATRIAL FIBRILLATION) (HCC): ICD-10-CM

## 2024-12-14 DIAGNOSIS — Z95.810 BIVENTRICULAR ICD (IMPLANTABLE CARDIOVERTER-DEFIBRILLATOR) IN PLACE: Primary | ICD-10-CM

## 2024-12-14 DIAGNOSIS — I42.0 DILATED CARDIOMYOPATHY (HCC): ICD-10-CM

## 2024-12-14 LAB
ANION GAP SERPL CALCULATED.3IONS-SCNC: 10 MMOL/L (ref 3–16)
BACTERIA SPEC AEROBE CULT: ABNORMAL
BACTERIA SPEC ANAEROBE CULT: ABNORMAL
BUN SERPL-MCNC: 28 MG/DL (ref 7–20)
CALCIUM SERPL-MCNC: 9.2 MG/DL (ref 8.3–10.6)
CHLORIDE SERPL-SCNC: 100 MMOL/L (ref 99–110)
CO2 SERPL-SCNC: 23 MMOL/L (ref 21–32)
CREAT SERPL-MCNC: 0.8 MG/DL (ref 0.6–1.2)
GFR SERPLBLD CREATININE-BSD FMLA CKD-EPI: 79 ML/MIN/{1.73_M2}
GLUCOSE SERPL-MCNC: 167 MG/DL (ref 70–99)
GRAM STN SPEC: ABNORMAL
ORGANISM: ABNORMAL
POTASSIUM SERPL-SCNC: 3.9 MMOL/L (ref 3.5–5.1)
SODIUM SERPL-SCNC: 133 MMOL/L (ref 136–145)

## 2024-12-14 PROCEDURE — 80048 BASIC METABOLIC PNL TOTAL CA: CPT

## 2024-12-14 PROCEDURE — 6370000000 HC RX 637 (ALT 250 FOR IP): Performed by: INTERNAL MEDICINE

## 2024-12-14 PROCEDURE — 6370000000 HC RX 637 (ALT 250 FOR IP): Performed by: HOSPITALIST

## 2024-12-14 PROCEDURE — 6360000002 HC RX W HCPCS: Performed by: INTERNAL MEDICINE

## 2024-12-14 PROCEDURE — 99232 SBSQ HOSP IP/OBS MODERATE 35: CPT | Performed by: NURSE PRACTITIONER

## 2024-12-14 RX ORDER — OXYCODONE HYDROCHLORIDE 5 MG/1
5 TABLET ORAL EVERY 6 HOURS PRN
Qty: 20 TABLET | Refills: 0 | Status: SHIPPED | OUTPATIENT
Start: 2024-12-14 | End: 2024-12-19

## 2024-12-14 RX ADMIN — ACETAMINOPHEN 1000 MG: 500 TABLET ORAL at 08:58

## 2024-12-14 RX ADMIN — CARVEDILOL 3.12 MG: 3.12 TABLET, FILM COATED ORAL at 08:58

## 2024-12-14 RX ADMIN — HYDROXYZINE PAMOATE 25 MG: 25 CAPSULE ORAL at 05:09

## 2024-12-14 RX ADMIN — VANCOMYCIN HYDROCHLORIDE 1500 MG: 1.5 INJECTION, SOLUTION INTRAVITREAL at 09:06

## 2024-12-14 RX ADMIN — EMPAGLIFLOZIN 10 MG: 10 TABLET, FILM COATED ORAL at 08:58

## 2024-12-14 RX ADMIN — VILAZODONE HYDROCHLORIDE 20 MG: 20 TABLET, FILM COATED ORAL at 09:09

## 2024-12-14 RX ADMIN — DIGOXIN 125 MCG: 0.12 TABLET ORAL at 09:00

## 2024-12-14 RX ADMIN — DOXYCYCLINE HYCLATE 100 MG: 100 TABLET, COATED ORAL at 08:58

## 2024-12-14 RX ADMIN — OXYCODONE 10 MG: 5 TABLET ORAL at 01:19

## 2024-12-14 ASSESSMENT — PAIN SCALES - GENERAL
PAINLEVEL_OUTOF10: 6
PAINLEVEL_OUTOF10: 0
PAINLEVEL_OUTOF10: 3
PAINLEVEL_OUTOF10: 0
PAINLEVEL_OUTOF10: 3
PAINLEVEL_OUTOF10: 9
PAINLEVEL_OUTOF10: 6

## 2024-12-14 ASSESSMENT — PAIN DESCRIPTION - DESCRIPTORS
DESCRIPTORS: THROBBING
DESCRIPTORS: THROBBING
DESCRIPTORS: SHARP;DISCOMFORT
DESCRIPTORS: SHARP;DISCOMFORT
DESCRIPTORS: DISCOMFORT

## 2024-12-14 ASSESSMENT — PAIN - FUNCTIONAL ASSESSMENT
PAIN_FUNCTIONAL_ASSESSMENT: ACTIVITIES ARE NOT PREVENTED
PAIN_FUNCTIONAL_ASSESSMENT: PREVENTS OR INTERFERES SOME ACTIVE ACTIVITIES AND ADLS

## 2024-12-14 ASSESSMENT — PAIN DESCRIPTION - ONSET
ONSET: ON-GOING

## 2024-12-14 ASSESSMENT — PAIN DESCRIPTION - LOCATION
LOCATION: CHEST
LOCATION: ARM;CHEST
LOCATION: ARM;CHEST
LOCATION: CHEST
LOCATION: CHEST

## 2024-12-14 ASSESSMENT — PAIN DESCRIPTION - FREQUENCY
FREQUENCY: CONTINUOUS
FREQUENCY: CONTINUOUS
FREQUENCY: INTERMITTENT

## 2024-12-14 ASSESSMENT — PAIN DESCRIPTION - ORIENTATION
ORIENTATION: RIGHT
ORIENTATION: RIGHT;UPPER
ORIENTATION: RIGHT;UPPER

## 2024-12-14 ASSESSMENT — PAIN DESCRIPTION - PAIN TYPE
TYPE: SURGICAL PAIN
TYPE: SURGICAL PAIN

## 2024-12-14 NOTE — CARE COORDINATION
Case Management -  Discharge Note      Patient Name: Tess Atkinson                   YOB: 1955  Room: [unfilled]            Readmission Risk (Low < 19, Mod (19-27), High > 27): Readmission Risk Score: 10    Current PCP: Rebekah Carson APRN - CNP      (IMM) Important Message from Medicare:    Has pt received appropriate compliance notices before being discharged if required: N/A  Compliance doc:  [] 2nd IMM; [] Code 44 [] [place]  Date: n/a    PT AM-PAC:   /24  OT AM-PAC:   /24    Patient/patient representative has been educated on the benefits of Home Care  as well as the possible risks of declining recommended services. Patient/patient representative has acknowledged the information provided and decided on the following discharge plan. Patient/ patient representative has been provided freedom of choice regarding service provider, supported by basic dialogue that supports the patient's individualized plan of care/goals.    [facility]  [address],   [place] [address]  Phone: [phone]  Fax: [phone]     CALLI called Teresita with [facility] [phone] and she is aware of discharge and has epic access for orders and dc paperwork.     She is aware that pt is discharging on oral antibiotic.      [facility] agency notified of discharge:  [x] Yes [] No  [] NA    Family notified of discharge:  [x] Yes  [] No  [] NA    Facility notified of discharge:  [] Yes  [] No  [x] NA     Financial    Payor: [place] [employer] / Plan: [employer] - CHOICE PLUS / Product Type: *No Product type* /     Pharmacy:  Potential assistance Purchasing Medications: No  Meds-to-Beds request:        [id number] - [place], [place] - [address] - P [phone] - F [phone]  [address]  [facility] [address]  Phone: [phone] Fax: [phone]      Notes:    Additional Case Management Notes: Patient discharged 12/14/2024 to home with [facility].   All discharge  needs met per case management     Mary Laguerre RN, BSN  [phone]

## 2024-12-14 NOTE — FLOWSHEET NOTE
Discharge instructions reviewed with patient and spouse.  Patient and family verbalized understanding.  All home medications have been reviewed, questions answered and patient voiced understanding. All medication side effects reviewed and patient and family verbalized understanding. Follow up appointment(s) reviewed with patient and appt made to be seen on Tuesday.   Patient sent prescriptions to [facility], discussed discharge instructions and appointment times.  Patient discharged to home with family via private car.  Taken to lobby via wheelchair.

## 2024-12-14 NOTE — DISCHARGE INSTR - DIET
Good nutrition is important when healing from an illness, injury, or surgery.  Follow any nutrition recommendations given to you during your hospital stay.   If you were given an oral nutrition supplement while in the hospital, continue to take this supplement at home.  You can take it with meals, in-between meals, and/or before bedtime. These supplements can be purchased at most local grocery stores, pharmacies, and chain [employer].   If you have any questions about your diet or nutrition, call the hospital and ask for the dietitian.      LOW SODIUM; LOW SATURATED FAT

## 2024-12-14 NOTE — PLAN OF CARE
Problem: Chronic Conditions and Co-morbidities  Goal: Patient's chronic conditions and co-morbidity symptoms are monitored and maintained or improved  Outcome: Progressing     Problem: Discharge Planning  Goal: Discharge to home or other facility with appropriate resources  Outcome: Progressing     Problem: Pain  Goal: Verbalizes/displays adequate comfort level or baseline comfort level  Outcome: Progressing     Problem: Safety - Adult  Goal: Free from fall injury  Outcome: Progressing     Problem: Neurosensory - Adult  Goal: Achieves stable or improved neurological status  Outcome: Progressing     Problem: Respiratory - Adult  Goal: Achieves optimal ventilation and oxygenation  Outcome: Progressing     Problem: Cardiovascular - Adult  Goal: Maintains optimal cardiac output and hemodynamic stability  Outcome: Progressing  Goal: Absence of cardiac dysrhythmias or at baseline  Outcome: Progressing     Problem: Skin/Tissue Integrity - Adult  Goal: Skin integrity remains intact  Outcome: Progressing  Goal: Incisions, wounds, or drain sites healing without S/S of infection  Outcome: Progressing  Goal: Oral mucous membranes remain intact  Outcome: Progressing     Problem: Musculoskeletal - Adult  Goal: Return mobility to safest level of function  Outcome: Progressing  Goal: Maintain proper alignment of affected body part  Outcome: Progressing  Goal: Return ADL status to a safe level of function  Outcome: Progressing     Problem: Gastrointestinal - Adult  Goal: Minimal or absence of nausea and vomiting  Outcome: Progressing  Goal: Maintains or returns to baseline bowel function  Outcome: Progressing  Goal: Maintains adequate nutritional intake  Outcome: Progressing     Problem: Genitourinary - Adult  Goal: Absence of urinary retention  Outcome: Progressing     Problem: Infection - Adult  Goal: Absence of infection at discharge  Outcome: Progressing     Problem: Metabolic/Fluid and Electrolytes - Adult  Goal: Electrolytes

## 2024-12-14 NOTE — DISCHARGE INSTR - OTHER ORDERS
[facility]                                      Electrophysiology Procedure Note                                         Date of Procedure: 12/13/2024  Patient's Name: Tess Atkinson  YOB: 1955   Medical Record Number: [id number]  Procedure Performed by: Albert Gusman MD     Procedures performed:        Insertion of MRI compatible right ventricular defibrillator lead under fluoroscopy.  Insertion of MRI compatible right atrial lead under fluoroscopy  Insertion of MRI compatible left ventricular lead under fluoroscopy  Implantation of a MRI compatible Biv-AICD.   Removal of temporary screw-in lead  Electronic analysis of lead and device.  General anesthesia        Blood loss: 10 cc  Complications: none   ASA 2  Mallampati 3  Indication of the procedure:     Tess Atkinson is a 68 y.o. female with non-ischemic cardiomyopathy, chronic systolic heart failure, NYHA class III, EF of 25%. On GDMT

## 2024-12-15 NOTE — DISCHARGE SUMMARY
[facility]    Patient Demographics    Patient. Tess Atkinson  Date of Birth. 1955  MRN. [id number]     Primary care provider. Rebekah Carson APRN - CNP  (Tel: [phone])    Admit date: 12/5/2024    Discharge date (blank if same as Note Date): 12/14/2024  Note Date: 12/15/2024     Reason for Hospitalization.   Chief Complaint   Patient presents with    Wound Check     Pt to ED with CC of infection to cardiac implant site.  Pt verbalizes pain.  States that her sx have been ongoing x3 weeks.           Problem-based Hospital Course.    Infected pacemaker.  Patient is status post lead extraction.  Within this is a temporary pacemaker.  Continue postop care per EP cardiology.  Await on culture currently remains on IV antibiotics ID following.  Monitor renal function closely..   R sided device placem   Baseline ID note patient will not need IV antibiotics on discharge.  Discharged home on oral     Chronic CHF.  Appears compensated  Paroxysmal A-fib  Stable on Coreg and digoxin.  Resume anticoagulation once okay with EP cardiology.     CAD.  Hypertension  Obstructive sleep apnea  Consults.  IP CONSULT TO INFECTIOUS DISEASES  PHARMACY TO DOSE VANCOMYCIN  IP CONSULT TO DIABETES EDUCATOR  IP CONSULT TO HOME CARE NEEDS    Physical examination on discharge day.   /60   Pulse 80   Temp 97.2 °F (36.2 °C) (Temporal)   Resp 16   Ht 1.575 m (5' 2\")   Wt 57.7 kg (127 lb 3.3 oz)   SpO2 100%   BMI 23.27 kg/m²   General appearance.  Alert. Looks comfortable.  HEENT. Sclera clear. Moist mucus membranes.  Cardiovascular. Regular rate and rhythm, normal S1, S2. No murmur.   Respiratory. Not using accessory muscles.Clear to auscultation bilaterally, no wheeze.  Gastrointestinal. Abdomen soft, non-tender, not distended, normal bowel sounds  Neurology.

## 2024-12-16 ENCOUNTER — TELEPHONE (OUTPATIENT)
Dept: INTERNAL MEDICINE CLINIC | Age: 69
End: 2024-12-16

## 2024-12-16 NOTE — PATIENT INSTRUCTIONS
New Cardiac Device Implant (Pacemaker and/or Defibrillator) Post Op Instructions  Bathe with water indirectly hitting the incision site. Water and soap may run over the incision site. Do not scrub. Pat dry with a clean towel after bathing.   Leave incision open to the air; do not apply any dressings, ointments, or bandages to the area. Do not apply lotion, perfume/cologne, or powders to the area until it is completely healed.   Any scabbing or skin glue that is noted will fall off within 1-2 weeks after the post op appointment.   If any oozing, bleeding, or pus drainage occurs, please call the office immediately at [phone].        Patient has movement restrictions in place until 4 weeks post op (to the day of implant) unless otherwise instructed by physician.   Patient may not lift the device side arm above shoulder height.   Do not far reach or stretch across body or behind body with effected side.   Do not use this arm for pushing, pulling, or lifting body.   Do not use cane on the effected side.   Patient may not lift anything heavier than a gallon of milk with the associated arm.     Appointments to expect going forward:  Post operatively the patient will have had a 1-week post op check and a 3 month follow up with NP/MD and the device clinic.        Remote Monitoring Instructions     Within 2-3 weeks of your device being implanted, you will receive a call from the  of your device. Please answer this call as it is to set up remote monitoring for your device. Once you receive your in-home monitor, please follow the instructions provided to sync the home monitor to your implanted device. Once you have paired your home monitor to your implanted device, keep your monitor plugged in within 6 feet of where you sleep. Your monitor will routinely check in with your device during sleep hours and transmit any urgent events to the Device Clinic for review.     Please do not send additional routine

## 2024-12-16 NOTE — PROGRESS NOTES
Patients incisions are healing nicely. Outside bandages removed today. Wound clean, dry and intact. RENY Santoro and Dr. Cates reviewed today in clinic. Patient and family educated on wound care. Patient can shower in 1 week. All questions answered.  Patient to call the office immediately with any signs on infection.   Patient scheduled to come back on 12/31/2024 for a wound check.     Home Monitor ordered today during clinic. Instructions given.

## 2024-12-17 ENCOUNTER — NURSE ONLY (OUTPATIENT)
Dept: CARDIOLOGY CLINIC | Age: 69
End: 2024-12-17

## 2024-12-17 DIAGNOSIS — Z95.810 BIVENTRICULAR ICD (IMPLANTABLE CARDIOVERTER-DEFIBRILLATOR) IN PLACE: Primary | ICD-10-CM

## 2024-12-17 DIAGNOSIS — E11.65 UNCONTROLLED DIABETES MELLITUS WITH HYPERGLYCEMIA, WITHOUT LONG-TERM CURRENT USE OF INSULIN (HCC): ICD-10-CM

## 2024-12-17 DIAGNOSIS — Z45.02 ICD (IMPLANTABLE CARDIOVERTER-DEFIBRILLATOR) BATTERY DEPLETION: ICD-10-CM

## 2024-12-17 DIAGNOSIS — I42.0 DILATED CARDIOMYOPATHY (HCC): ICD-10-CM

## 2024-12-17 DIAGNOSIS — I44.2 COMPLETE HEART BLOCK (HCC): ICD-10-CM

## 2024-12-17 PROCEDURE — 93284 PRGRMG EVAL IMPLANTABLE DFB: CPT | Performed by: INTERNAL MEDICINE

## 2024-12-17 RX ORDER — ACYCLOVIR 800 MG/1
TABLET ORAL
Qty: 6 EACH | Refills: 1 | Status: SHIPPED | OUTPATIENT
Start: 2024-12-17

## 2024-12-18 ENCOUNTER — OFFICE VISIT (OUTPATIENT)
Dept: INTERNAL MEDICINE CLINIC | Age: 69
End: 2024-12-18

## 2024-12-18 VITALS
WEIGHT: 127.4 LBS | SYSTOLIC BLOOD PRESSURE: 96 MMHG | OXYGEN SATURATION: 98 % | BODY MASS INDEX: 23.45 KG/M2 | HEART RATE: 61 BPM | HEIGHT: 62 IN | DIASTOLIC BLOOD PRESSURE: 68 MMHG

## 2024-12-18 DIAGNOSIS — I50.22 CHRONIC SYSTOLIC HEART FAILURE (HCC): Chronic | ICD-10-CM

## 2024-12-18 DIAGNOSIS — T82.7XXD: ICD-10-CM

## 2024-12-18 DIAGNOSIS — E78.00 HYPERCHOLESTEREMIA: ICD-10-CM

## 2024-12-18 DIAGNOSIS — E11.65 UNCONTROLLED DIABETES MELLITUS WITH HYPERGLYCEMIA, WITHOUT LONG-TERM CURRENT USE OF INSULIN (HCC): ICD-10-CM

## 2024-12-18 DIAGNOSIS — Z95.810 BIVENTRICULAR ICD (IMPLANTABLE CARDIOVERTER-DEFIBRILLATOR) IN PLACE: ICD-10-CM

## 2024-12-18 DIAGNOSIS — I48.0 PAF (PAROXYSMAL ATRIAL FIBRILLATION) (HCC): ICD-10-CM

## 2024-12-18 DIAGNOSIS — Z09 HOSPITAL DISCHARGE FOLLOW-UP: Primary | ICD-10-CM

## 2024-12-18 DIAGNOSIS — G47.33 OSA ON CPAP: ICD-10-CM

## 2024-12-18 DIAGNOSIS — Z95.0 PACEMAKER: ICD-10-CM

## 2024-12-18 DIAGNOSIS — I25.83 CORONARY ARTERY DISEASE DUE TO LIPID RICH PLAQUE: ICD-10-CM

## 2024-12-18 DIAGNOSIS — F41.9 ANXIETY: Chronic | ICD-10-CM

## 2024-12-18 DIAGNOSIS — I44.2 COMPLETE HEART BLOCK (HCC): ICD-10-CM

## 2024-12-18 DIAGNOSIS — I10 ESSENTIAL HYPERTENSION: ICD-10-CM

## 2024-12-18 DIAGNOSIS — I42.0 DILATED CARDIOMYOPATHY (HCC): ICD-10-CM

## 2024-12-18 DIAGNOSIS — I25.10 CORONARY ARTERY DISEASE DUE TO LIPID RICH PLAQUE: ICD-10-CM

## 2024-12-18 PROBLEM — N89.8 VAGINAL ITCHING: Status: RESOLVED | Noted: 2024-07-31 | Resolved: 2024-12-18

## 2024-12-18 PROBLEM — R06.09 DYSPNEA ON EXERTION: Status: RESOLVED | Noted: 2020-06-28 | Resolved: 2024-12-18

## 2024-12-18 PROBLEM — Z45.02 ICD (IMPLANTABLE CARDIOVERTER-DEFIBRILLATOR) BATTERY DEPLETION: Status: RESOLVED | Noted: 2022-09-16 | Resolved: 2024-12-18

## 2024-12-18 PROBLEM — E87.5 HYPERKALEMIA: Status: RESOLVED | Noted: 2020-06-28 | Resolved: 2024-12-18

## 2024-12-18 NOTE — ASSESSMENT & PLAN NOTE
Acute, improving   Admission notes and work up reviewed with pt and   Repeat BW ordered  Complete doxycycline as prescribed by ID    No

## 2024-12-18 NOTE — ASSESSMENT & PLAN NOTE
Left chest wall site staple are CDI without erythema, warmth or drainage.   Right chest wall steristrips are CDI

## 2024-12-18 NOTE — ASSESSMENT & PLAN NOTE
Underlying CHB, device dependant   Device exchange complete during recent admission   Complete antibiotics as prescribed   Continue medications and follow up as recommended by cardiology and EP

## 2024-12-18 NOTE — ASSESSMENT & PLAN NOTE
Chronic, improving.  A1c per CGM is 6.4 it was A1c 9.5 and was 13.4 in Sept. Continue jardiance 20 mg daily, jardiance 25 mg Rx sent to [facility].  Continue working on dietary changes.

## 2024-12-18 NOTE — ASSESSMENT & PLAN NOTE
chronic, stable.  BP soft today, asymptomatic. Continue medications and follow up as recommended by cardiology. She is currently taking carvedilol 3.125 mg BID, digoxin 125 mcg daily, entresto 24-26 1/2 tab in am and 1 tab in pm, spironolactone 12.5 mg m/w/f

## 2024-12-18 NOTE — PROGRESS NOTES
Friday 12.5 mg mon wed and friday) 36 tablet 1   • hydrOXYzine pamoate (VISTARIL) 25 MG capsule Take 1 capsule by mouth 2 times daily as needed for Anxiety 90 capsule 1   • vitamin D3 (CHOLECALCIFEROL) 25 MCG (1000 UT) TABS tablet TAKE 1 TABLET BY MOUTH DAILY 90 tablet 1   • montelukast (SINGULAIR) 10 MG tablet Take 1 tablet by mouth daily (Patient taking differently: Take 1 tablet by mouth nightly) 30 tablet 5   • Magnesium 400 MG TABS Take by mouth daily (Patient taking differently: Take 1 tablet by mouth every other day Take one tablet by mouth every other day with Oscal at night.) 30 tablet 5   • calcium carbonate (OSCAL) 500 MG TABS tablet Take 1 tablet by mouth daily 30 tablet 5   • vitamin E 400 UNIT capsule Take 1 capsule by mouth daily     • Omega-3 Fatty Acids (FISH OIL) 1000 MG CAPS Take 2 capsules by mouth 2 times daily          Medications patient taking as of now reconciled against medications ordered at time of hospital discharge: Yes        Objective:    BP 96/68 (Site: Left Upper Arm, Position: Sitting, Cuff Size: Medium Adult)   Pulse 61   Ht 1.575 m (5' 2\")   Wt 57.8 kg (127 lb 6.4 oz)   SpO2 98%   BMI 23.30 kg/m²     Physical Exam  Constitutional:       General: She is not in acute distress.     Appearance: Normal appearance. She is not ill-appearing.   HENT:      Head: Normocephalic and atraumatic.   Cardiovascular:      Rate and Rhythm: Normal rate and regular rhythm.   Pulmonary:      Effort: Pulmonary effort is normal. No respiratory distress.      Breath sounds: Normal breath sounds.   Skin:     Comments: Left chest wall incision / staples CDI without drainage, warmth or erythema   Right chest wall steri strips CDI    Neurological:      Mental Status: She is alert and oriented to person, place, and time. Mental status is at baseline.   Psychiatric:         Mood and Affect: Mood normal.         Behavior: Behavior normal.      An electronic signature was used to authenticate this

## 2024-12-18 NOTE — ASSESSMENT & PLAN NOTE
Chronic, stable. Left chest wall site staple are CDI without erythema, warmth or drainage. Right chest wall steristrips are CDI. Complete antibiotics as prescribed.

## 2024-12-18 NOTE — ASSESSMENT & PLAN NOTE
Chronic. Compensated. Reviewed most recent echo. Continue current medications and plan per cards.

## 2024-12-19 ENCOUNTER — TELEPHONE (OUTPATIENT)
Dept: CARDIOLOGY CLINIC | Age: 69
End: 2024-12-19

## 2024-12-19 NOTE — TELEPHONE ENCOUNTER
Home care states pt's BP 81/59 HR 62 pt has chest tightness and little pain. States on Monday 12/16 and BP was normal   Pt's weight yesterday was 127 today it is 127.    Two new meds Doxycycline 100 mg one tab every 12 hr for 14 days    Oxycodone 5 mg one tab every 6 hr as needed.    Please call to advise

## 2024-12-22 NOTE — PROGRESS NOTES
PSYCHIATRY PROGRESS NOTE      Guillaume Early  1955 08/19/20  Total time: 30 min  PCP: Iwona Yeager MD    CC:   Chief Complaint   Patient presents with    Depression         S:   Pt reports some ongoing stability in her anxiety and depression, but still having some significant ongoing symptoms. Symptoms continue to improve some depressed mood, some struggles looking at the future in a positive light, tearfulness at times, some anhedonia. She is sleeping ok and reports energy is ok. She denies any suicidal ideation. Anxiety seems to continue to be persistent throughout the day. She denies any panic attacks or palpitations associated with her anxiety. However she feels nervous and on edge often with no clear stressor. She does get exacerbation of her anxiety d/t her ex- still being around at times. Main stressor is her  still needing to live with her for a brief period, but he should be moving out soon. Her daughter also had 3 children that she asks patient to watch which she views as a source of stress and prevents her from doing what she'd like. She fantasizes about leaving everyone and living in a more remote area of [place] or [place] and feels she would be happier this way. Exacerbating factors also include headaches with sertraline, pretty persistent through the day, as well as sexual dysfunction (reduced sensation, reduced libido). THERAPY GOALS: reduce anxiety  THERAPY MODALITIES: supportive  THERAPY NOTES: normalized her reactions to her . Attempted to have patient view her past struggles as demonstrations of her strength rather than something that has contributed to her sense of being weak. Discussed mental strategies to cope with anxiety.      ROS: no headaches, vision problems, dysuria, abd pain, chest pain or SOB        Current Outpatient Medications   Medication Sig Dispense Refill    escitalopram (LEXAPRO) 10 MG tablet Take 1 tablet by mouth daily 30 tablet 1    clonazePAM (KLONOPIN) 0.5 MG tablet Take 0.5 tablets by mouth 2 times daily as needed for Anxiety for up to 30 days. Do not fill before 7/8/2020 30 tablet 1    vitamin D3 (CHOLECALCIFEROL) 25 MCG (1000 UT) TABS tablet Take 1 tablet by mouth daily 90 tablet 1    carvedilol (COREG) 6.25 MG tablet Take 0.5 tablets by mouth 2 times daily 180 tablet 0    sacubitril-valsartan (ENTRESTO) 24-26 MG per tablet Take 1/2 tablet am and 1 tablet pm 180 tablet 3    spironolactone (ALDACTONE) 25 MG tablet Take 1/2 tablet M,W,F only 30 tablet 3    torsemide (DEMADEX) 20 MG tablet Take 1 tablet by mouth as needed (for weight gain or swelling) 30 tablet 3    digoxin (LANOXIN) 125 MCG tablet TAKE 1 TABLET BY MOUTH DAILY 90 tablet 1    ciprofloxacin (CIPRO) 250 MG tablet TAKE 1 TABLET BY MOUTH AFTER SEXUAL INTERCOURSE 10 tablet 1    Omega-3 Fatty Acids (FISH OIL) 1000 MG CAPS Take 3,000 mg by mouth 2 times daily        No current facility-administered medications for this visit.         O:  Wt Readings from Last 3 Encounters:   08/19/20 145 lb (65.8 kg)   07/08/20 143 lb 8 oz (65.1 kg)   07/01/20 148 lb 3.2 oz (67.2 kg)     Temp Readings from Last 3 Encounters:   08/19/20 98 °F (36.7 °C) (Temporal)   07/01/20 96.6 °F (35.9 °C) (Temporal)   12/19/19 97.4 °F (36.3 °C) (Temporal)     BP Readings from Last 3 Encounters:   08/19/20 116/80   07/08/20 92/60   07/01/20 (!) 96/57     Pulse Readings from Last 3 Encounters:   08/19/20 86   07/08/20 60   07/01/20 60       Mental Status Exam:   Appearance well dressed and groomed, no PMA/PMR  Behavior cooperative, good eye contact  Speech    spontaneous, normal rate and normal volume  Mood  \" ok \"  Affect appears dysphoric, constricted, tearful at times  Thought Process    linear, goal directed and coherent  Thought Content    intact , future oriented, no suicidal ideation  Associations    logical connections  Attention/Concentration    intact  Memory    recent and remote memory interference as defined by Roche. Value may be falsely increased. Suggest recollection if clinically  indicated.  Chloride 06/28/2020 98* 99 - 110 mmol/L Final    CO2 06/28/2020 20* 21 - 32 mmol/L Final    Anion Gap 06/28/2020 14  3 - 16 Final    Glucose 06/28/2020 198* 70 - 99 mg/dL Final    BUN 06/28/2020 22* 7 - 20 mg/dL Final    CREATININE 06/28/2020 0.9  0.6 - 1.2 mg/dL Final    GFR Non- 06/28/2020 >60  >60 Final    Comment: >60 mL/min/1.73m2 EGFR, calc. for ages 25 and older using the  MDRD formula (not corrected for weight), is valid for stable  renal function.  GFR  06/28/2020 >60  >60 Final    Comment: Chronic Kidney Disease: less than 60 ml/min/1.73 sq.m. Kidney Failure: less than 15 ml/min/1.73 sq.m. Results valid for patients 18 years and older.  Calcium 06/28/2020 9.9  8.3 - 10.6 mg/dL Final    Troponin 06/28/2020 <0.01  <0.01 ng/mL Final    Comment: Specimen hemolysis has exceeded the interference as defined by Roche. Value may be falsely decreased. Suggest recollection if clinically  indicated. Methodology by Troponin T      Pro-BNP 06/28/2020 263* 0 - 124 pg/mL Final    Comment: Methodology by NT-proBNP    An age-independent cutoff point of 300 pg/ml has a 98%  negative predictive value excluding acute heart failure. Values exceeding the age-related cutoff values (450 pg/mL if  age<50, 900 if 50-75 and 1800 if >75) has 90% sensitivity and  84% specificity for diagnosing acute HF. In patients with  renal compromise (eGFR<60) values greater than 1200pg/ml have  a diagnostic sensitivity and specificity of 89% and 72% for  acute HF.       Troponin 06/28/2020 <0.01  <0.01 ng/mL Final    Methodology by Troponin T    Troponin 06/28/2020 <0.01  <0.01 ng/mL Final    Methodology by Troponin T    Troponin 06/29/2020 <0.01  <0.01 ng/mL Final    Methodology by Troponin T    Potassium 06/28/2020 3.6  3.5 - 5.1 mmol/L Final    Cholesterol, Total 06/29/2020 189  0 - 199 mg/dL Final    Triglycerides 06/29/2020 210* 0 - 150 mg/dL Final    HDL 06/29/2020 31* 40 - 60 mg/dL Final    LDL Calculated 06/29/2020 116* <100 mg/dL Final    VLDL Cholesterol Calculated 06/29/2020 42  Not Established mg/dL Final    Sodium 06/29/2020 134* 136 - 145 mmol/L Final    Potassium reflex Magnesium 06/29/2020 3.8  3.5 - 5.1 mmol/L Final    Chloride 06/29/2020 102  99 - 110 mmol/L Final    CO2 06/29/2020 21  21 - 32 mmol/L Final    Anion Gap 06/29/2020 11  3 - 16 Final    Glucose 06/29/2020 125* 70 - 99 mg/dL Final    BUN 06/29/2020 29* 7 - 20 mg/dL Final    CREATININE 06/29/2020 1.0  0.6 - 1.2 mg/dL Final    GFR Non- 06/29/2020 55* >60 Final    Comment: >60 mL/min/1.73m2 EGFR, calc. for ages 25 and older using the  MDRD formula (not corrected for weight), is valid for stable  renal function.  GFR  06/29/2020 >60  >60 Final    Comment: Chronic Kidney Disease: less than 60 ml/min/1.73 sq.m. Kidney Failure: less than 15 ml/min/1.73 sq.m. Results valid for patients 18 years and older.       Calcium 06/29/2020 9.2  8.3 - 10.6 mg/dL Final    WBC 06/29/2020 6.2  4.0 - 11.0 K/uL Final    RBC 06/29/2020 4.37  4.00 - 5.20 M/uL Final    Hemoglobin 06/29/2020 13.4  12.0 - 16.0 g/dL Final    Hematocrit 06/29/2020 39.9  36.0 - 48.0 % Final    MCV 06/29/2020 91.3  80.0 - 100.0 fL Final    MCH 06/29/2020 30.7  26.0 - 34.0 pg Final    MCHC 06/29/2020 33.6  31.0 - 36.0 g/dL Final    RDW 06/29/2020 13.2  12.4 - 15.4 % Final    Platelets 92/93/5145 124* 135 - 450 K/uL Final    MPV 06/29/2020 10.1  5.0 - 10.5 fL Final    Neutrophils % 06/29/2020 49.9  % Final    Lymphocytes % 06/29/2020 39.0  % Final    Monocytes % 06/29/2020 7.4  % Final    Eosinophils % 06/29/2020 3.0  % Final    Basophils % 06/29/2020 0.7  % Final    Neutrophils Absolute 06/29/2020 3.1  1.7 - 7.7 K/uL Final    Lymphocytes Absolute 06/29/2020 2.4  1.0 - 5.1 K/uL Final    Monocytes Absolute 06/29/2020 0.5  0.0 - 1.3 K/uL Final    Eosinophils Absolute 06/29/2020 0.2  0.0 - 0.6 K/uL Final    Basophils Absolute 06/29/2020 0.0  0.0 - 0.2 K/uL Final    POC Glucose 06/29/2020 118* 70 - 99 mg/dl Final    Performed on 06/29/2020 ACCU-CHEK   Final    Total Protein 06/29/2020 6.0* 6.4 - 8.2 g/dL Final    Alb 06/29/2020 3.6  3.4 - 5.0 g/dL Final    Alkaline Phosphatase 06/29/2020 46  40 - 129 U/L Final    ALT 06/29/2020 14  10 - 40 U/L Final    AST 06/29/2020 17  15 - 37 U/L Final    Total Bilirubin 06/29/2020 0.5  0.0 - 1.0 mg/dL Final    Bilirubin, Direct 06/29/2020 <0.2  0.0 - 0.3 mg/dL Final    Bilirubin, Indirect 06/29/2020 see below  0.0 - 1.0 mg/dL Final    Comment: Indirect Bilirubin cannot be calculated since Total Bilirubin  and/or Direct Bilirubin is below measurable range.  Amylase 06/29/2020 52  25 - 115 U/L Final    Lipase 06/29/2020 27.0  13.0 - 60.0 U/L Final    POC Glucose 06/29/2020 106* 70 - 99 mg/dl Final    Performed on 06/29/2020 ACCU-CHEK   Final    POC Glucose 06/29/2020 110* 70 - 99 mg/dl Final    Performed on 06/29/2020 ACCU-CHEK   Final    Sodium 06/30/2020 135* 136 - 145 mmol/L Final    Potassium 06/30/2020 4.2  3.5 - 5.1 mmol/L Final    Chloride 06/30/2020 103  99 - 110 mmol/L Final    CO2 06/30/2020 22  21 - 32 mmol/L Final    Anion Gap 06/30/2020 10  3 - 16 Final    Glucose 06/30/2020 114* 70 - 99 mg/dL Final    BUN 06/30/2020 27* 7 - 20 mg/dL Final    CREATININE 06/30/2020 0.9  0.6 - 1.2 mg/dL Final    GFR Non- 06/30/2020 >60  >60 Final    Comment: >60 mL/min/1.73m2 EGFR, calc. for ages 25 and older using the  MDRD formula (not corrected for weight), is valid for stable  renal function.  GFR  06/30/2020 >60  >60 Final    Comment: Chronic Kidney Disease: less than 60 ml/min/1.73 sq.m. Kidney Failure: less than 15 ml/min/1.73 sq.m.   Results valid for patients 18 years and older.  Calcium 06/30/2020 9.0  8.3 - 10.6 mg/dL Final    WBC 06/30/2020 5.8  4.0 - 11.0 K/uL Final    RBC 06/30/2020 4.29  4.00 - 5.20 M/uL Final    Hemoglobin 06/30/2020 13.1  12.0 - 16.0 g/dL Final    Hematocrit 06/30/2020 39.1  36.0 - 48.0 % Final    MCV 06/30/2020 91.2  80.0 - 100.0 fL Final    MCH 06/30/2020 30.6  26.0 - 34.0 pg Final    MCHC 06/30/2020 33.6  31.0 - 36.0 g/dL Final    RDW 06/30/2020 12.9  12.4 - 15.4 % Final    Platelets 97/38/6548 127* 135 - 450 K/uL Final    MPV 06/30/2020 10.6* 5.0 - 10.5 fL Final    POC Glucose 06/30/2020 123* 70 - 99 mg/dl Final    Performed on 06/30/2020 ACCU-CHEK   Final    Left Ventricular Ejection Fraction 06/30/2020 25  % Final    LEFT VENTRICULAR EJECTION FRACTION* 06/30/2020 Cardiac Cath   Final    Sodium 07/01/2020 136  136 - 145 mmol/L Final    Potassium 07/01/2020 4.3  3.5 - 5.1 mmol/L Final    Chloride 07/01/2020 104  99 - 110 mmol/L Final    CO2 07/01/2020 23  21 - 32 mmol/L Final    Anion Gap 07/01/2020 9  3 - 16 Final    Glucose 07/01/2020 112* 70 - 99 mg/dL Final    BUN 07/01/2020 21* 7 - 20 mg/dL Final    CREATININE 07/01/2020 0.9  0.6 - 1.2 mg/dL Final    GFR Non- 07/01/2020 >60  >60 Final    Comment: >60 mL/min/1.73m2 EGFR, calc. for ages 25 and older using the  MDRD formula (not corrected for weight), is valid for stable  renal function.  GFR  07/01/2020 >60  >60 Final    Comment: Chronic Kidney Disease: less than 60 ml/min/1.73 sq.m. Kidney Failure: less than 15 ml/min/1.73 sq.m. Results valid for patients 18 years and older.  Calcium 07/01/2020 8.8  8.3 - 10.6 mg/dL Final         A:  Continues to be doing better since leaving the hospital. Likely things will improve as there is more separation from ex- (when he moves out of the home). Having tolerability issues with sertraline. 1. MDD recurrent, moderate with anxious distress  2.  Trauma and stressor related disorder      P:   1. Continue clonazepam 0.25mg BID (generally only using at nighttime)  2. Reduce sertraline to 25mg daily for 1 week, then stop. 3. Start lexapro 5mg daily for 1 week, then increase to 10mg daily  4. Will work on tapering off the clonazepam in the future. I spent 18 min on psychotherapy with the patient    Follow-up: RTC in 4-6 weeks  Safety: RF include age, chronic medical issues, mood disorder, anxiety, interpersonal relationship stressors / .  Pt is moderate risk of future dangerousness to self or others, however at this time pt denies SI/HI, is future oriented, has reasonable follow up plan, and is not an imminent safety risk to self or others at this time.         Nereida Fowler MD  Psychiatrist 22-Dec-2024 21:15

## 2024-12-31 ENCOUNTER — NURSE ONLY (OUTPATIENT)
Dept: CARDIOLOGY CLINIC | Age: 69
End: 2024-12-31

## 2024-12-31 DIAGNOSIS — T82.7XXD: Primary | ICD-10-CM

## 2024-12-31 NOTE — PROGRESS NOTES
MXA in to see site. Cleansed site, removed staples, and denied discharge. MXA recommended referral for wound care clinic.

## 2025-01-02 ENCOUNTER — NURSE ONLY (OUTPATIENT)
Dept: CARDIOLOGY CLINIC | Age: 70
End: 2025-01-02

## 2025-01-02 ENCOUNTER — TELEPHONE (OUTPATIENT)
Dept: CARDIOLOGY CLINIC | Age: 70
End: 2025-01-02

## 2025-01-02 NOTE — TELEPHONE ENCOUNTER
Pt called in letting MXA know since removing pacemaker pt wound is leaking watery bloody fluid. She experiences dizziness and headaches but shows no sign of CP or SOB   Please call and advise next steps   Thank you

## 2025-01-02 NOTE — TELEPHONE ENCOUNTER
Pt started having watery, clear drainage with scant blood from left chest wound (from small hole below actually incision from prior ASHLEE drain). No s/s of infection. No fevers, chills, redness, or discolored drainage.    Discussed with Dr. Hernandez. Picture placed in chart.    Start doxycycline BID for 10 days. Message sent to Dr. Barrett regarding need for follow up. She has wound care visit next week. Instructed to call us if any s/s of infection    SHAMEKA Lee-CNP

## 2025-01-02 NOTE — TELEPHONE ENCOUNTER
Spoke with the patient and she states , device has been draining for the past 2 days. She describes drainage as \"pink\". Admits to some discomfort 2-3/10 . Reports the headache has been ongoing since last week. She is unable to check blood pressure. She is coming in this afternoon for site check.     She has an appointment on Wednesday with wound care clinic.

## 2025-01-03 ENCOUNTER — HOSPITAL ENCOUNTER (OUTPATIENT)
Age: 70
Discharge: HOME | End: 2025-01-03
Payer: COMMERCIAL

## 2025-01-03 ENCOUNTER — OFFICE VISIT (OUTPATIENT)
Dept: CARDIOLOGY CLINIC | Age: 70
End: 2025-01-03

## 2025-01-03 VITALS
HEART RATE: 66 BPM | BODY MASS INDEX: 23.55 KG/M2 | WEIGHT: 128 LBS | OXYGEN SATURATION: 96 % | SYSTOLIC BLOOD PRESSURE: 98 MMHG | HEIGHT: 62 IN | DIASTOLIC BLOOD PRESSURE: 60 MMHG

## 2025-01-03 DIAGNOSIS — E78.5 HYPERLIPIDEMIA, UNSPECIFIED HYPERLIPIDEMIA TYPE: ICD-10-CM

## 2025-01-03 DIAGNOSIS — I50.22 CHRONIC SYSTOLIC HEART FAILURE (HCC): ICD-10-CM

## 2025-01-03 DIAGNOSIS — I42.8 NON-ISCHEMIC CARDIOMYOPATHY (HCC): ICD-10-CM

## 2025-01-03 DIAGNOSIS — I50.22 CHRONIC SYSTOLIC HEART FAILURE (HCC): Primary | ICD-10-CM

## 2025-01-03 DIAGNOSIS — I48.0 PAF (PAROXYSMAL ATRIAL FIBRILLATION) (HCC): ICD-10-CM

## 2025-01-03 DIAGNOSIS — Z95.810 BIVENTRICULAR ICD (IMPLANTABLE CARDIOVERTER-DEFIBRILLATOR) IN PLACE: ICD-10-CM

## 2025-01-03 LAB
ANION GAP SERPL CALCULATED.3IONS-SCNC: 14 MMOL/L (ref 3–16)
BUN SERPL-MCNC: 27 MG/DL (ref 7–20)
CALCIUM SERPL-MCNC: 9.4 MG/DL (ref 8.3–10.6)
CHLORIDE SERPL-SCNC: 99 MMOL/L (ref 99–110)
CO2 SERPL-SCNC: 22 MMOL/L (ref 21–32)
CREAT SERPL-MCNC: 0.6 MG/DL (ref 0.6–1.2)
DEPRECATED RDW RBC AUTO: 14.7 % (ref 12.4–15.4)
GFR SERPLBLD CREATININE-BSD FMLA CKD-EPI: >90 ML/MIN/{1.73_M2}
GLUCOSE SERPL-MCNC: 115 MG/DL (ref 70–99)
HCT VFR BLD AUTO: 42.7 % (ref 36–48)
HGB BLD-MCNC: 13.8 G/DL (ref 12–16)
MCH RBC QN AUTO: 29.4 PG (ref 26–34)
MCHC RBC AUTO-ENTMCNC: 32.3 G/DL (ref 31–36)
MCV RBC AUTO: 91 FL (ref 80–100)
NT-PROBNP SERPL-MCNC: 241 PG/ML (ref 0–124)
PLATELET # BLD AUTO: 156 K/UL (ref 135–450)
PMV BLD AUTO: 10.6 FL (ref 5–10.5)
POTASSIUM SERPL-SCNC: 4.1 MMOL/L (ref 3.5–5.1)
RBC # BLD AUTO: 4.69 M/UL (ref 4–5.2)
SODIUM SERPL-SCNC: 135 MMOL/L (ref 136–145)
WBC # BLD AUTO: 6.4 K/UL (ref 4–11)

## 2025-01-03 PROCEDURE — 83880 ASSAY OF NATRIURETIC PEPTIDE: CPT

## 2025-01-03 PROCEDURE — 36415 COLL VENOUS BLD VENIPUNCTURE: CPT

## 2025-01-03 PROCEDURE — 80048 BASIC METABOLIC PNL TOTAL CA: CPT

## 2025-01-03 PROCEDURE — 85027 COMPLETE CBC AUTOMATED: CPT

## 2025-01-03 RX ORDER — SACUBITRIL AND VALSARTAN 24; 26 MG/1; MG/1
TABLET, FILM COATED ORAL
Qty: 180 TABLET | Refills: 1 | Status: SHIPPED | OUTPATIENT
Start: 2025-01-03

## 2025-01-03 NOTE — PATIENT INSTRUCTIONS
Blood work today  Continue all current medications  Refilled entresto  Make an appt with Dr Arnold ALAMO in march

## 2025-01-03 NOTE — PROGRESS NOTES
[facility]  Progress Note    Primary Care Doctor:  Rebekah Carson, APRN - CNP    Chief Complaint   Patient presents with    Congestive Heart Failure     No cardiac symptoms at this time.    Cardiomyopathy    Hyperlipidemia    Hypertension        History of Present Illness:  69 y.o. female with history of NICM, BiV ICD  and , AF (not on anticoagulation, follows with Dr Gusman), ESCOBAR on cpap, fibromyalgia, sHF on entresto since , followed at   - for chest pain, LHC done, EGD with H pylori neg and anxiety/depression (family issues) seen by psychiatry and started on klonipin/zoloft.  ICD generator change 2022, ICD removed left and changed to right 2024    I had the pleasure of seeing Tess Atkinson in follow up for systolic heart failure.  She is ambulatory and with her .  She had her ICD removed on the left and is now on the right side.  She noticed some drainage from the old ICD site and was started on oral antibiotics.  She has not heard from ID regarding an appt yet.  She does not have a headache but has some dizziness (less).  Her blood sugars are averaging in the 120s.  Her optival is just starting to gather information.  No recent blood work.    Past Medical History:   has a past medical history of Atrial fibrillation (HCC), Cardiomyopathy, nonischemic (HCC), CHF (congestive heart failure) (HCC), Dyspnea on exertion, Fibromyalgia, GERD (gastroesophageal reflux disease), Heart disease, HSV-1 (herpes simplex virus 1) infection, Hyperkalemia, Hyperlipidemia, ICD (implantable cardioverter-defibrillator) battery depletion, Sleep apnea, Type 2 diabetes mellitus with other specified complication (HCC), and Uterine fibroids affecting pregnancy.  Surgical History:   has a past surgical history that includes  section; Pacemaker insertion (); Pacemaker insertion; Upper gastrointestinal endoscopy (2015); Colonoscopy (2015); Colonoscopy (N/A, 2019);

## 2025-01-07 NOTE — PATIENT INSTRUCTIONS
[facility]  [address]   [place], [place] [address]  Telephone: [phone]     FAX [phone]    Discharge Instructions    Important reminders:    **If you have any signs and symptoms of illness (Cough, fever, congestion, nausea, vomiting, diarrhea, etc.) please call the wound care center prior to your appointment.    1. Increase Protein intake for optimal wound healing  2. No added salt to reduce any swelling  3. If diabetic, maintain good glucose control  4. If you smoke, smoking prohibits wound healing, we ask that you refrain from smoking.  5. When taking antibiotics take the entire prescription as ordered. Do not stop taking until medication is all gone unless otherwise instructed.   6. Exercise as tolerated.   7. Keep weight off wounds and reposition every 2 hours if applicable.  8. If wound(s) is on your lower extremity, elevate legs to the level of the heart or above for 30 minutes 4-5 times a day and/or when sitting. Avoid standing for long periods of time.   9. Do not get wounds wet in bath or shower unless otherwise instructed by your physician. If your wound is on your foot or leg, you may purchase a cast bag. Please ask at the pharmacy.      If Vascular testing is ordered, please call [phone] to schedule.    Vascular tests ordered by Wound Care Physicians may take up to 2 hours to complete. Please keep that in mind when scheduling.     If Vascular testing is scheduled, please bring supplies to replace your dressing after testing is done. The vascular department does not stock supplies.     Wound: Left chest    With each dressing change, rinse wounds with 0.9% Saline. (May use wound wash or soft contact solution. Both can be purchased at a local drug store). If unable to obtain saline, may use a gentle soap and water.    Dressing care: gauze and tape    Home Care Agency/Facility:     Your wound-care supplies will be provided by:   Please note, depending on your

## 2025-01-08 ENCOUNTER — HOSPITAL ENCOUNTER (OUTPATIENT)
Dept: WOUND CARE | Age: 70
Discharge: HOME | End: 2025-01-08
Attending: SURGERY
Payer: COMMERCIAL

## 2025-01-08 VITALS — RESPIRATION RATE: 15 BRPM | SYSTOLIC BLOOD PRESSURE: 92 MMHG | DIASTOLIC BLOOD PRESSURE: 56 MMHG | HEART RATE: 70 BPM

## 2025-01-08 PROCEDURE — 99213 OFFICE O/P EST LOW 20 MIN: CPT

## 2025-01-08 PROCEDURE — 11042 DBRDMT SUBQ TIS 1ST 20SQCM/<: CPT | Performed by: SURGERY

## 2025-01-08 PROCEDURE — 11042 DBRDMT SUBQ TIS 1ST 20SQCM/<: CPT

## 2025-01-08 ASSESSMENT — PAIN SCALES - WONG BAKER: WONGBAKER_NUMERICALRESPONSE: NO HURT

## 2025-01-08 ASSESSMENT — PAIN DESCRIPTION - LOCATION: LOCATION: CHEST

## 2025-01-08 ASSESSMENT — PAIN SCALES - GENERAL: PAINLEVEL_OUTOF10: 1

## 2025-01-08 ASSESSMENT — PAIN DESCRIPTION - ORIENTATION: ORIENTATION: LEFT

## 2025-01-08 NOTE — PROGRESS NOTES
and water.    Dressing care: gauze and tape    Home Care Agency/Facility:     Your wound-care supplies will be provided by:   Please note, depending on your insurance coverage, you may have out-of-pocket expenses for these supplies. Someone from the company should call you to confirm your order and discuss those potential costs before they ship your products -- please anticipate that call. If your out-of-pocket cost could be substantial, Many companies have financial hardship programs for patients who qualify, so please ask about that if you might need a hand. If you have any questions about your supplies or your potential out-of-pocket costs, or if you need to place an order for a refill of supplies (typically monthly), please call the company directly.     Your  is Nieves Mckinney in 2 weeks if wound not healed to schedule with Dr Zepeda in the wound care center.       Wound Care Center Information: Should you experience any significant changes in your wound(s) or have questions about your wound care, please contact the [facility] at [phone] Monday  - Thursday 8:00 am - 4:00 pm and Friday 8:00 am - 1:00pm. If you need help with your wound outside these hours and cannot wait until we are again available, contact your PCP or go to the hospital emergency room.     PLEASE NOTE: IF YOU ARE UNABLE TO OBTAIN WOUND SUPPLIES, CONTINUE TO USE THE SUPPLIES YOU HAVE AVAILABLE UNTIL YOU ARE ABLE TO REACH US. IT IS MOST IMPORTANT TO KEEP THE WOUND COVERED AT ALL TIMES.    Patient Experience    Thank you for trusting us with your care.  You may receive a survey from a company called [employer] asking for your feedback.  We would appreciate it if you took a few minutes to share your experience.  Your input is very valuable to us.            Jorge Zepeda MD, FACS  1/8/2025  2:49 PM

## 2025-01-08 NOTE — PLAN OF CARE
Discharge instructions given.  Patient verbalized understanding.  Return to [facility] in 2 week(s) if needed.

## 2025-01-23 ENCOUNTER — OFFICE VISIT (OUTPATIENT)
Dept: INFECTIOUS DISEASES | Age: 70
End: 2025-01-23
Payer: COMMERCIAL

## 2025-01-23 VITALS
SYSTOLIC BLOOD PRESSURE: 96 MMHG | HEART RATE: 81 BPM | BODY MASS INDEX: 24.29 KG/M2 | DIASTOLIC BLOOD PRESSURE: 55 MMHG | HEIGHT: 62 IN | OXYGEN SATURATION: 95 % | WEIGHT: 132 LBS

## 2025-01-23 DIAGNOSIS — I10 ESSENTIAL HYPERTENSION: ICD-10-CM

## 2025-01-23 DIAGNOSIS — T82.7XXD: ICD-10-CM

## 2025-01-23 DIAGNOSIS — K21.00 GASTROESOPHAGEAL REFLUX DISEASE WITH ESOPHAGITIS WITHOUT HEMORRHAGE: Chronic | ICD-10-CM

## 2025-01-23 DIAGNOSIS — I42.0 DILATED CARDIOMYOPATHY (HCC): ICD-10-CM

## 2025-01-23 DIAGNOSIS — A49.8 STAPHYLOCOCCUS EPIDERMIDIS INFECTION: ICD-10-CM

## 2025-01-23 DIAGNOSIS — G47.33 OSA ON CPAP: ICD-10-CM

## 2025-01-23 DIAGNOSIS — I48.0 PAF (PAROXYSMAL ATRIAL FIBRILLATION) (HCC): ICD-10-CM

## 2025-01-23 DIAGNOSIS — Z51.81 THERAPEUTIC DRUG MONITORING: Primary | ICD-10-CM

## 2025-01-23 DIAGNOSIS — G89.4 CHRONIC PAIN SYNDROME: ICD-10-CM

## 2025-01-23 PROCEDURE — G8427 DOCREV CUR MEDS BY ELIG CLIN: HCPCS | Performed by: INTERNAL MEDICINE

## 2025-01-23 PROCEDURE — 1090F PRES/ABSN URINE INCON ASSESS: CPT | Performed by: INTERNAL MEDICINE

## 2025-01-23 PROCEDURE — G8400 PT W/DXA NO RESULTS DOC: HCPCS | Performed by: INTERNAL MEDICINE

## 2025-01-23 PROCEDURE — 3074F SYST BP LT 130 MM HG: CPT | Performed by: INTERNAL MEDICINE

## 2025-01-23 PROCEDURE — 99214 OFFICE O/P EST MOD 30 MIN: CPT | Performed by: INTERNAL MEDICINE

## 2025-01-23 PROCEDURE — 3017F COLORECTAL CA SCREEN DOC REV: CPT | Performed by: INTERNAL MEDICINE

## 2025-01-23 PROCEDURE — 1123F ACP DISCUSS/DSCN MKR DOCD: CPT | Performed by: INTERNAL MEDICINE

## 2025-01-23 PROCEDURE — 1036F TOBACCO NON-USER: CPT | Performed by: INTERNAL MEDICINE

## 2025-01-23 PROCEDURE — 3078F DIAST BP <80 MM HG: CPT | Performed by: INTERNAL MEDICINE

## 2025-01-23 PROCEDURE — G8420 CALC BMI NORM PARAMETERS: HCPCS | Performed by: INTERNAL MEDICINE

## 2025-01-23 RX ORDER — DOXYCYCLINE HYCLATE 100 MG
100 TABLET ORAL 2 TIMES DAILY
Qty: 42 TABLET | Refills: 0 | Status: SHIPPED | OUTPATIENT
Start: 2025-01-23 | End: 2025-02-13

## 2025-01-23 ASSESSMENT — ENCOUNTER SYMPTOMS
COUGH: 0
BACK PAIN: 0
SORE THROAT: 0
SINUS PRESSURE: 0
NAUSEA: 0
DIARRHEA: 0
EYE REDNESS: 0
CONSTIPATION: 0
ABDOMINAL PAIN: 0
SHORTNESS OF BREATH: 0
WHEEZING: 0
RHINORRHEA: 0
SINUS PAIN: 0
EYE DISCHARGE: 0

## 2025-01-23 NOTE — PROGRESS NOTES
Infectious Diseases Oupatient Follow-up Note            Reason for Visit:               Follow-up for pacemaker infection  Primary Care Physician:  Rebekah Carson, SHAMEKA - CNP  History Obtained From:   Patient , Medical Records     CHIEF COMPLAINT:    Chief Complaint   Patient presents with    Follow-up     Pacemaker infection follow up       INTERVAL HISTORY: Tess Atkinson is a 69 y.o. pleasant female patient, who had an outpatient visit with me today for follow-up.    History was obtained from chart review and the patient. The patient had a in-person clinic visit with me today for above mentioned complaints.    The patient was hospitalized last month for pacemaker pocket infection.  She underwent AICD extraction surgery on 12/9/2024.  She had a prior AICD generator exchange in September 2022 for that and had left ventricular lead in place since 2005    Her AICD site wound culture remained negative and the Gram stain showed gram-positive cocci.    The patient was treated in the hospital with IV vancomycin and oral doxycycline.  Since the cultures were negative, started on IV vancomycin and recommended doxycycline 100 mg every 12 hour for 2 weeks at discharge.    The patient comes in today for a follow-up.      Past Medical History:   Past medical and surgical history was reviewed by me during this visit in detail.    Past Medical History:   Diagnosis Date    Atrial fibrillation (HCC)     Cardiomyopathy, nonischemic (HCC) 1997    CHF (congestive heart failure) (HCC)     Dyspnea on exertion 06/28/2020    Fibromyalgia     GERD (gastroesophageal reflux disease)     Heart disease     HSV-1 (herpes simplex virus 1) infection     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    Hyperkalemia 06/28/2020    Hyperlipidemia     ICD (implantable cardioverter-defibrillator) battery depletion 09/16/2022    Sleep apnea     uses cpap    Type 2 diabetes mellitus with other specified complication (HCC) 12/05/2024    Uterine fibroids

## 2025-02-03 ENCOUNTER — OFFICE VISIT (OUTPATIENT)
Dept: PULMONOLOGY | Age: 70
End: 2025-02-03
Payer: COMMERCIAL

## 2025-02-03 VITALS
WEIGHT: 130 LBS | HEIGHT: 62 IN | DIASTOLIC BLOOD PRESSURE: 64 MMHG | HEART RATE: 92 BPM | SYSTOLIC BLOOD PRESSURE: 106 MMHG | BODY MASS INDEX: 23.92 KG/M2 | OXYGEN SATURATION: 96 %

## 2025-02-03 DIAGNOSIS — I10 ESSENTIAL HYPERTENSION: ICD-10-CM

## 2025-02-03 DIAGNOSIS — I50.22 CHRONIC SYSTOLIC HEART FAILURE (HCC): Chronic | ICD-10-CM

## 2025-02-03 DIAGNOSIS — G47.33 OBSTRUCTIVE SLEEP APNEA: Primary | ICD-10-CM

## 2025-02-03 PROCEDURE — G2211 COMPLEX E/M VISIT ADD ON: HCPCS | Performed by: NURSE PRACTITIONER

## 2025-02-03 PROCEDURE — G8427 DOCREV CUR MEDS BY ELIG CLIN: HCPCS | Performed by: NURSE PRACTITIONER

## 2025-02-03 PROCEDURE — 3078F DIAST BP <80 MM HG: CPT | Performed by: NURSE PRACTITIONER

## 2025-02-03 PROCEDURE — 3074F SYST BP LT 130 MM HG: CPT | Performed by: NURSE PRACTITIONER

## 2025-02-03 PROCEDURE — G8420 CALC BMI NORM PARAMETERS: HCPCS | Performed by: NURSE PRACTITIONER

## 2025-02-03 PROCEDURE — 1036F TOBACCO NON-USER: CPT | Performed by: NURSE PRACTITIONER

## 2025-02-03 PROCEDURE — 99214 OFFICE O/P EST MOD 30 MIN: CPT | Performed by: NURSE PRACTITIONER

## 2025-02-03 PROCEDURE — 3017F COLORECTAL CA SCREEN DOC REV: CPT | Performed by: NURSE PRACTITIONER

## 2025-02-03 PROCEDURE — 1090F PRES/ABSN URINE INCON ASSESS: CPT | Performed by: NURSE PRACTITIONER

## 2025-02-03 PROCEDURE — G8400 PT W/DXA NO RESULTS DOC: HCPCS | Performed by: NURSE PRACTITIONER

## 2025-02-03 PROCEDURE — 1123F ACP DISCUSS/DSCN MKR DOCD: CPT | Performed by: NURSE PRACTITIONER

## 2025-02-03 ASSESSMENT — SLEEP AND FATIGUE QUESTIONNAIRES
ESS TOTAL SCORE: 3
HOW LIKELY ARE YOU TO NOD OFF OR FALL ASLEEP WHILE WATCHING TV: WOULD NEVER DOZE
HOW LIKELY ARE YOU TO NOD OFF OR FALL ASLEEP WHILE LYING DOWN TO REST IN THE AFTERNOON WHEN CIRCUMSTANCES PERMIT: SLIGHT CHANCE OF DOZING
HOW LIKELY ARE YOU TO NOD OFF OR FALL ASLEEP WHILE SITTING INACTIVE IN A PUBLIC PLACE: WOULD NEVER DOZE
HOW LIKELY ARE YOU TO NOD OFF OR FALL ASLEEP IN A CAR, WHILE STOPPED FOR A FEW MINUTES IN TRAFFIC: WOULD NEVER DOZE
HOW LIKELY ARE YOU TO NOD OFF OR FALL ASLEEP WHEN YOU ARE A PASSENGER IN A CAR FOR AN HOUR WITHOUT A BREAK: WOULD NEVER DOZE
HOW LIKELY ARE YOU TO NOD OFF OR FALL ASLEEP WHILE SITTING AND READING: SLIGHT CHANCE OF DOZING
HOW LIKELY ARE YOU TO NOD OFF OR FALL ASLEEP WHILE SITTING AND TALKING TO SOMEONE: SLIGHT CHANCE OF DOZING
HOW LIKELY ARE YOU TO NOD OFF OR FALL ASLEEP WHILE SITTING QUIETLY AFTER LUNCH WITHOUT ALCOHOL: WOULD NEVER DOZE

## 2025-02-03 NOTE — ASSESSMENT & PLAN NOTE
Chronic - With progression/exacerbation: Reviewed and analyzed results of physiologic download from patient's machine and reviewed with patients. Supplies and parts as needed for the machine. These are medically necessary. Limit caffeine use after 3 PM. Based on the analyzed data, we will make the following change: Ramp pressure 7 for comfort. Discussed trial of different styles of masks for comfort. Can consider trial of AirTouch N20 for comfort, or other styles of masks such as a nasal cushion mask. Reviewed a few options and website information was provided so she can browse different styles of masks available. Will send a prescription for mask fitting. Recommended in office mask fitting at her DME to ensure proper fit of the mask. Advised her not use the masks with magnets as she has a pacemaker. She will work on consistent use of the machine and will see if she needs pressure adjustment to help with daytime sleepiness. She will return in 3 mo for follow up. Instructed her to return sooner or contact the office if experiences new or worsening of symptoms. Encouraged her to use her machine each night, all night.     Discussed the importance of consistence use of the machine and encouraged consistent use of the machine each night. Also discussed the importance of treating Obstructive Sleep Apnea from a physiological standpoint. Instructed not to drive unless had 4 hours of effective therapy for ESCOBAR the night before. Did review the risks of under or untreated ESCOBAR including, but not limited to, higher risks of motor vehicle accidents, stroke, heart attacks, and death. Patients verbalized understanding and accepts all these risks.

## 2025-02-03 NOTE — PROGRESS NOTES
Diagnosis: [x] ESCOBAR (G47.33) [] CSA (G47.31) [] Apnea (G47.30)   Length of Need: [x] 18 Months [] 99 Months [] Other:   Machine (LINDSAY!): [] Respironics Dream Station   2   Auto [] ResMed AirSense     Auto 11 [] Other:     []  CPAP () [] Bilevel ()   Mode: [] Auto [] Spontaneous    Mode: [] Auto [] Spontaneous             Comfort Settings:      Humidifier: [] Heated ()        [x] Water chamber replacement ()/ 1 per 6 months        Mask:   [x] Nasal () /1 per 3 months [] Full Face () /1 per 3 months   [x] Patient choice -Size and fit mask  ** please schedule in office mask fitting [] Patient Choice - Size and fit mask   [] Dispense: [] Dispense:   [x] Headgear () / 1 per 3 months [] Headgear () / 1 per 3 months   [x] Replacement Nasal Cushion ()/2 per month [] Interface Replacement ()/1 per month   [x] Replacement Nasal Pillows ()/2 per month         Tubing: [x] Heated ()/1 per 3 months    [] Standard ()/1 per 3 months [] Other:           Filters: [x] Non-disposable ()/1 per 6 months     [x] Ultra-Fine, Disposable ()/2 per month        Miscellaneous: [] Chin Strap ()/ 1 per 6 months [] O2 bleed-in:        LPM   [] Oxymetry on CPAP/Bilevel [x]  Other: Modem: ()         Start Order Date: 25    MEDICAL JUSTIFICATION:  I, the undersigned, certify that the above prescribed supplies are medically necessary for this patient’s wellbeing.  In my opinion, the supplies are both reasonable and necessary in reference to accepted standards of medicalpractice in treatment of this patient’s condition.    Michelle Lang CNP    NPI: [id number]     Order Signed Date: 25    Tess Atkinson  1955  [address] [place], [place] [id number]  [phone] (home)   [phone] (mobile)      Insurance Info (confirm with patient if correct):  Payer/Plan Subscr  Sex Relation Sub. Ins. ID Effective Group Num

## 2025-02-19 RX ORDER — DIGOXIN 125 MCG
125 TABLET ORAL DAILY
Qty: 90 TABLET | Refills: 1 | Status: SHIPPED | OUTPATIENT
Start: 2025-02-19

## 2025-02-19 NOTE — TELEPHONE ENCOUNTER
Received refill request for digoxin (LANOXIN) 125 MCG tablet  from [facility] pharmacy.     Last OV: 1/3/2025 NPRG    Next OV: 3/18/2025 NPRG    Last Labs: 12/5/2024 EKG    Last Filled: 6/5/2024 NPRG

## 2025-03-12 RX ORDER — ROSUVASTATIN CALCIUM 10 MG/1
10 TABLET, COATED ORAL NIGHTLY
Qty: 90 TABLET | Refills: 1 | Status: SHIPPED | OUTPATIENT
Start: 2025-03-12

## 2025-03-13 NOTE — PROGRESS NOTES
and isometric activities.   5. Patient counseled about and offered assistance for smoking cessation   6. No indication for cardiac rehab  7. Follow up in 1-2 weeks with cardiology CHF clinic.     Echo 10-19-18  - Left ventricle: The cavity size was moderately dilated. Wall    thickness was normal. Systolic function was severely reduced. The    estimated ejection fraction was in the range of 25% to 30%. There    is significant dyssynergy between the septal and lateral walls.    Mild hypokinesis of the inferoseptal myocardium. Severe    hypokinesis of the basal-midanterolateral myocardium. Moderate    hypokinesis of the apicallateral myocardium. Akinesis of the    apicalinferior myocardium. Doppler parameters are consistent with    abnormal left ventricular relaxation (grade 1 diastolic    dysfunction).  - Ventricular septum: Septal motion showed abnormal function and    dyssynergy.  - Aortic valve: Trivial regurgitation.  - Right ventricle: The cavity size was mildly dilated. Wall    thickness was normal. Systolic function was normal by objective    interpretation. TAPSE: 2.6cm.Tricuspid annular systolic velocity:    10cm/s. A device lead is seen extending into the RV cavity.  - Right atrium: The atrium was mildly to moderately dilated. Pacer    wire or catheter noted in right atrium.  - Tricuspid valve: Mild-moderate regurgitation.  - Pericardium, extracardiac: A trivial pericardial effusion was    identified.     Impressions:  Compared to the study from 2/3/17, LV systolic is  slightly more reduced.      I independently reviewed the cardiac diagnostic studies, ECG and relevant imaging studies.     Lab Results   Component Value Date    LVEF 28 09/27/2022    LVEFMODE Cardiac Cath 06/30/2020     Lab Results   Component Value Date    ALT 26 12/12/2024    AST 32 12/12/2024     (L) 01/03/2025    K 4.1 01/03/2025    HGB 13.8 01/03/2025    HCT 42.7 01/03/2025    CL 99 01/03/2025    CREATININE 0.6 01/03/2025    BUN 27

## 2025-03-17 ASSESSMENT — PATIENT HEALTH QUESTIONNAIRE - PHQ9
6. FEELING BAD ABOUT YOURSELF - OR THAT YOU ARE A FAILURE OR HAVE LET YOURSELF OR YOUR FAMILY DOWN: NOT AT ALL
2. FEELING DOWN, DEPRESSED OR HOPELESS: NOT AT ALL
4. FEELING TIRED OR HAVING LITTLE ENERGY: SEVERAL DAYS
3. TROUBLE FALLING OR STAYING ASLEEP: SEVERAL DAYS
1. LITTLE INTEREST OR PLEASURE IN DOING THINGS: NOT AT ALL
8. MOVING OR SPEAKING SO SLOWLY THAT OTHER PEOPLE COULD HAVE NOTICED. OR THE OPPOSITE, BEING SO FIGETY OR RESTLESS THAT YOU HAVE BEEN MOVING AROUND A LOT MORE THAN USUAL: NOT AT ALL
1. LITTLE INTEREST OR PLEASURE IN DOING THINGS: NOT AT ALL
5. POOR APPETITE OR OVEREATING: NOT AT ALL
SUM OF ALL RESPONSES TO PHQ QUESTIONS 1-9: 2
4. FEELING TIRED OR HAVING LITTLE ENERGY: SEVERAL DAYS
5. POOR APPETITE OR OVEREATING: NOT AT ALL
SUM OF ALL RESPONSES TO PHQ QUESTIONS 1-9: 2
6. FEELING BAD ABOUT YOURSELF - OR THAT YOU ARE A FAILURE OR HAVE LET YOURSELF OR YOUR FAMILY DOWN: NOT AT ALL
9. THOUGHTS THAT YOU WOULD BE BETTER OFF DEAD, OR OF HURTING YOURSELF: NOT AT ALL
7. TROUBLE CONCENTRATING ON THINGS, SUCH AS READING THE NEWSPAPER OR WATCHING TELEVISION: NOT AT ALL
8. MOVING OR SPEAKING SO SLOWLY THAT OTHER PEOPLE COULD HAVE NOTICED. OR THE OPPOSITE - BEING SO FIDGETY OR RESTLESS THAT YOU HAVE BEEN MOVING AROUND A LOT MORE THAN USUAL: NOT AT ALL
10. IF YOU CHECKED OFF ANY PROBLEMS, HOW DIFFICULT HAVE THESE PROBLEMS MADE IT FOR YOU TO DO YOUR WORK, TAKE CARE OF THINGS AT HOME, OR GET ALONG WITH OTHER PEOPLE: NOT DIFFICULT AT ALL
2. FEELING DOWN, DEPRESSED OR HOPELESS: NOT AT ALL
7. TROUBLE CONCENTRATING ON THINGS, SUCH AS READING THE NEWSPAPER OR WATCHING TELEVISION: NOT AT ALL
SUM OF ALL RESPONSES TO PHQ QUESTIONS 1-9: 2
3. TROUBLE FALLING OR STAYING ASLEEP: SEVERAL DAYS
SUM OF ALL RESPONSES TO PHQ QUESTIONS 1-9: 2
SUM OF ALL RESPONSES TO PHQ QUESTIONS 1-9: 2
10. IF YOU CHECKED OFF ANY PROBLEMS, HOW DIFFICULT HAVE THESE PROBLEMS MADE IT FOR YOU TO DO YOUR WORK, TAKE CARE OF THINGS AT HOME, OR GET ALONG WITH OTHER PEOPLE: NOT DIFFICULT AT ALL
9. THOUGHTS THAT YOU WOULD BE BETTER OFF DEAD, OR OF HURTING YOURSELF: NOT AT ALL

## 2025-03-18 ENCOUNTER — OFFICE VISIT (OUTPATIENT)
Dept: CARDIOLOGY CLINIC | Age: 70
End: 2025-03-18
Payer: COMMERCIAL

## 2025-03-18 ENCOUNTER — NURSE ONLY (OUTPATIENT)
Dept: CARDIOLOGY CLINIC | Age: 70
End: 2025-03-18

## 2025-03-18 VITALS
SYSTOLIC BLOOD PRESSURE: 94 MMHG | BODY MASS INDEX: 24.37 KG/M2 | WEIGHT: 132.4 LBS | HEIGHT: 62 IN | HEART RATE: 62 BPM | DIASTOLIC BLOOD PRESSURE: 66 MMHG

## 2025-03-18 VITALS
OXYGEN SATURATION: 95 % | HEIGHT: 62 IN | BODY MASS INDEX: 24.11 KG/M2 | HEART RATE: 69 BPM | SYSTOLIC BLOOD PRESSURE: 94 MMHG | DIASTOLIC BLOOD PRESSURE: 64 MMHG | WEIGHT: 131 LBS

## 2025-03-18 DIAGNOSIS — I50.22 CHRONIC SYSTOLIC HEART FAILURE (HCC): Chronic | ICD-10-CM

## 2025-03-18 DIAGNOSIS — Z95.810 BIVENTRICULAR ICD (IMPLANTABLE CARDIOVERTER-DEFIBRILLATOR) IN PLACE: Primary | ICD-10-CM

## 2025-03-18 DIAGNOSIS — I48.0 PAF (PAROXYSMAL ATRIAL FIBRILLATION) (HCC): ICD-10-CM

## 2025-03-18 DIAGNOSIS — Z95.810 BIVENTRICULAR ICD (IMPLANTABLE CARDIOVERTER-DEFIBRILLATOR) IN PLACE: ICD-10-CM

## 2025-03-18 DIAGNOSIS — E78.5 HYPERLIPIDEMIA, UNSPECIFIED HYPERLIPIDEMIA TYPE: ICD-10-CM

## 2025-03-18 DIAGNOSIS — E55.9 VITAMIN D DEFICIENCY: ICD-10-CM

## 2025-03-18 DIAGNOSIS — I42.0 DILATED CARDIOMYOPATHY (HCC): ICD-10-CM

## 2025-03-18 DIAGNOSIS — I10 ESSENTIAL HYPERTENSION: ICD-10-CM

## 2025-03-18 DIAGNOSIS — I44.2 COMPLETE HEART BLOCK (HCC): ICD-10-CM

## 2025-03-18 DIAGNOSIS — I42.8 NON-ISCHEMIC CARDIOMYOPATHY (HCC): ICD-10-CM

## 2025-03-18 DIAGNOSIS — I42.0 DILATED CARDIOMYOPATHY (HCC): Primary | ICD-10-CM

## 2025-03-18 DIAGNOSIS — I50.22 CHRONIC SYSTOLIC CONGESTIVE HEART FAILURE (HCC): Primary | ICD-10-CM

## 2025-03-18 PROCEDURE — G8427 DOCREV CUR MEDS BY ELIG CLIN: HCPCS | Performed by: INTERNAL MEDICINE

## 2025-03-18 PROCEDURE — G8420 CALC BMI NORM PARAMETERS: HCPCS | Performed by: INTERNAL MEDICINE

## 2025-03-18 PROCEDURE — 3074F SYST BP LT 130 MM HG: CPT | Performed by: CLINICAL NURSE SPECIALIST

## 2025-03-18 PROCEDURE — G8400 PT W/DXA NO RESULTS DOC: HCPCS | Performed by: CLINICAL NURSE SPECIALIST

## 2025-03-18 PROCEDURE — 1090F PRES/ABSN URINE INCON ASSESS: CPT | Performed by: INTERNAL MEDICINE

## 2025-03-18 PROCEDURE — 3074F SYST BP LT 130 MM HG: CPT | Performed by: INTERNAL MEDICINE

## 2025-03-18 PROCEDURE — 1123F ACP DISCUSS/DSCN MKR DOCD: CPT | Performed by: CLINICAL NURSE SPECIALIST

## 2025-03-18 PROCEDURE — 3017F COLORECTAL CA SCREEN DOC REV: CPT | Performed by: CLINICAL NURSE SPECIALIST

## 2025-03-18 PROCEDURE — 3078F DIAST BP <80 MM HG: CPT | Performed by: INTERNAL MEDICINE

## 2025-03-18 PROCEDURE — 1036F TOBACCO NON-USER: CPT | Performed by: INTERNAL MEDICINE

## 2025-03-18 PROCEDURE — 99214 OFFICE O/P EST MOD 30 MIN: CPT | Performed by: INTERNAL MEDICINE

## 2025-03-18 PROCEDURE — 3017F COLORECTAL CA SCREEN DOC REV: CPT | Performed by: INTERNAL MEDICINE

## 2025-03-18 PROCEDURE — G2211 COMPLEX E/M VISIT ADD ON: HCPCS | Performed by: INTERNAL MEDICINE

## 2025-03-18 PROCEDURE — 1036F TOBACCO NON-USER: CPT | Performed by: CLINICAL NURSE SPECIALIST

## 2025-03-18 PROCEDURE — G8427 DOCREV CUR MEDS BY ELIG CLIN: HCPCS | Performed by: CLINICAL NURSE SPECIALIST

## 2025-03-18 PROCEDURE — 3078F DIAST BP <80 MM HG: CPT | Performed by: CLINICAL NURSE SPECIALIST

## 2025-03-18 PROCEDURE — 1123F ACP DISCUSS/DSCN MKR DOCD: CPT | Performed by: INTERNAL MEDICINE

## 2025-03-18 PROCEDURE — 1090F PRES/ABSN URINE INCON ASSESS: CPT | Performed by: CLINICAL NURSE SPECIALIST

## 2025-03-18 PROCEDURE — G8420 CALC BMI NORM PARAMETERS: HCPCS | Performed by: CLINICAL NURSE SPECIALIST

## 2025-03-18 PROCEDURE — G8400 PT W/DXA NO RESULTS DOC: HCPCS | Performed by: INTERNAL MEDICINE

## 2025-03-18 PROCEDURE — 99214 OFFICE O/P EST MOD 30 MIN: CPT | Performed by: CLINICAL NURSE SPECIALIST

## 2025-03-18 RX ORDER — SPIRONOLACTONE 25 MG/1
TABLET ORAL
Qty: 36 TABLET | Refills: 1 | Status: SHIPPED | OUTPATIENT
Start: 2025-03-18

## 2025-03-18 RX ORDER — CARVEDILOL 3.12 MG/1
3.12 TABLET ORAL 2 TIMES DAILY
Qty: 180 TABLET | Refills: 2 | Status: SHIPPED | OUTPATIENT
Start: 2025-03-18

## 2025-03-19 ENCOUNTER — PATIENT MESSAGE (OUTPATIENT)
Dept: INTERNAL MEDICINE CLINIC | Age: 70
End: 2025-03-19

## 2025-03-19 ENCOUNTER — OFFICE VISIT (OUTPATIENT)
Dept: INTERNAL MEDICINE CLINIC | Age: 70
End: 2025-03-19
Payer: COMMERCIAL

## 2025-03-19 ENCOUNTER — RESULTS FOLLOW-UP (OUTPATIENT)
Dept: CARDIOLOGY CLINIC | Age: 70
End: 2025-03-19

## 2025-03-19 VITALS
HEART RATE: 68 BPM | DIASTOLIC BLOOD PRESSURE: 68 MMHG | OXYGEN SATURATION: 99 % | SYSTOLIC BLOOD PRESSURE: 100 MMHG | BODY MASS INDEX: 23.74 KG/M2 | HEIGHT: 62 IN | WEIGHT: 129 LBS

## 2025-03-19 DIAGNOSIS — I42.0 DILATED CARDIOMYOPATHY (HCC): ICD-10-CM

## 2025-03-19 DIAGNOSIS — E11.65 TYPE 2 DIABETES MELLITUS WITH HYPERGLYCEMIA, WITHOUT LONG-TERM CURRENT USE OF INSULIN (HCC): Primary | ICD-10-CM

## 2025-03-19 DIAGNOSIS — Z12.11 COLON CANCER SCREENING: ICD-10-CM

## 2025-03-19 DIAGNOSIS — G47.33 OSA ON CPAP: ICD-10-CM

## 2025-03-19 DIAGNOSIS — E55.9 VITAMIN D DEFICIENCY: ICD-10-CM

## 2025-03-19 DIAGNOSIS — I50.22 CHRONIC SYSTOLIC HEART FAILURE (HCC): Chronic | ICD-10-CM

## 2025-03-19 DIAGNOSIS — I25.83 CORONARY ARTERY DISEASE DUE TO LIPID RICH PLAQUE: ICD-10-CM

## 2025-03-19 DIAGNOSIS — I50.22 CHRONIC SYSTOLIC CONGESTIVE HEART FAILURE (HCC): ICD-10-CM

## 2025-03-19 DIAGNOSIS — I44.2 COMPLETE HEART BLOCK (HCC): ICD-10-CM

## 2025-03-19 DIAGNOSIS — I10 ESSENTIAL HYPERTENSION: ICD-10-CM

## 2025-03-19 DIAGNOSIS — I25.10 CORONARY ARTERY DISEASE DUE TO LIPID RICH PLAQUE: ICD-10-CM

## 2025-03-19 DIAGNOSIS — E78.5 HYPERLIPIDEMIA, UNSPECIFIED HYPERLIPIDEMIA TYPE: ICD-10-CM

## 2025-03-19 DIAGNOSIS — N89.8 VAGINAL DRYNESS: ICD-10-CM

## 2025-03-19 DIAGNOSIS — E78.00 HYPERCHOLESTEREMIA: ICD-10-CM

## 2025-03-19 DIAGNOSIS — I48.0 PAF (PAROXYSMAL ATRIAL FIBRILLATION) (HCC): ICD-10-CM

## 2025-03-19 DIAGNOSIS — F33.1 MODERATE EPISODE OF RECURRENT MAJOR DEPRESSIVE DISORDER (HCC): Chronic | ICD-10-CM

## 2025-03-19 DIAGNOSIS — E11.65 UNCONTROLLED DIABETES MELLITUS WITH HYPERGLYCEMIA, WITHOUT LONG-TERM CURRENT USE OF INSULIN (HCC): ICD-10-CM

## 2025-03-19 PROBLEM — Z86.59 HISTORY OF SUICIDAL IDEATION: Status: ACTIVE | Noted: 2020-06-28

## 2025-03-19 PROBLEM — Z71.89 DIABETES EDUCATION, ENCOUNTER FOR: Status: RESOLVED | Noted: 2024-12-13 | Resolved: 2025-03-19

## 2025-03-19 PROBLEM — T82.7XXA: Status: RESOLVED | Noted: 2024-12-05 | Resolved: 2025-03-19

## 2025-03-19 LAB
25(OH)D3 SERPL-MCNC: 28.7 NG/ML
ALBUMIN SERPL-MCNC: 4.6 G/DL (ref 3.4–5)
ALBUMIN/GLOB SERPL: 1.6 {RATIO} (ref 1.1–2.2)
ALP SERPL-CCNC: 71 U/L (ref 40–129)
ALT SERPL-CCNC: 30 U/L (ref 10–40)
ANION GAP SERPL CALCULATED.3IONS-SCNC: 12 MMOL/L (ref 3–16)
ANION GAP SERPL CALCULATED.3IONS-SCNC: 12 MMOL/L (ref 3–16)
AST SERPL-CCNC: 28 U/L (ref 15–37)
BILIRUB SERPL-MCNC: 0.6 MG/DL (ref 0–1)
BUN SERPL-MCNC: 22 MG/DL (ref 7–20)
BUN SERPL-MCNC: 22 MG/DL (ref 7–20)
CALCIUM SERPL-MCNC: 10.2 MG/DL (ref 8.3–10.6)
CALCIUM SERPL-MCNC: 10.5 MG/DL (ref 8.3–10.6)
CHLORIDE SERPL-SCNC: 101 MMOL/L (ref 99–110)
CHLORIDE SERPL-SCNC: 103 MMOL/L (ref 99–110)
CHOLEST SERPL-MCNC: 189 MG/DL (ref 0–199)
CO2 SERPL-SCNC: 28 MMOL/L (ref 21–32)
CO2 SERPL-SCNC: 28 MMOL/L (ref 21–32)
CREAT SERPL-MCNC: 0.8 MG/DL (ref 0.6–1.2)
CREAT SERPL-MCNC: 0.9 MG/DL (ref 0.6–1.2)
CREAT UR-MCNC: 104 MG/DL (ref 28–259)
DEPRECATED RDW RBC AUTO: 15.5 % (ref 12.4–15.4)
EST. AVERAGE GLUCOSE BLD GHB EST-MCNC: 137 MG/DL
GFR SERPLBLD CREATININE-BSD FMLA CKD-EPI: 68 ML/MIN/{1.73_M2}
GFR SERPLBLD CREATININE-BSD FMLA CKD-EPI: 79 ML/MIN/{1.73_M2}
GLUCOSE SERPL-MCNC: 140 MG/DL (ref 70–99)
GLUCOSE SERPL-MCNC: 144 MG/DL (ref 70–99)
HBA1C MFR BLD: 6.4 %
HCT VFR BLD AUTO: 51.1 % (ref 36–48)
HDLC SERPL-MCNC: 49 MG/DL (ref 40–60)
HGB BLD-MCNC: 16.5 G/DL (ref 12–16)
LDLC SERPL CALC-MCNC: 105 MG/DL
MCH RBC QN AUTO: 28.9 PG (ref 26–34)
MCHC RBC AUTO-ENTMCNC: 32.3 G/DL (ref 31–36)
MCV RBC AUTO: 89.5 FL (ref 80–100)
MICROALBUMIN UR DL<=1MG/L-MCNC: 8.01 MG/DL
MICROALBUMIN/CREAT UR: 77 MG/G (ref 0–30)
NT-PROBNP SERPL-MCNC: 250 PG/ML (ref 0–124)
PLATELET # BLD AUTO: 105 K/UL (ref 135–450)
PMV BLD AUTO: 10.4 FL (ref 5–10.5)
POTASSIUM SERPL-SCNC: 4.2 MMOL/L (ref 3.5–5.1)
POTASSIUM SERPL-SCNC: 4.5 MMOL/L (ref 3.5–5.1)
PROT SERPL-MCNC: 7.5 G/DL (ref 6.4–8.2)
RBC # BLD AUTO: 5.7 M/UL (ref 4–5.2)
SODIUM SERPL-SCNC: 141 MMOL/L (ref 136–145)
SODIUM SERPL-SCNC: 143 MMOL/L (ref 136–145)
TRIGL SERPL-MCNC: 173 MG/DL (ref 0–150)
VLDLC SERPL CALC-MCNC: 35 MG/DL
WBC # BLD AUTO: 5.8 K/UL (ref 4–11)

## 2025-03-19 PROCEDURE — 3074F SYST BP LT 130 MM HG: CPT | Performed by: NURSE PRACTITIONER

## 2025-03-19 PROCEDURE — G2211 COMPLEX E/M VISIT ADD ON: HCPCS | Performed by: NURSE PRACTITIONER

## 2025-03-19 PROCEDURE — 2022F DILAT RTA XM EVC RTNOPTHY: CPT | Performed by: NURSE PRACTITIONER

## 2025-03-19 PROCEDURE — G8420 CALC BMI NORM PARAMETERS: HCPCS | Performed by: NURSE PRACTITIONER

## 2025-03-19 PROCEDURE — 1036F TOBACCO NON-USER: CPT | Performed by: NURSE PRACTITIONER

## 2025-03-19 PROCEDURE — 3046F HEMOGLOBIN A1C LEVEL >9.0%: CPT | Performed by: NURSE PRACTITIONER

## 2025-03-19 PROCEDURE — 99214 OFFICE O/P EST MOD 30 MIN: CPT | Performed by: NURSE PRACTITIONER

## 2025-03-19 PROCEDURE — 3017F COLORECTAL CA SCREEN DOC REV: CPT | Performed by: NURSE PRACTITIONER

## 2025-03-19 PROCEDURE — 1123F ACP DISCUSS/DSCN MKR DOCD: CPT | Performed by: NURSE PRACTITIONER

## 2025-03-19 PROCEDURE — 3078F DIAST BP <80 MM HG: CPT | Performed by: NURSE PRACTITIONER

## 2025-03-19 PROCEDURE — G8400 PT W/DXA NO RESULTS DOC: HCPCS | Performed by: NURSE PRACTITIONER

## 2025-03-19 PROCEDURE — 1090F PRES/ABSN URINE INCON ASSESS: CPT | Performed by: NURSE PRACTITIONER

## 2025-03-19 PROCEDURE — G8427 DOCREV CUR MEDS BY ELIG CLIN: HCPCS | Performed by: NURSE PRACTITIONER

## 2025-03-19 RX ORDER — ROSUVASTATIN CALCIUM 20 MG/1
20 TABLET, COATED ORAL NIGHTLY
Qty: 90 TABLET | Refills: 2 | Status: SHIPPED | OUTPATIENT
Start: 2025-03-19

## 2025-03-19 RX ORDER — CHOLECALCIFEROL (VITAMIN D3) 25 MCG
2000 TABLET ORAL DAILY
Qty: 90 TABLET | Refills: 0
Start: 2025-03-19 | End: 2025-03-20

## 2025-03-19 NOTE — ASSESSMENT & PLAN NOTE
Chronic, stable. Continue recommended medications and following cardiology and EP.  Most recent notes and work up reviewed. Planning for echo in May.

## 2025-03-19 NOTE — ASSESSMENT & PLAN NOTE
Underlying CHB, device dependant   Continue medications and follow up as recommended by cardiology and EP

## 2025-03-19 NOTE — PROGRESS NOTES
indications. All questions answered.    Return in about 3 months (around 6/19/2025), or if symptoms worsen or fail to improve, for DM, HTN, HLD .      Electronically signed by SHAMEKA Bassett CNP on 3/19/2025 at 11:15 AM

## 2025-03-19 NOTE — ASSESSMENT & PLAN NOTE
chronic, stable. Continue using bipap and following with sleep medicine as recommended.

## 2025-03-19 NOTE — ASSESSMENT & PLAN NOTE
Chronic, improving.  Glucose controlled per CGM. Labs complete this morning - not resulted.  Continue jardiance 0.5 tablet daily.

## 2025-03-19 NOTE — ASSESSMENT & PLAN NOTE
Chronic,  continue crestor 10mg as prescribed by cardiology.  BW complete this morning, not yet resulted.

## 2025-03-19 NOTE — PATIENT INSTRUCTIONS
Try OTC vaginal moisturizer for vaginal dryness   Use vaginal lubricant with intercourse   If not improving, follow up with Dr. De La Torre

## 2025-03-20 RX ORDER — MULTIVIT-MIN/IRON/FOLIC ACID/K 18-600-40
2000 CAPSULE ORAL DAILY
Qty: 90 CAPSULE | Refills: 2 | Status: SHIPPED | OUTPATIENT
Start: 2025-03-20

## 2025-03-20 RX ORDER — CHOLECALCIFEROL (VITAMIN D3) 25 MCG
2000 TABLET ORAL DAILY
Qty: 180 TABLET | Refills: 0 | Status: CANCELLED | OUTPATIENT
Start: 2025-03-20

## 2025-03-20 NOTE — TELEPHONE ENCOUNTER
----- Message from SHAMEKA Escalante - CNS sent at 3/19/2025  7:13 PM EDT -----  Her vitamin d is still low increase vitamin d to 2000 daily  LDL is up and need to increase crestor to 20 mg daily  Rest of blood work is great  I sent the crestor to her pharmacy  Thanks    Spoke to patient she would like the vitamin d sent to the pharmacy also.

## 2025-03-24 ENCOUNTER — RESULTS FOLLOW-UP (OUTPATIENT)
Dept: INTERNAL MEDICINE CLINIC | Age: 70
End: 2025-03-24

## 2025-04-11 ENCOUNTER — TELEPHONE (OUTPATIENT)
Dept: CARDIOLOGY CLINIC | Age: 70
End: 2025-04-11

## 2025-04-14 NOTE — TELEPHONE ENCOUNTER
Outcome  N/A today by [facility]  This medication or product is on your plan's list of covered drugs. Prior authorization is not required at this time. If your pharmacy has questions regarding the processing of your prescription, please have them call the [facility] pharmacy help desk at [phone].    If this requires a response please respond to the pool ( P MHCX PSC MEDICATION PRE-AUTH).      Thank you please advise patient.

## 2025-04-14 NOTE — TELEPHONE ENCOUNTER
Submitted PA for Entresto 24-26MG tablets  [facility] [id number]  STATUS: PENDING.    Follow up done daily; if no decision with in three days we will refax.  If another three days goes by with no decision will call the insurance for status.

## 2025-04-28 ENCOUNTER — TELEPHONE (OUTPATIENT)
Dept: INTERNAL MEDICINE CLINIC | Age: 70
End: 2025-04-28

## 2025-04-28 NOTE — TELEPHONE ENCOUNTER
Yelena Delgado RN Case Manager from [facility] called to verify receipt of paperwork and if it had been faxed back to [facility]. Phone number [phone] ext. [phone]

## 2025-05-06 ASSESSMENT — SLEEP AND FATIGUE QUESTIONNAIRES
HOW LIKELY ARE YOU TO NOD OFF OR FALL ASLEEP IN A CAR, WHILE STOPPED FOR A FEW MINUTES IN TRAFFIC: WOULD NEVER DOZE
HOW LIKELY ARE YOU TO NOD OFF OR FALL ASLEEP WHILE SITTING QUIETLY AFTER LUNCH WITHOUT ALCOHOL: SLIGHT CHANCE OF DOZING
HOW LIKELY ARE YOU TO NOD OFF OR FALL ASLEEP WHILE LYING DOWN TO REST IN THE AFTERNOON WHEN CIRCUMSTANCES PERMIT: SLIGHT CHANCE OF DOZING
HOW LIKELY ARE YOU TO NOD OFF OR FALL ASLEEP WHILE WATCHING TV: SLIGHT CHANCE OF DOZING
HOW LIKELY ARE YOU TO NOD OFF OR FALL ASLEEP WHEN YOU ARE A PASSENGER IN A CAR FOR AN HOUR WITHOUT A BREAK: SLIGHT CHANCE OF DOZING
HOW LIKELY ARE YOU TO NOD OFF OR FALL ASLEEP IN A CAR, WHILE STOPPED FOR A FEW MINUTES IN TRAFFIC: WOULD NEVER DOZE
HOW LIKELY ARE YOU TO NOD OFF OR FALL ASLEEP WHILE SITTING AND TALKING TO SOMEONE: WOULD NEVER DOZE
HOW LIKELY ARE YOU TO NOD OFF OR FALL ASLEEP WHILE SITTING INACTIVE IN A PUBLIC PLACE: WOULD NEVER DOZE
HOW LIKELY ARE YOU TO NOD OFF OR FALL ASLEEP WHEN YOU ARE A PASSENGER IN A CAR FOR AN HOUR WITHOUT A BREAK: SLIGHT CHANCE OF DOZING
HOW LIKELY ARE YOU TO NOD OFF OR FALL ASLEEP WHILE SITTING AND READING: WOULD NEVER DOZE
HOW LIKELY ARE YOU TO NOD OFF OR FALL ASLEEP WHILE LYING DOWN TO REST IN THE AFTERNOON WHEN CIRCUMSTANCES PERMIT: SLIGHT CHANCE OF DOZING
ESS TOTAL SCORE: 4
HOW LIKELY ARE YOU TO NOD OFF OR FALL ASLEEP WHILE SITTING QUIETLY AFTER LUNCH WITHOUT ALCOHOL: SLIGHT CHANCE OF DOZING
HOW LIKELY ARE YOU TO NOD OFF OR FALL ASLEEP WHILE WATCHING TV: SLIGHT CHANCE OF DOZING
HOW LIKELY ARE YOU TO NOD OFF OR FALL ASLEEP WHILE SITTING INACTIVE IN A PUBLIC PLACE: WOULD NEVER DOZE
HOW LIKELY ARE YOU TO NOD OFF OR FALL ASLEEP WHILE SITTING AND READING: WOULD NEVER DOZE
HOW LIKELY ARE YOU TO NOD OFF OR FALL ASLEEP WHILE SITTING AND TALKING TO SOMEONE: WOULD NEVER DOZE

## 2025-05-09 ENCOUNTER — OFFICE VISIT (OUTPATIENT)
Dept: PULMONOLOGY | Age: 70
End: 2025-05-09
Payer: COMMERCIAL

## 2025-05-09 VITALS
BODY MASS INDEX: 24.18 KG/M2 | OXYGEN SATURATION: 98 % | SYSTOLIC BLOOD PRESSURE: 110 MMHG | WEIGHT: 131.4 LBS | HEART RATE: 86 BPM | HEIGHT: 62 IN | DIASTOLIC BLOOD PRESSURE: 66 MMHG

## 2025-05-09 DIAGNOSIS — E11.65 TYPE 2 DIABETES MELLITUS WITH HYPERGLYCEMIA, WITHOUT LONG-TERM CURRENT USE OF INSULIN (HCC): ICD-10-CM

## 2025-05-09 DIAGNOSIS — I50.22 CHRONIC SYSTOLIC HEART FAILURE (HCC): Chronic | ICD-10-CM

## 2025-05-09 DIAGNOSIS — G47.33 OSA ON CPAP: Primary | ICD-10-CM

## 2025-05-09 DIAGNOSIS — I10 ESSENTIAL HYPERTENSION: ICD-10-CM

## 2025-05-09 DIAGNOSIS — I48.0 PAF (PAROXYSMAL ATRIAL FIBRILLATION) (HCC): ICD-10-CM

## 2025-05-09 PROCEDURE — G8420 CALC BMI NORM PARAMETERS: HCPCS | Performed by: NURSE PRACTITIONER

## 2025-05-09 PROCEDURE — 3017F COLORECTAL CA SCREEN DOC REV: CPT | Performed by: NURSE PRACTITIONER

## 2025-05-09 PROCEDURE — G8400 PT W/DXA NO RESULTS DOC: HCPCS | Performed by: NURSE PRACTITIONER

## 2025-05-09 PROCEDURE — 2022F DILAT RTA XM EVC RTNOPTHY: CPT | Performed by: NURSE PRACTITIONER

## 2025-05-09 PROCEDURE — 3078F DIAST BP <80 MM HG: CPT | Performed by: NURSE PRACTITIONER

## 2025-05-09 PROCEDURE — 1123F ACP DISCUSS/DSCN MKR DOCD: CPT | Performed by: NURSE PRACTITIONER

## 2025-05-09 PROCEDURE — 1036F TOBACCO NON-USER: CPT | Performed by: NURSE PRACTITIONER

## 2025-05-09 PROCEDURE — 3044F HG A1C LEVEL LT 7.0%: CPT | Performed by: NURSE PRACTITIONER

## 2025-05-09 PROCEDURE — G2211 COMPLEX E/M VISIT ADD ON: HCPCS | Performed by: NURSE PRACTITIONER

## 2025-05-09 PROCEDURE — 99214 OFFICE O/P EST MOD 30 MIN: CPT | Performed by: NURSE PRACTITIONER

## 2025-05-09 PROCEDURE — 1090F PRES/ABSN URINE INCON ASSESS: CPT | Performed by: NURSE PRACTITIONER

## 2025-05-09 PROCEDURE — 3074F SYST BP LT 130 MM HG: CPT | Performed by: NURSE PRACTITIONER

## 2025-05-09 PROCEDURE — G8427 DOCREV CUR MEDS BY ELIG CLIN: HCPCS | Performed by: NURSE PRACTITIONER

## 2025-05-09 NOTE — PROGRESS NOTES
Diagnosis: [x] ESCOBAR (G47.33) [] CSA (G47.31) [] Apnea (G47.30)   Length of Need: [x] 15 Months [] 99 Months [] Other:   Machine (LINDSAY!): [] Respironics Dream Station      Auto [] ResMed AirSense     Auto [] Other:     []  CPAP () [] Bilevel ()   Mode: [] Auto [] Spontaneous    Mode: [] Auto [] Spontaneous            Comfort Settings:      Humidifier: [] Heated ()        [x] Water chamber replacement ()/ 1 per 6 months        Mask:   [x] Nasal () /1 per 3 months [] Full Face () /1 per 3 months   [x] Patient choice -Size and fit mask [] Patient Choice - Size and fit mask   [] Dispense: [] Dispense:   [x] Headgear () / 1 per 3 months [] Headgear () / 1 per 3 months   [x] Replacement Nasal Cushion ()/2 per month [] Interface Replacement ()/1 per month   [] Replacement Nasal Pillows ()/2 per month         Tubing: [x] Heated ()/1 per 3 months    [] Standard ()/1 per 3 months [] Other:           Filters: [x] Non-disposable ()/1 per 6 months     [x] Ultra-Fine, Disposable ()/2 per month        Miscellaneous: [x] Chin Strap ()/ 1 per 6 months [] O2 bleed-in:        LPM   [] Oxymetry on CPAP/Bilevel []  Other:         Start Order Date: 05/09/25    MEDICAL JUSTIFICATION:  I, the undersigned, certify that the above prescribed supplies are medically necessary for this patient’s wellbeing.  In my opinion, the supplies are both reasonable and necessary in reference to accepted standards of medicalpractice in treatment of this patient’s condition.    KEO PEREZ NP    NPI: [id number]       Order Signed Date: 05/09/25  [facility]  Pulmonary, Sleep, and Critical Care    Pulmonary, Sleep, and Critical Care  [facility] [address]                          [address] [place] [address] [address]  [place] [address]                                    [place] [address]  Phone: [phone]    Fax:

## 2025-05-09 NOTE — PROGRESS NOTES
Diagnosis: [x] ESCOBAR (G47.33) [] CSA (G47.31) [] Apnea (G47.30)   Length of Need: [x] 15 Months [] 99 Months [] Other:   Machine (LINDSAY!): [] Respironics Dream Station      Auto [] ResMed AirSense     Auto [] Other:     []  CPAP () [] Bilevel ()   Mode: [] Auto [] Spontaneous    Mode: [] Auto [] Spontaneous            Comfort Settings:      Humidifier: [] Heated ()        [x] Water chamber replacement ()/ 1 per 6 months        Mask:   [x] Nasal () /1 per 3 months [] Full Face () /1 per 3 months   [x] Patient choice -Size and fit mask [] Patient Choice - Size and fit mask   [x] Dispense:Airtouch N30i [] Dispense:   [x] Headgear () / 1 per 3 months [] Headgear () / 1 per 3 months   [x] Replacement Nasal Cushion ()/2 per month [] Interface Replacement ()/1 per month   [] Replacement Nasal Pillows ()/2 per month         Tubing: [x] Heated ()/1 per 3 months    [] Standard ()/1 per 3 months [] Other:           Filters: [x] Non-disposable ()/1 per 6 months     [x] Ultra-Fine, Disposable ()/2 per month        Miscellaneous: [] Chin Strap ()/ 1 per 6 months [] O2 bleed-in:        LPM   [] Oxymetry on CPAP/Bilevel []  Other:         Start Order Date: 05/09/25    MEDICAL JUSTIFICATION:  I, the undersigned, certify that the above prescribed supplies are medically necessary for this patient’s wellbeing.  In my opinion, the supplies are both reasonable and necessary in reference to accepted standards of medicalpractice in treatment of this patient’s condition.    KEO PEREZ NP    NPI: [id number]       Order Signed Date: 05/09/25  [facility]  Pulmonary, Sleep, and Critical Care    Pulmonary, Sleep, and Critical Care  [address]                          [address] [facility], [address]  [place] [address]                                    [place] [address]  Phone: [phone]    Fax:

## 2025-05-09 NOTE — PROGRESS NOTES
[facility]  [address]  [address]  [place] [address]  P- [phone]  F- [phone]   [facility]YS[facility] [place] [facility] SLEEP MEDICINE  [address]  [address]  [facility] [address]  Dept: [phone]  Dept Fax: [phone]  Loc: [phone]      Assessment/Plan:      1. ESCOBAR on CPAP  Assessment & Plan:   Chronic-Stable: Reviewed and analyzed results of physiologic download from patient's machine and reviewed with patient.  Supplies and parts as needed for her machine.  These are medically necessary.  Limit caffeine use after 3pm. Based on the analyzed data will continue with current settings.  Encouraged her to use her machine as much as possible.  Discussed contacting her equipment company to schedule a mask fitting.  Discussed could consider going to [employer]'s office in [place] to review their masks and their show room and they can notify [facility] which mask she would like to try since she felt they were not many available in their office.  Discussed could consider the AirTouch N30i mask.  Will see her back in 2-3 months.  Encouraged her to contact the office with any questions or concerns    2. Chronic systolic heart failure (HCC)  Assessment & Plan:   Chronic- Stable.  Discussed the importance of treating obstructive sleep apnea as part of the management of this disorder.  Cont any meds per PCP and other physicians.    3. PAF (paroxysmal atrial fibrillation) (HCC)  Assessment & Plan:   Chronic- Stable.  Discussed the importance of treating obstructive sleep apnea as part of the management of this disorder.  Cont any meds per PCP and other physicians.    4. Essential hypertension  Assessment & Plan:   Chronic- Stable.  Discussed the importance of treating obstructive sleep apnea as part of the management of this disorder.  Cont any meds per PCP and other physicians.    5. Type 2 diabetes mellitus with

## 2025-05-09 NOTE — ASSESSMENT & PLAN NOTE
Chronic-Stable: Reviewed and analyzed results of physiologic download from patient's machine and reviewed with patient.  Supplies and parts as needed for her machine.  These are medically necessary.  Limit caffeine use after 3pm. Based on the analyzed data will continue with current settings.  Encouraged her to use her machine as much as possible.  Discussed contacting her equipment company to schedule a mask fitting.  Discussed could consider going to [employer]'s office in [place] to review their masks and their show room and they can notify [facility] which mask she would like to try since she felt they were not many available in their office.  Discussed could consider the AirTouch N30i mask.  Will see her back in 2-3 months.  Encouraged her to contact the office with any questions or concerns

## 2025-05-18 DIAGNOSIS — F41.9 ANXIETY: Chronic | ICD-10-CM

## 2025-05-19 RX ORDER — VILAZODONE HYDROCHLORIDE 20 MG/1
20 TABLET ORAL DAILY
Qty: 90 TABLET | Refills: 1 | Status: SHIPPED | OUTPATIENT
Start: 2025-05-19

## 2025-05-19 NOTE — TELEPHONE ENCOUNTER
Last OV: 3/19/2025  Next OV: Visit date not found    Next appointment due: 6/19/2025    Last fill: 11/19/2024  Refills: 1

## 2025-07-14 RX ORDER — SACUBITRIL AND VALSARTAN 24; 26 MG/1; MG/1
TABLET, FILM COATED ORAL
Qty: 180 TABLET | Refills: 1 | Status: SHIPPED | OUTPATIENT
Start: 2025-07-14

## 2025-07-14 NOTE — TELEPHONE ENCOUNTER
Medication Refill    Medication needing refilled:  ENTRESTO     Dosage of the medication:   24-26mg    How are you taking this medication (QD, BID, TID, QID, PRN):   TAKE ONE-HALF TABLET BY MOUTH EVERY MORNING AND 1 EVERY EVENING     30 or 90 day supply called in:  180    When will you run out of your medication:    Which Pharmacy are we sending the medication to?:     [id number]  [address] [facility] [address]  Phone: [phone]  Fax: [phone]

## 2025-07-17 NOTE — TELEPHONE ENCOUNTER
Group Topic: BH Therapeutic Activity    Date: 7/17/2025  Start Time: 1055  End Time: 1155  Facilitators: Yuan Malagon OT    Focus: Distress Tolerance  Number in attendance: 11    Method: Group  Attendance: Present  Participation: Active  Patient Response: Attentive  Mood: Depressed  Affect: Type: Depressed   Range: Blunted/flat   Congruency: Congruent   Stability: Stable  Behavior/Socialization: Appropriate to group and Supportive  Thought Process: Tracking  Task Performance: Follows directions  Patient Evaluation: Independent - full participation     I spoke with pt and relayed blood culture and lab results per MXA. Pt verbalized understanding.     Patient is having some pain +3-4 at the pacemaker site and still having dizziness as well

## 2025-08-13 RX ORDER — DIGOXIN 125 MCG
125 TABLET ORAL DAILY
Qty: 90 TABLET | Refills: 1 | Status: SHIPPED | OUTPATIENT
Start: 2025-08-13

## 2025-08-14 ASSESSMENT — SLEEP AND FATIGUE QUESTIONNAIRES
HOW LIKELY ARE YOU TO NOD OFF OR FALL ASLEEP WHILE SITTING INACTIVE IN A PUBLIC PLACE: WOULD NEVER DOZE
HOW LIKELY ARE YOU TO NOD OFF OR FALL ASLEEP WHILE WATCHING TV: SLIGHT CHANCE OF DOZING
HOW LIKELY ARE YOU TO NOD OFF OR FALL ASLEEP WHILE WATCHING TV: SLIGHT CHANCE OF DOZING
HOW LIKELY ARE YOU TO NOD OFF OR FALL ASLEEP WHILE LYING DOWN TO REST IN THE AFTERNOON WHEN CIRCUMSTANCES PERMIT: SLIGHT CHANCE OF DOZING
HOW LIKELY ARE YOU TO NOD OFF OR FALL ASLEEP WHILE SITTING AND READING: SLIGHT CHANCE OF DOZING
HOW LIKELY ARE YOU TO NOD OFF OR FALL ASLEEP WHILE SITTING QUIETLY AFTER LUNCH WITHOUT ALCOHOL: SLIGHT CHANCE OF DOZING
ESS TOTAL SCORE: 5
HOW LIKELY ARE YOU TO NOD OFF OR FALL ASLEEP WHILE SITTING INACTIVE IN A PUBLIC PLACE: WOULD NEVER DOZE
HOW LIKELY ARE YOU TO NOD OFF OR FALL ASLEEP WHILE SITTING AND TALKING TO SOMEONE: WOULD NEVER DOZE
HOW LIKELY ARE YOU TO NOD OFF OR FALL ASLEEP IN A CAR, WHILE STOPPED FOR A FEW MINUTES IN TRAFFIC: WOULD NEVER DOZE
HOW LIKELY ARE YOU TO NOD OFF OR FALL ASLEEP WHEN YOU ARE A PASSENGER IN A CAR FOR AN HOUR WITHOUT A BREAK: SLIGHT CHANCE OF DOZING
HOW LIKELY ARE YOU TO NOD OFF OR FALL ASLEEP WHILE SITTING QUIETLY AFTER LUNCH WITHOUT ALCOHOL: SLIGHT CHANCE OF DOZING
HOW LIKELY ARE YOU TO NOD OFF OR FALL ASLEEP WHILE SITTING AND TALKING TO SOMEONE: WOULD NEVER DOZE
HOW LIKELY ARE YOU TO NOD OFF OR FALL ASLEEP WHILE LYING DOWN TO REST IN THE AFTERNOON WHEN CIRCUMSTANCES PERMIT: SLIGHT CHANCE OF DOZING
HOW LIKELY ARE YOU TO NOD OFF OR FALL ASLEEP WHEN YOU ARE A PASSENGER IN A CAR FOR AN HOUR WITHOUT A BREAK: SLIGHT CHANCE OF DOZING
HOW LIKELY ARE YOU TO NOD OFF OR FALL ASLEEP WHILE SITTING AND READING: SLIGHT CHANCE OF DOZING
HOW LIKELY ARE YOU TO NOD OFF OR FALL ASLEEP IN A CAR, WHILE STOPPED FOR A FEW MINUTES IN TRAFFIC: WOULD NEVER DOZE

## 2025-08-15 ENCOUNTER — OFFICE VISIT (OUTPATIENT)
Dept: PULMONOLOGY | Age: 70
End: 2025-08-15
Payer: COMMERCIAL

## 2025-08-15 VITALS
HEART RATE: 88 BPM | HEIGHT: 62 IN | OXYGEN SATURATION: 97 % | DIASTOLIC BLOOD PRESSURE: 76 MMHG | SYSTOLIC BLOOD PRESSURE: 112 MMHG | WEIGHT: 132 LBS | BODY MASS INDEX: 24.29 KG/M2

## 2025-08-15 DIAGNOSIS — I50.22 CHRONIC SYSTOLIC HEART FAILURE (HCC): Chronic | ICD-10-CM

## 2025-08-15 DIAGNOSIS — E11.65 TYPE 2 DIABETES MELLITUS WITH HYPERGLYCEMIA, WITHOUT LONG-TERM CURRENT USE OF INSULIN (HCC): ICD-10-CM

## 2025-08-15 DIAGNOSIS — G47.33 OSA ON CPAP: Primary | ICD-10-CM

## 2025-08-15 DIAGNOSIS — I10 ESSENTIAL HYPERTENSION: ICD-10-CM

## 2025-08-15 PROCEDURE — 3074F SYST BP LT 130 MM HG: CPT | Performed by: NURSE PRACTITIONER

## 2025-08-15 PROCEDURE — 1123F ACP DISCUSS/DSCN MKR DOCD: CPT | Performed by: NURSE PRACTITIONER

## 2025-08-15 PROCEDURE — G8400 PT W/DXA NO RESULTS DOC: HCPCS | Performed by: NURSE PRACTITIONER

## 2025-08-15 PROCEDURE — 2022F DILAT RTA XM EVC RTNOPTHY: CPT | Performed by: NURSE PRACTITIONER

## 2025-08-15 PROCEDURE — 1036F TOBACCO NON-USER: CPT | Performed by: NURSE PRACTITIONER

## 2025-08-15 PROCEDURE — 99214 OFFICE O/P EST MOD 30 MIN: CPT | Performed by: NURSE PRACTITIONER

## 2025-08-15 PROCEDURE — 3017F COLORECTAL CA SCREEN DOC REV: CPT | Performed by: NURSE PRACTITIONER

## 2025-08-15 PROCEDURE — G8420 CALC BMI NORM PARAMETERS: HCPCS | Performed by: NURSE PRACTITIONER

## 2025-08-15 PROCEDURE — G8427 DOCREV CUR MEDS BY ELIG CLIN: HCPCS | Performed by: NURSE PRACTITIONER

## 2025-08-15 PROCEDURE — 3044F HG A1C LEVEL LT 7.0%: CPT | Performed by: NURSE PRACTITIONER

## 2025-08-15 PROCEDURE — 3078F DIAST BP <80 MM HG: CPT | Performed by: NURSE PRACTITIONER

## 2025-08-15 PROCEDURE — G2211 COMPLEX E/M VISIT ADD ON: HCPCS | Performed by: NURSE PRACTITIONER

## 2025-08-15 PROCEDURE — 1090F PRES/ABSN URINE INCON ASSESS: CPT | Performed by: NURSE PRACTITIONER

## 2025-08-18 ENCOUNTER — HOSPITAL ENCOUNTER (OUTPATIENT)
Age: 70
Discharge: HOME | End: 2025-08-20
Payer: COMMERCIAL

## 2025-08-18 ENCOUNTER — OFFICE VISIT (OUTPATIENT)
Dept: CARDIOLOGY CLINIC | Age: 70
End: 2025-08-18
Payer: COMMERCIAL

## 2025-08-18 VITALS
BODY MASS INDEX: 24.29 KG/M2 | DIASTOLIC BLOOD PRESSURE: 61 MMHG | HEIGHT: 62 IN | SYSTOLIC BLOOD PRESSURE: 106 MMHG | WEIGHT: 132 LBS

## 2025-08-18 VITALS
HEART RATE: 61 BPM | BODY MASS INDEX: 24.48 KG/M2 | SYSTOLIC BLOOD PRESSURE: 100 MMHG | DIASTOLIC BLOOD PRESSURE: 78 MMHG | OXYGEN SATURATION: 96 % | WEIGHT: 133 LBS | HEIGHT: 62 IN

## 2025-08-18 DIAGNOSIS — I48.0 PAF (PAROXYSMAL ATRIAL FIBRILLATION) (HCC): ICD-10-CM

## 2025-08-18 DIAGNOSIS — I50.22 CHRONIC SYSTOLIC CONGESTIVE HEART FAILURE (HCC): ICD-10-CM

## 2025-08-18 DIAGNOSIS — Z95.810 BIVENTRICULAR ICD (IMPLANTABLE CARDIOVERTER-DEFIBRILLATOR) IN PLACE: ICD-10-CM

## 2025-08-18 DIAGNOSIS — E78.5 HYPERLIPIDEMIA, UNSPECIFIED HYPERLIPIDEMIA TYPE: ICD-10-CM

## 2025-08-18 DIAGNOSIS — I50.22 CHRONIC SYSTOLIC CONGESTIVE HEART FAILURE (HCC): Primary | ICD-10-CM

## 2025-08-18 DIAGNOSIS — I42.8 NON-ISCHEMIC CARDIOMYOPATHY (HCC): ICD-10-CM

## 2025-08-18 DIAGNOSIS — E55.9 VITAMIN D DEFICIENCY: ICD-10-CM

## 2025-08-18 LAB
ECHO AO ASC DIAM: 3.2 CM
ECHO AO ASCENDING AORTA INDEX: 2 CM/M2
ECHO AO ROOT DIAM: 2.8 CM
ECHO AO ROOT INDEX: 1.75 CM/M2
ECHO AV AREA PEAK VELOCITY: 2.6 CM2
ECHO AV AREA VTI: 2.5 CM2
ECHO AV AREA/BSA PEAK VELOCITY: 1.6 CM2/M2
ECHO AV AREA/BSA VTI: 1.6 CM2/M2
ECHO AV MEAN GRADIENT: 5 MMHG
ECHO AV MEAN VELOCITY: 1 M/S
ECHO AV PEAK GRADIENT: 9 MMHG
ECHO AV PEAK VELOCITY: 1.5 M/S
ECHO AV VELOCITY RATIO: 0.87
ECHO AV VTI: 31.8 CM
ECHO BSA: 1.62 M2
ECHO EST RA PRESSURE: 3 MMHG
ECHO LA AREA 2C: 13.6 CM2
ECHO LA AREA 4C: 13 CM2
ECHO LA MAJOR AXIS: 5.2 CM
ECHO LA MINOR AXIS: 4.6 CM
ECHO LA VOL BP: 30 ML (ref 22–52)
ECHO LA VOL MOD A2C: 33 ML (ref 22–52)
ECHO LA VOL MOD A4C: 26 ML (ref 22–52)
ECHO LA VOL/BSA BIPLANE: 19 ML/M2 (ref 16–34)
ECHO LA VOLUME INDEX MOD A2C: 21 ML/M2 (ref 16–34)
ECHO LA VOLUME INDEX MOD A4C: 16 ML/M2 (ref 16–34)
ECHO LV E' LATERAL VELOCITY: 4.95 CM/S
ECHO LV E' SEPTAL VELOCITY: 6.08 CM/S
ECHO LV EDV A2C: 113 ML
ECHO LV EDV A4C: 111 ML
ECHO LV EDV INDEX A4C: 69 ML/M2
ECHO LV EDV NDEX A2C: 71 ML/M2
ECHO LV EF PHYSICIAN: 30 %
ECHO LV EJECTION FRACTION A2C: 36 %
ECHO LV EJECTION FRACTION A4C: 37 %
ECHO LV EJECTION FRACTION BIPLANE: 36 % (ref 55–100)
ECHO LV ESV A2C: 73 ML
ECHO LV ESV A4C: 70 ML
ECHO LV ESV INDEX A2C: 46 ML/M2
ECHO LV ESV INDEX A4C: 44 ML/M2
ECHO LVOT AREA: 2.8 CM2
ECHO LVOT AV VTI INDEX: 0.87
ECHO LVOT DIAM: 1.9 CM
ECHO LVOT MEAN GRADIENT: 4 MMHG
ECHO LVOT PEAK GRADIENT: 7 MMHG
ECHO LVOT PEAK VELOCITY: 1.3 M/S
ECHO LVOT STROKE VOLUME INDEX: 49.2 ML/M2
ECHO LVOT SV: 78.8 ML
ECHO LVOT VTI: 27.8 CM
ECHO MV A VELOCITY: 0.82 M/S
ECHO MV AREA VTI: 2.9 CM2
ECHO MV E VELOCITY: 0.47 M/S
ECHO MV E/A RATIO: 0.57
ECHO MV E/E' LATERAL: 9.49
ECHO MV E/E' RATIO (AVERAGED): 8.61
ECHO MV E/E' SEPTAL: 7.73
ECHO MV LVOT VTI INDEX: 0.99
ECHO MV MAX VELOCITY: 0.8 M/S
ECHO MV MEAN GRADIENT: 1 MMHG
ECHO MV MEAN VELOCITY: 0.5 M/S
ECHO MV PEAK GRADIENT: 3 MMHG
ECHO MV VTI: 27.6 CM
ECHO PV MAX VELOCITY: 1 M/S
ECHO PV PEAK GRADIENT: 4 MMHG
ECHO RA AREA 4C: 16.2 CM2
ECHO RA END SYSTOLIC VOLUME APICAL 4 CHAMBER INDEX BSA: 28 ML/M2
ECHO RA VOLUME: 45 ML
ECHO RIGHT VENTRICULAR SYSTOLIC PRESSURE (RVSP): 17 MMHG
ECHO RV BASAL DIMENSION: 4.7 CM
ECHO RV FRACTIONAL AREA CHANGE: 32 %
ECHO RV FREE WALL PEAK S': 10.7 CM/S
ECHO RV MID DIMENSION: 3 CM
ECHO RV TAPSE: 2.2 CM (ref 1.7–?)
ECHO TV REGURGITANT MAX VELOCITY: 1.87 M/S
ECHO TV REGURGITANT PEAK GRADIENT: 14 MMHG

## 2025-08-18 PROCEDURE — 99214 OFFICE O/P EST MOD 30 MIN: CPT | Performed by: CLINICAL NURSE SPECIALIST

## 2025-08-18 PROCEDURE — G8427 DOCREV CUR MEDS BY ELIG CLIN: HCPCS | Performed by: CLINICAL NURSE SPECIALIST

## 2025-08-18 PROCEDURE — 3078F DIAST BP <80 MM HG: CPT | Performed by: CLINICAL NURSE SPECIALIST

## 2025-08-18 PROCEDURE — 93306 TTE W/DOPPLER COMPLETE: CPT | Performed by: INTERNAL MEDICINE

## 2025-08-18 PROCEDURE — G8420 CALC BMI NORM PARAMETERS: HCPCS | Performed by: CLINICAL NURSE SPECIALIST

## 2025-08-18 PROCEDURE — G8400 PT W/DXA NO RESULTS DOC: HCPCS | Performed by: CLINICAL NURSE SPECIALIST

## 2025-08-18 PROCEDURE — 3017F COLORECTAL CA SCREEN DOC REV: CPT | Performed by: CLINICAL NURSE SPECIALIST

## 2025-08-18 PROCEDURE — 3074F SYST BP LT 130 MM HG: CPT | Performed by: CLINICAL NURSE SPECIALIST

## 2025-08-18 PROCEDURE — C8929 TTE W OR WO FOL WCON,DOPPLER: HCPCS

## 2025-08-18 PROCEDURE — 6360000004 HC RX CONTRAST MEDICATION: Performed by: CLINICAL NURSE SPECIALIST

## 2025-08-18 PROCEDURE — 1036F TOBACCO NON-USER: CPT | Performed by: CLINICAL NURSE SPECIALIST

## 2025-08-18 PROCEDURE — 1123F ACP DISCUSS/DSCN MKR DOCD: CPT | Performed by: CLINICAL NURSE SPECIALIST

## 2025-08-18 PROCEDURE — 1090F PRES/ABSN URINE INCON ASSESS: CPT | Performed by: CLINICAL NURSE SPECIALIST

## 2025-08-18 RX ADMIN — SULFUR HEXAFLUORIDE 2 ML: 60.7; .19; .19 INJECTION, POWDER, LYOPHILIZED, FOR SUSPENSION INTRAVENOUS; INTRAVESICAL at 14:59

## 2025-08-20 ENCOUNTER — HOSPITAL ENCOUNTER (OUTPATIENT)
Age: 70
Discharge: HOME | End: 2025-08-20
Payer: COMMERCIAL

## 2025-08-20 DIAGNOSIS — I50.22 CHRONIC SYSTOLIC CONGESTIVE HEART FAILURE (HCC): ICD-10-CM

## 2025-08-20 DIAGNOSIS — E78.5 HYPERLIPIDEMIA, UNSPECIFIED HYPERLIPIDEMIA TYPE: ICD-10-CM

## 2025-08-20 LAB
ALBUMIN SERPL-MCNC: 4.4 G/DL (ref 3.4–5)
ALBUMIN/GLOB SERPL: 1.4 {RATIO} (ref 1.1–2.2)
ALP SERPL-CCNC: 60 U/L (ref 40–129)
ALT SERPL-CCNC: 25 U/L (ref 10–40)
ANION GAP SERPL CALCULATED.3IONS-SCNC: 10 MMOL/L (ref 3–16)
AST SERPL-CCNC: 28 U/L (ref 15–37)
BILIRUB SERPL-MCNC: 0.8 MG/DL (ref 0–1)
BUN SERPL-MCNC: 21 MG/DL (ref 7–20)
CALCIUM SERPL-MCNC: 9.5 MG/DL (ref 8.3–10.6)
CHLORIDE SERPL-SCNC: 103 MMOL/L (ref 99–110)
CHOLEST SERPL-MCNC: 146 MG/DL (ref 0–199)
CO2 SERPL-SCNC: 25 MMOL/L (ref 21–32)
CREAT SERPL-MCNC: 0.8 MG/DL (ref 0.6–1.2)
DEPRECATED RDW RBC AUTO: 13.5 % (ref 12.4–15.4)
GFR SERPLBLD CREATININE-BSD FMLA CKD-EPI: 78 ML/MIN/{1.73_M2}
GLUCOSE SERPL-MCNC: 145 MG/DL (ref 70–99)
HCT VFR BLD AUTO: 49.8 % (ref 36–48)
HDLC SERPL-MCNC: 43 MG/DL (ref 40–60)
HGB BLD-MCNC: 16.7 G/DL (ref 12–16)
LDLC SERPL CALC-MCNC: 67 MG/DL
MCH RBC QN AUTO: 30.5 PG (ref 26–34)
MCHC RBC AUTO-ENTMCNC: 33.4 G/DL (ref 31–36)
MCV RBC AUTO: 91.2 FL (ref 80–100)
NT-PROBNP SERPL-MCNC: 175 PG/ML (ref 0–124)
PLATELET # BLD AUTO: 99 K/UL (ref 135–450)
PMV BLD AUTO: 11.4 FL (ref 5–10.5)
POTASSIUM SERPL-SCNC: 4.4 MMOL/L (ref 3.5–5.1)
PROT SERPL-MCNC: 7.6 G/DL (ref 6.4–8.2)
RBC # BLD AUTO: 5.46 M/UL (ref 4–5.2)
SODIUM SERPL-SCNC: 138 MMOL/L (ref 136–145)
TRIGL SERPL-MCNC: 179 MG/DL (ref 0–150)
VLDLC SERPL CALC-MCNC: 36 MG/DL
WBC # BLD AUTO: 5.6 K/UL (ref 4–11)

## 2025-08-20 PROCEDURE — 85027 COMPLETE CBC AUTOMATED: CPT

## 2025-08-20 PROCEDURE — 36415 COLL VENOUS BLD VENIPUNCTURE: CPT

## 2025-08-20 PROCEDURE — 83880 ASSAY OF NATRIURETIC PEPTIDE: CPT

## 2025-08-20 PROCEDURE — 80061 LIPID PANEL: CPT

## 2025-08-20 PROCEDURE — 80053 COMPREHEN METABOLIC PANEL: CPT

## (undated) DEVICE — DRESSING BORDERED ADH GZ UNIV GEN USE 5IN 4IN AND 2 1/2IN

## (undated) DEVICE — MASIMOSET LNCS ADTX SPO2 ADULT PULSE OXIMETER ADHESIVE SENSOR: Brand: MASIMOSET® LNCS® ADTX SPO2 ADULT PULSE OXIMETER ADHESIVE SENSOR

## (undated) DEVICE — SWAN-GANZ BIPOLAR PACING CATHETER: Brand: SWAN-GANZ

## (undated) DEVICE — INTRODUCER SHTH L13CM OD7FR SH ORNG HUB SEAMLESS SAFSHTH

## (undated) DEVICE — SET VLV 3 PC AWS DISPOSABLE GRDIAN SCOPEVALET

## (undated) DEVICE — FORCEPS BX L240CM WRK CHN 2.8MM STD CAP W/ NDL MIC MESH

## (undated) DEVICE — SUTURE ABSORBABLE L 18 IN SZ 4-0 NDL L 19 MM POLYGLACTIN 910 36/BX

## (undated) DEVICE — PENCIL SMK EVAC L10FT TBNG NONSTICK ESU BLDE PLUMEPEN ELITE

## (undated) DEVICE — PINNACLE INTRODUCER SHEATH: Brand: PINNACLE

## (undated) DEVICE — LLD ACCESSORY KIT: Brand: LLD ACCESSORY KIT

## (undated) DEVICE — Device: Brand: NOMOLINE™ LH ADULT NASAL CO2 CANNULA WITH O2 4M

## (undated) DEVICE — KIT WRNCH CARD W/ NO2 TORQ RATCH WHT HEX FOR CARD PACEMKR

## (undated) DEVICE — BW-412T DISP COMBO CLEANING BRUSH: Brand: SINGLE USE COMBINATION CLEANING BRUSH

## (undated) DEVICE — SUTURE VICRYL + SZ 3-0 L27IN ABSRB UD L26MM SH 1/2 CIR VCP416H

## (undated) DEVICE — ELECTRODE PT RET AD L9FT HI MOIST COND ADH HYDRGEL CORDED

## (undated) DEVICE — HI-TORQUE WHISPER MS GUIDE WIRE .014 J TIP 3.0 CM X 190 CM: Brand: HI-TORQUE WHISPER

## (undated) DEVICE — PROCEDURE KIT ENDOSCP CUST

## (undated) DEVICE — CORDIS J-WIRE 80CM

## (undated) DEVICE — Device

## (undated) DEVICE — CATHETER GUID L50CM OD3MM ID2.4MM L VENT COR SNUS HYDRPHLC

## (undated) DEVICE — MEDTRONIC ATTAIN BALLOON 6215-80

## (undated) DEVICE — LIGHT HANDLE COVER: Brand: UNBRANDED

## (undated) DEVICE — BRIDGE OCCLUSION CATHETER - 80 MM LENGTH BALLOON: Brand: BRIDGE™ OCCLUSION BALLOON

## (undated) DEVICE — ATTAIN SELECT + SURE VALVE 90 (6248V-90)

## (undated) DEVICE — KIT CATH 5FR L91CM GWIRE INTRO SHTH SET SYR STPCOCK BRDG

## (undated) DEVICE — PACEMAKER PACK: Brand: MEDLINE INDUSTRIES, INC.

## (undated) DEVICE — Device: Brand: TIGHTRAIL ROTATING DILATOR SHEATH

## (undated) DEVICE — PAD, DEFIB, ADULT, RADIOTRANS, PHYSIO: Brand: MEDLINE

## (undated) DEVICE — SUTURE PERMAHAND SZ 0 L30IN NONABSORBABLE BLK L26MM SH 1/2 K834H

## (undated) DEVICE — STRIP,CLOSURE,WOUND,MEDI-STRIP,1/2X4: Brand: MEDLINE

## (undated) DEVICE — MOUTHPIECE ENDOSCP L CTRL OPN AND SIDE PORTS DISP

## (undated) DEVICE — SLITTER LD GUID ADJ DISP

## (undated) DEVICE — LIQUIBAND RAPID ADHESIVE 36/CS 0.8ML: Brand: MEDLINE

## (undated) DEVICE — THE PHOTONBLADE IS AN RF DEVICE COUPLED WITH ILLUMINATION THAT IS INDICATED FOR CUTTING AND COAGULATION OF SOFT TISSUE DURING SURGICAL PROCEDURES. IT IS INTENDED TO BE USED WITH A VARIETY OF STANDARD COMMERCIALLY AVAILABLE ELECTROSURGICAL UNITS.: Brand: PHOTONBLADE

## (undated) DEVICE — Device: Brand: DISPOSABLE ELECTROSURGICAL SNARE

## (undated) DEVICE — Device: Brand: VISISHEATH DILATOR SHEATH

## (undated) DEVICE — GUIDEWIRE VASC ANGLED 035X150 M00146151B17

## (undated) DEVICE — Device: Brand: TIGHTRAIL SUB-C ROTATING DILATOR SHEATH

## (undated) DEVICE — SOLUTION IV IRRIG WATER 500ML POUR BRL ST 2F7113

## (undated) DEVICE — Device: Brand: LLD EZ LEAD LOCKING DEVICE

## (undated) DEVICE — CATHETER EP 6FR L120CM 5MM SPC 1MM BND QPLR TORQ CTRL

## (undated) DEVICE — 1LYRTR 16FR10ML100%SILTMPS SNP: Brand: MEDLINE INDUSTRIES, INC.

## (undated) DEVICE — PERCUTANEOUS ENTRY THINWALL NEEDLE  ONE-PART: Brand: COOK

## (undated) DEVICE — Device: Brand: LLD #2 LEAD LOCKING DEVICE

## (undated) DEVICE — LEAD CUTTER: Brand: LEAD CUTTER

## (undated) DEVICE — KIT MICROINTRODUCER 4FR ECHOGENIC NDL L7CM 21GA STIFF COAX

## (undated) DEVICE — PROBE COVER KIT: Brand: MEDLINE INDUSTRIES, INC.

## (undated) DEVICE — KIT INTRO 9FR L13CM DIA0.118IN SPLITTABLE HEMSTAT ROBUST

## (undated) DEVICE — RESTRAINT EXT ANK WRST LT BLU FOAM 2 STRP SIDE BCKL HK AND